# Patient Record
Sex: MALE | Race: WHITE | Employment: OTHER | URBAN - METROPOLITAN AREA
[De-identification: names, ages, dates, MRNs, and addresses within clinical notes are randomized per-mention and may not be internally consistent; named-entity substitution may affect disease eponyms.]

---

## 2017-01-04 ENCOUNTER — TELEPHONE (OUTPATIENT)
Dept: INTERNAL MEDICINE CLINIC | Age: 67
End: 2017-01-04

## 2017-01-15 DIAGNOSIS — J44.1 CHRONIC OBSTRUCTIVE PULMONARY DISEASE WITH ACUTE EXACERBATION (HCC): ICD-10-CM

## 2017-01-25 ENCOUNTER — TELEPHONE (OUTPATIENT)
Dept: INTERNAL MEDICINE CLINIC | Age: 67
End: 2017-01-25

## 2017-01-31 ENCOUNTER — TELEPHONE (OUTPATIENT)
Dept: INTERNAL MEDICINE CLINIC | Age: 67
End: 2017-01-31

## 2017-01-31 RX ORDER — LANCETS
EACH MISCELLANEOUS
Qty: 100 EACH | Refills: 5 | Status: SHIPPED | OUTPATIENT
Start: 2017-01-31 | End: 2017-03-27

## 2017-02-01 ENCOUNTER — TELEPHONE (OUTPATIENT)
Dept: PULMONOLOGY | Age: 67
End: 2017-02-01

## 2017-02-01 DIAGNOSIS — R91.1 PULMONARY NODULE: Primary | ICD-10-CM

## 2017-02-06 ENCOUNTER — TELEPHONE (OUTPATIENT)
Dept: INTERNAL MEDICINE CLINIC | Age: 67
End: 2017-02-06

## 2017-02-14 ENCOUNTER — TELEPHONE (OUTPATIENT)
Dept: INTERNAL MEDICINE CLINIC | Age: 67
End: 2017-02-14

## 2017-02-14 RX ORDER — GLIMEPIRIDE 4 MG/1
TABLET ORAL
Qty: 60 TABLET | Refills: 5 | Status: SHIPPED | OUTPATIENT
Start: 2017-02-14 | End: 2017-06-26 | Stop reason: SDUPTHER

## 2017-02-16 ENCOUNTER — HOSPITAL ENCOUNTER (OUTPATIENT)
Dept: CT IMAGING | Age: 67
Discharge: HOME | End: 2017-02-16
Attending: INTERNAL MEDICINE | Admitting: INTERNAL MEDICINE

## 2017-02-16 DIAGNOSIS — R91.1 SOLITARY PULMONARY NODULE: ICD-10-CM

## 2017-02-16 DIAGNOSIS — R91.1 PULMONARY NODULE: ICD-10-CM

## 2017-02-24 ENCOUNTER — TELEPHONE (OUTPATIENT)
Dept: PULMONOLOGY | Age: 67
End: 2017-02-24

## 2017-03-10 ENCOUNTER — OFFICE VISIT (OUTPATIENT)
Dept: INTERNAL MEDICINE CLINIC | Age: 67
End: 2017-03-10

## 2017-03-10 VITALS
OXYGEN SATURATION: 98 % | DIASTOLIC BLOOD PRESSURE: 86 MMHG | SYSTOLIC BLOOD PRESSURE: 144 MMHG | WEIGHT: 194 LBS | HEART RATE: 68 BPM | BODY MASS INDEX: 26.31 KG/M2

## 2017-03-10 DIAGNOSIS — E78.2 MIXED HYPERLIPIDEMIA: Chronic | ICD-10-CM

## 2017-03-10 DIAGNOSIS — Z79.4 TYPE 2 DIABETES MELLITUS WITHOUT COMPLICATION, WITH LONG-TERM CURRENT USE OF INSULIN (HCC): Primary | ICD-10-CM

## 2017-03-10 DIAGNOSIS — I10 ESSENTIAL HYPERTENSION: Chronic | ICD-10-CM

## 2017-03-10 DIAGNOSIS — E11.9 TYPE 2 DIABETES MELLITUS WITHOUT COMPLICATION, WITH LONG-TERM CURRENT USE OF INSULIN (HCC): Primary | ICD-10-CM

## 2017-03-10 DIAGNOSIS — N18.30 CHRONIC KIDNEY DISEASE, STAGE III (MODERATE) (HCC): ICD-10-CM

## 2017-03-10 PROCEDURE — 99214 OFFICE O/P EST MOD 30 MIN: CPT | Performed by: INTERNAL MEDICINE

## 2017-03-10 RX ORDER — SERTRALINE HYDROCHLORIDE 100 MG/1
150 TABLET, FILM COATED ORAL DAILY
Qty: 135 TABLET | Refills: 1 | Status: SHIPPED | OUTPATIENT
Start: 2017-03-10 | End: 2017-08-29

## 2017-03-10 RX ORDER — ATORVASTATIN CALCIUM 80 MG/1
TABLET, FILM COATED ORAL
Qty: 90 TABLET | Refills: 1 | Status: SHIPPED | OUTPATIENT
Start: 2017-03-10 | End: 2017-08-29

## 2017-03-10 RX ORDER — FENOFIBRATE 160 MG/1
TABLET ORAL
Qty: 90 TABLET | Refills: 1 | Status: SHIPPED | OUTPATIENT
Start: 2017-03-10 | End: 2017-08-29

## 2017-03-10 RX ORDER — AMLODIPINE BESYLATE 5 MG/1
TABLET ORAL
Qty: 30 TABLET | Refills: 5 | Status: SHIPPED | OUTPATIENT
Start: 2017-03-10 | End: 2017-06-16 | Stop reason: SDUPTHER

## 2017-03-13 ENCOUNTER — OFFICE VISIT (OUTPATIENT)
Dept: PULMONOLOGY | Age: 67
End: 2017-03-13

## 2017-03-13 VITALS
SYSTOLIC BLOOD PRESSURE: 152 MMHG | WEIGHT: 195 LBS | DIASTOLIC BLOOD PRESSURE: 90 MMHG | HEART RATE: 85 BPM | BODY MASS INDEX: 26.45 KG/M2

## 2017-03-13 DIAGNOSIS — I10 ESSENTIAL HYPERTENSION: Chronic | ICD-10-CM

## 2017-03-13 DIAGNOSIS — J43.2 CENTRILOBULAR EMPHYSEMA (HCC): ICD-10-CM

## 2017-03-13 DIAGNOSIS — R91.1 PULMONARY NODULE: Primary | ICD-10-CM

## 2017-03-13 DIAGNOSIS — J44.9 COPD, MODERATE (HCC): ICD-10-CM

## 2017-03-13 PROCEDURE — 99214 OFFICE O/P EST MOD 30 MIN: CPT | Performed by: INTERNAL MEDICINE

## 2017-03-27 RX ORDER — LANCETS
EACH MISCELLANEOUS
Qty: 150 EACH | Refills: 5 | Status: SHIPPED | OUTPATIENT
Start: 2017-03-27 | End: 2017-05-23 | Stop reason: SDUPTHER

## 2017-03-29 ENCOUNTER — TELEPHONE (OUTPATIENT)
Dept: INTERNAL MEDICINE CLINIC | Age: 67
End: 2017-03-29

## 2017-03-30 ENCOUNTER — OFFICE VISIT (OUTPATIENT)
Dept: INTERNAL MEDICINE CLINIC | Age: 67
End: 2017-03-30

## 2017-03-30 VITALS
HEART RATE: 76 BPM | RESPIRATION RATE: 95 BRPM | TEMPERATURE: 98.3 F | WEIGHT: 190 LBS | BODY MASS INDEX: 25.77 KG/M2 | DIASTOLIC BLOOD PRESSURE: 80 MMHG | SYSTOLIC BLOOD PRESSURE: 130 MMHG

## 2017-03-30 DIAGNOSIS — I10 ESSENTIAL HYPERTENSION: Chronic | ICD-10-CM

## 2017-03-30 DIAGNOSIS — J44.0 COPD WITH ACUTE BRONCHITIS (HCC): Primary | ICD-10-CM

## 2017-03-30 DIAGNOSIS — E78.2 MIXED HYPERLIPIDEMIA: Chronic | ICD-10-CM

## 2017-03-30 DIAGNOSIS — Z79.4 TYPE 2 DIABETES MELLITUS WITHOUT COMPLICATION, WITH LONG-TERM CURRENT USE OF INSULIN (HCC): ICD-10-CM

## 2017-03-30 DIAGNOSIS — E11.9 TYPE 2 DIABETES MELLITUS WITHOUT COMPLICATION, WITH LONG-TERM CURRENT USE OF INSULIN (HCC): ICD-10-CM

## 2017-03-30 DIAGNOSIS — J20.9 COPD WITH ACUTE BRONCHITIS (HCC): Primary | ICD-10-CM

## 2017-03-30 LAB
A/G RATIO: 1.4 (ref 1.1–2.2)
ALBUMIN SERPL-MCNC: 4.6 G/DL (ref 3.4–5)
ALP BLD-CCNC: 55 U/L (ref 40–129)
ALT SERPL-CCNC: 22 U/L (ref 10–40)
ANION GAP SERPL CALCULATED.3IONS-SCNC: 16 MMOL/L (ref 3–16)
AST SERPL-CCNC: 26 U/L (ref 15–37)
BILIRUB SERPL-MCNC: 0.4 MG/DL (ref 0–1)
BUN BLDV-MCNC: 25 MG/DL (ref 7–20)
CALCIUM SERPL-MCNC: 10.2 MG/DL (ref 8.3–10.6)
CHLORIDE BLD-SCNC: 100 MMOL/L (ref 99–110)
CHOLESTEROL, TOTAL: 164 MG/DL (ref 0–199)
CO2: 26 MMOL/L (ref 21–32)
CREAT SERPL-MCNC: 1.7 MG/DL (ref 0.8–1.3)
GFR AFRICAN AMERICAN: 49
GFR NON-AFRICAN AMERICAN: 41
GLOBULIN: 3.2 G/DL
GLUCOSE BLD-MCNC: 132 MG/DL (ref 70–99)
HDLC SERPL-MCNC: 30 MG/DL (ref 40–60)
LDL CHOLESTEROL CALCULATED: 81 MG/DL
POTASSIUM SERPL-SCNC: 4.4 MMOL/L (ref 3.5–5.1)
SODIUM BLD-SCNC: 142 MMOL/L (ref 136–145)
TOTAL PROTEIN: 7.8 G/DL (ref 6.4–8.2)
TRIGL SERPL-MCNC: 264 MG/DL (ref 0–150)
VLDLC SERPL CALC-MCNC: 53 MG/DL

## 2017-03-30 PROCEDURE — 99213 OFFICE O/P EST LOW 20 MIN: CPT | Performed by: INTERNAL MEDICINE

## 2017-03-30 RX ORDER — AZITHROMYCIN 250 MG/1
TABLET, FILM COATED ORAL
Qty: 1 PACKET | Refills: 0 | Status: SHIPPED | OUTPATIENT
Start: 2017-03-30 | End: 2017-04-09

## 2017-03-30 RX ORDER — GUAIFENESIN 1200 MG/1
1 TABLET, EXTENDED RELEASE ORAL 2 TIMES DAILY
Qty: 30 TABLET | Refills: 0 | COMMUNITY
Start: 2017-03-30 | End: 2018-07-06 | Stop reason: ALTCHOICE

## 2017-03-30 RX ORDER — ALBUTEROL SULFATE 90 UG/1
AEROSOL, METERED RESPIRATORY (INHALATION)
Qty: 1 INHALER | Refills: 3 | Status: SHIPPED | OUTPATIENT
Start: 2017-03-30 | End: 2018-04-06 | Stop reason: SDUPTHER

## 2017-03-31 LAB
ESTIMATED AVERAGE GLUCOSE: 139.9 MG/DL
HBA1C MFR BLD: 6.5 %

## 2017-05-23 ENCOUNTER — TELEPHONE (OUTPATIENT)
Dept: INTERNAL MEDICINE CLINIC | Age: 67
End: 2017-05-23

## 2017-05-23 RX ORDER — LANCETS
EACH MISCELLANEOUS
Qty: 150 EACH | Refills: 5 | Status: SHIPPED | OUTPATIENT
Start: 2017-05-23 | End: 2017-07-20 | Stop reason: SDUPTHER

## 2017-06-09 ENCOUNTER — OFFICE VISIT (OUTPATIENT)
Dept: INTERNAL MEDICINE CLINIC | Age: 67
End: 2017-06-09

## 2017-06-09 VITALS
DIASTOLIC BLOOD PRESSURE: 70 MMHG | WEIGHT: 191 LBS | OXYGEN SATURATION: 97 % | HEART RATE: 70 BPM | SYSTOLIC BLOOD PRESSURE: 140 MMHG | BODY MASS INDEX: 25.9 KG/M2

## 2017-06-09 DIAGNOSIS — F32.5 MAJOR DEPRESSIVE DISORDER WITH SINGLE EPISODE, IN FULL REMISSION (HCC): ICD-10-CM

## 2017-06-09 DIAGNOSIS — Z79.4 TYPE 2 DIABETES MELLITUS WITHOUT COMPLICATION, WITH LONG-TERM CURRENT USE OF INSULIN (HCC): Primary | ICD-10-CM

## 2017-06-09 DIAGNOSIS — E11.9 TYPE 2 DIABETES MELLITUS WITHOUT COMPLICATION, WITH LONG-TERM CURRENT USE OF INSULIN (HCC): Primary | ICD-10-CM

## 2017-06-09 DIAGNOSIS — N18.30 CHRONIC KIDNEY DISEASE, STAGE III (MODERATE) (HCC): ICD-10-CM

## 2017-06-09 DIAGNOSIS — E78.2 MIXED HYPERLIPIDEMIA: Chronic | ICD-10-CM

## 2017-06-09 DIAGNOSIS — I10 ESSENTIAL HYPERTENSION: Chronic | ICD-10-CM

## 2017-06-09 PROCEDURE — 90471 IMMUNIZATION ADMIN: CPT | Performed by: INTERNAL MEDICINE

## 2017-06-09 PROCEDURE — 99214 OFFICE O/P EST MOD 30 MIN: CPT | Performed by: INTERNAL MEDICINE

## 2017-06-09 PROCEDURE — 90670 PCV13 VACCINE IM: CPT | Performed by: INTERNAL MEDICINE

## 2017-06-09 ASSESSMENT — PATIENT HEALTH QUESTIONNAIRE - PHQ9
SUM OF ALL RESPONSES TO PHQ9 QUESTIONS 1 & 2: 0
2. FEELING DOWN, DEPRESSED OR HOPELESS: 0
1. LITTLE INTEREST OR PLEASURE IN DOING THINGS: 0
SUM OF ALL RESPONSES TO PHQ QUESTIONS 1-9: 0

## 2017-06-15 ENCOUNTER — TELEPHONE (OUTPATIENT)
Dept: INTERNAL MEDICINE CLINIC | Age: 67
End: 2017-06-15

## 2017-06-15 DIAGNOSIS — E11.9 TYPE 2 DIABETES MELLITUS WITHOUT COMPLICATION, WITH LONG-TERM CURRENT USE OF INSULIN (HCC): ICD-10-CM

## 2017-06-15 DIAGNOSIS — I10 ESSENTIAL HYPERTENSION: Chronic | ICD-10-CM

## 2017-06-15 DIAGNOSIS — Z79.4 TYPE 2 DIABETES MELLITUS WITHOUT COMPLICATION, WITH LONG-TERM CURRENT USE OF INSULIN (HCC): ICD-10-CM

## 2017-06-15 DIAGNOSIS — E78.2 MIXED HYPERLIPIDEMIA: Chronic | ICD-10-CM

## 2017-06-15 LAB
A/G RATIO: 1.4 (ref 1.1–2.2)
ALBUMIN SERPL-MCNC: 4.2 G/DL (ref 3.4–5)
ALP BLD-CCNC: 51 U/L (ref 40–129)
ALT SERPL-CCNC: 15 U/L (ref 10–40)
ANION GAP SERPL CALCULATED.3IONS-SCNC: 14 MMOL/L (ref 3–16)
AST SERPL-CCNC: 24 U/L (ref 15–37)
BILIRUB SERPL-MCNC: <0.2 MG/DL (ref 0–1)
BUN BLDV-MCNC: 26 MG/DL (ref 7–20)
CALCIUM SERPL-MCNC: 9.7 MG/DL (ref 8.3–10.6)
CHLORIDE BLD-SCNC: 102 MMOL/L (ref 99–110)
CHOLESTEROL, FASTING: 143 MG/DL (ref 0–199)
CO2: 28 MMOL/L (ref 21–32)
CREAT SERPL-MCNC: 1.5 MG/DL (ref 0.8–1.3)
GFR AFRICAN AMERICAN: 57
GFR NON-AFRICAN AMERICAN: 47
GLOBULIN: 3 G/DL
GLUCOSE FASTING: 100 MG/DL (ref 70–99)
HDLC SERPL-MCNC: 28 MG/DL (ref 40–60)
LDL CHOLESTEROL CALCULATED: 69 MG/DL
POTASSIUM SERPL-SCNC: 4.1 MMOL/L (ref 3.5–5.1)
SODIUM BLD-SCNC: 144 MMOL/L (ref 136–145)
TOTAL PROTEIN: 7.2 G/DL (ref 6.4–8.2)
TRIGLYCERIDE, FASTING: 229 MG/DL (ref 0–150)
VLDLC SERPL CALC-MCNC: 46 MG/DL

## 2017-06-16 ENCOUNTER — TELEPHONE (OUTPATIENT)
Dept: INTERNAL MEDICINE CLINIC | Age: 67
End: 2017-06-16

## 2017-06-16 LAB
ESTIMATED AVERAGE GLUCOSE: 125.5 MG/DL
HBA1C MFR BLD: 6 %

## 2017-06-16 RX ORDER — AMLODIPINE BESYLATE 5 MG/1
TABLET ORAL
Qty: 60 TABLET | Refills: 5 | Status: SHIPPED | OUTPATIENT
Start: 2017-06-16 | End: 2017-08-29

## 2017-06-26 ENCOUNTER — TELEPHONE (OUTPATIENT)
Dept: INTERNAL MEDICINE CLINIC | Age: 67
End: 2017-06-26

## 2017-06-26 ENCOUNTER — OFFICE VISIT (OUTPATIENT)
Dept: INTERNAL MEDICINE CLINIC | Age: 67
End: 2017-06-26

## 2017-06-26 VITALS
WEIGHT: 191.2 LBS | OXYGEN SATURATION: 95 % | HEART RATE: 75 BPM | DIASTOLIC BLOOD PRESSURE: 72 MMHG | BODY MASS INDEX: 25.93 KG/M2 | SYSTOLIC BLOOD PRESSURE: 152 MMHG

## 2017-06-26 DIAGNOSIS — I10 ESSENTIAL HYPERTENSION: Primary | Chronic | ICD-10-CM

## 2017-06-26 PROCEDURE — 99212 OFFICE O/P EST SF 10 MIN: CPT | Performed by: NURSE PRACTITIONER

## 2017-06-26 RX ORDER — GLIMEPIRIDE 4 MG/1
TABLET ORAL
Qty: 60 TABLET | Refills: 5 | Status: SHIPPED | OUTPATIENT
Start: 2017-06-26 | End: 2018-02-22

## 2017-06-26 RX ORDER — LISINOPRIL 30 MG/1
30 TABLET ORAL DAILY
Qty: 30 TABLET | Refills: 3 | Status: SHIPPED | OUTPATIENT
Start: 2017-06-26 | End: 2017-09-15 | Stop reason: SDUPTHER

## 2017-06-26 RX ORDER — HYDROCHLOROTHIAZIDE 12.5 MG/1
12.5 CAPSULE, GELATIN COATED ORAL DAILY
Qty: 30 CAPSULE | Refills: 3 | Status: SHIPPED | OUTPATIENT
Start: 2017-06-26 | End: 2017-10-25 | Stop reason: SDUPTHER

## 2017-06-28 DIAGNOSIS — J44.1 CHRONIC OBSTRUCTIVE PULMONARY DISEASE WITH ACUTE EXACERBATION (HCC): ICD-10-CM

## 2017-07-19 ENCOUNTER — TELEPHONE (OUTPATIENT)
Dept: INTERNAL MEDICINE CLINIC | Age: 67
End: 2017-07-19

## 2017-07-20 RX ORDER — LANCETS
EACH MISCELLANEOUS
Qty: 150 EACH | Refills: 5 | Status: SHIPPED | OUTPATIENT
Start: 2017-07-20 | End: 2017-09-15 | Stop reason: SDUPTHER

## 2017-07-24 ENCOUNTER — OFFICE VISIT (OUTPATIENT)
Dept: INTERNAL MEDICINE CLINIC | Age: 67
End: 2017-07-24

## 2017-07-24 VITALS — SYSTOLIC BLOOD PRESSURE: 152 MMHG | DIASTOLIC BLOOD PRESSURE: 80 MMHG

## 2017-07-24 DIAGNOSIS — I10 ESSENTIAL HYPERTENSION: Chronic | ICD-10-CM

## 2017-07-24 DIAGNOSIS — R42 DIZZINESS: Primary | ICD-10-CM

## 2017-07-24 LAB
ANION GAP SERPL CALCULATED.3IONS-SCNC: 12 MMOL/L (ref 3–16)
BUN BLDV-MCNC: 25 MG/DL (ref 7–20)
CALCIUM SERPL-MCNC: 10 MG/DL (ref 8.3–10.6)
CHLORIDE BLD-SCNC: 102 MMOL/L (ref 99–110)
CO2: 26 MMOL/L (ref 21–32)
CREAT SERPL-MCNC: 1.6 MG/DL (ref 0.8–1.3)
GFR AFRICAN AMERICAN: 53
GFR NON-AFRICAN AMERICAN: 44
GLUCOSE BLD-MCNC: 177 MG/DL (ref 70–99)
POTASSIUM SERPL-SCNC: 4.5 MMOL/L (ref 3.5–5.1)
SODIUM BLD-SCNC: 140 MMOL/L (ref 136–145)

## 2017-07-24 PROCEDURE — 99214 OFFICE O/P EST MOD 30 MIN: CPT | Performed by: NURSE PRACTITIONER

## 2017-07-24 RX ORDER — LISINOPRIL 10 MG/1
10 TABLET ORAL DAILY
Qty: 30 TABLET | Refills: 3 | Status: SHIPPED | OUTPATIENT
Start: 2017-07-24 | End: 2017-09-15

## 2017-07-28 ENCOUNTER — TELEPHONE (OUTPATIENT)
Dept: INTERNAL MEDICINE CLINIC | Age: 67
End: 2017-07-28

## 2017-07-28 ENCOUNTER — HOSPITAL ENCOUNTER (OUTPATIENT)
Dept: MRI IMAGING | Age: 67
Discharge: HOME | End: 2017-07-28
Attending: NURSE PRACTITIONER | Admitting: NURSE PRACTITIONER

## 2017-07-28 ENCOUNTER — OFFICE VISIT (OUTPATIENT)
Dept: CARDIOLOGY CLINIC | Age: 67
End: 2017-07-28

## 2017-07-28 VITALS
SYSTOLIC BLOOD PRESSURE: 132 MMHG | BODY MASS INDEX: 25.6 KG/M2 | HEART RATE: 62 BPM | HEIGHT: 72 IN | WEIGHT: 189 LBS | DIASTOLIC BLOOD PRESSURE: 76 MMHG

## 2017-07-28 DIAGNOSIS — R06.02 SHORTNESS OF BREATH: ICD-10-CM

## 2017-07-28 DIAGNOSIS — R42 DIZZINESS AND GIDDINESS: ICD-10-CM

## 2017-07-28 DIAGNOSIS — I25.10 CORONARY ARTERY DISEASE INVOLVING NATIVE CORONARY ARTERY OF NATIVE HEART WITHOUT ANGINA PECTORIS: Primary | ICD-10-CM

## 2017-07-28 DIAGNOSIS — H35.033 HYPERTENSIVE RETINOPATHY OF BOTH EYES: ICD-10-CM

## 2017-07-28 DIAGNOSIS — R42 DIZZINESS: ICD-10-CM

## 2017-07-28 DIAGNOSIS — I65.23 OCCLUSION AND STENOSIS OF BILATERAL CAROTID ARTERIES: ICD-10-CM

## 2017-07-28 DIAGNOSIS — E78.00 HYPERCHOLESTEREMIA: ICD-10-CM

## 2017-07-28 DIAGNOSIS — E78.2 MIXED HYPERLIPIDEMIA: Chronic | ICD-10-CM

## 2017-07-28 DIAGNOSIS — I10 ESSENTIAL HYPERTENSION: ICD-10-CM

## 2017-07-28 PROCEDURE — 93000 ELECTROCARDIOGRAM COMPLETE: CPT | Performed by: INTERNAL MEDICINE

## 2017-07-28 PROCEDURE — 99214 OFFICE O/P EST MOD 30 MIN: CPT | Performed by: INTERNAL MEDICINE

## 2017-08-03 ENCOUNTER — TELEPHONE (OUTPATIENT)
Dept: CARDIOLOGY CLINIC | Age: 67
End: 2017-08-03

## 2017-08-04 ENCOUNTER — HOSPITAL ENCOUNTER (OUTPATIENT)
Dept: NON INVASIVE DIAGNOSTICS | Age: 67
Discharge: HOME | End: 2017-08-07
Admitting: INTERNAL MEDICINE

## 2017-08-04 DIAGNOSIS — R42 DIZZINESS AND GIDDINESS: ICD-10-CM

## 2017-08-04 DIAGNOSIS — I65.23 OCCLUSION AND STENOSIS OF BILATERAL CAROTID ARTERIES: ICD-10-CM

## 2017-08-04 LAB
LV EF: 72 %
LVEF MODALITY: NORMAL

## 2017-08-29 RX ORDER — AMLODIPINE BESYLATE 5 MG/1
TABLET ORAL
Qty: 25 TABLET | Refills: 4 | Status: SHIPPED | OUTPATIENT
Start: 2017-08-29 | End: 2017-09-15 | Stop reason: SDUPTHER

## 2017-08-29 RX ORDER — AMLODIPINE BESYLATE 5 MG/1
TABLET ORAL
Qty: 30 TABLET | Refills: 5 | Status: SHIPPED | OUTPATIENT
Start: 2017-08-29 | End: 2017-08-29

## 2017-08-29 RX ORDER — FENOFIBRATE 160 MG/1
TABLET ORAL
Qty: 30 TABLET | Refills: 5 | Status: SHIPPED | OUTPATIENT
Start: 2017-08-29 | End: 2018-07-06 | Stop reason: SDUPTHER

## 2017-08-29 RX ORDER — SERTRALINE HYDROCHLORIDE 100 MG/1
TABLET, FILM COATED ORAL
Qty: 45 TABLET | Refills: 5 | Status: SHIPPED | OUTPATIENT
Start: 2017-08-29 | End: 2018-07-06 | Stop reason: SDUPTHER

## 2017-08-29 RX ORDER — ATORVASTATIN CALCIUM 80 MG/1
TABLET, FILM COATED ORAL
Qty: 30 TABLET | Refills: 5 | Status: SHIPPED | OUTPATIENT
Start: 2017-08-29 | End: 2018-07-06 | Stop reason: SDUPTHER

## 2017-09-15 ENCOUNTER — OFFICE VISIT (OUTPATIENT)
Dept: INTERNAL MEDICINE CLINIC | Age: 67
End: 2017-09-15

## 2017-09-15 VITALS
BODY MASS INDEX: 26.04 KG/M2 | WEIGHT: 192 LBS | DIASTOLIC BLOOD PRESSURE: 87 MMHG | SYSTOLIC BLOOD PRESSURE: 145 MMHG | HEART RATE: 75 BPM | OXYGEN SATURATION: 97 %

## 2017-09-15 DIAGNOSIS — E78.2 MIXED HYPERLIPIDEMIA: ICD-10-CM

## 2017-09-15 DIAGNOSIS — E11.9 TYPE 2 DIABETES MELLITUS WITHOUT COMPLICATION, WITH LONG-TERM CURRENT USE OF INSULIN (HCC): ICD-10-CM

## 2017-09-15 DIAGNOSIS — I10 ESSENTIAL HYPERTENSION: Chronic | ICD-10-CM

## 2017-09-15 DIAGNOSIS — Z79.4 TYPE 2 DIABETES MELLITUS WITHOUT COMPLICATION, WITH LONG-TERM CURRENT USE OF INSULIN (HCC): ICD-10-CM

## 2017-09-15 DIAGNOSIS — E11.9 TYPE 2 DIABETES MELLITUS WITHOUT COMPLICATION, WITH LONG-TERM CURRENT USE OF INSULIN (HCC): Primary | ICD-10-CM

## 2017-09-15 DIAGNOSIS — Z79.4 TYPE 2 DIABETES MELLITUS WITHOUT COMPLICATION, WITH LONG-TERM CURRENT USE OF INSULIN (HCC): Primary | ICD-10-CM

## 2017-09-15 DIAGNOSIS — N18.30 CHRONIC KIDNEY DISEASE, STAGE III (MODERATE) (HCC): ICD-10-CM

## 2017-09-15 LAB
A/G RATIO: 1.3 (ref 1.1–2.2)
ALBUMIN SERPL-MCNC: 4.5 G/DL (ref 3.4–5)
ALP BLD-CCNC: 62 U/L (ref 40–129)
ALT SERPL-CCNC: 16 U/L (ref 10–40)
ANION GAP SERPL CALCULATED.3IONS-SCNC: 15 MMOL/L (ref 3–16)
AST SERPL-CCNC: 18 U/L (ref 15–37)
BILIRUB SERPL-MCNC: <0.2 MG/DL (ref 0–1)
BUN BLDV-MCNC: 24 MG/DL (ref 7–20)
CALCIUM SERPL-MCNC: 10.9 MG/DL (ref 8.3–10.6)
CHLORIDE BLD-SCNC: 102 MMOL/L (ref 99–110)
CO2: 28 MMOL/L (ref 21–32)
CREAT SERPL-MCNC: 1.5 MG/DL (ref 0.8–1.3)
GFR AFRICAN AMERICAN: 57
GFR NON-AFRICAN AMERICAN: 47
GLOBULIN: 3.4 G/DL
GLUCOSE BLD-MCNC: 112 MG/DL (ref 70–99)
POTASSIUM SERPL-SCNC: 4.7 MMOL/L (ref 3.5–5.1)
SODIUM BLD-SCNC: 145 MMOL/L (ref 136–145)
TOTAL PROTEIN: 7.9 G/DL (ref 6.4–8.2)

## 2017-09-15 PROCEDURE — 99214 OFFICE O/P EST MOD 30 MIN: CPT | Performed by: INTERNAL MEDICINE

## 2017-09-15 RX ORDER — AMLODIPINE BESYLATE 5 MG/1
10 TABLET ORAL DAILY
COMMUNITY
Start: 2017-09-15 | End: 2017-10-25

## 2017-09-15 RX ORDER — LANCETS
EACH MISCELLANEOUS
Qty: 150 EACH | Refills: 5 | Status: SHIPPED | OUTPATIENT
Start: 2017-09-15 | End: 2018-07-06 | Stop reason: SDUPTHER

## 2017-09-15 RX ORDER — LISINOPRIL 40 MG/1
40 TABLET ORAL DAILY
COMMUNITY
Start: 2017-09-15 | End: 2017-10-25

## 2017-09-16 LAB
ESTIMATED AVERAGE GLUCOSE: 131.2 MG/DL
HBA1C MFR BLD: 6.2 %

## 2017-09-19 ENCOUNTER — TELEPHONE (OUTPATIENT)
Dept: INTERNAL MEDICINE CLINIC | Age: 67
End: 2017-09-19

## 2017-09-19 RX ORDER — AMLODIPINE BESYLATE 10 MG/1
10 TABLET ORAL DAILY
Qty: 30 TABLET | Refills: 5 | Status: SHIPPED | OUTPATIENT
Start: 2017-09-19 | End: 2018-02-22 | Stop reason: SDUPTHER

## 2017-10-18 ENCOUNTER — NURSE ONLY (OUTPATIENT)
Dept: INTERNAL MEDICINE CLINIC | Age: 67
End: 2017-10-18

## 2017-10-18 DIAGNOSIS — Z23 NEEDS FLU SHOT: Primary | ICD-10-CM

## 2017-10-18 PROCEDURE — 90471 IMMUNIZATION ADMIN: CPT | Performed by: INTERNAL MEDICINE

## 2017-10-18 PROCEDURE — 90662 IIV NO PRSV INCREASED AG IM: CPT | Performed by: INTERNAL MEDICINE

## 2017-12-29 ENCOUNTER — OFFICE VISIT (OUTPATIENT)
Dept: INTERNAL MEDICINE CLINIC | Age: 67
End: 2017-12-29

## 2017-12-29 VITALS
BODY MASS INDEX: 25.9 KG/M2 | WEIGHT: 191 LBS | HEART RATE: 80 BPM | SYSTOLIC BLOOD PRESSURE: 124 MMHG | RESPIRATION RATE: 16 BRPM | DIASTOLIC BLOOD PRESSURE: 58 MMHG

## 2017-12-29 DIAGNOSIS — Z79.4 TYPE 2 DIABETES MELLITUS WITHOUT COMPLICATION, WITH LONG-TERM CURRENT USE OF INSULIN (HCC): Primary | ICD-10-CM

## 2017-12-29 DIAGNOSIS — I10 ESSENTIAL HYPERTENSION: Chronic | ICD-10-CM

## 2017-12-29 DIAGNOSIS — E78.2 MIXED HYPERLIPIDEMIA: ICD-10-CM

## 2017-12-29 DIAGNOSIS — N18.30 CHRONIC KIDNEY DISEASE, STAGE III (MODERATE) (HCC): ICD-10-CM

## 2017-12-29 DIAGNOSIS — E11.9 TYPE 2 DIABETES MELLITUS WITHOUT COMPLICATION, WITH LONG-TERM CURRENT USE OF INSULIN (HCC): Primary | ICD-10-CM

## 2017-12-29 PROCEDURE — 99214 OFFICE O/P EST MOD 30 MIN: CPT | Performed by: INTERNAL MEDICINE

## 2017-12-29 NOTE — PROGRESS NOTES
DAILY Yes Maia Beltran MD   fenofibrate 160 MG tablet TAKE ONE TABLET BY MOUTH DAILY Yes Maia Beltran MD   sertraline (ZOLOFT) 100 MG tablet TAKE ONE AND ONE-HALF (1 & 1/2) TABLET BY MOUTH DAILY Yes Maia Beltran MD   glimepiride (AMARYL) 4 MG tablet take 2 tabs every morning Yes Maia Beltran MD   albuterol sulfate HFA (VENTOLIN HFA) 108 (90 BASE) MCG/ACT inhaler 2 Puff every 4-6 hours as needed for wheezing or shortness of breath Yes Jean Varner DO   glucose blood VI test strips ([employer] TEST STRIPS) strip Patient test twice daily Yes Maia Beltran MD   Blood Glucose Monitoring Suppl (ONE TOUCH ULTRA 2) W/DEVICE KIT 1 kit by Does not apply route daily as needed. Yes Maia Beltran MD   oxyCODONE-acetaminophen (PERCOCET) 7.5-325 MG per tablet Take 1 tablet by mouth every 6 hours as needed for Pain. Yes Historical Provider, MD   aspirin 325 MG tablet Take 325 mg by mouth daily. Yes Historical Provider, MD   fish oil-omega-3 fatty acids 1000 MG capsule Take 2 g by mouth daily. Yes Historical Provider, MD   guaiFENesin (MUCINEX MAXIMUM STRENGTH) 1200 MG TB12 Take 1 tablet by mouth 2 times daily  Jean Varner DO   fluticasone (ARNUITY ELLIPTA) 100 MCG/ACT AEPB Inhale 2 puffs into the lungs daily  Jean Varner DO        Vitals:    12/29/17 1259   BP: (!) 124/58   Site: Right Arm   Position: Sitting   Cuff Size: Large Adult   Pulse: 80   Resp: 16   Weight: 191 lb (86.6 kg)     Body mass index is 25.9 kg/m². Wt Readings from Last 3 Encounters:   12/29/17 191 lb (86.6 kg)   09/15/17 192 lb (87.1 kg)   07/28/17 189 lb (85.7 kg)     BP Readings from Last 3 Encounters:   12/29/17 (!) 124/58   09/15/17 (!) 145/87   07/28/17 132/76      CC:  Patient presents for re-evaluation of the following medical concerns     HPI  Diabetes Mellitus Type 2:  Current issues:  hyperglycemia.     Home blood sugar records:   in am, 101-288 in the evening on 8 mg dose of Amaryl, 10 units Lantus qpm  Any episodes of hypoglycemia? no  Eye exam current (within one year): yes  Tobacco history: He  reports that he quit smoking about 26 years ago. His smoking use included Cigarettes. He has a 20.00 pack-year smoking history. He has never used smokeless tobacco.   Daily Aspirin? Yes  Dietary indiscretion over the holidays- has gained about 2 pounds over the past 3 months. Hypertension/CKD:  Home blood pressure monitoring: No.  He is not adherent to a low sodium diet. Patient denies chest pain, shortness of breath, lightheadedness, headaches and palpitations. Antihypertensive medication side effects: no medication side effects noted. Use of agents associated with hypertension: none. Hyperlipidemia:  No new myalgias or GI upset on atorvastatin (Lipitor), fenofibrate (Tricor, Trilipix) and OTC Fish Oil. Lab Results   Component Value Date    LABA1C 6.2 09/15/2017    LABA1C 6.0 06/15/2017    LABA1C 6.5 03/30/2017     Lab Results   Component Value Date    LABMICR <1.20 02/26/2016    CREATININE 1.5 (H) 09/15/2017     Lab Results   Component Value Date    ALT 16 09/15/2017    AST 18 09/15/2017     Lab Results   Component Value Date    CHOL 164 03/30/2017    TRIG 264 (H) 03/30/2017    HDL 28 (L) 06/15/2017    LDLCALC 69 06/15/2017    LDLDIRECT 92 03/10/2016        Review of Systems  As documented in HPI    Physical Exam   Constitutional: He is oriented to person, place, and time. He appears well-developed and well-nourished. No distress. HENT:   Mouth/Throat: Oropharynx is clear and moist and mucous membranes are normal.   Eyes: Conjunctivae are normal.   Neck: No JVD present. Carotid bruit is not present. No thyroid mass and no thyromegaly present. Cardiovascular: Normal rate, regular rhythm, normal heart sounds and intact distal pulses. Exam reveals no gallop and no friction rub. No murmur heard. Pulmonary/Chest: Effort normal and breath sounds normal. No respiratory distress. He has no wheezes.  He has no rhonchi. He has no rales. Musculoskeletal: He exhibits no edema. Neurological: He is alert and oriented to person, place, and time. He has normal strength. Skin: Skin is warm and dry. No rash noted. No erythema. Psychiatric: He has a normal mood and affect.  His behavior is normal. Judgment and thought content normal.

## 2017-12-29 NOTE — PATIENT INSTRUCTIONS
good, quick way to make sure that you have a balanced meal. It also helps you spread carbs throughout the day. ¨ Divide your plate by types of foods. Put non-starchy vegetables on half the plate, meat or other protein food on one-quarter of the plate, and a grain or starchy vegetable in the final quarter of the plate. To this you can add a small piece of fruit and 1 cup of milk or yogurt, depending on how many carbs you are supposed to eat at a meal.  · Try to eat about the same amount of carbs at each meal. Do not \"save up\" your daily allowance of carbs to eat at one meal.  · Proteins have very little or no carbs per serving. Examples of proteins are beef, chicken, turkey, fish, eggs, tofu, cheese, cottage cheese, and peanut butter. A serving size of meat is 3 ounces, which is about the size of a deck of cards. Examples of meat substitute serving sizes (equal to 1 ounce of meat) are 1/4 cup of cottage cheese, 1 egg, 1 tablespoon of peanut butter, and ½ cup of tofu. How can you eat out and still eat healthy? · Learn to estimate the serving sizes of foods that have carbohydrate. If you measure food at home, it will be easier to estimate the amount in a serving of restaurant food. · If the meal you order has too much carbohydrate (such as potatoes, corn, or baked beans), ask to have a low-carbohydrate food instead. Ask for a salad or green vegetables. · If you use insulin, check your blood sugar before and after eating out to help you plan how much to eat in the future. · If you eat more carbohydrate at a meal than you had planned, take a walk or do other exercise. This will help lower your blood sugar. What else should you know? · Limit saturated fat, such as the fat from meat and dairy products. This is a healthy choice because people who have diabetes are at higher risk of heart disease. So choose lean cuts of meat and nonfat or low-fat dairy products.  Use olive or canola oil instead of butter or shortening

## 2018-02-22 RX ORDER — AMLODIPINE BESYLATE 10 MG/1
TABLET ORAL
Qty: 30 TABLET | Refills: 5 | Status: SHIPPED | OUTPATIENT
Start: 2018-02-22 | End: 2018-07-06 | Stop reason: SDUPTHER

## 2018-02-22 RX ORDER — GLIMEPIRIDE 4 MG/1
TABLET ORAL
Qty: 60 TABLET | Refills: 5 | Status: SHIPPED | OUTPATIENT
Start: 2018-02-22 | End: 2018-07-06 | Stop reason: SDUPTHER

## 2018-04-06 ENCOUNTER — OFFICE VISIT (OUTPATIENT)
Dept: CARDIOLOGY CLINIC | Age: 68
End: 2018-04-06

## 2018-04-06 ENCOUNTER — OFFICE VISIT (OUTPATIENT)
Dept: INTERNAL MEDICINE CLINIC | Age: 68
End: 2018-04-06

## 2018-04-06 VITALS
OXYGEN SATURATION: 95 % | BODY MASS INDEX: 25.9 KG/M2 | DIASTOLIC BLOOD PRESSURE: 60 MMHG | HEART RATE: 76 BPM | WEIGHT: 191 LBS | SYSTOLIC BLOOD PRESSURE: 134 MMHG

## 2018-04-06 VITALS
OXYGEN SATURATION: 96 % | BODY MASS INDEX: 26.01 KG/M2 | SYSTOLIC BLOOD PRESSURE: 148 MMHG | DIASTOLIC BLOOD PRESSURE: 82 MMHG | WEIGHT: 192 LBS | HEIGHT: 72 IN | HEART RATE: 67 BPM

## 2018-04-06 DIAGNOSIS — Z12.11 COLON CANCER SCREENING: ICD-10-CM

## 2018-04-06 DIAGNOSIS — Z79.4 TYPE 2 DIABETES MELLITUS WITHOUT COMPLICATION, WITH LONG-TERM CURRENT USE OF INSULIN (HCC): ICD-10-CM

## 2018-04-06 DIAGNOSIS — J44.9 COPD, MODERATE (HCC): ICD-10-CM

## 2018-04-06 DIAGNOSIS — E78.00 HYPERCHOLESTEREMIA: ICD-10-CM

## 2018-04-06 DIAGNOSIS — I10 ESSENTIAL HYPERTENSION: Chronic | ICD-10-CM

## 2018-04-06 DIAGNOSIS — Z12.5 PROSTATE CANCER SCREENING: ICD-10-CM

## 2018-04-06 DIAGNOSIS — I25.10 CORONARY ARTERY DISEASE INVOLVING NATIVE CORONARY ARTERY OF NATIVE HEART WITHOUT ANGINA PECTORIS: Primary | ICD-10-CM

## 2018-04-06 DIAGNOSIS — E11.9 TYPE 2 DIABETES MELLITUS WITHOUT COMPLICATION, WITH LONG-TERM CURRENT USE OF INSULIN (HCC): ICD-10-CM

## 2018-04-06 DIAGNOSIS — I10 ESSENTIAL HYPERTENSION: ICD-10-CM

## 2018-04-06 DIAGNOSIS — J40 BRONCHITIS: Primary | ICD-10-CM

## 2018-04-06 PROCEDURE — 99214 OFFICE O/P EST MOD 30 MIN: CPT | Performed by: INTERNAL MEDICINE

## 2018-04-06 RX ORDER — GUAIFENESIN AND CODEINE PHOSPHATE 100; 10 MG/5ML; MG/5ML
10 SOLUTION ORAL NIGHTLY PRN
Qty: 120 ML | Refills: 0 | Status: SHIPPED | OUTPATIENT
Start: 2018-04-06 | End: 2018-04-13

## 2018-04-06 RX ORDER — DOXYCYCLINE HYCLATE 100 MG/1
100 CAPSULE ORAL 2 TIMES DAILY
Qty: 20 CAPSULE | Refills: 0 | Status: SHIPPED | OUTPATIENT
Start: 2018-04-06 | End: 2018-04-16

## 2018-04-06 RX ORDER — ALBUTEROL SULFATE 90 UG/1
AEROSOL, METERED RESPIRATORY (INHALATION)
Qty: 1 INHALER | Refills: 3 | Status: SHIPPED | OUTPATIENT
Start: 2018-04-06 | End: 2019-02-07 | Stop reason: SDUPTHER

## 2018-04-06 RX ORDER — PREDNISONE 10 MG/1
40 TABLET ORAL DAILY
Qty: 20 TABLET | Refills: 0 | Status: SHIPPED | OUTPATIENT
Start: 2018-04-06 | End: 2018-04-11

## 2018-04-21 DIAGNOSIS — I10 ESSENTIAL HYPERTENSION: Chronic | ICD-10-CM

## 2018-04-22 RX ORDER — HYDROCHLOROTHIAZIDE 12.5 MG/1
CAPSULE, GELATIN COATED ORAL
Qty: 30 CAPSULE | Refills: 5 | Status: SHIPPED | OUTPATIENT
Start: 2018-04-22 | End: 2018-10-09 | Stop reason: SDUPTHER

## 2018-04-22 RX ORDER — LISINOPRIL 40 MG/1
TABLET ORAL
Qty: 30 TABLET | Refills: 5 | Status: SHIPPED | OUTPATIENT
Start: 2018-04-22 | End: 2018-10-09 | Stop reason: SDUPTHER

## 2018-04-23 ENCOUNTER — TELEPHONE (OUTPATIENT)
Dept: INTERNAL MEDICINE CLINIC | Age: 68
End: 2018-04-23

## 2018-04-23 RX ORDER — LEVOFLOXACIN 500 MG/1
500 TABLET, FILM COATED ORAL DAILY
Qty: 7 TABLET | Refills: 0 | Status: SHIPPED | OUTPATIENT
Start: 2018-04-23 | End: 2018-04-30

## 2018-06-25 DIAGNOSIS — J44.1 CHRONIC OBSTRUCTIVE PULMONARY DISEASE WITH ACUTE EXACERBATION (HCC): ICD-10-CM

## 2018-06-25 RX ORDER — INSULIN GLARGINE 100 [IU]/ML
INJECTION, SOLUTION SUBCUTANEOUS
Qty: 15 PEN | Refills: 4 | Status: SHIPPED | OUTPATIENT
Start: 2018-06-25 | End: 2018-10-09 | Stop reason: ALTCHOICE

## 2018-06-25 RX ORDER — PEN NEEDLE, DIABETIC 31 GX5/16"
NEEDLE, DISPOSABLE MISCELLANEOUS
Qty: 100 EACH | Refills: 11 | Status: SHIPPED | OUTPATIENT
Start: 2018-06-25 | End: 2019-06-19 | Stop reason: SDUPTHER

## 2018-06-27 RX ORDER — UMECLIDINIUM BROMIDE AND VILANTEROL TRIFENATATE 62.5; 25 UG/1; UG/1
POWDER RESPIRATORY (INHALATION)
Qty: 60 EACH | Refills: 0 | Status: SHIPPED | OUTPATIENT
Start: 2018-06-27 | End: 2018-07-06 | Stop reason: SDUPTHER

## 2018-07-06 ENCOUNTER — OFFICE VISIT (OUTPATIENT)
Dept: INTERNAL MEDICINE CLINIC | Age: 68
End: 2018-07-06

## 2018-07-06 VITALS
WEIGHT: 188 LBS | DIASTOLIC BLOOD PRESSURE: 60 MMHG | BODY MASS INDEX: 25.5 KG/M2 | HEART RATE: 80 BPM | SYSTOLIC BLOOD PRESSURE: 132 MMHG

## 2018-07-06 DIAGNOSIS — I10 ESSENTIAL HYPERTENSION: Chronic | ICD-10-CM

## 2018-07-06 DIAGNOSIS — Z79.4 TYPE 2 DIABETES MELLITUS WITHOUT COMPLICATION, WITH LONG-TERM CURRENT USE OF INSULIN (HCC): Primary | ICD-10-CM

## 2018-07-06 DIAGNOSIS — F32.5 MAJOR DEPRESSIVE DISORDER WITH SINGLE EPISODE, IN FULL REMISSION (HCC): ICD-10-CM

## 2018-07-06 DIAGNOSIS — E11.9 TYPE 2 DIABETES MELLITUS WITHOUT COMPLICATION, WITH LONG-TERM CURRENT USE OF INSULIN (HCC): Primary | ICD-10-CM

## 2018-07-06 DIAGNOSIS — E78.2 MIXED HYPERLIPIDEMIA: ICD-10-CM

## 2018-07-06 DIAGNOSIS — N18.30 CHRONIC KIDNEY DISEASE, STAGE III (MODERATE) (HCC): ICD-10-CM

## 2018-07-06 PROCEDURE — 99214 OFFICE O/P EST MOD 30 MIN: CPT | Performed by: INTERNAL MEDICINE

## 2018-07-06 RX ORDER — AMLODIPINE BESYLATE 10 MG/1
10 TABLET ORAL DAILY
Qty: 30 TABLET | Refills: 5 | Status: SHIPPED | OUTPATIENT
Start: 2018-07-06 | End: 2018-12-28 | Stop reason: SDUPTHER

## 2018-07-06 RX ORDER — LANCETS
EACH MISCELLANEOUS
Qty: 150 EACH | Refills: 5 | Status: SHIPPED | OUTPATIENT
Start: 2018-07-06 | End: 2019-06-19 | Stop reason: SDUPTHER

## 2018-07-06 RX ORDER — FENOFIBRATE 160 MG/1
TABLET ORAL
Qty: 30 TABLET | Refills: 5 | Status: SHIPPED | OUTPATIENT
Start: 2018-07-06 | End: 2018-12-28 | Stop reason: SDUPTHER

## 2018-07-06 RX ORDER — GLIMEPIRIDE 4 MG/1
TABLET ORAL
Qty: 60 TABLET | Refills: 5 | Status: SHIPPED | OUTPATIENT
Start: 2018-07-06 | End: 2018-12-28 | Stop reason: SDUPTHER

## 2018-07-06 RX ORDER — ATORVASTATIN CALCIUM 80 MG/1
TABLET, FILM COATED ORAL
Qty: 30 TABLET | Refills: 5 | Status: SHIPPED | OUTPATIENT
Start: 2018-07-06 | End: 2018-12-28 | Stop reason: SDUPTHER

## 2018-07-06 RX ORDER — SERTRALINE HYDROCHLORIDE 100 MG/1
100 TABLET, FILM COATED ORAL DAILY
Qty: 30 TABLET | Refills: 5 | Status: SHIPPED | OUTPATIENT
Start: 2018-07-06 | End: 2018-07-23 | Stop reason: SDUPTHER

## 2018-07-06 ASSESSMENT — PATIENT HEALTH QUESTIONNAIRE - PHQ9
2. FEELING DOWN, DEPRESSED OR HOPELESS: 0
SUM OF ALL RESPONSES TO PHQ9 QUESTIONS 1 & 2: 0
1. LITTLE INTEREST OR PLEASURE IN DOING THINGS: 0
SUM OF ALL RESPONSES TO PHQ QUESTIONS 1-9: 0

## 2018-07-06 NOTE — PROGRESS NOTES
sounds normal. No respiratory distress. He has no wheezes. He has no rhonchi. He has no rales. Musculoskeletal: He exhibits no edema. Neurological: He is alert and oriented to person, place, and time. He has normal strength. Skin: Skin is warm and dry. No rash noted. No erythema. Psychiatric: He has a normal mood and affect.  His behavior is normal. Judgment and thought content normal.

## 2018-07-23 ENCOUNTER — TELEPHONE (OUTPATIENT)
Dept: INTERNAL MEDICINE CLINIC | Age: 68
End: 2018-07-23

## 2018-07-23 RX ORDER — SERTRALINE HYDROCHLORIDE 100 MG/1
150 TABLET, FILM COATED ORAL DAILY
Qty: 45 TABLET | Refills: 5 | Status: SHIPPED | OUTPATIENT
Start: 2018-07-23 | End: 2018-12-28 | Stop reason: SDUPTHER

## 2018-07-23 NOTE — TELEPHONE ENCOUNTER
Pt is requesting a new rx for Sertraline . He stated that his meds was decreased to 100 mg.   Pt stated that he would like to have doseage increased to 150 mg #45  Please all new rx into [address] [place] [address] 00, [address] [facility] [phone] - F [phone] [

## 2018-07-24 ENCOUNTER — TELEPHONE (OUTPATIENT)
Dept: INTERNAL MEDICINE CLINIC | Age: 68
End: 2018-07-24

## 2018-07-30 ENCOUNTER — TELEPHONE (OUTPATIENT)
Dept: INTERNAL MEDICINE CLINIC | Age: 68
End: 2018-07-30

## 2018-07-30 NOTE — TELEPHONE ENCOUNTER
Pt stated that he requested a refill for Anoro Ellipta inhater #2  Pt stated that he called [address]. And they have not received any refills.   Please call med into [facility] [address], [address]

## 2018-08-07 ENCOUNTER — TELEPHONE (OUTPATIENT)
Dept: INTERNAL MEDICINE CLINIC | Age: 68
End: 2018-08-07

## 2018-08-07 NOTE — TELEPHONE ENCOUNTER
Pt stated that his blood sugars have been running 80-81 in the mornings. He has been taking 10 mg of insulin at night. Pt stated that blood sugars have not gone over 100 in the past 3 wks.

## 2018-08-23 ENCOUNTER — TELEPHONE (OUTPATIENT)
Dept: INTERNAL MEDICINE CLINIC | Age: 68
End: 2018-08-23

## 2018-09-18 ENCOUNTER — TELEPHONE (OUTPATIENT)
Dept: INTERNAL MEDICINE CLINIC | Age: 68
End: 2018-09-18

## 2018-09-18 NOTE — TELEPHONE ENCOUNTER
Pt is calling with the following blood sugar readings;   These readings were taken in the morning fasting,  9/1 117  9/3 135  9/4 165  9/5 124  9/6 132  9/7 152  9/10 163  9/11 138  9/13 123  9/14 171  9/16 139  9/18 147

## 2018-10-09 ENCOUNTER — OFFICE VISIT (OUTPATIENT)
Dept: INTERNAL MEDICINE CLINIC | Age: 68
End: 2018-10-09
Payer: COMMERCIAL

## 2018-10-09 VITALS
WEIGHT: 189 LBS | HEART RATE: 84 BPM | DIASTOLIC BLOOD PRESSURE: 70 MMHG | SYSTOLIC BLOOD PRESSURE: 128 MMHG | BODY MASS INDEX: 25.63 KG/M2

## 2018-10-09 DIAGNOSIS — Z79.4 TYPE 2 DIABETES MELLITUS WITHOUT COMPLICATION, WITH LONG-TERM CURRENT USE OF INSULIN (HCC): Primary | ICD-10-CM

## 2018-10-09 DIAGNOSIS — N18.30 CHRONIC KIDNEY DISEASE, STAGE III (MODERATE) (HCC): ICD-10-CM

## 2018-10-09 DIAGNOSIS — I10 ESSENTIAL HYPERTENSION: Chronic | ICD-10-CM

## 2018-10-09 DIAGNOSIS — E11.9 TYPE 2 DIABETES MELLITUS WITHOUT COMPLICATION, WITH LONG-TERM CURRENT USE OF INSULIN (HCC): Primary | ICD-10-CM

## 2018-10-09 DIAGNOSIS — F32.5 MAJOR DEPRESSIVE DISORDER WITH SINGLE EPISODE, IN FULL REMISSION (HCC): ICD-10-CM

## 2018-10-09 DIAGNOSIS — E78.2 MIXED HYPERLIPIDEMIA: ICD-10-CM

## 2018-10-09 PROCEDURE — 99214 OFFICE O/P EST MOD 30 MIN: CPT | Performed by: INTERNAL MEDICINE

## 2018-10-09 RX ORDER — LISINOPRIL 40 MG/1
TABLET ORAL
Qty: 30 TABLET | Refills: 5 | Status: SHIPPED | OUTPATIENT
Start: 2018-10-09 | End: 2018-12-28 | Stop reason: SDUPTHER

## 2018-10-09 RX ORDER — HYDROCHLOROTHIAZIDE 12.5 MG/1
CAPSULE, GELATIN COATED ORAL
Qty: 30 CAPSULE | Refills: 5 | Status: SHIPPED | OUTPATIENT
Start: 2018-10-09 | End: 2018-12-28 | Stop reason: SDUPTHER

## 2018-11-08 ENCOUNTER — NURSE ONLY (OUTPATIENT)
Dept: INTERNAL MEDICINE CLINIC | Age: 68
End: 2018-11-08
Payer: COMMERCIAL

## 2018-11-08 DIAGNOSIS — Z23 NEEDS FLU SHOT: Primary | ICD-10-CM

## 2018-11-08 PROCEDURE — 90471 IMMUNIZATION ADMIN: CPT | Performed by: INTERNAL MEDICINE

## 2018-11-08 PROCEDURE — 90662 IIV NO PRSV INCREASED AG IM: CPT | Performed by: INTERNAL MEDICINE

## 2018-11-20 ENCOUNTER — TELEPHONE (OUTPATIENT)
Dept: INTERNAL MEDICINE CLINIC | Age: 68
End: 2018-11-20

## 2018-11-20 NOTE — TELEPHONE ENCOUNTER
Blood sugars are 200's, bumped insulin up to 4 units lantus at night and sugar was 189 in am. Concerned that numbers are to high.

## 2018-11-20 NOTE — TELEPHONE ENCOUNTER
Patient requesting a call back from Yasmin Rolle regarding his A1C. Patient would not provide any other information. Patient can be reached at phone number provided.

## 2018-12-03 ENCOUNTER — TELEPHONE (OUTPATIENT)
Dept: INTERNAL MEDICINE CLINIC | Age: 68
End: 2018-12-03

## 2018-12-11 ENCOUNTER — TELEPHONE (OUTPATIENT)
Dept: INTERNAL MEDICINE CLINIC | Age: 68
End: 2018-12-11

## 2018-12-28 DIAGNOSIS — I10 ESSENTIAL HYPERTENSION: Chronic | ICD-10-CM

## 2018-12-28 RX ORDER — FENOFIBRATE 160 MG/1
TABLET ORAL
Qty: 90 TABLET | Refills: 1 | Status: SHIPPED | OUTPATIENT
Start: 2018-12-28 | End: 2019-06-22 | Stop reason: SDUPTHER

## 2018-12-28 RX ORDER — ATORVASTATIN CALCIUM 80 MG/1
TABLET, FILM COATED ORAL
Qty: 90 TABLET | Refills: 1 | Status: SHIPPED | OUTPATIENT
Start: 2018-12-28 | End: 2019-04-14

## 2018-12-28 RX ORDER — AMLODIPINE BESYLATE 10 MG/1
10 TABLET ORAL DAILY
Qty: 90 TABLET | Refills: 1 | Status: SHIPPED | OUTPATIENT
Start: 2018-12-28 | End: 2019-06-22 | Stop reason: SDUPTHER

## 2018-12-28 RX ORDER — GLIMEPIRIDE 4 MG/1
TABLET ORAL
Qty: 180 TABLET | Refills: 1 | Status: SHIPPED | OUTPATIENT
Start: 2018-12-28 | End: 2019-06-22 | Stop reason: SDUPTHER

## 2018-12-28 RX ORDER — LISINOPRIL 40 MG/1
TABLET ORAL
Qty: 90 TABLET | Refills: 1 | Status: SHIPPED | OUTPATIENT
Start: 2018-12-28 | End: 2019-06-21 | Stop reason: SDUPTHER

## 2018-12-28 RX ORDER — SERTRALINE HYDROCHLORIDE 100 MG/1
150 TABLET, FILM COATED ORAL DAILY
Qty: 135 TABLET | Refills: 1 | Status: SHIPPED | OUTPATIENT
Start: 2018-12-28 | End: 2019-01-11 | Stop reason: SDUPTHER

## 2018-12-28 RX ORDER — HYDROCHLOROTHIAZIDE 12.5 MG/1
CAPSULE, GELATIN COATED ORAL
Qty: 90 CAPSULE | Refills: 1 | Status: SHIPPED | OUTPATIENT
Start: 2018-12-28 | End: 2019-05-02 | Stop reason: ALTCHOICE

## 2019-01-11 ENCOUNTER — OFFICE VISIT (OUTPATIENT)
Dept: INTERNAL MEDICINE CLINIC | Age: 69
End: 2019-01-11
Payer: COMMERCIAL

## 2019-01-11 VITALS
DIASTOLIC BLOOD PRESSURE: 74 MMHG | OXYGEN SATURATION: 98 % | HEART RATE: 74 BPM | BODY MASS INDEX: 25.36 KG/M2 | SYSTOLIC BLOOD PRESSURE: 130 MMHG | WEIGHT: 187 LBS

## 2019-01-11 DIAGNOSIS — F51.01 PRIMARY INSOMNIA: ICD-10-CM

## 2019-01-11 DIAGNOSIS — N18.30 CHRONIC KIDNEY DISEASE, STAGE III (MODERATE) (HCC): ICD-10-CM

## 2019-01-11 DIAGNOSIS — Z79.4 TYPE 2 DIABETES MELLITUS WITHOUT COMPLICATION, WITH LONG-TERM CURRENT USE OF INSULIN (HCC): Primary | ICD-10-CM

## 2019-01-11 DIAGNOSIS — E78.2 MIXED HYPERLIPIDEMIA: ICD-10-CM

## 2019-01-11 DIAGNOSIS — F32.5 MAJOR DEPRESSIVE DISORDER WITH SINGLE EPISODE, IN FULL REMISSION (HCC): ICD-10-CM

## 2019-01-11 DIAGNOSIS — E11.9 TYPE 2 DIABETES MELLITUS WITHOUT COMPLICATION, WITH LONG-TERM CURRENT USE OF INSULIN (HCC): Primary | ICD-10-CM

## 2019-01-11 DIAGNOSIS — I10 ESSENTIAL HYPERTENSION: Chronic | ICD-10-CM

## 2019-01-11 PROCEDURE — 99214 OFFICE O/P EST MOD 30 MIN: CPT | Performed by: INTERNAL MEDICINE

## 2019-01-11 RX ORDER — TRAZODONE HYDROCHLORIDE 50 MG/1
50 TABLET ORAL NIGHTLY
Qty: 90 TABLET | Refills: 1 | Status: SHIPPED | OUTPATIENT
Start: 2019-01-11 | End: 2019-03-07 | Stop reason: DRUGHIGH

## 2019-01-11 RX ORDER — SERTRALINE HYDROCHLORIDE 100 MG/1
100 TABLET, FILM COATED ORAL DAILY
COMMUNITY
Start: 2019-01-11 | End: 2019-06-22

## 2019-01-14 ENCOUNTER — TELEPHONE (OUTPATIENT)
Dept: INTERNAL MEDICINE CLINIC | Age: 69
End: 2019-01-14

## 2019-01-15 ENCOUNTER — TELEPHONE (OUTPATIENT)
Dept: INTERNAL MEDICINE CLINIC | Age: 69
End: 2019-01-15

## 2019-01-22 ENCOUNTER — TELEPHONE (OUTPATIENT)
Dept: INTERNAL MEDICINE CLINIC | Age: 69
End: 2019-01-22

## 2019-01-23 ENCOUNTER — TELEPHONE (OUTPATIENT)
Dept: INTERNAL MEDICINE CLINIC | Age: 69
End: 2019-01-23

## 2019-01-24 ENCOUNTER — TELEPHONE (OUTPATIENT)
Dept: PULMONOLOGY | Age: 69
End: 2019-01-24

## 2019-01-25 ENCOUNTER — TELEPHONE (OUTPATIENT)
Dept: FAMILY MEDICINE CLINIC | Age: 69
End: 2019-01-25

## 2019-01-29 ENCOUNTER — TELEPHONE (OUTPATIENT)
Dept: INTERNAL MEDICINE CLINIC | Age: 69
End: 2019-01-29

## 2019-01-30 ENCOUNTER — TELEPHONE (OUTPATIENT)
Dept: INTERNAL MEDICINE CLINIC | Age: 69
End: 2019-01-30

## 2019-01-30 RX ORDER — BLOOD KETONE GLUCOSE MONITOR
KIT MISCELLANEOUS
Qty: 1 KIT | Refills: 0 | Status: SHIPPED | OUTPATIENT
Start: 2019-01-30

## 2019-01-30 RX ORDER — BLOOD KETONE GLUCOSE MONITOR
KIT MISCELLANEOUS
Qty: 1 KIT | Refills: 0 | Status: SHIPPED | OUTPATIENT
Start: 2019-01-30 | End: 2019-01-30 | Stop reason: SDUPTHER

## 2019-02-06 ENCOUNTER — TELEPHONE (OUTPATIENT)
Dept: INTERNAL MEDICINE CLINIC | Age: 69
End: 2019-02-06

## 2019-02-07 ENCOUNTER — OFFICE VISIT (OUTPATIENT)
Dept: INTERNAL MEDICINE CLINIC | Age: 69
End: 2019-02-07
Payer: COMMERCIAL

## 2019-02-07 VITALS
BODY MASS INDEX: 25.9 KG/M2 | WEIGHT: 191 LBS | OXYGEN SATURATION: 96 % | HEART RATE: 78 BPM | SYSTOLIC BLOOD PRESSURE: 134 MMHG | DIASTOLIC BLOOD PRESSURE: 62 MMHG | TEMPERATURE: 98.3 F

## 2019-02-07 DIAGNOSIS — J20.9 COPD WITH ACUTE BRONCHITIS (HCC): Primary | ICD-10-CM

## 2019-02-07 DIAGNOSIS — J44.0 COPD WITH ACUTE BRONCHITIS (HCC): Primary | ICD-10-CM

## 2019-02-07 PROBLEM — J43.8 OTHER EMPHYSEMA (HCC): Status: ACTIVE | Noted: 2017-03-13

## 2019-02-07 PROBLEM — J41.8 MIXED SIMPLE AND MUCOPURULENT CHRONIC BRONCHITIS (HCC): Status: ACTIVE | Noted: 2017-03-13

## 2019-02-07 PROCEDURE — 99214 OFFICE O/P EST MOD 30 MIN: CPT | Performed by: INTERNAL MEDICINE

## 2019-02-07 RX ORDER — DOXYCYCLINE HYCLATE 100 MG/1
100 CAPSULE ORAL 2 TIMES DAILY
Qty: 20 CAPSULE | Refills: 0 | Status: SHIPPED | OUTPATIENT
Start: 2019-02-07 | End: 2019-02-17

## 2019-02-07 RX ORDER — ALBUTEROL SULFATE 90 UG/1
AEROSOL, METERED RESPIRATORY (INHALATION)
Qty: 1 INHALER | Refills: 3 | Status: SHIPPED | OUTPATIENT
Start: 2019-02-07 | End: 2019-02-07 | Stop reason: SDUPTHER

## 2019-02-07 RX ORDER — DOXYCYCLINE HYCLATE 100 MG/1
100 CAPSULE ORAL 2 TIMES DAILY
Qty: 20 CAPSULE | Refills: 0 | Status: SHIPPED | OUTPATIENT
Start: 2019-02-07 | End: 2019-02-07 | Stop reason: SDUPTHER

## 2019-02-07 RX ORDER — ALBUTEROL SULFATE 90 UG/1
AEROSOL, METERED RESPIRATORY (INHALATION)
Qty: 1 INHALER | Refills: 3 | Status: SHIPPED | OUTPATIENT
Start: 2019-02-07

## 2019-03-07 ENCOUNTER — TELEPHONE (OUTPATIENT)
Dept: INTERNAL MEDICINE CLINIC | Age: 69
End: 2019-03-07

## 2019-03-07 RX ORDER — TRAZODONE HYDROCHLORIDE 100 MG/1
100 TABLET ORAL NIGHTLY
Qty: 30 TABLET | Refills: 5 | Status: SHIPPED | OUTPATIENT
Start: 2019-03-07 | End: 2019-07-08

## 2019-04-05 ENCOUNTER — OFFICE VISIT (OUTPATIENT)
Dept: INTERNAL MEDICINE CLINIC | Age: 69
End: 2019-04-05
Payer: COMMERCIAL

## 2019-04-05 VITALS
WEIGHT: 190 LBS | BODY MASS INDEX: 25.77 KG/M2 | HEART RATE: 75 BPM | SYSTOLIC BLOOD PRESSURE: 138 MMHG | RESPIRATION RATE: 14 BRPM | DIASTOLIC BLOOD PRESSURE: 70 MMHG | OXYGEN SATURATION: 96 %

## 2019-04-05 DIAGNOSIS — N18.30 CHRONIC KIDNEY DISEASE, STAGE III (MODERATE) (HCC): ICD-10-CM

## 2019-04-05 DIAGNOSIS — K59.03 DRUG-INDUCED CONSTIPATION: ICD-10-CM

## 2019-04-05 DIAGNOSIS — E78.2 MIXED HYPERLIPIDEMIA: ICD-10-CM

## 2019-04-05 DIAGNOSIS — F32.5 MAJOR DEPRESSIVE DISORDER WITH SINGLE EPISODE, IN FULL REMISSION (HCC): ICD-10-CM

## 2019-04-05 DIAGNOSIS — F51.01 PRIMARY INSOMNIA: ICD-10-CM

## 2019-04-05 DIAGNOSIS — I10 ESSENTIAL HYPERTENSION: Chronic | ICD-10-CM

## 2019-04-05 DIAGNOSIS — E11.9 TYPE 2 DIABETES MELLITUS WITHOUT COMPLICATION, WITH LONG-TERM CURRENT USE OF INSULIN (HCC): Primary | ICD-10-CM

## 2019-04-05 DIAGNOSIS — Z79.4 TYPE 2 DIABETES MELLITUS WITHOUT COMPLICATION, WITH LONG-TERM CURRENT USE OF INSULIN (HCC): Primary | ICD-10-CM

## 2019-04-05 PROCEDURE — 99214 OFFICE O/P EST MOD 30 MIN: CPT | Performed by: INTERNAL MEDICINE

## 2019-04-05 RX ORDER — POLYETHYLENE GLYCOL 3350 17 G/17G
POWDER, FOR SOLUTION ORAL
COMMUNITY
Start: 2019-04-05 | End: 2019-07-01

## 2019-04-05 NOTE — PROGRESS NOTES
Assessment/Plan      1. Type 2 diabetes mellitus without complication, with long-term current use of insulin ([facility])  Expect improvement based upon home BS readings, but will increase Victoza if HgbA1c remains > 8.0.  - Hemoglobin A1C; Future  -  DIABETES FOOT EXAM    2. Essential hypertension  Higher than baseline today, but had incident driving to the office. Will be rechecked at upcoming cardiology appointment. Continue current medications. - Comprehensive Metabolic Panel, Fasting; Future    3. Chronic kidney disease, stage III (moderate) ([facility])  Due to stone nephropathy. Stable. Will refer back to Dr. Larry Alberts if renal function worsens. He will continue to avoid NSAIDs and other nephrotoxic agents. 4. Mixed hyperlipidemia  Last LDL not at goal- if remains greater than 80, will switch to Crestor.  - Lipid, Fasting; Future    5. Major depressive disorder with single episode, in full remission ([facility] [address]  Well-controlled on lower dose of Zoloft- continue. 6. Primary insomnia  Improved on 100 mg dose of Trazodone- continue. 7. Drug-induced constipation  Worse with addition of Victoza and Trazodone. He will try Miralax 1 capful qd. Return in about 3 months (around 7/5/2019). Rhiannon Little   YOB: 1950    Date of Visit:  4/5/2019    Allergies   Allergen Reactions    Dilaudid [Hydromorphone Hcl] Other (See Comments)     Mental status changes    Nubain [Nalbuphine Hcl] Rash      Prior to Visit Medications    Medication Sig Taking? Authorizing Provider   traZODone (DESYREL) 100 MG tablet Take 1 tablet by mouth nightly Yes Urmila Dai MD   albuterol sulfate HFA (VENTOLIN HFA) 108 (90 Base) MCG/ACT inhaler 2 Puff every 4-6 hours as needed for wheezing or shortness of breath Yes Urmila Dai MD   blood glucose test strips (ASCENSIA AUTODISC VI;ONE TOUCH ULTRA TEST VI) strip 1 each by In Vitro route 2 times daily As needed.  Yes Urmila Dai MD   Blood Glucose Monitoring Suppl (PRECISION XTRA) w/Device KIT As directed Yes Baylee Lora MD   Tiotropium Bromide-Olodaterol (STIOLTO RESPIMAT) 2.5-2.5 MCG/ACT AERS Inhale 2 puffs once daily Yes Dora Davenport MD   umeclidinium-vilanterol (ANORO ELLIPTA) 62.5-25 MCG/INH AEPB inhaler Inhale 1 puff into the lungs daily Yes Baylee Lora MD   Liraglutide (VICTOZA) 18 MG/3ML SOPN SC injection Inject 0.6 mg into the skin daily Yes Baylee Lora MD   sertraline (ZOLOFT) 100 MG tablet Take 1 tablet by mouth daily Yes Baylee Lora MD   fenofibrate 160 MG tablet TAKE ONE TABLET BY MOUTH DAILY Yes Baylee Lora MD   hydrochlorothiazide (MICROZIDE) 12.5 MG capsule TAKE ONE CAPSULE BY MOUTH DAILY Yes Baylee Lora MD   glimepiride (AMARYL) 4 MG tablet TAKE TWO TABLETS BY MOUTH EVERY MORNING Yes Baylee Lora MD   amLODIPine (NORVASC) 10 MG tablet Take 1 tablet by mouth daily Yes Baylee Lora MD   atorvastatin (LIPITOR) 80 MG tablet TAKE ONE TABLET BY MOUTH DAILY Yes Baylee Lora MD   lisinopril (PRINIVIL;ZESTRIL) 40 MG tablet TAKE ONE TABLET BY MOUTH DAILY Yes Baylee Lora MD   insulin glargine (LANTUS SOLOSTAR) 100 UNIT/ML injection pen Inject 15 Units into the skin nightly  Yes Historical Provider, MD [phone] [facility] Patient test twice daily Yes Baylee Lora MD   B-D UF III MINI PEN NEEDLES 31G X 5 MM MISC PATIENT INJECTS ONCE DAILY Yes Baylee Lora MD   Blood Glucose Monitoring Suppl (ONE TOUCH ULTRA 2) W/DEVICE KIT 1 kit by Does not apply route daily as needed. Yes Baylee Lora MD   aspirin 325 MG tablet Take 325 mg by mouth daily. Yes Historical Provider, MD   fish oil-omega-3 fatty acids 1000 MG capsule Take 2 g by mouth daily. Yes Historical Provider, MD       Vitals:    04/05/19 1301 04/05/19 1335   BP: (!) 157/80 138/70   Pulse: 75    Resp: 14    SpO2: 96%    Weight: 190 lb (86.2 kg)       Body mass index is 25.77 kg/m².      Wt Readings from Last 3 Encounters: 228 (H) 2019    TRIGLYCFAST 376 (H) 2018    TRIG 264 (H) 2017    HDL 32 (L) 2019     Lab Results   Component Value Date    GLUF 146 (H) 2019    GLUCOSE 112 (H) 09/15/2017    LABA1C 8.3 2019    LABA1C 9.2 2018    LABA1C 6.6 2018     Lab Results   Component Value Date     (H) 2019    K 4.7 2019    BUN 26 (H) 2019    CREATININE 1.5 (H) 2019    LABGLOM 47 (A) 2019    GFRAA 57 (A) 2019    CALCIUM 10.3 2019    VITD25 41.6 2012     Lab Results   Component Value Date    ALT 17 2019    AST 17 2019     Lab Results   Component Value Date    HGB 16.8 2014     Lab Results   Component Value Date    TSH 1.56 2016        Review of Systems  As documented in HPI    Social History     Tobacco Use    Smoking status: Former Smoker     Packs/day: 1.00     Years: 20.00     Pack years: 20.00     Types: Cigarettes     Last attempt to quit: 1992     Years since quittin.2    Smokeless tobacco: Never Used   Substance Use Topics    Alcohol use: No        Physical Exam   Constitutional: He is oriented to person, place, and time. He appears well-developed and well-nourished. No distress. HENT:   Mouth/Throat: Oropharynx is clear and moist and mucous membranes are normal.   Eyes: Conjunctivae are normal.   Neck: No JVD present. Carotid bruit is not present. No thyroid mass and no thyromegaly present. Cardiovascular: Normal rate, regular rhythm, normal heart sounds and intact distal pulses. Exam reveals no gallop and no friction rub. No murmur heard. Pulmonary/Chest: Effort normal and breath sounds normal. No respiratory distress. He has no wheezes. He has no rhonchi. He has no rales. Musculoskeletal: He exhibits no edema. Neurological: He is alert and oriented to person, place, and time. He has normal strength. Skin: Skin is warm and dry. No rash noted. No erythema.    Psychiatric: He has a normal mood and affect.  His behavior is normal. Judgment and thought content normal.     Visual inspection:  Deformity/amputation: absent, dystrophic toenails  Skin lesions/pre-ulcerative calluses: absent  Edema: right- negative, left- negative    Sensory exam:  Monofilament sensation: normal  (minimum of 5 random plantar locations tested, avoiding callused areas - > 1 area with absence of sensation is + for neuropathy)    Plus at least one of the following:  Pulses: normal,   Pinprick: N/A  Proprioception: N/A  Vibration (128 Hz): N/A

## 2019-04-14 RX ORDER — ROSUVASTATIN CALCIUM 20 MG/1
20 TABLET, COATED ORAL DAILY
Qty: 30 TABLET | Refills: 2 | Status: SHIPPED | OUTPATIENT
Start: 2019-04-14 | End: 2019-06-10 | Stop reason: SDUPTHER

## 2019-04-16 ENCOUNTER — OFFICE VISIT (OUTPATIENT)
Dept: CARDIOLOGY CLINIC | Age: 69
End: 2019-04-16
Payer: COMMERCIAL

## 2019-04-16 VITALS
OXYGEN SATURATION: 94 % | HEART RATE: 70 BPM | HEIGHT: 72 IN | DIASTOLIC BLOOD PRESSURE: 80 MMHG | WEIGHT: 187 LBS | SYSTOLIC BLOOD PRESSURE: 140 MMHG | BODY MASS INDEX: 25.33 KG/M2

## 2019-04-16 DIAGNOSIS — I10 ESSENTIAL HYPERTENSION: Chronic | ICD-10-CM

## 2019-04-16 DIAGNOSIS — I25.10 CORONARY ARTERY DISEASE INVOLVING NATIVE CORONARY ARTERY OF NATIVE HEART WITHOUT ANGINA PECTORIS: Chronic | ICD-10-CM

## 2019-04-16 DIAGNOSIS — N18.30 CHRONIC KIDNEY DISEASE, STAGE III (MODERATE) (HCC): ICD-10-CM

## 2019-04-16 DIAGNOSIS — R06.02 SHORTNESS OF BREATH: Primary | ICD-10-CM

## 2019-04-16 DIAGNOSIS — E78.2 MIXED HYPERLIPIDEMIA: ICD-10-CM

## 2019-04-16 DIAGNOSIS — I65.23 BILATERAL CAROTID ARTERY STENOSIS: ICD-10-CM

## 2019-04-16 PROCEDURE — 93000 ELECTROCARDIOGRAM COMPLETE: CPT | Performed by: NURSE PRACTITIONER

## 2019-04-16 PROCEDURE — 99214 OFFICE O/P EST MOD 30 MIN: CPT | Performed by: NURSE PRACTITIONER

## 2019-04-16 RX ORDER — RANITIDINE 150 MG/1
150 TABLET ORAL 2 TIMES DAILY
COMMUNITY
End: 2019-07-01

## 2019-04-16 NOTE — PROGRESS NOTES
nightly      [address] Patient test twice daily 150 each 5    B-D UF III MINI PEN NEEDLES 31G X 5 MM MISC PATIENT INJECTS ONCE DAILY 100 each 11    Blood Glucose Monitoring Suppl (ONE TOUCH ULTRA 2) W/DEVICE KIT 1 kit by Does not apply route daily as needed. 1 kit 0    aspirin 325 MG tablet Take 325 mg by mouth daily.  fish oil-omega-3 fatty acids 1000 MG capsule Take 2 g by mouth daily.  polyethylene glycol (MIRALAX) powder Take 1 capful orally daily.  Tiotropium Bromide-Olodaterol (STIOLTO RESPIMAT) 2.5-2.5 MCG/ACT AERS Inhale 2 puffs once daily 1 Inhaler 5     No current facility-administered medications for this visit. Social History     Social History Narrative    Not on file       History reviewed. No pertinent family history. Review of Systems:  Cardiac: No chest pain and no palpitations. Respiratory: No new SOB, PND, orthopnea or cough. Neurological: No syncope or TIA. General: No major weight gain or loss, no fatigue, no weakness, no fever. Musculoskeletal: No new muscle or joint pain. GI: No change in bowel habits. Psychiatric: No anxiety, no panic, no insomnia, no depression. Skin: No rash. Lab Results   Component Value Date    CHOL 164 03/30/2017    TRIG 264 03/30/2017    HDL 30 04/12/2019    HDL 30 05/08/2012    LDLDIRECT 116 11/08/2018     No results for input(s): AST, ALT, ALB, BILIDIR, BILITOT, ALKPHOS in the last 72 hours. Vitals:    04/16/19 0955   BP: (!) 140/80   Pulse:    SpO2:        Physical Examination  Constitutional: He is oriented to person, place and time. He appears well developed and well nourished. Head: Normocephalic and atraumatic. Neck: Neck supple. No JVD present. Carotid bruit is not present. No mass and no thyromegaly present. Cardiovascular: Normal rate, regular rhythm, normal heart sounds and intact distal pulses. Exam reveals no gallop and no friction rub. No murmur heard.   Pulmonary/Chest: Effort normal

## 2019-04-16 NOTE — LETTER
[address] [place] [address] [address] [id number]  Phone: [phone]  Fax: [phone]    ANTHONY Maldonado CNP          April 16, 2019       Patient: Roshan Lamar   MR Number: [id number]   YOB: 1950   Date of Visit: 4/16/2019       Dear  ***:    I am requesting a consultation of my patient Gus Silva, to you for evaluation of ***. I appreciate your assistance in his care and look forward to your findings and recommendations.     Sincerely,                           Johnny Lopez APRN - CNP

## 2019-04-22 ENCOUNTER — TELEPHONE (OUTPATIENT)
Dept: INTERNAL MEDICINE CLINIC | Age: 69
End: 2019-04-22

## 2019-04-22 NOTE — TELEPHONE ENCOUNTER
Patient brought in bill for services on 10/9/18 & 2/7/2019. 10/9/19 services were not covered. E-mail sent to billing as it looks like there was a payment and then payment taken out.  Per billing, claim sent back to [facility] for reprocessing

## 2019-05-03 ENCOUNTER — HOSPITAL ENCOUNTER (OUTPATIENT)
Dept: ULTRASOUND IMAGING | Age: 69
Discharge: HOME | End: 2019-05-03
Payer: COMMERCIAL

## 2019-05-03 DIAGNOSIS — N18.30 CKD (CHRONIC KIDNEY DISEASE), STAGE III (HCC): ICD-10-CM

## 2019-05-03 PROCEDURE — 76770 US EXAM ABDO BACK WALL COMP: CPT

## 2019-05-30 ENCOUNTER — TELEPHONE (OUTPATIENT)
Dept: INTERNAL MEDICINE CLINIC | Age: 69
End: 2019-05-30

## 2019-05-31 NOTE — TELEPHONE ENCOUNTER
[facility] Transitions Initial Follow Up Call    Outreach made within 2 business days of discharge: Yes    Patient: Umair oRusseau Patient : 1950   MRN: [id number]  Reason for Admission: There are no discharge diagnoses documented for the most recent discharge. Discharge Date: 19       Spoke with: patient    Discharge department/facility: [facility] Patient Contact:  Was patient able to fill all prescriptions: Yes  Was patient instructed to bring all medications to the follow-up visit: No: patient refused  Is patient taking all medications as directed in the discharge summary?  Yes  Does patient understand their discharge instructions: Yes  Does patient have questions or concerns that need addressed prior to 7-14 day follow up office visit: no patient refused    Scheduled appointment with PCP within 7-14 days    Follow Up  Future Appointments   Date Time Provider Padmini Almaguer   2019 11:30 AM Saira Rivera MD [facility] MMA   2019  1:15 PM Janene Champion MD [facility] AFL Nephrolo   10/22/2019 10:00 AM [facility]   10/22/2019 11:15 AM ANTHONY Vega - CNP FF Cardio [facility]       Nicholas Mata MA

## 2019-06-04 ENCOUNTER — TELEPHONE (OUTPATIENT)
Dept: INTERNAL MEDICINE CLINIC | Age: 69
End: 2019-06-04

## 2019-06-04 NOTE — TELEPHONE ENCOUNTER
Patient wants to know if Dr. Reed Smith needs to see him for a HFU. Patient states he was discharged on 5.30. He is requesting an early morning Friday appointment if her has to come in. Please advise. Patient can be reached at phone number provided.

## 2019-06-04 NOTE — TELEPHONE ENCOUNTER
See neuro or pulmonary. Should come in for follow up but if he doesn't want to it is his decision. He does not want to come in. He is feeling fine. Will follow up with neuro and pulmonary. He will call if he needs anything  Per Dr Veena Alvarez.

## 2019-06-10 RX ORDER — ROSUVASTATIN CALCIUM 20 MG/1
20 TABLET, COATED ORAL DAILY
Qty: 90 TABLET | Refills: 1 | Status: SHIPPED | OUTPATIENT
Start: 2019-06-10 | End: 2019-07-01

## 2019-06-19 ENCOUNTER — TELEPHONE (OUTPATIENT)
Dept: INTERNAL MEDICINE CLINIC | Age: 69
End: 2019-06-19

## 2019-06-19 RX ORDER — LANCETS
EACH MISCELLANEOUS
Qty: 150 EACH | Refills: 5 | Status: SHIPPED | OUTPATIENT
Start: 2019-06-19

## 2019-06-19 NOTE — TELEPHONE ENCOUNTER
Patient is leaving for Vacation this coming Friday and is requesting the following please be called in before he leaves:    1.    blood glucose test strips (ASCENSIA AUTODISC VI;ONE TOUCH ULTRA TEST VI) strip 1 each by In Vitro route 2 times daily As needed. 2. [place] Patient test twice daily     3. B-D UF III MINI PEN NEEDLES 31G X 5 MM MISC PATIENT INJECTS ONCE DAILY         [facility] [address] [place] [address], [address] [place] [address] [phone]    Aware doctor  is out of the office today  Patient made aware to allow 24 to 48 business hours for prescription refills.

## 2019-06-21 ENCOUNTER — TELEPHONE (OUTPATIENT)
Dept: INTERNAL MEDICINE CLINIC | Age: 69
End: 2019-06-21

## 2019-06-21 DIAGNOSIS — I10 ESSENTIAL HYPERTENSION: Chronic | ICD-10-CM

## 2019-06-21 RX ORDER — LISINOPRIL 40 MG/1
TABLET ORAL
Qty: 90 TABLET | Refills: 1 | Status: SHIPPED | OUTPATIENT
Start: 2019-06-21 | End: 2019-11-15 | Stop reason: SINTOL

## 2019-06-21 NOTE — TELEPHONE ENCOUNTER
Patient forgot to request the following prescription refill prior to leaving on vacation tomorrow:    lisinopril (PRINIVIL;ZESTRIL) 40 MG tablet TAKE ONE TABLET BY MOUTH DAILY     #90    [facility] [address] [place] [address], [address] [place] [address] [phone] Krupa Kaiser [phone]     Patient made aware to allow 24 to 48 business hours for prescription refills. Since patient is leaving tomorrow for Vacation, he is requesting this please be done today. Patient can be reached @ phone # provided should there be any questions.

## 2019-06-22 DIAGNOSIS — I10 ESSENTIAL HYPERTENSION: Chronic | ICD-10-CM

## 2019-06-22 RX ORDER — HYDROCHLOROTHIAZIDE 12.5 MG/1
CAPSULE, GELATIN COATED ORAL
Qty: 90 CAPSULE | Refills: 1 | Status: SHIPPED | OUTPATIENT
Start: 2019-06-22 | End: 2019-07-01

## 2019-06-22 RX ORDER — AMLODIPINE BESYLATE 10 MG/1
10 TABLET ORAL DAILY
Qty: 90 TABLET | Refills: 1 | Status: SHIPPED | OUTPATIENT
Start: 2019-06-22 | End: 2019-12-24

## 2019-06-22 RX ORDER — SERTRALINE HYDROCHLORIDE 100 MG/1
TABLET, FILM COATED ORAL
Qty: 135 TABLET | Refills: 1 | Status: SHIPPED | OUTPATIENT
Start: 2019-06-22 | End: 2019-12-24

## 2019-06-22 RX ORDER — FENOFIBRATE 160 MG/1
TABLET ORAL
Qty: 90 TABLET | Refills: 1 | Status: SHIPPED | OUTPATIENT
Start: 2019-06-22 | End: 2019-12-24

## 2019-06-22 RX ORDER — ATORVASTATIN CALCIUM 80 MG/1
TABLET, FILM COATED ORAL
Qty: 90 TABLET | Refills: 1 | Status: SHIPPED | OUTPATIENT
Start: 2019-06-22 | End: 2019-08-09 | Stop reason: ALTCHOICE

## 2019-06-22 RX ORDER — GLIMEPIRIDE 4 MG/1
TABLET ORAL
Qty: 180 TABLET | Refills: 1 | Status: SHIPPED | OUTPATIENT
Start: 2019-06-22 | End: 2019-12-26

## 2019-07-01 ENCOUNTER — OFFICE VISIT (OUTPATIENT)
Dept: INTERNAL MEDICINE CLINIC | Age: 69
End: 2019-07-01
Payer: COMMERCIAL

## 2019-07-01 ENCOUNTER — HOSPITAL ENCOUNTER (OUTPATIENT)
Age: 69
End: 2019-07-01
Payer: COMMERCIAL

## 2019-07-01 ENCOUNTER — HOSPITAL ENCOUNTER (OUTPATIENT)
Dept: GENERAL RADIOLOGY | Age: 69
Discharge: HOME | End: 2019-07-01
Payer: COMMERCIAL

## 2019-07-01 VITALS
TEMPERATURE: 98.6 F | WEIGHT: 187 LBS | DIASTOLIC BLOOD PRESSURE: 64 MMHG | RESPIRATION RATE: 16 BRPM | BODY MASS INDEX: 25.36 KG/M2 | HEART RATE: 74 BPM | SYSTOLIC BLOOD PRESSURE: 122 MMHG | OXYGEN SATURATION: 95 %

## 2019-07-01 DIAGNOSIS — Z13.29 SCREENING FOR THYROID DISORDER: ICD-10-CM

## 2019-07-01 DIAGNOSIS — R06.09 EXERTIONAL DYSPNEA: ICD-10-CM

## 2019-07-01 DIAGNOSIS — J18.9 LINGULAR PNEUMONIA: ICD-10-CM

## 2019-07-01 DIAGNOSIS — R53.83 OTHER FATIGUE: Primary | ICD-10-CM

## 2019-07-01 DIAGNOSIS — R53.83 OTHER FATIGUE: ICD-10-CM

## 2019-07-01 PROCEDURE — 71046 X-RAY EXAM CHEST 2 VIEWS: CPT

## 2019-07-01 PROCEDURE — 99214 OFFICE O/P EST MOD 30 MIN: CPT | Performed by: INTERNAL MEDICINE

## 2019-07-01 NOTE — PROGRESS NOTES
Progress Note:    Alfredo Roque    7/8/2019    Chief Complaint   Patient presents with    Fatigue     Recent hospitalization for pneumonia, in [place] last week and now fatigued, slight cough with green phlegm. No fever or chills  Shortness of breath with exertion. (s)Juan Roque has had 3 days of fatigue  Progression: same not resolving  Quality: fatigue, no cough, no fever  Radiation: general  Severity: moderate, mildly limiting  Timing: gradual osnet  Moderation: was recently placed in hospital for 2 days for lingular consolidation treated as PNA, he just feels fatigued after coming back from vacation, \"I over did it when I was there\", beach outings, riding his bike, hauling his bike. He traveled to [facility] by air flight. Did not bring his bottles in, no list of meds, was uncertain of few meds when asked. He has two statin listed on his med rec. Reports sugars \"it's been good\", last A1c 7.4%, denies, lowest 67, he denies any symptoms with low sugars, usually between 's range, just had nephrology checkup his Cr. Was stable ~1.4 range. Patient currently denies chest pain, SOB>baseline, NVD, FC, rash, malaise, rigor, dizziness/lightheadness, other pertinent ROS was also reviewed. (-) edema/leg pain. /64 (Site: Right Upper Arm, Position: Sitting, Cuff Size: Medium Adult)   Pulse 74 Comment: Regular  Temp 98.6 °F (37 °C) (Oral)   Resp 16   Wt 187 lb (84.8 kg)   SpO2 95%   BMI 25.36 kg/m²   Body mass index is 25.36 kg/m².     Gen: Patient appears well groomed, well appearing  HEAD: Atraumatic, normocephalic,   Eyes: PERRLA, EOMI   Neck: supple, no thyroid nodule appreciated, no JVD  Chest: Clear to auscultation BLADE, unlabored breathing, normal expansion  Heart: Regular rate, regular rhythm, no murmur, no rub  Abdomen: Non-tender, non-distended, bowel sounds present x3  Extremities: no edema, distal pulses intact  Patient was alert and oriented to person, place and time    Assessment and Plan:    Victorino Damon was seen today for fatigue. Diagnoses and all orders for this visit:    Lingular pneumonia  Other fatigue  Exertional dyspnea  Low suspicion for PE but will check d-dimer, likely it will be midly elevated consider recent PNA episode but should not be very high, had recent CT chest that was neg, recheck xray for ligular consolidation  -     XR CHEST STANDARD (2 VW); Future  -     COMPREHENSIVE METABOLIC PANEL; Future  -     CBC WITH AUTO DIFFERENTIAL; Future  -     D-DIMER, QUANTITATIVE; Future    Screening for thyroid disorder  -     TSH with Reflex; Future    Current Outpatient Medications on File Prior to Visit   Medication Sig Dispense Refill    glimepiride (AMARYL) 4 MG tablet TAKE 2 TABLETS BY MOUTH EVERY MORNING 180 tablet 1    amLODIPine (NORVASC) 10 MG tablet TAKE 1 TABLET BY MOUTH DAILY 90 tablet 1    sertraline (ZOLOFT) 100 MG tablet TAKE 1 AND 1/2 TABLETS BY MOUTH DAILY 135 tablet 1    atorvastatin (LIPITOR) 80 MG tablet TAKE 1 TABLET BY MOUTH DAILY 90 tablet 1    fenofibrate 160 MG tablet TAKE 1 TABLET BY MOUTH DAILY 90 tablet 1    lisinopril (PRINIVIL;ZESTRIL) 40 MG tablet TAKE ONE TABLET BY MOUTH DAILY 90 tablet 1    Insulin Pen Needle (B-D UF III MINI PEN NEEDLES) 31G X 5 MM MISC PATIENT INJECTS ONCE DAILY 100 each 11    KROGER LANCETS THIN [facility] Patient test twice daily 150 each 5    umeclidinium-vilanterol (ANORO ELLIPTA) 62.5-25 MCG/INH AEPB inhaler Inhale 1 puff into the lungs daily 60 each 5    Liraglutide (VICTOZA) 18 MG/3ML SOPN SC injection Inject 0.6 mg into the skin daily 9 mL 3    insulin glargine (LANTUS SOLOSTAR) 100 UNIT/ML injection pen Inject 15 Units into the skin nightly       aspirin 325 MG tablet Take 325 mg by mouth daily.  fish oil-omega-3 fatty acids 1000 MG capsule Take 2 g by mouth daily.       blood glucose test strips (ASCENSIA AUTODISC VI;ONE TOUCH ULTRA TEST VI) strip 1 each by In Vitro route 2 times daily 100 each 5    albuterol 20.00     Pack years: 20.00     Types: Cigarettes     Last attempt to quit: 1992     Years since quittin.5    Smokeless tobacco: Never Used   Substance Use Topics    Alcohol use: No       History reviewed. No pertinent family history.

## 2019-07-02 ENCOUNTER — TELEPHONE (OUTPATIENT)
Dept: INTERNAL MEDICINE CLINIC | Age: 69
End: 2019-07-02

## 2019-07-02 DIAGNOSIS — J18.9 LINGULAR PNEUMONIA: Primary | ICD-10-CM

## 2019-07-02 RX ORDER — DOXYCYCLINE HYCLATE 100 MG
100 TABLET ORAL 2 TIMES DAILY
Qty: 14 TABLET | Refills: 0 | Status: SHIPPED | OUTPATIENT
Start: 2019-07-02 | End: 2019-07-09

## 2019-07-02 NOTE — TELEPHONE ENCOUNTER
Lab values looks normal overall, low suspicion for blood clot, the R middle lobe lung still seems to have a solid area concerning for unresolved pneumonia. Kidney function is elevated but around his baseline. Start antibiotics again  and monitor for fevers. Cannot rule out fungal etiologies, follow up with pulmonologist.  Avoid excessive UV (sun) while on doxycycline.

## 2019-07-23 ENCOUNTER — OFFICE VISIT (OUTPATIENT)
Dept: PULMONOLOGY | Age: 69
End: 2019-07-23
Payer: COMMERCIAL

## 2019-07-23 ENCOUNTER — TELEPHONE (OUTPATIENT)
Dept: INTERNAL MEDICINE CLINIC | Age: 69
End: 2019-07-23

## 2019-07-23 VITALS
OXYGEN SATURATION: 93 % | SYSTOLIC BLOOD PRESSURE: 145 MMHG | RESPIRATION RATE: 18 BRPM | DIASTOLIC BLOOD PRESSURE: 73 MMHG | WEIGHT: 186 LBS | HEART RATE: 64 BPM | BODY MASS INDEX: 25.23 KG/M2

## 2019-07-23 DIAGNOSIS — R91.8 LUNG INFILTRATE: ICD-10-CM

## 2019-07-23 DIAGNOSIS — R06.02 SOB (SHORTNESS OF BREATH): ICD-10-CM

## 2019-07-23 DIAGNOSIS — J43.2 CENTRILOBULAR EMPHYSEMA (HCC): Primary | ICD-10-CM

## 2019-07-23 DIAGNOSIS — J44.9 COPD, MODERATE (HCC): ICD-10-CM

## 2019-07-23 PROCEDURE — 99214 OFFICE O/P EST MOD 30 MIN: CPT | Performed by: INTERNAL MEDICINE

## 2019-07-23 NOTE — TELEPHONE ENCOUNTER
Patient is requesting the following prescription(s):      blood glucose test strips (ASCENSIA AUTODISC VI;ONE TOUCH ULTRA TEST VI) strip 1 each by In Vitro route 2 times daily       Please call in to:    [address], [address] [place] [address] [phone] [facility] [phone]    Patient made aware to allow 24 to 48 business hours for prescription refills. Patient can be reached @ phone # provided should there be any questions.

## 2019-07-23 NOTE — PROGRESS NOTES
[facility]  Progress Note  Su Pedersen MD       Kiah Maldonado   YOB: 1950    Date of Visit:  7/23/2019    Assessment/Plan:  1. SOB  Seems worse  AGARWAL  Had PNA in lingular  F/U CXR looks like some R sided infiltrate  Repeat CT chest  Also repeat PFT (Last was 2015)s    2. COPD, moderate ([address]  Reviewed pulmonary function testing from 2015. Repeat PFT  Anoro ==> Trelegy. Give him samples  Have him use the albuterol in advance of activity    3. Centrilobular emphysema (HCC)  CT imaging does reveal evidence of mild centrilobular emphysema    4. Essential hypertension  Blood pressure is elevated today  Follow-up with Dr. Glenis Barr: one month      Chief Complaint   Patient presents with    Shortness of Breath     more frequent     Pneumonia     recently diagnosed with pneumonia in past 6 weeks       HPI  The patient presents with a chief complaint of shortness of breath and cough. He had PNA at the end of May this year diagnosed by CT. He was at [facility] for a couple days in treatment of this. However, he is still having trouble with exertional dyspnea. He is on Anoro daily. He has albuterol but does not use that much. He had f/u CXR 7/1. No Chest pain, Nausea or vomiting reported      Review of Systems  As documented in HPI     Physical Exam:  Well developed, well nourished  Alert and oriented  Sclera is clear  No cervical adenopathy  No JVD. Chest examination is clear. Cardiac examination reveals regular rate and rhythm without murmur, gallop or rub. The abdomen is soft, nontender and nondistended. There is no clubbing, cyanosis or edema of the extremities. There is no obvious skin rash. No focal neuro deficicts  Normal mood and affect    Allergies   Allergen Reactions    Dilaudid [Hydromorphone Hcl] Other (See Comments)     Mental status changes    Nubain [Nalbuphine Hcl] Rash     Prior to Visit Medications    Medication Sig Taking?

## 2019-08-05 ENCOUNTER — OFFICE VISIT (OUTPATIENT)
Dept: INTERNAL MEDICINE CLINIC | Age: 69
End: 2019-08-05
Payer: COMMERCIAL

## 2019-08-05 VITALS
HEART RATE: 74 BPM | BODY MASS INDEX: 25.23 KG/M2 | OXYGEN SATURATION: 95 % | DIASTOLIC BLOOD PRESSURE: 60 MMHG | TEMPERATURE: 98.4 F | WEIGHT: 186 LBS | SYSTOLIC BLOOD PRESSURE: 134 MMHG

## 2019-08-05 DIAGNOSIS — J40 BRONCHITIS: Primary | ICD-10-CM

## 2019-08-05 PROCEDURE — 99213 OFFICE O/P EST LOW 20 MIN: CPT | Performed by: INTERNAL MEDICINE

## 2019-08-05 RX ORDER — AZITHROMYCIN 250 MG/1
TABLET, FILM COATED ORAL
Qty: 6 TABLET | Refills: 0 | Status: SHIPPED | OUTPATIENT
Start: 2019-08-05 | End: 2019-08-15

## 2019-08-05 NOTE — PROGRESS NOTES
[facility]  Internal Medicine Progress Note  Marlene Alvarez MD       Assessment/Plan:  Bronchitis  COPD  zpak  Continue trelegy  Off work this week. Instructed to call if worsening. Has routine f/u already scheduled for Friday. Other orders  -     azithromycin (ZITHROMAX Z-LURDES) 250 MG tablet; Take 2 tablets daily on day 1, then 1 tablet daily on days 2-5. Patient advised to call or return to clinic if these symptoms worsen or fail to improve as anticipated. DOS: 8/5/2019    Leopold Perfect  UGJ:[id number]    Chief Complaint   Patient presents with    Cough       HPI:   81 y.o. male here today for 4 day(s) of URI symptoms. Symptoms include cough with yellow sputum, dyspnea. He denies fever, nasal congestion and sore throat. Treatment to date: trelegy daily and albuterol approx every 5 hours. Relevant PMH: COPD. Also treated for pneumonia in June. He works outdoors with [employer]. He  reports that he quit smoking about 27 years ago. His smoking use included cigarettes. He has a 20.00 pack-year smoking history.  He has never used smokeless tobacco.    Allergies   Allergen Reactions    Dilaudid [Hydromorphone Hcl] Other (See Comments)     Mental status changes    Nubain [Nalbuphine Hcl] Rash     Outpatient Medications Marked as Taking for the 8/5/19 encounter (Office Visit) with Marlene Alvarez MD   Medication Sig Dispense Refill    Fluticasone-Umeclidin-Vilant (TRELEGY ELLIPTA IN) Inhale 1 puff into the lungs daily      azithromycin (ZITHROMAX Z-LURDES) 250 MG tablet Take 2 tablets daily on day 1, then 1 tablet daily on days 2-5. 6 tablet 0    traZODone (DESYREL) 100 MG tablet Take 1 tablet by mouth nightly 90 tablet 1    doxazosin (CARDURA) 2 MG tablet Take 1 tablet by mouth daily 30 tablet 3    glimepiride (AMARYL) 4 MG tablet TAKE 2 TABLETS BY MOUTH EVERY MORNING 180 tablet 1    amLODIPine (NORVASC) 10 MG tablet TAKE 1 TABLET BY MOUTH DAILY 90 tablet 1    sertraline (ZOLOFT) 100 MG tablet TAKE 1 AND 1/2 TABLETS BY MOUTH DAILY 135 tablet 1    atorvastatin (LIPITOR) 80 MG tablet TAKE 1 TABLET BY MOUTH DAILY 90 tablet 1    fenofibrate 160 MG tablet TAKE 1 TABLET BY MOUTH DAILY 90 tablet 1    lisinopril (PRINIVIL;ZESTRIL) 40 MG tablet TAKE ONE TABLET BY MOUTH DAILY 90 tablet 1    blood glucose test strips (ASCENSIA AUTODISC VI;ONE TOUCH ULTRA TEST VI) strip 1 each by In Vitro route 2 times daily 100 each 5    Insulin Pen Needle (B-D UF III MINI PEN NEEDLES) 31G X 5 MM MISC PATIENT INJECTS ONCE DAILY 100 each [address] [facility] Patient test twice daily 150 each 5    albuterol sulfate HFA (VENTOLIN HFA) 108 (90 Base) MCG/ACT inhaler 2 Puff every 4-6 hours as needed for wheezing or shortness of breath 1 Inhaler 3    Blood Glucose Monitoring Suppl (PRECISION XTRA) w/Device KIT As directed 1 kit 0    Liraglutide (VICTOZA) 18 MG/3ML SOPN SC injection Inject 0.6 mg into the skin daily 9 mL 3    insulin glargine (LANTUS SOLOSTAR) 100 UNIT/ML injection pen Inject 15 Units into the skin nightly       Blood Glucose Monitoring Suppl (ONE TOUCH ULTRA 2) W/DEVICE KIT 1 kit by Does not apply route daily as needed. 1 kit 0    aspirin 325 MG tablet Take 325 mg by mouth daily.  fish oil-omega-3 fatty acids 1000 MG capsule Take 2 g by mouth daily. Review of Systems    As per HPI. OBJECTIVE:  Vitals:    08/05/19 1000   BP: 134/60   Pulse: 74   Temp: 98.4 °F (36.9 °C)   TempSrc: Oral   SpO2: 95%   Weight: 186 lb (84.4 kg)     Physical Exam   Constitutional: No distress. Cardiovascular: Normal rate and regular rhythm. Pulmonary/Chest: Effort normal and breath sounds normal. No respiratory distress. He has no wheezes. He has no rales.

## 2019-08-09 ENCOUNTER — TELEPHONE (OUTPATIENT)
Dept: PULMONOLOGY | Age: 69
End: 2019-08-09

## 2019-08-09 ENCOUNTER — OFFICE VISIT (OUTPATIENT)
Dept: INTERNAL MEDICINE CLINIC | Age: 69
End: 2019-08-09
Payer: COMMERCIAL

## 2019-08-09 VITALS
SYSTOLIC BLOOD PRESSURE: 158 MMHG | OXYGEN SATURATION: 93 % | BODY MASS INDEX: 25.36 KG/M2 | DIASTOLIC BLOOD PRESSURE: 70 MMHG | HEART RATE: 81 BPM | WEIGHT: 187 LBS | TEMPERATURE: 98.4 F

## 2019-08-09 DIAGNOSIS — E78.2 MIXED HYPERLIPIDEMIA: ICD-10-CM

## 2019-08-09 DIAGNOSIS — E11.9 TYPE 2 DIABETES MELLITUS WITHOUT COMPLICATION, WITH LONG-TERM CURRENT USE OF INSULIN (HCC): ICD-10-CM

## 2019-08-09 DIAGNOSIS — Z79.4 TYPE 2 DIABETES MELLITUS WITHOUT COMPLICATION, WITH LONG-TERM CURRENT USE OF INSULIN (HCC): ICD-10-CM

## 2019-08-09 DIAGNOSIS — J20.9 COPD WITH ACUTE BRONCHITIS (HCC): Primary | ICD-10-CM

## 2019-08-09 DIAGNOSIS — I10 ESSENTIAL HYPERTENSION: Chronic | ICD-10-CM

## 2019-08-09 DIAGNOSIS — N18.30 CHRONIC KIDNEY DISEASE, STAGE III (MODERATE) (HCC): ICD-10-CM

## 2019-08-09 DIAGNOSIS — J44.0 COPD WITH ACUTE BRONCHITIS (HCC): Primary | ICD-10-CM

## 2019-08-09 PROBLEM — R91.8 LUNG INFILTRATE: Status: RESOLVED | Noted: 2019-07-23 | Resolved: 2019-08-09

## 2019-08-09 PROBLEM — G93.0 CEREBRAL CYSTS: Status: ACTIVE | Noted: 2019-08-09

## 2019-08-09 PROBLEM — G93.0 CEREBRAL CYSTS: Status: RESOLVED | Noted: 2019-08-09 | Resolved: 2019-08-09

## 2019-08-09 PROCEDURE — 99214 OFFICE O/P EST MOD 30 MIN: CPT | Performed by: INTERNAL MEDICINE

## 2019-08-09 RX ORDER — DOXAZOSIN 2 MG/1
4 TABLET ORAL NIGHTLY
COMMUNITY
Start: 2019-08-09 | End: 2019-10-22

## 2019-08-09 RX ORDER — ROSUVASTATIN CALCIUM 20 MG/1
20 TABLET, COATED ORAL DAILY
COMMUNITY
End: 2019-12-04

## 2019-08-09 RX ORDER — PREDNISONE 10 MG/1
40 TABLET ORAL DAILY
Qty: 20 TABLET | Refills: 0 | Status: SHIPPED | OUTPATIENT
Start: 2019-08-09 | End: 2019-08-14

## 2019-08-09 RX ORDER — DOXYCYCLINE HYCLATE 100 MG/1
100 CAPSULE ORAL 2 TIMES DAILY
Qty: 20 CAPSULE | Refills: 0 | Status: SHIPPED | OUTPATIENT
Start: 2019-08-09 | End: 2019-08-19

## 2019-08-09 NOTE — PROGRESS NOTES
Assessment/Plan       1. COPD with acute bronchitis ([facility]  No significant improvement with Z-lurdes. Doxycycline 100 mg bid and prednisone 40 mg qd x5 days prescribed. Continue Trelegy and albuterol MDI prn. He has follow up appointment with pulmonary 8/21, but he will call here if no improvement within 2-3 days or sooner if condition worsens. 2. Type 2 diabetes mellitus without complication, with long-term current use of insulin ([facility])  Benefits of GLP-1 agonist discussed. Since Victoza is not preferred with his insurance plan, will switch to Trulicity 5.49 mg qweek. He will continue his glimepiride and insulin at current doses, but may be able to wean down insulin dose once steroids completed. He will call if am BS < 100.  - Hemoglobin A1C; Future    3. Essential hypertension  Inadequately controlled off diuretic. Increase Cardura to 4 mg qd. - Comprehensive Metabolic Panel, Fasting; Future    4. Chronic kidney disease, stage III (moderate) ([facility])  Due to stone nephropathy. Improved with discontinuation of diuretic. He will continue regular follow up with Dr. Anyi Martins. 5. Mixed hyperlipidemia  Tolerating switch from Lipitor to Crestor. Will adjust dose until LDL < 70.  - Lipid, Fasting; Future           Return in about 3 months (around 11/9/2019). Raffaele Patel   YOB: 1950    Date of Visit:  8/9/2019    Allergies   Allergen Reactions    Dilaudid [Hydromorphone Hcl] Other (See Comments)     Mental status changes    Nubain [Nalbuphine Hcl] Rash      Prior to Visit Medications    Medication Sig Taking? Authorizing Provider   rosuvastatin (CRESTOR) 20 MG tablet Take 20 mg by mouth daily Yes Historical Provider, MD   Fluticasone-Umeclidin-Vilant (TRELEGY ELLIPTA IN) Inhale 1 puff into the lungs daily Yes Historical Provider, MD   azithromycin (ZITHROMAX Z-LURDES) 250 MG tablet Take 2 tablets daily on day 1, then 1 tablet daily on days 2-5.  Yes Jomar Mendoza MD   traZODone ([facility])

## 2019-08-12 ENCOUNTER — HOSPITAL ENCOUNTER (OUTPATIENT)
Dept: CT IMAGING | Age: 69
Discharge: HOME | End: 2019-08-12
Payer: COMMERCIAL

## 2019-08-12 ENCOUNTER — HOSPITAL ENCOUNTER (OUTPATIENT)
Dept: PULMONOLOGY | Age: 69
End: 2019-08-12
Payer: COMMERCIAL

## 2019-08-12 DIAGNOSIS — J44.9 COPD, MODERATE (HCC): ICD-10-CM

## 2019-08-12 DIAGNOSIS — R91.8 LUNG INFILTRATE: ICD-10-CM

## 2019-08-12 PROCEDURE — 71250 CT THORAX DX C-: CPT

## 2019-08-13 ENCOUNTER — TELEPHONE (OUTPATIENT)
Dept: PULMONOLOGY | Age: 69
End: 2019-08-13

## 2019-08-13 DIAGNOSIS — T17.908A ASPIRATION INTO AIRWAY, INITIAL ENCOUNTER: Primary | ICD-10-CM

## 2019-08-22 ENCOUNTER — HOSPITAL ENCOUNTER (OUTPATIENT)
Dept: GENERAL RADIOLOGY | Age: 69
Discharge: HOME | End: 2019-08-22
Payer: COMMERCIAL

## 2019-08-22 ENCOUNTER — HOSPITAL ENCOUNTER (OUTPATIENT)
Dept: SPEECH THERAPY | Age: 69
Discharge: HOME | End: 2019-08-22
Payer: COMMERCIAL

## 2019-08-22 ENCOUNTER — HOSPITAL ENCOUNTER (OUTPATIENT)
Dept: PULMONOLOGY | Age: 69
Discharge: HOME | End: 2019-08-22
Payer: COMMERCIAL

## 2019-08-22 VITALS — OXYGEN SATURATION: 97 % | HEART RATE: 68 BPM | RESPIRATION RATE: 18 BRPM

## 2019-08-22 DIAGNOSIS — T17.908A ASPIRATION INTO AIRWAY, INITIAL ENCOUNTER: ICD-10-CM

## 2019-08-22 DIAGNOSIS — J44.9 COPD, MODERATE (HCC): ICD-10-CM

## 2019-08-22 PROCEDURE — 94060 EVALUATION OF WHEEZING: CPT

## 2019-08-22 PROCEDURE — 94200 LUNG FUNCTION TEST (MBC/MVV): CPT

## 2019-08-22 PROCEDURE — 94729 DIFFUSING CAPACITY: CPT

## 2019-08-22 PROCEDURE — 92611 MOTION FLUOROSCOPY/SWALLOW: CPT

## 2019-08-22 PROCEDURE — 92526 ORAL FUNCTION THERAPY: CPT

## 2019-08-22 PROCEDURE — 94760 N-INVAS EAR/PLS OXIMETRY 1: CPT

## 2019-08-22 PROCEDURE — 94726 PLETHYSMOGRAPHY LUNG VOLUMES: CPT

## 2019-08-22 PROCEDURE — 6370000000 HC RX 637 (ALT 250 FOR IP): Performed by: INTERNAL MEDICINE

## 2019-08-22 RX ORDER — ALBUTEROL SULFATE 90 UG/1
4 AEROSOL, METERED RESPIRATORY (INHALATION) ONCE
Status: COMPLETED | OUTPATIENT
Start: 2019-08-22 | End: 2019-08-22

## 2019-08-22 RX ADMIN — Medication 4 PUFF: at 08:28

## 2019-08-23 ENCOUNTER — OFFICE VISIT (OUTPATIENT)
Dept: PULMONOLOGY | Age: 69
End: 2019-08-23
Payer: COMMERCIAL

## 2019-08-23 VITALS
RESPIRATION RATE: 18 BRPM | HEIGHT: 72 IN | WEIGHT: 184 LBS | OXYGEN SATURATION: 95 % | DIASTOLIC BLOOD PRESSURE: 71 MMHG | SYSTOLIC BLOOD PRESSURE: 146 MMHG | HEART RATE: 70 BPM | BODY MASS INDEX: 24.92 KG/M2

## 2019-08-23 DIAGNOSIS — J44.9 COPD, MODERATE (HCC): ICD-10-CM

## 2019-08-23 DIAGNOSIS — R06.02 SOB (SHORTNESS OF BREATH): Primary | ICD-10-CM

## 2019-08-23 DIAGNOSIS — J43.2 CENTRILOBULAR EMPHYSEMA (HCC): ICD-10-CM

## 2019-08-23 DIAGNOSIS — I10 ESSENTIAL HYPERTENSION: Chronic | ICD-10-CM

## 2019-08-23 LAB
DLCO %PRED: 64 %
DLCO PRED: NORMAL ML/MIN/MMHG
DLCO/VA %PRED: NORMAL %
DLCO/VA PRED: NORMAL ML/MIN/MMHG
DLCO/VA: NORMAL ML/MIN/MMHG
DLCO: NORMAL ML/MIN/MMHG
EXPIRATORY TIME-POST: NORMAL SEC
EXPIRATORY TIME: NORMAL SEC
FEF 25-75% %CHNG: NORMAL
FEF 25-75% %PRED-POST: NORMAL %
FEF 25-75% %PRED-PRE: NORMAL L/SEC
FEF 25-75% PRED: NORMAL L/SEC
FEF 25-75%-POST: NORMAL L/SEC
FEF 25-75%-PRE: NORMAL L/SEC
FEV1 %PRED-POST: 80 %
FEV1 %PRED-PRE: 77 %
FEV1 PRED: NORMAL L
FEV1-POST: NORMAL L
FEV1-PRE: NORMAL L
FEV1/FVC %PRED-POST: NORMAL %
FEV1/FVC %PRED-PRE: NORMAL %
FEV1/FVC PRED: NORMAL %
FEV1/FVC-POST: 77 %
FEV1/FVC-PRE: 75 %
FVC %PRED-POST: NORMAL L
FVC %PRED-PRE: NORMAL %
FVC PRED: NORMAL L
FVC-POST: NORMAL L
FVC-PRE: NORMAL L
GAW %PRED: NORMAL %
GAW PRED: NORMAL L/S/CMH2O
GAW: NORMAL L/S/CMH2O
IC %PRED: NORMAL %
IC PRED: NORMAL L
IC: NORMAL L
MEP: NORMAL
MIP: NORMAL
MVV %PRED-PRE: NORMAL %
MVV PRED: NORMAL L/MIN
MVV-PRE: NORMAL L/MIN
PEF %PRED-POST: NORMAL %
PEF %PRED-PRE: NORMAL L/SEC
PEF PRED: NORMAL L/SEC
PEF%CHNG: NORMAL
PEF-POST: NORMAL L/SEC
PEF-PRE: NORMAL L/SEC
RAW %PRED: NORMAL %
RAW PRED: NORMAL CMH2O/L/S
RAW: NORMAL CMH2O/L/S
RV %PRED: NORMAL %
RV PRED: NORMAL L
RV: NORMAL L
SVC %PRED: NORMAL %
SVC PRED: NORMAL L
SVC: NORMAL L
TLC %PRED: 83 %
TLC PRED: NORMAL L
TLC: NORMAL L
VA %PRED: NORMAL %
VA PRED: NORMAL L
VA: NORMAL L
VTG %PRED: NORMAL %
VTG PRED: NORMAL L
VTG: NORMAL L

## 2019-08-23 PROCEDURE — 99214 OFFICE O/P EST MOD 30 MIN: CPT | Performed by: INTERNAL MEDICINE

## 2019-08-23 ASSESSMENT — PULMONARY FUNCTION TESTS
FEV1_PERCENT_PREDICTED_POST: 80
FEV1/FVC_POST: 77
FEV1_PERCENT_PREDICTED_PRE: 77
FEV1/FVC_PRE: 75

## 2019-08-26 ENCOUNTER — TELEPHONE (OUTPATIENT)
Dept: INTERNAL MEDICINE CLINIC | Age: 69
End: 2019-08-26

## 2019-08-26 ENCOUNTER — TELEPHONE (OUTPATIENT)
Dept: PULMONOLOGY | Age: 69
End: 2019-08-26

## 2019-08-26 NOTE — TELEPHONE ENCOUNTER
Last appointment: 8/9/2019  Next appointment: No future appointment scheduled  Last refill:     Chart documentation states medication discontinued 08/09/2019, no reason documented but was re added to med list as historical.

## 2019-08-26 NOTE — TELEPHONE ENCOUNTER
Incoming call from patient who stated that Deborah didn't receive the prescription for his Trelegy. He stated they didn't call him so they don't have it. Tried to call pharmacy, but they are closed. Informed patient that we will make sure they get it.

## 2019-09-30 ENCOUNTER — TELEPHONE (OUTPATIENT)
Dept: INTERNAL MEDICINE CLINIC | Age: 69
End: 2019-09-30

## 2019-09-30 NOTE — TELEPHONE ENCOUNTER
Spoke with patient regarding medication issues. Patient was under the impression that he was switched from Atorvastatin to Crestor. Advised that is correct, but pharmacy continues to fill the Atorvastatin, advised patient he needs to advise his pharmacy of switch and to stop with automatic refill. He understood and will contact them to have them stop all automatic refill as they are screwing up his medications.

## 2019-10-22 ENCOUNTER — OFFICE VISIT (OUTPATIENT)
Dept: CARDIOLOGY CLINIC | Age: 69
End: 2019-10-22
Payer: COMMERCIAL

## 2019-10-22 ENCOUNTER — HOSPITAL ENCOUNTER (OUTPATIENT)
Dept: VASCULAR LAB | Age: 69
Discharge: HOME | End: 2019-10-22
Payer: COMMERCIAL

## 2019-10-22 VITALS
DIASTOLIC BLOOD PRESSURE: 80 MMHG | HEART RATE: 68 BPM | WEIGHT: 187 LBS | OXYGEN SATURATION: 92 % | SYSTOLIC BLOOD PRESSURE: 130 MMHG | BODY MASS INDEX: 25.33 KG/M2 | HEIGHT: 72 IN

## 2019-10-22 DIAGNOSIS — I65.23 BILATERAL CAROTID ARTERY STENOSIS: Primary | ICD-10-CM

## 2019-10-22 DIAGNOSIS — I10 ESSENTIAL HYPERTENSION: Chronic | ICD-10-CM

## 2019-10-22 DIAGNOSIS — I25.10 CORONARY ARTERY DISEASE INVOLVING NATIVE CORONARY ARTERY OF NATIVE HEART WITHOUT ANGINA PECTORIS: Chronic | ICD-10-CM

## 2019-10-22 DIAGNOSIS — E78.2 MIXED HYPERLIPIDEMIA: ICD-10-CM

## 2019-10-22 DIAGNOSIS — R06.02 SHORTNESS OF BREATH: Primary | ICD-10-CM

## 2019-10-22 PROCEDURE — 99214 OFFICE O/P EST MOD 30 MIN: CPT | Performed by: NURSE PRACTITIONER

## 2019-10-22 PROCEDURE — 93880 EXTRACRANIAL BILAT STUDY: CPT

## 2019-10-22 PROCEDURE — 93000 ELECTROCARDIOGRAM COMPLETE: CPT | Performed by: NURSE PRACTITIONER

## 2019-11-12 ENCOUNTER — TELEPHONE (OUTPATIENT)
Dept: CARDIOLOGY CLINIC | Age: 69
End: 2019-11-12

## 2019-11-12 ENCOUNTER — HOSPITAL ENCOUNTER (OUTPATIENT)
Dept: NON INVASIVE DIAGNOSTICS | Age: 69
Discharge: HOME | End: 2019-11-12
Payer: COMMERCIAL

## 2019-11-12 DIAGNOSIS — R06.02 SHORTNESS OF BREATH: ICD-10-CM

## 2019-11-12 LAB
LV EF: 60 %
LVEF MODALITY: NORMAL

## 2019-11-12 PROCEDURE — 93306 TTE W/DOPPLER COMPLETE: CPT

## 2019-11-14 ENCOUNTER — TELEPHONE (OUTPATIENT)
Dept: INTERNAL MEDICINE CLINIC | Age: 69
End: 2019-11-14

## 2019-11-15 ENCOUNTER — OFFICE VISIT (OUTPATIENT)
Dept: INTERNAL MEDICINE CLINIC | Age: 69
End: 2019-11-15
Payer: COMMERCIAL

## 2019-11-15 VITALS
TEMPERATURE: 97.8 F | DIASTOLIC BLOOD PRESSURE: 64 MMHG | WEIGHT: 188 LBS | BODY MASS INDEX: 25.5 KG/M2 | OXYGEN SATURATION: 96 % | SYSTOLIC BLOOD PRESSURE: 148 MMHG | HEART RATE: 92 BPM | RESPIRATION RATE: 16 BRPM

## 2019-11-15 DIAGNOSIS — J20.9 ACUTE BRONCHITIS, UNSPECIFIED ORGANISM: Primary | ICD-10-CM

## 2019-11-15 PROCEDURE — 99213 OFFICE O/P EST LOW 20 MIN: CPT | Performed by: INTERNAL MEDICINE

## 2019-11-15 RX ORDER — GUAIFENESIN 600 MG/1
1200 TABLET, EXTENDED RELEASE ORAL 2 TIMES DAILY PRN
Qty: 120 TABLET | Refills: 0 | Status: SHIPPED | OUTPATIENT
Start: 2019-11-15 | End: 2019-12-15

## 2019-11-15 RX ORDER — DOXYCYCLINE HYCLATE 100 MG/1
100 CAPSULE ORAL 2 TIMES DAILY
Qty: 20 CAPSULE | Refills: 0 | Status: SHIPPED | OUTPATIENT
Start: 2019-11-15 | End: 2019-11-25

## 2019-11-15 RX ORDER — LOSARTAN POTASSIUM 100 MG/1
100 TABLET ORAL DAILY
Qty: 30 TABLET | Refills: 5 | Status: SHIPPED | OUTPATIENT
Start: 2019-11-15 | End: 2020-04-06

## 2019-11-22 ENCOUNTER — OFFICE VISIT (OUTPATIENT)
Dept: INTERNAL MEDICINE CLINIC | Age: 69
End: 2019-11-22
Payer: COMMERCIAL

## 2019-11-22 VITALS
BODY MASS INDEX: 24.55 KG/M2 | OXYGEN SATURATION: 97 % | SYSTOLIC BLOOD PRESSURE: 132 MMHG | WEIGHT: 181 LBS | HEART RATE: 84 BPM | DIASTOLIC BLOOD PRESSURE: 60 MMHG

## 2019-11-22 DIAGNOSIS — N18.30 CHRONIC KIDNEY DISEASE, STAGE III (MODERATE) (HCC): ICD-10-CM

## 2019-11-22 DIAGNOSIS — J20.9 COPD WITH ACUTE BRONCHITIS (HCC): Primary | ICD-10-CM

## 2019-11-22 DIAGNOSIS — E11.9 TYPE 2 DIABETES MELLITUS WITHOUT COMPLICATION, WITH LONG-TERM CURRENT USE OF INSULIN (HCC): ICD-10-CM

## 2019-11-22 DIAGNOSIS — E78.2 MIXED HYPERLIPIDEMIA: ICD-10-CM

## 2019-11-22 DIAGNOSIS — J44.0 COPD WITH ACUTE BRONCHITIS (HCC): Primary | ICD-10-CM

## 2019-11-22 DIAGNOSIS — I10 ESSENTIAL HYPERTENSION: Chronic | ICD-10-CM

## 2019-11-22 DIAGNOSIS — Z12.5 SCREENING FOR PROSTATE CANCER: ICD-10-CM

## 2019-11-22 DIAGNOSIS — Z79.4 TYPE 2 DIABETES MELLITUS WITHOUT COMPLICATION, WITH LONG-TERM CURRENT USE OF INSULIN (HCC): ICD-10-CM

## 2019-11-22 PROCEDURE — 99214 OFFICE O/P EST MOD 30 MIN: CPT | Performed by: INTERNAL MEDICINE

## 2019-12-02 ENCOUNTER — TELEPHONE (OUTPATIENT)
Dept: INTERNAL MEDICINE CLINIC | Age: 69
End: 2019-12-02

## 2019-12-03 ENCOUNTER — OFFICE VISIT (OUTPATIENT)
Dept: INTERNAL MEDICINE CLINIC | Age: 69
End: 2019-12-03
Payer: COMMERCIAL

## 2019-12-03 VITALS
OXYGEN SATURATION: 98 % | DIASTOLIC BLOOD PRESSURE: 80 MMHG | HEART RATE: 72 BPM | SYSTOLIC BLOOD PRESSURE: 138 MMHG | WEIGHT: 181 LBS | TEMPERATURE: 98.1 F | BODY MASS INDEX: 24.55 KG/M2

## 2019-12-03 DIAGNOSIS — F51.01 PRIMARY INSOMNIA: ICD-10-CM

## 2019-12-03 DIAGNOSIS — R06.83 SNORING: ICD-10-CM

## 2019-12-03 DIAGNOSIS — R53.83 OTHER FATIGUE: ICD-10-CM

## 2019-12-03 DIAGNOSIS — J40 BRONCHITIS: Primary | ICD-10-CM

## 2019-12-03 DIAGNOSIS — J44.9 COPD, MODERATE (HCC): ICD-10-CM

## 2019-12-03 PROCEDURE — 99214 OFFICE O/P EST MOD 30 MIN: CPT | Performed by: INTERNAL MEDICINE

## 2019-12-03 RX ORDER — AMOXICILLIN AND CLAVULANATE POTASSIUM 875; 125 MG/1; MG/1
1 TABLET, FILM COATED ORAL 2 TIMES DAILY
Qty: 20 TABLET | Refills: 0 | Status: SHIPPED | OUTPATIENT
Start: 2019-12-03 | End: 2019-12-13

## 2019-12-04 RX ORDER — ROSUVASTATIN CALCIUM 20 MG/1
20 TABLET, COATED ORAL DAILY
Qty: 90 TABLET | Refills: 1 | Status: SHIPPED | OUTPATIENT
Start: 2019-12-04 | End: 2020-06-01

## 2019-12-24 RX ORDER — SERTRALINE HYDROCHLORIDE 100 MG/1
TABLET, FILM COATED ORAL
Qty: 135 TABLET | Refills: 1 | Status: SHIPPED | OUTPATIENT
Start: 2019-12-24 | End: 2020-03-20

## 2019-12-24 RX ORDER — FENOFIBRATE 160 MG/1
TABLET ORAL
Qty: 90 TABLET | Refills: 1 | Status: SHIPPED | OUTPATIENT
Start: 2019-12-24 | End: 2020-06-22

## 2019-12-24 RX ORDER — AMLODIPINE BESYLATE 10 MG/1
10 TABLET ORAL DAILY
Qty: 90 TABLET | Refills: 1 | Status: SHIPPED | OUTPATIENT
Start: 2019-12-24 | End: 2020-06-22

## 2019-12-26 ENCOUNTER — TELEPHONE (OUTPATIENT)
Dept: INTERNAL MEDICINE CLINIC | Age: 69
End: 2019-12-26

## 2019-12-26 RX ORDER — GLIMEPIRIDE 4 MG/1
TABLET ORAL
Qty: 180 TABLET | Refills: 1 | Status: SHIPPED | OUTPATIENT
Start: 2019-12-26 | End: 2020-06-22

## 2020-01-02 ENCOUNTER — TELEPHONE (OUTPATIENT)
Dept: INTERNAL MEDICINE CLINIC | Age: 70
End: 2020-01-02

## 2020-01-02 RX ORDER — TRAZODONE HYDROCHLORIDE 100 MG/1
TABLET ORAL
Qty: 90 TABLET | Refills: 1 | Status: SHIPPED | OUTPATIENT
Start: 2020-01-02 | End: 2020-04-30 | Stop reason: SDUPTHER

## 2020-01-02 RX ORDER — LEVOFLOXACIN 500 MG/1
500 TABLET, FILM COATED ORAL DAILY
Qty: 7 TABLET | Refills: 0 | Status: SHIPPED | OUTPATIENT
Start: 2020-01-02 | End: 2021-11-29 | Stop reason: SDUPTHER

## 2020-01-02 NOTE — TELEPHONE ENCOUNTER
URI TRIAGE    When did the symptoms start? 7 day(s) ago  Blowing out or coughing up any colored mucus? yes - Green & Yellow and Brown  Fevers? none  Sore throat? no  SOB/wheezing/chest tightness? yes - SOB due to COPD, No Wheezing or Chest Tightness  GI symptoms? no    Other symptoms? No   Treatment tried so far? Mucinex and  Antibiotic. Patient finished, and he got better for a couple days, but then the above symptoms returned. He states Dr. Caroline Rodas had informed him there is a stronger antibiotic he ould try which he now wishes to try. He states Dr. Caroline Rodas diagnosed him with Bronchitis.     [facility] [address] [place] [address], [address] [place] [address] -  [phone]

## 2020-01-27 ENCOUNTER — TELEPHONE (OUTPATIENT)
Dept: PULMONOLOGY | Age: 70
End: 2020-01-27

## 2020-03-16 ENCOUNTER — TELEPHONE (OUTPATIENT)
Dept: INTERNAL MEDICINE CLINIC | Age: 70
End: 2020-03-16

## 2020-03-16 NOTE — TELEPHONE ENCOUNTER
Patient does not have access to his MyChart.  He stated that he will wait until his appt that's scheduled for 3/20/20

## 2020-03-16 NOTE — TELEPHONE ENCOUNTER
He should not go to work if he is sick. If he thinks that he needs an antibiotic and is not short of breath, he can complete an Evisit. If he becomes short of breath, should go to ED.

## 2020-03-20 ENCOUNTER — OFFICE VISIT (OUTPATIENT)
Dept: INTERNAL MEDICINE CLINIC | Age: 70
End: 2020-03-20
Payer: COMMERCIAL

## 2020-03-20 VITALS
DIASTOLIC BLOOD PRESSURE: 60 MMHG | OXYGEN SATURATION: 97 % | HEART RATE: 81 BPM | SYSTOLIC BLOOD PRESSURE: 128 MMHG | BODY MASS INDEX: 25.09 KG/M2 | WEIGHT: 185 LBS

## 2020-03-20 PROCEDURE — 99214 OFFICE O/P EST MOD 30 MIN: CPT | Performed by: INTERNAL MEDICINE

## 2020-03-20 RX ORDER — DOXYCYCLINE HYCLATE 100 MG/1
100 CAPSULE ORAL 2 TIMES DAILY
Qty: 20 CAPSULE | Refills: 0 | Status: SHIPPED | OUTPATIENT
Start: 2020-03-20 | End: 2021-11-12 | Stop reason: SDUPTHER

## 2020-03-20 RX ORDER — SERTRALINE HYDROCHLORIDE 100 MG/1
50 TABLET, FILM COATED ORAL DAILY
COMMUNITY
Start: 2020-03-20 | End: 2020-06-22

## 2020-03-20 NOTE — PROGRESS NOTES
Willa Brady MD   amLODIPine (NORVASC) 10 MG tablet TAKE 1 TABLET BY MOUTH DAILY Yes Willa Brady MD   rosuvastatin (CRESTOR) 20 MG tablet TAKE 1 TABLET BY MOUTH DAILY Yes Willa Brady MD   Insulin Pen Needle (B-D UF III MINI PEN NEEDLES) 31G X 5 MM MISC PATIENT INJECTS ONCE DAILY Yes Willa Brady MD   doxazosin (CARDURA) 4 MG tablet Take 1 tablet by mouth nightly Yes Wallace Inman MD   losartan (COZAAR) 100 MG tablet Take 1 tablet by mouth daily Yes Jane Anderson MD   blood glucose test strips (ASCENSIA AUTODISC VI;ONE TOUCH ULTRA TEST VI) strip 1 each by In Vitro route 2 times daily Yes Willa Brady MD   Fluticasone-Umeclidin-Vilant (TRELEGY ELLIPTA) 126-18.8-75 MCG/INH AEPB Inhale 2 puffs into the lungs daily Yes Huber Toussaint MD   [facility] Patient test twice daily Yes Willa Brady MD   albuterol sulfate HFA (VENTOLIN HFA) 108 (90 Base) MCG/ACT inhaler 2 Puff every 4-6 hours as needed for wheezing or shortness of breath Yes Willa Brady MD   Blood Glucose Monitoring Suppl (PRECISION XTRA) w/Device KIT As directed Yes Willa Brady MD   insulin glargine (LANTUS SOLOSTAR) 100 UNIT/ML injection pen Inject 15 Units into the skin nightly  Yes Historical Provider, MD   Blood Glucose Monitoring Suppl (ONE TOUCH ULTRA 2) W/DEVICE KIT 1 kit by Does not apply route daily as needed. Yes Willa Brady MD   aspirin 325 MG tablet Take 325 mg by mouth daily. Yes Historical Provider, MD   fish oil-omega-3 fatty acids 1000 MG capsule Take 2 g by mouth daily. Yes Historical Provider, MD   sertraline (ZOLOFT) 100 MG tablet TAKE 1 AND 1/2 TABLETS BY MOUTH DAILY  Willa Brady MD       Vitals:    03/20/20 1400   BP: 128/60   Pulse: 81   SpO2: 97%   Weight: 185 lb (83.9 kg)      Body mass index is 25.09 kg/m².      Wt Readings from Last 3 Encounters:   03/20/20 185 lb (83.9 kg)   01/16/20 189 lb 6.4 oz (85.9 kg)   12/03/19 181 lb (82.1 kg)     BP Readings from Last 3 Encounters:   03/20/20 128/60   01/16/20 131/82   12/03/19 138/80       CC:  Patient presents for re-evaluation of the following medical concerns     HPI    Acute bronchitis/COPD:  10 day history of cough productive of light yellow sputum. No fever, SOB, rhinorrhea, nasal congestion, ST.  Last treated with antibiotics in early January, with complete resolution of symptoms. Taking Trelegy consistently, but only needed rescue inhaler 2-3x/week. No change with time. Taking Mucinex. Diabetes Mellitus Type 2: Current symptoms/problems include none. Not taking trulicity consistently, but tolerating. Using 12-15 units of Lantus qpm.    Home blood sugar records: fasting range: not checking, 76- 206 before bedtime  Any episodes of hypoglycemia? no  Daily Aspirin? Yes    Hypertension/CKD:  Home blood pressure monitoring: No.  He is adherent to a low sodium diet. Patient denies chest pain, headache, lightheadedness and palpitations. Antihypertensive medication side effects: no medication side effects noted. Use of agents associated with hypertension: none. Diuretic still on hold per Dr. Chantal Saenz. Hyperlipidemia:  No new myalgias or GI upset on Crestor and fenofibrate (Tricor, Trilipix). Mood Disorder:  Patient presents for follow-up of depression. Current complaints include: none. He denies anhedonia, depressed mood, tearfulness, feelings of hopelessness, insomnia, irritability, excessive worry, restlessness and suicidal thoughts or behavior. Symptoms/signs of marol: none. External stressors: nothing new. Current treatment includes: Zoloft- 100 mg qd. Medication side effects: none. Interested in weaning off Zoloft.     Lab Results   Component Value Date    LDLCALC 66 12/20/2019    LDLCALC 73 08/30/2019    LDLDIRECT 116 (H) 11/08/2018    LDLDIRECT 92 03/10/2016    TRIGLYCFAST 184 (H) 12/20/2019    TRIGLYCFAST 170 (H) 08/30/2019    TRIG 264 (H) 03/30/2017    HDL 34 (L) 12/20/2019     Lab Results Component Value Date    GLUF 144 (H) 2019    GLUCOSE 251 (H) 2019    LABA1C 6.4 2019    LABA1C 7.4 2019    LABA1C 7.4 2019     Lab Results   Component Value Date     2019    K 4.2 2019    BUN 21 (H) 2019    CREATININE 1.5 (H) 2019    LABGLOM 47 (A) 2019    GFRAA 56 (A) 2019    CALCIUM 10.0 2019    VITD25 41.6 2012     Lab Results   Component Value Date    ALT 13 2019    AST 21 2019     Lab Results   Component Value Date    HGB 14.0 2019     Lab Results   Component Value Date    TSHREFLEX 1.16 2019    TSH 1.56 2016        Review of Systems  As documented in HPI    Social History     Tobacco Use    Smoking status: Former Smoker     Packs/day: 1.00     Years: 20.00     Pack years: 20.00     Types: Cigarettes     Last attempt to quit: 3/3/1992     Years since quittin.0    Smokeless tobacco: Never Used   Substance Use Topics    Alcohol use: No        Physical Exam   Constitutional: He is oriented to person, place, and time. He appears well-developed and well-nourished. No distress. HENT:   Right Ear: Tympanic membrane, external ear and ear canal normal.   Left Ear: Tympanic membrane, external ear and ear canal normal.   Eyes: Conjunctivae are normal.   Neck: Neck supple. Cardiovascular: Normal rate and regular rhythm. No murmur heard. Pulmonary/Chest: Effort normal. No respiratory distress. He has wheezes (occasional). He has no rhonchi. He has no rales. Lymphadenopathy:     He has cervical adenopathy. Neurological: He is alert and oriented to person, place, and time. Skin: Skin is warm and dry. No rash noted. No erythema. Psychiatric: He has a normal mood and affect.  His speech is normal and behavior is normal. Cognition and memory are normal.

## 2020-04-06 RX ORDER — LOSARTAN POTASSIUM 100 MG/1
100 TABLET ORAL DAILY
Qty: 30 TABLET | Refills: 5 | Status: SHIPPED | OUTPATIENT
Start: 2020-04-06 | End: 2020-09-30

## 2020-04-30 RX ORDER — TRAZODONE HYDROCHLORIDE 100 MG/1
TABLET ORAL
Qty: 90 TABLET | Refills: 1 | Status: SHIPPED | OUTPATIENT
Start: 2020-04-30 | End: 2020-05-01 | Stop reason: SDUPTHER

## 2020-04-30 NOTE — TELEPHONE ENCOUNTER
Patient requesting 90 day refill of traZODone (DESYREL) 100 MG tablet be sent to [facility] on [address] [facility].      Last appointment: 3/20/2020  Next appointment: 6/26/2020  Last refill: 1/2/2020

## 2020-05-01 ENCOUNTER — TELEPHONE (OUTPATIENT)
Dept: INTERNAL MEDICINE CLINIC | Age: 70
End: 2020-05-01

## 2020-05-01 RX ORDER — TRAZODONE HYDROCHLORIDE 100 MG/1
150 TABLET ORAL NIGHTLY
Qty: 135 TABLET | Refills: 1 | Status: SHIPPED | OUTPATIENT
Start: 2020-05-01 | End: 2020-11-09

## 2020-05-01 NOTE — TELEPHONE ENCOUNTER
Spring Cunningham with [facility] is requesting clarification on the sig for traZODone (DESYREL) 100 MG tablet    Please contact Spring Cunningham at the number provided to clarify

## 2020-05-01 NOTE — TELEPHONE ENCOUNTER
Patient states he couldn't get his prescription for Trazodone 100 mg because it's too soon. He has been taking 1 & 1/2 tablets at nights. He is requesting a different prescription strength please be called in so he can get it filled.     Please call in to:    Iris Valle [address] [place] [address] [phone] - F [phone]

## 2020-06-01 RX ORDER — ROSUVASTATIN CALCIUM 20 MG/1
20 TABLET, COATED ORAL DAILY
Qty: 90 TABLET | Refills: 1 | Status: SHIPPED | OUTPATIENT
Start: 2020-06-01 | End: 2020-11-25 | Stop reason: SDUPTHER

## 2020-06-09 ENCOUNTER — TELEPHONE (OUTPATIENT)
Dept: PULMONOLOGY | Age: 70
End: 2020-06-09

## 2020-06-09 RX ORDER — DULAGLUTIDE 0.75 MG/.5ML
INJECTION, SOLUTION SUBCUTANEOUS
Qty: 6 ML | Refills: 5 | Status: SHIPPED | OUTPATIENT
Start: 2020-06-09 | End: 2021-04-29 | Stop reason: SINTOL

## 2020-06-09 RX ORDER — FLUTICASONE FUROATE, UMECLIDINIUM BROMIDE AND VILANTEROL TRIFENATATE 100; 62.5; 25 UG/1; UG/1; UG/1
1 POWDER RESPIRATORY (INHALATION) DAILY
Qty: 3 EACH | Refills: 3 | Status: SHIPPED | OUTPATIENT
Start: 2020-06-09 | End: 2021-07-09

## 2020-06-22 RX ORDER — AMLODIPINE BESYLATE 10 MG/1
10 TABLET ORAL DAILY
Qty: 90 TABLET | Refills: 1 | Status: SHIPPED | OUTPATIENT
Start: 2020-06-22 | End: 2020-12-18

## 2020-06-22 RX ORDER — GLIMEPIRIDE 4 MG/1
TABLET ORAL
Qty: 180 TABLET | Refills: 1 | Status: SHIPPED | OUTPATIENT
Start: 2020-06-22 | End: 2020-12-18

## 2020-06-22 RX ORDER — FENOFIBRATE 160 MG/1
TABLET ORAL
Qty: 90 TABLET | Refills: 1 | Status: SHIPPED | OUTPATIENT
Start: 2020-06-22 | End: 2020-12-18

## 2020-06-22 RX ORDER — SERTRALINE HYDROCHLORIDE 100 MG/1
TABLET, FILM COATED ORAL
Qty: 135 TABLET | Refills: 1 | Status: SHIPPED | OUTPATIENT
Start: 2020-06-22 | End: 2020-07-31

## 2020-07-10 RX ORDER — PEN NEEDLE, DIABETIC 31 GX5/16"
NEEDLE, DISPOSABLE MISCELLANEOUS
Qty: 100 EACH | Refills: 1 | Status: SHIPPED | OUTPATIENT
Start: 2020-07-10 | End: 2022-03-03 | Stop reason: SDUPTHER

## 2020-07-30 PROBLEM — J44.0 COPD WITH ACUTE BRONCHITIS (HCC): Status: RESOLVED | Noted: 2017-03-13 | Resolved: 2020-07-30

## 2020-07-30 PROBLEM — J20.9 COPD WITH ACUTE BRONCHITIS (HCC): Status: RESOLVED | Noted: 2017-03-13 | Resolved: 2020-07-30

## 2020-07-30 NOTE — PROGRESS NOTES
Assessment/Plan     1. Type 2 diabetes mellitus without complication, with long-term current use of insulin (HCC)  Volatile depending upon dietary factors. He will adjust his Lantus dose based upon home BS readings. Continue current doses of Amaryl and Trulicity. Labs ordered. -  DIABETES FOOT EXAM    2. Essential hypertension  Well-controlled on recheck. Continue current medications. 3. Chronic kidney disease, stage III (moderate) (HCC)  Stable. He does not wish to follow-up with nephrology, but will make new referral if GFR drops significantly. 4. Mixed hyperlipidemia  Tolerating current dose(s) of Crestor and Tricor- continue. -  ABDOMINAL AORTA LIMITED; Future    5. Primary insomnia  Doing well on 100-150 mg dose of trazodone- continue. 6. Major depressive disorder with single episode, in full remission [facility])  Patient feels ready to wean Zoloft- will decrease dose to 50 mg qd. He will call if symptoms recur. Return in about 3 months (around 10/31/2020). Marcell Scott   YOB: 1950    Date of Visit:  7/31/2020    Allergies   Allergen Reactions    Dilaudid [Hydromorphone Hcl] Other (See Comments)     Mental status changes    Nubain [Nalbuphine Hcl] Rash      Prior to Visit Medications    Medication Sig Taking?  Authorizing Provider   Insulin Pen Needle (B-D UF III MINI PEN NEEDLES) 31G X 5 MM MISC USE AS DIRECTED TO INJECT ONCE DAILY Yes Deisy Wylie MD   amLODIPine (NORVASC) 10 MG tablet TAKE 1 TABLET BY MOUTH DAILY Yes Deisy Wylie MD   glimepiride (AMARYL) 4 MG tablet TAKE 2 TABLETS BY MOUTH EVERY MORNING Yes Deisy Wylie MD   sertraline (ZOLOFT) 100 MG tablet TAKE 1 AND 1/2 TABLETS BY MOUTH DAILY  Patient taking differently: Take 100 mg by mouth daily  Yes Deisy Wylie MD   fenofibrate (TRIGLIDE) 160 MG tablet TAKE 1 TABLET BY MOUTH DAILY Yes MD CONNOR Yee 2.42 SO/8.2IM SOPN INJECT 0.75 MG INTO THE SKIN ONCE A WEEK Yes Annalise Paez MD   fluticasone-umeclidin-vilant (TRELEGY ELLIPTA) 100-62.5-25 MCG/INH AEPB Inhale 1 puff into the lungs daily Yes Tonia Canseco MD   Nicolas Schilder 100-62.5-25 MCG/INH AEPB INHALE 2 PUFFS INTO THE LUNGS DAILY Yes Tonia Canseco MD   rosuvastatin (CRESTOR) 20 MG tablet TAKE 1 TABLET BY MOUTH DAILY Yes Annalise Paez MD   doxazosin (CARDURA) 4 MG tablet TAKE 1 TABLET BY MOUTH EVERY NIGHT Yes Chetan West MD   traZODone (DESYREL) 100 MG tablet Take 1.5 tablets by mouth nightly TAKE 1 TABLET BY MOUTH EVERY NIGHT Yes Annalise Paez MD   losartan (COZAAR) 100 MG tablet TAKE 1 TABLET BY MOUTH DAILY Yes Annalise Paze MD   blood glucose test strips (ASCENSIA AUTODISC VI;ONE TOUCH ULTRA TEST VI) strip 1 each by In Vitro route 2 times daily Yes Annalise Paez MD   [facility] Patient test twice daily Yes Annalise Paez MD   albuterol sulfate HFA (VENTOLIN HFA) 108 (90 Base) MCG/ACT inhaler 2 Puff every 4-6 hours as needed for wheezing or shortness of breath Yes Annalise Paez MD   Blood Glucose Monitoring Suppl (PRECISION XTRA) w/Device KIT As directed Yes Annalise Paez MD   insulin glargine (LANTUS SOLOSTAR) 100 UNIT/ML injection pen Inject 15 Units into the skin nightly  Yes Historical Provider, MD   Blood Glucose Monitoring Suppl (ONE TOUCH ULTRA 2) W/DEVICE KIT 1 kit by Does not apply route daily as needed. Yes Annalise Paez MD   aspirin 325 MG tablet Take 325 mg by mouth daily. Yes Historical Provider, MD   fish oil-omega-3 fatty acids 1000 MG capsule Take 2 g by mouth daily. Yes Historical Provider, MD       Vitals:    07/31/20 0959 07/31/20 1002 07/31/20 1029   BP: (!) 146/60 (!) 148/62 135/65   Pulse: 76     Temp: 98.4 °F (36.9 °C)     TempSrc: Oral     SpO2: 97%     Weight: 186 lb (84.4 kg)        Body mass index is 25.23 kg/m².      Wt Readings from Last 3 Encounters:   07/31/20 186 lb (84.4 kg)   03/20/20 185 lb (83.9 kg)   01/16/20 189 lb 6.4 oz (85.9 kg)     BP Readings from Last 3 Encounters:   07/31/20 135/65   03/20/20 128/60   01/16/20 131/82       CC:  Patient presents for re-evaluation of the following medical concerns     HPI    Diabetes Mellitus Type 2: Current symptoms/problems include none. Not taking trulicity consistently, but tolerating. Using 2-15 units of Lantus qpm.    Home blood sugar records: fasting range: , 150-160 before bedtime  Any episodes of hypoglycemia? no  Daily Aspirin? Yes    Hypertension/CKD:  Home blood pressure monitoring: No.  He is adherent to a low sodium diet. Patient denies chest pain, headache, lightheadedness and palpitations. Antihypertensive medication side effects: no medication side effects noted. Use of agents associated with hypertension: none. Hyperlipidemia:  No new myalgias or GI upset on Crestor and fenofibrate (Tricor, Trilipix). Mood Disorder:  Patient presents for follow-up of depression and insomnia. Current complaints include: none. He denies anhedonia, depressed mood, tearfulness, feelings of hopelessness, insomnia, irritability, excessive worry, restlessness and suicidal thoughts or behavior. Symptoms/signs of marlo: none. External stressors: nothing new. Current treatment includes: Zoloft- 100 mg qd, trazodone 150 mg qd. Medication side effects: none.       Lab Results   Component Value Date    LDLCALC 66 12/20/2019    LDLCALC 73 08/30/2019    LDLDIRECT 116 (H) 11/08/2018    LDLDIRECT 92 03/10/2016    TRIGLYCFAST 184 (H) 12/20/2019    TRIGLYCFAST 170 (H) 08/30/2019    TRIG 264 (H) 03/30/2017    HDL 34 (L) 12/20/2019     Lab Results   Component Value Date    GLUF 144 (H) 12/20/2019    GLUCOSE 251 (H) 07/01/2019    LABA1C 6.4 12/20/2019    LABA1C 7.4 08/30/2019    LABA1C 7.4 04/12/2019     Lab Results   Component Value Date     12/20/2019    K 4.2 12/20/2019    BUN 21 (H) 12/20/2019    CREATININE 1.5 (H) 12/20/2019    LABGLOM 47 (A) 12/20/2019    GFRAA 56 (A) 12/20/2019 CALCIUM 10.0 2019    VITD25 41.6 2012     Lab Results   Component Value Date    ALT 13 2019    AST 21 2019     Lab Results   Component Value Date    HGB 14.0 2019     Lab Results   Component Value Date    TSHREFLEX 1.16 2019    TSH 1.56 2016        Review of Systems  As documented in HPI    Social History     Tobacco Use    Smoking status: Former Smoker     Packs/day: 1.00     Years: 20.00     Pack years: 20.00     Types: Cigarettes     Last attempt to quit: 3/3/1992     Years since quittin.4    Smokeless tobacco: Never Used   Substance Use Topics    Alcohol use: No        Physical Exam   Constitutional: He is oriented to person, place, and time. He appears well-developed and well-nourished. No distress. HENT:   Mouth/Throat: Oropharynx is clear and moist and mucous membranes are normal.   Eyes: Conjunctivae are normal.   Neck: No JVD present. Carotid bruit is not present. No thyroid mass and no thyromegaly present. Cardiovascular: Normal rate, regular rhythm, normal heart sounds and intact distal pulses. Exam reveals no gallop and no friction rub. No murmur heard. Pulmonary/Chest: Effort normal and breath sounds normal. No respiratory distress. He has no wheezes. He has no rhonchi. He has no rales. Musculoskeletal:         General: No edema. Neurological: He is alert and oriented to person, place, and time. He has normal strength. Skin: Skin is warm and dry. No rash noted. No erythema. Psychiatric: He has a normal mood and affect.  His behavior is normal. Judgment and thought content normal.     Visual inspection:  Deformity/amputation: absent  Skin lesions/pre-ulcerative calluses: absent  Edema: right- negative, left- negative    Sensory exam:  Monofilament sensation: normal  (minimum of 5 random plantar locations tested, avoiding callused areas - > 1 area with absence of sensation is + for neuropathy)    Plus at least one of the following:  Pulses: normal,   Pinprick: N/A  Proprioception: N/A  Vibration (128 Hz): N/A

## 2020-07-31 ENCOUNTER — OFFICE VISIT (OUTPATIENT)
Dept: INTERNAL MEDICINE CLINIC | Age: 70
End: 2020-07-31
Payer: COMMERCIAL

## 2020-07-31 VITALS
HEART RATE: 76 BPM | OXYGEN SATURATION: 97 % | DIASTOLIC BLOOD PRESSURE: 65 MMHG | BODY MASS INDEX: 25.23 KG/M2 | SYSTOLIC BLOOD PRESSURE: 135 MMHG | TEMPERATURE: 98.4 F | WEIGHT: 186 LBS

## 2020-07-31 PROCEDURE — 99214 OFFICE O/P EST MOD 30 MIN: CPT | Performed by: INTERNAL MEDICINE

## 2020-09-08 DIAGNOSIS — Z79.4 TYPE 2 DIABETES MELLITUS WITHOUT COMPLICATION, WITH LONG-TERM CURRENT USE OF INSULIN (HCC): ICD-10-CM

## 2020-09-08 DIAGNOSIS — I10 ESSENTIAL HYPERTENSION: Chronic | ICD-10-CM

## 2020-09-08 DIAGNOSIS — E78.2 MIXED HYPERLIPIDEMIA: ICD-10-CM

## 2020-09-08 DIAGNOSIS — E11.9 TYPE 2 DIABETES MELLITUS WITHOUT COMPLICATION, WITH LONG-TERM CURRENT USE OF INSULIN (HCC): ICD-10-CM

## 2020-09-08 DIAGNOSIS — N18.30 CKD (CHRONIC KIDNEY DISEASE), STAGE III (HCC): ICD-10-CM

## 2020-09-08 LAB
A/G RATIO: 1.5 (ref 1.1–2.2)
ALBUMIN SERPL-MCNC: 4.2 G/DL (ref 3.4–5)
ALP BLD-CCNC: 53 U/L (ref 40–129)
ALT SERPL-CCNC: 13 U/L (ref 10–40)
ANION GAP SERPL CALCULATED.3IONS-SCNC: 15 MMOL/L (ref 3–16)
AST SERPL-CCNC: 22 U/L (ref 15–37)
BILIRUB SERPL-MCNC: 0.3 MG/DL (ref 0–1)
BUN BLDV-MCNC: 16 MG/DL (ref 7–20)
CALCIUM SERPL-MCNC: 9.6 MG/DL (ref 8.3–10.6)
CHLORIDE BLD-SCNC: 103 MMOL/L (ref 99–110)
CHOLESTEROL, FASTING: 119 MG/DL (ref 0–199)
CO2: 24 MMOL/L (ref 21–32)
CREAT SERPL-MCNC: 1.7 MG/DL (ref 0.8–1.3)
GFR AFRICAN AMERICAN: 49
GFR NON-AFRICAN AMERICAN: 40
GLOBULIN: 2.8 G/DL
GLUCOSE BLD-MCNC: 126 MG/DL (ref 70–99)
GLUCOSE FASTING: 126 MG/DL (ref 70–99)
HDLC SERPL-MCNC: 32 MG/DL (ref 40–60)
LDL CHOLESTEROL CALCULATED: 56 MG/DL
PHOSPHORUS: 2.6 MG/DL (ref 2.5–4.9)
POTASSIUM SERPL-SCNC: 3.8 MMOL/L (ref 3.5–5.1)
SODIUM BLD-SCNC: 142 MMOL/L (ref 136–145)
TOTAL PROTEIN: 7 G/DL (ref 6.4–8.2)
TRIGLYCERIDE, FASTING: 157 MG/DL (ref 0–150)
VLDLC SERPL CALC-MCNC: 31 MG/DL

## 2020-09-09 LAB
ESTIMATED AVERAGE GLUCOSE: 151.3 MG/DL
HBA1C MFR BLD: 6.9 %

## 2020-09-30 RX ORDER — LOSARTAN POTASSIUM 100 MG/1
100 TABLET ORAL DAILY
Qty: 30 TABLET | Refills: 5 | Status: SHIPPED | OUTPATIENT
Start: 2020-09-30 | End: 2021-03-02

## 2020-09-30 NOTE — TELEPHONE ENCOUNTER
Last appointment: 7/31/2020  Next appointment: 11/6/2020  Last refill: 04/06/2020 # 30 with 5 refills

## 2020-10-05 ENCOUNTER — OFFICE VISIT (OUTPATIENT)
Dept: PULMONOLOGY | Age: 70
End: 2020-10-05
Payer: COMMERCIAL

## 2020-10-05 VITALS — DIASTOLIC BLOOD PRESSURE: 79 MMHG | OXYGEN SATURATION: 97 % | HEART RATE: 82 BPM | SYSTOLIC BLOOD PRESSURE: 139 MMHG

## 2020-10-05 PROCEDURE — 90694 VACC AIIV4 NO PRSRV 0.5ML IM: CPT | Performed by: INTERNAL MEDICINE

## 2020-10-05 PROCEDURE — 90471 IMMUNIZATION ADMIN: CPT | Performed by: INTERNAL MEDICINE

## 2020-10-05 PROCEDURE — 99214 OFFICE O/P EST MOD 30 MIN: CPT | Performed by: INTERNAL MEDICINE

## 2020-10-05 NOTE — PROGRESS NOTES
[facility]  Progress Note  Seferino Thompson MD       Nina Branch   YOB: 1950    Date of Visit:  10/5/2020    Assessment/Plan:  1. SOB  Stable  Actually better since he was here last    2. COPD, moderate ([address]  Stable  PFT 8/19:  Spirometry:  Flow volume loops were normal. The FEV-1/FVC ratio was normal.   The FEV-1 was 2.79 liters (77% of predicted), which was   moderately decreased. The FVC was 3.72 liters (76% of predicted),   which was decreased. Response to inhaled bronchodilators   (albuterol) was not significant. Lung volumes:  Lung volumes were tested by plethysmography. The total lung   capacity was 5.82 liters (83% of predicted), which was normal.   The residual volume was 1.86 liters (71% of predicted), which was   decreased. The ratio of residual volume to total lung capacity   (RV/TLC) was 32, which was normal.     Diffusion capacity was found to be mildly decreased.       Interpretation:  Overall normal PFT with only mild decrease in DLCO. Clinical   correlation is recommended. Pulmonary function testing actually is improved. Modified barium swallow was also pretty good. Discussed techniques to protect against future aspiration    Medication Management:  Trelegy  Albuterol    3. Centrilobular emphysema (HCC)/PN  Stable  CT imaging does reveal evidence of mild centrilobular emphysema. Last CT showed \"inflammatory nodules\"  Repeat CT chest    4. Essential hypertension  Stable  BP acceptable. FOLLOW UP: 6 month      Chief Complaint   Patient presents with    Shortness of Breath     1 year       HPI  The patient presents with a chief complaint of moderate shortness of breath related to Moderate COPD of many years duration. He has mild associated cough. Exertion is a modifying factor. Cold weather is a factor. No Chest pain, Nausea or vomiting reported.       Review of Systems  As documented in HPI      Physical Exam:  Well developed, well nourished  Alert and oriented  Sclera is clear  No cervical adenopathy  No JVD. Chest examination is clear. Cardiac examination reveals regular rate and rhythm without murmur, gallop or rub. The abdomen is soft, nontender and nondistended. There is no clubbing, cyanosis or edema of the extremities. There is no obvious skin rash. No focal neuro deficicts  Normal mood and affect    Allergies   Allergen Reactions    Dilaudid [Hydromorphone Hcl] Other (See Comments)     Mental status changes    Nubain [Nalbuphine Hcl] Rash     Prior to Visit Medications    Medication Sig Taking?  Authorizing Provider   losartan (COZAAR) 100 MG tablet TAKE 1 TABLET BY MOUTH DAILY  Gladis Gonzalez MD   sertraline (ZOLOFT) 50 MG tablet Take 1 tablet by mouth daily  Gladis Gonzalez MD   Insulin Pen Needle (B-D UF III MINI PEN NEEDLES) 31G X 5 MM MISC USE AS DIRECTED TO INJECT ONCE DAILY  Gladis Gonzalez MD   amLODIPine (NORVASC) 10 MG tablet TAKE 1 TABLET BY MOUTH DAILY  Gladis Gonzalez MD   glimepiride (AMARYL) 4 MG tablet TAKE 2 TABLETS BY MOUTH EVERY MORNING  Gladis Gonzalez MD   fenofibrate (TRIGLIDE) 160 MG tablet TAKE 1 TABLET BY MOUTH DAILY  Gladis Gonzalez MD   TRULICITY 0.73 AF/3.7WM SOPN INJECT 0.75 MG INTO THE SKIN ONCE A WEEK  Gladis Gonzalez MD   fluticasone-umeclidin-vilant (TRELEGY ELLIPTA) 100-62.5-25 MCG/INH AEPB Inhale 1 puff into the lungs daily  Feliz Jennings MD   Fatuma Coker 100-62.5-25 MCG/INH AEPB INHALE 2 PUFFS INTO THE LUNGS DAILY  Feliz Jennings MD   rosuvastatin (CRESTOR) 20 MG tablet TAKE 1 TABLET BY MOUTH DAILY  Gladis Gonzalez MD   doxazosin (CARDURA) 4 MG tablet TAKE 1 TABLET BY MOUTH EVERY NIGHT  Aaron Wolf MD   traZODone (DESYREL) 100 MG tablet Take 1.5 tablets by mouth nightly TAKE 1 TABLET BY MOUTH EVERY NIGHT  Gladis Gonzalez MD   blood glucose test strips (ASCENSIA AUTODISC VI;ONE TOUCH ULTRA TEST VI) strip 1 each by In Vitro route 2 times daily  Gladis Gonzalez MD [facility] Patient test twice daily  Yvonne Mendez MD   albuterol sulfate HFA (VENTOLIN HFA) 108 (90 Base) MCG/ACT inhaler 2 Puff every 4-6 hours as needed for wheezing or shortness of breath  Yvonne Mendez MD   Blood Glucose Monitoring Suppl (PRECISION XTRA) w/Device KIT As directed  Yvonne Mendez MD   insulin glargine (LANTUS SOLOSTAR) 100 UNIT/ML injection pen Inject 15 Units into the skin nightly   Historical Provider, MD   Blood Glucose Monitoring Suppl (ONE TOUCH ULTRA 2) W/DEVICE KIT 1 kit by Does not apply route daily as needed. Yvonne Mendez MD   aspirin 325 MG tablet Take 325 mg by mouth daily. Historical Provider, MD   fish oil-omega-3 fatty acids 1000 MG capsule Take 2 g by mouth daily. Historical Provider, MD       Vitals:    10/05/20 0903   BP: 139/79   Pulse: 82   SpO2: 97%     There is no height or weight on file to calculate BMI.      Wt Readings from Last 3 Encounters:   20 186 lb (84.4 kg)   20 185 lb (83.9 kg)   20 189 lb 6.4 oz (85.9 kg)     BP Readings from Last 3 Encounters:   10/05/20 139/79   20 135/65   20 128/60        Social History     Tobacco Use   Smoking Status Former Smoker    Packs/day: 1.00    Years: 20.00    Pack years: 20.00    Types: Cigarettes    Last attempt to quit: 3/3/1992    Years since quittin.6   Smokeless Tobacco Never Used

## 2020-10-16 ENCOUNTER — HOSPITAL ENCOUNTER (OUTPATIENT)
Dept: CT IMAGING | Age: 70
Discharge: HOME | End: 2020-10-16
Payer: COMMERCIAL

## 2020-10-16 PROCEDURE — 71250 CT THORAX DX C-: CPT

## 2020-10-29 ENCOUNTER — OFFICE VISIT (OUTPATIENT)
Dept: INTERNAL MEDICINE CLINIC | Age: 70
End: 2020-10-29
Payer: COMMERCIAL

## 2020-10-29 VITALS
TEMPERATURE: 97.1 F | WEIGHT: 186 LBS | SYSTOLIC BLOOD PRESSURE: 138 MMHG | BODY MASS INDEX: 25.23 KG/M2 | HEART RATE: 64 BPM | DIASTOLIC BLOOD PRESSURE: 60 MMHG

## 2020-10-29 PROCEDURE — 99214 OFFICE O/P EST MOD 30 MIN: CPT | Performed by: INTERNAL MEDICINE

## 2020-10-29 NOTE — PROGRESS NOTES
Assessment/Plan     1. Type 2 diabetes mellitus without complication, with long-term current use of insulin (HCC)  Evening BS readings volatile due to inconsistent diet and frequent episodes of am hypoglycemia. Will move insulin to am- sliding scale provided. Recommended more consistent diet. 2. Essential hypertension  Well-controlled. Continue current medications. 3. Stage 3a chronic kidney disease  Stable. He will continue to avoid NSAIDs and follow up as directed with nephrology. 4. Mixed hyperlipidemia  Tolerating current dose(s) of Crestor and fenofibrate- continue. 5. Major depressive disorder with single episode, in full remission ([address]  Well-controlled on current dose of Zoloft- continue. 6. Primary insomnia  Doing well on current dose of trazodone- continue. Return in about 3 months (around 1/29/2021) for 30 MIN F/U- Video. Mart Washington   YOB: 1950    Date of Visit:  10/29/2020    Allergies   Allergen Reactions    Dilaudid [Hydromorphone Hcl] Other (See Comments)     Mental status changes    Nubain [Nalbuphine Hcl] Rash      Prior to Visit Medications    Medication Sig Taking?  Authorizing Provider   losartan (COZAAR) 100 MG tablet TAKE 1 TABLET BY MOUTH DAILY Yes Daniel Nunez MD   amLODIPine (NORVASC) 10 MG tablet TAKE 1 TABLET BY MOUTH DAILY Yes Daniel Nunez MD   glimepiride (AMARYL) 4 MG tablet TAKE 2 TABLETS BY MOUTH EVERY MORNING Yes Daniel Nunez MD   fenofibrate (TRIGLIDE) 160 MG tablet TAKE 1 TABLET BY MOUTH DAILY Yes Daniel Nunez MD   TRULICITY 1.01 IX/4.3PJ SOPN INJECT 0.75 MG INTO THE SKIN ONCE A WEEK Yes Daniel Nunez MD   fluticasone-umeclidin-vilant (TRELEGY ELLIPTA) 100-62.5-25 MCG/INH AEPB Inhale 1 puff into the lungs daily Yes MD Diego Johnsontiss Latin 100-62.5-25 MCG/INH AEPB INHALE 2 PUFFS INTO THE LUNGS DAILY Yes Sylvie Rodriguez MD   rosuvastatin (CRESTOR) 20 MG tablet TAKE 1 TABLET BY MOUTH DAILY Yes Loreta Ramirez MD   doxazosin (CARDURA) 4 MG tablet TAKE 1 TABLET BY MOUTH EVERY NIGHT Yes Jose Schafer MD   traZODone (DESYREL) 100 MG tablet Take 1.5 tablets by mouth nightly TAKE 1 TABLET BY MOUTH EVERY NIGHT Yes Loreta Ramirez MD   insulin glargine (LANTUS SOLOSTAR) 100 UNIT/ML injection pen Inject 15 Units into the skin nightly  Yes Historical Provider, MD   aspirin 325 MG tablet Take 325 mg by mouth daily. Yes Historical Provider, MD   fish oil-omega-3 fatty acids 1000 MG capsule Take 2 g by mouth daily. Yes Historical Provider, MD   Insulin Pen Needle (B-D UF III MINI PEN NEEDLES) 31G X 5 MM MISC USE AS DIRECTED TO INJECT ONCE DAILY  Loreta Ramirez MD   blood glucose test strips (ASCENSIA AUTODISC VI;ONE TOUCH ULTRA TEST VI) strip 1 each by In Vitro route 2 times daily  MD Kimmie Puga Patient test twice daily  Loreta Ramirez MD   albuterol sulfate HFA (VENTOLIN HFA) 108 (90 Base) MCG/ACT inhaler 2 Puff every 4-6 hours as needed for wheezing or shortness of breath  Patient not taking: Reported on 10/29/2020  Loreta Ramirez MD   Blood Glucose Monitoring Suppl (PRECISION XTRA) w/Device KIT As directed  Loreta Ramirez MD   Blood Glucose Monitoring Suppl (ONE TOUCH ULTRA 2) W/DEVICE KIT 1 kit by Does not apply route daily as needed. Loreta Ramirez MD       Vitals:    10/29/20 0934   BP: 138/60   Site: Right Upper Arm   Position: Sitting   Cuff Size: Large Adult   Pulse: 64   Temp: 97.1 °F (36.2 °C)   Weight: 186 lb (84.4 kg)      Body mass index is 25.23 kg/m². Wt Readings from Last 3 Encounters:   10/29/20 186 lb (84.4 kg)   07/31/20 186 lb (84.4 kg)   03/20/20 185 lb (83.9 kg)     BP Readings from Last 3 Encounters:   10/29/20 138/60   10/05/20 139/79   07/31/20 135/65       CC:  Patient presents for re-evaluation of the following medical concerns     HPI    Diabetes Mellitus Type 2: Current symptoms/problems include none.   Not taking trulicity consistently, but tolerating. Using 2-11 units of Lantus qpm.    Home blood sugar records: fasting range: , 120-160 before bedtime  Any episodes of hypoglycemia? no  Daily Aspirin? Yes    Hypertension/CKD:  Home blood pressure monitoring: No.  He is adherent to a low sodium diet. Patient denies chest pain, SOB, headache, lightheadedness and palpitations. Antihypertensive medication side effects: no medication side effects noted. Use of agents associated with hypertension: none. Hyperlipidemia:  No new myalgias or GI upset on Crestor and fenofibrate (Tricor, Trilipix). Mood Disorder:  Patient presents for follow-up of depression and insomnia. Current complaints include: none. He denies anhedonia, depressed mood, tearfulness, feelings of hopelessness, insomnia, irritability, excessive worry, restlessness and suicidal thoughts or behavior. Symptoms/signs of marlo: none. External stressors: nothing new. Current treatment includes: Zoloft- 50 mg qd, trazodone 150 mg qd. Medication side effects: none.       Lab Results   Component Value Date    LDLCALC 56 09/08/2020    LDLCALC 66 12/20/2019    LDLDIRECT 116 (H) 11/08/2018    LDLDIRECT 92 03/10/2016    TRIGLYCFAST 157 (H) 09/08/2020    TRIGLYCFAST 184 (H) 12/20/2019    TRIG 264 (H) 03/30/2017    HDL 32 (L) 09/08/2020     Lab Results   Component Value Date    GLUF 126 (H) 09/08/2020    GLUCOSE 126 (H) 09/08/2020    LABA1C 6.9 09/08/2020    LABA1C 6.4 12/20/2019    LABA1C 7.4 08/30/2019     Lab Results   Component Value Date     09/08/2020    K 3.8 09/08/2020    BUN 16 09/08/2020    CREATININE 1.7 (H) 09/08/2020    LABGLOM 40 (A) 09/08/2020    GFRAA 49 (A) 09/08/2020    CALCIUM 9.6 09/08/2020    VITD25 41.6 05/08/2012     Lab Results   Component Value Date    ALT 13 09/08/2020    AST 22 09/08/2020     Lab Results   Component Value Date    HGB 14.0 12/20/2019     Lab Results   Component Value Date    TSHREFLEX 1.16 07/01/2019    TSH 1.56 2016        Review of Systems  As documented in HPI    Social History     Tobacco Use    Smoking status: Former Smoker     Packs/day: 1.00     Years: 20.00     Pack years: 20.00     Types: Cigarettes     Last attempt to quit: 3/3/1992     Years since quittin.6    Smokeless tobacco: Never Used   Substance Use Topics    Alcohol use: No        Physical Exam   Constitutional: He is oriented to person, place, and time. He appears well-developed and well-nourished. No distress. HENT:   Mouth/Throat: Oropharynx is clear and moist and mucous membranes are normal.   Eyes: Conjunctivae are normal.   Neck: No JVD present. Carotid bruit is not present. No thyroid mass and no thyromegaly present. Cardiovascular: Normal rate, regular rhythm, normal heart sounds and intact distal pulses. Exam reveals no gallop and no friction rub. No murmur heard. Pulmonary/Chest: Effort normal and breath sounds normal. No respiratory distress. He has no wheezes. He has no rhonchi. He has no rales. Musculoskeletal:         General: No edema. Neurological: He is alert and oriented to person, place, and time. He has normal strength. Skin: Skin is warm and dry. No rash noted. No erythema. Psychiatric: He has a normal mood and affect.  His behavior is normal. Judgment and thought content normal.

## 2020-11-09 RX ORDER — TRAZODONE HYDROCHLORIDE 100 MG/1
TABLET ORAL
Qty: 135 TABLET | Refills: 1 | Status: SHIPPED | OUTPATIENT
Start: 2020-11-09 | End: 2021-03-24

## 2020-11-13 ENCOUNTER — TELEPHONE (OUTPATIENT)
Dept: INTERNAL MEDICINE CLINIC | Age: 70
End: 2020-11-13

## 2020-11-13 RX ORDER — DOXAZOSIN MESYLATE 4 MG/1
TABLET ORAL
Qty: 90 TABLET | Refills: 1 | Status: SHIPPED | OUTPATIENT
Start: 2020-11-13 | End: 2021-05-10

## 2020-11-13 NOTE — TELEPHONE ENCOUNTER
Patient states he does not want to go back to Dr. Rudolph Hughes so they will not refill his doxazosin (CARDURA) 4 MG tablet.  Wondering if Dr. José Miguel Childs would take over filling it    FYI -  Since changing DM checks to the am readings have not exceeded 100

## 2020-11-13 NOTE — TELEPHONE ENCOUNTER
I will take care of his Cardura as long as his renal function remains stable. Refill sent to his local pharmacy.

## 2020-11-25 RX ORDER — ROSUVASTATIN CALCIUM 20 MG/1
20 TABLET, COATED ORAL DAILY
Qty: 90 TABLET | Refills: 1 | Status: SHIPPED | OUTPATIENT
Start: 2020-11-25 | End: 2021-02-16 | Stop reason: SDUPTHER

## 2020-12-17 RX ORDER — SERTRALINE HYDROCHLORIDE 100 MG/1
TABLET, FILM COATED ORAL
Qty: 135 TABLET | Refills: 1 | OUTPATIENT
Start: 2020-12-17

## 2020-12-18 RX ORDER — AMLODIPINE BESYLATE 10 MG/1
10 TABLET ORAL DAILY
Qty: 90 TABLET | Refills: 1 | Status: SHIPPED | OUTPATIENT
Start: 2020-12-18 | End: 2021-07-09

## 2020-12-18 RX ORDER — GLIMEPIRIDE 4 MG/1
TABLET ORAL
Qty: 180 TABLET | Refills: 1 | Status: SHIPPED | OUTPATIENT
Start: 2020-12-18 | End: 2021-07-05

## 2020-12-18 RX ORDER — FENOFIBRATE 160 MG/1
TABLET ORAL
Qty: 90 TABLET | Refills: 1 | Status: SHIPPED | OUTPATIENT
Start: 2020-12-18 | End: 2021-07-09

## 2021-02-16 RX ORDER — ROSUVASTATIN CALCIUM 20 MG/1
20 TABLET, COATED ORAL DAILY
Qty: 90 TABLET | Refills: 1 | Status: SHIPPED | OUTPATIENT
Start: 2021-02-16 | End: 2021-08-15

## 2021-02-16 NOTE — TELEPHONE ENCOUNTER
Received refill request from pharmacy for Rosuvastatin.   LOV 10/29/20  Next appt 4/29/20  Last filled  11/25/2020  Medication pended

## 2021-03-02 ENCOUNTER — TELEPHONE (OUTPATIENT)
Dept: INTERNAL MEDICINE CLINIC | Age: 71
End: 2021-03-02

## 2021-03-02 RX ORDER — LOSARTAN POTASSIUM 100 MG/1
100 TABLET ORAL DAILY
Qty: 90 TABLET | Refills: 1 | Status: SHIPPED | OUTPATIENT
Start: 2021-03-02 | End: 2021-08-29

## 2021-03-02 NOTE — TELEPHONE ENCOUNTER
Patient called the Pharmacy requesting that his prescription for:    losartan (COZAAR) 100 MG tablet TAKE 1 TABLET BY MOUTH DAILY     Please be changed to refill for a quantity of 90 instead of 30    Please call in to:    [address] [phone] - F [phone]    If approved by Dr. Duane Hoyle

## 2021-03-24 RX ORDER — TRAZODONE HYDROCHLORIDE 100 MG/1
TABLET ORAL
Qty: 135 TABLET | Refills: 1 | Status: SHIPPED | OUTPATIENT
Start: 2021-03-24 | End: 2021-03-25 | Stop reason: SDUPTHER

## 2021-03-25 ENCOUNTER — TELEPHONE (OUTPATIENT)
Dept: INTERNAL MEDICINE CLINIC | Age: 71
End: 2021-03-25

## 2021-03-25 RX ORDER — TRAZODONE HYDROCHLORIDE 100 MG/1
200 TABLET ORAL NIGHTLY
Qty: 180 TABLET | Refills: 1 | Status: SHIPPED | OUTPATIENT
Start: 2021-03-25 | End: 2021-09-03

## 2021-03-25 NOTE — TELEPHONE ENCOUNTER
Patient is requesting the following prescription(s):    Patient is requesting an increase in the dosage of     traZODone (DESYREL) 100 MG tablet TAKE 1& 1/2 TABLETS BY MOUTH EVERY NIGHT     Stating that sometimes he needs more than prescribed, so he takes more, which causes him to require a refill too early. Please call in to:  [address] [place] [phone] [facility] [phone]     Patient made aware to allow 24 to 48 business hours for prescription refills. Patient can be reached @ phone # provided should there be any questions.

## 2021-03-25 NOTE — TELEPHONE ENCOUNTER
Insurance will not pay as it is too soon for script. Patient takes a max of 2 tablets nightly but not every night. Can we send a new script to pharmacy for 2 QHS so he can get this filled?

## 2021-04-07 NOTE — PROGRESS NOTES
Date of Visit:  2021    CC: Imelda Fisher (: 1950) is a 78 y.o. male, established patient, here for evaluation/re-evaluation of the following medical concerns:    ASSESSMENT/PLAN:  Generalized abdominal pain  Top of differential includes side effect of Trulicity, pancreatic dysfunction and gallbladder disease. He will try stopping the Trulicity for 2 weeks- if no improvement and RUQ US negative, will refer to GI.  -     US GALLBLADDER RUQ; Future  -     Lipase; Future  -     Amylase; Future  Diarrhea, unspecified type  Suspect related to above. -     US GALLBLADDER RUQ; Future  -     TSH with Reflex; Future  -     Celiac Screen with Reflex; Future  Type 2 diabetes mellitus without complication, with long-term current use of insulin (HCC)  Adequately controlled per current home BS readings, but may need to restart insulin with discontinuation of Trulicity- he will call if FBG or bedtime readings consistently > 150. Continue current dose of Amaryl.  -     Lipid, Fasting; Future  -     Comprehensive Metabolic Panel, Fasting; Future  -     Hemoglobin A1C; Future  Screening for prostate cancer  -     PSA screening; Future     Follow up 21, as scheduled    Vitals:    21 0959   BP: 130/68   Pulse: 77   Weight: 184 lb (83.5 kg)   Height: 6' (1.829 m)      Estimated body mass index is 24.95 kg/m² as calculated from the following:    Height as of this encounter: 6' (1.829 m). Weight as of this encounter: 184 lb (83.5 kg). Wt Readings from Last 3 Encounters:   21 184 lb (83.5 kg)   10/29/20 186 lb (84.4 kg)   20 186 lb (84.4 kg)     BP Readings from Last 3 Encounters:   21 130/68   10/29/20 138/60   10/05/20 139/79     HPI  Acute GI Symptoms:  Patient complains of a 2 1/2 month history of diarrhea- semi-solid, small volume, occuring 3 times/day. Symptoms have been unchanged with time.   Associated symptoms include early satiety, abdominal pain- generalized, achy and hematochezia- occasional after BM. Patient denies heartburn, nausea, vomiting, constipation, melena, unintentional weight loss and fevers. Symptoms are exacerbated by any oral intake. Prior history of similar symptoms: no.  Relevant PMH: diabetes. New exposures: none. Therapy to date: anti-diarrheal- Immodium- temporary relief. Prior diagnostic testing: colonoscopy 7/18- negative. BS running between . Has not taken insulin for the past 7 months. ROS  As documented above    Physical Exam  Constitutional:       General: He is not in acute distress. Appearance: He is well-developed. Eyes:      General: No scleral icterus. Cardiovascular:      Rate and Rhythm: Normal rate and regular rhythm. Heart sounds: Normal heart sounds. No murmur. No friction rub. No gallop. Pulmonary:      Effort: Pulmonary effort is normal. No respiratory distress. Breath sounds: Normal breath sounds. No decreased breath sounds, wheezing, rhonchi or rales. Abdominal:      General: Bowel sounds are normal. There is no distension. Palpations: Abdomen is soft. There is no mass. Tenderness: There is abdominal tenderness (mid-abdominal, mild). There is no guarding or rebound. Lymphadenopathy:      Cervical: No cervical adenopathy. Skin:     General: Skin is warm and dry. Findings: No erythema or rash. Comments: No jaundice   Neurological:      Mental Status: He is alert and oriented to person, place, and time. Psychiatric:         Behavior: Behavior normal.           --Arnulfo Martines MD on 4/8/2021 at 1:44 PM    An electronic signature was used to authenticate this note.

## 2021-04-08 ENCOUNTER — OFFICE VISIT (OUTPATIENT)
Dept: INTERNAL MEDICINE CLINIC | Age: 71
End: 2021-04-08
Payer: COMMERCIAL

## 2021-04-08 VITALS
SYSTOLIC BLOOD PRESSURE: 130 MMHG | HEART RATE: 77 BPM | DIASTOLIC BLOOD PRESSURE: 68 MMHG | BODY MASS INDEX: 24.92 KG/M2 | HEIGHT: 72 IN | WEIGHT: 184 LBS

## 2021-04-08 DIAGNOSIS — E11.9 TYPE 2 DIABETES MELLITUS WITHOUT COMPLICATION, WITH LONG-TERM CURRENT USE OF INSULIN (HCC): ICD-10-CM

## 2021-04-08 DIAGNOSIS — Z12.5 SCREENING FOR PROSTATE CANCER: ICD-10-CM

## 2021-04-08 DIAGNOSIS — R19.7 DIARRHEA, UNSPECIFIED TYPE: ICD-10-CM

## 2021-04-08 DIAGNOSIS — Z79.4 TYPE 2 DIABETES MELLITUS WITHOUT COMPLICATION, WITH LONG-TERM CURRENT USE OF INSULIN (HCC): ICD-10-CM

## 2021-04-08 DIAGNOSIS — R10.84 GENERALIZED ABDOMINAL PAIN: Primary | ICD-10-CM

## 2021-04-08 PROBLEM — K59.03 DRUG-INDUCED CONSTIPATION: Status: RESOLVED | Noted: 2019-04-05 | Resolved: 2021-04-08

## 2021-04-08 PROCEDURE — 99214 OFFICE O/P EST MOD 30 MIN: CPT | Performed by: INTERNAL MEDICINE

## 2021-04-08 RX ORDER — CALCIUM CITRATE/VITAMIN D3 200MG-6.25
TABLET ORAL
Qty: 100 STRIP | Refills: 11 | Status: SHIPPED | OUTPATIENT
Start: 2021-04-08 | End: 2022-03-18 | Stop reason: SDUPTHER

## 2021-04-13 ENCOUNTER — HOSPITAL ENCOUNTER (OUTPATIENT)
Dept: ULTRASOUND IMAGING | Age: 71
Discharge: HOME | End: 2021-04-13
Payer: COMMERCIAL

## 2021-04-13 DIAGNOSIS — R10.84 GENERALIZED ABDOMINAL PAIN: ICD-10-CM

## 2021-04-13 DIAGNOSIS — R19.7 DIARRHEA, UNSPECIFIED TYPE: ICD-10-CM

## 2021-04-13 PROCEDURE — 76705 ECHO EXAM OF ABDOMEN: CPT

## 2021-04-27 PROBLEM — R06.02 SOB (SHORTNESS OF BREATH): Status: RESOLVED | Noted: 2019-07-23 | Resolved: 2021-04-27

## 2021-04-27 NOTE — PROGRESS NOTES
Date of Visit:  2021    CC: Page Sahu (: 1950) is a 78 y.o. male, established patient, here for evaluation/re-evaluation of the following medical concerns:    ASSESSMENT/PLAN:  Type 2 diabetes mellitus without complication, with long-term current use of insulin ([facility].)  Assessment & Plan:  Recent HgbA1c at goal of < 8.0. Home BS readings higher since discontinuation of Trulicity, which caused abdominal pain. He will restart Lantus consistently at 5 units qpm if fasting BS readings rise consistently above 150. If this fails to control his HgbA1c, consider SGLT2 inhibitor- Invokana is the only drug in this class indicated for his GFR. He is very reluctant to start mealtime insulin. Continue high dose Amaryl qam.  He was urged to improve his erratic diet- declines further education. Orders:  -     Hemoglobin A1C; Future  Essential hypertension  Assessment & Plan:  Well-controlled. Continue current antihypertensive regimen. Orders:  -     Comprehensive Metabolic Panel, Fasting; Future  Stage 3a chronic kidney disease  Assessment & Plan:  Stable per recent labs- will continue to monitor. He will continue to avoid NSAIDs. Mixed hyperlipidemia  Assessment & Plan:  LDL at goal of < 80, but triglycerides remain slightly elevated and HDL is low. Continue current doses of Crestor and fenofibrate. Orders:  -     Lipid, Fasting; Future  Major depressive disorder with single episode, in full remission ([facility].)  Assessment & Plan:  Well-controlled on moderate dose Zoloft- continue. Primary insomnia  Assessment & Plan:  Doing well on 100 mg dose of trazodone- continue. Return in about 3 months (around 2021). Vitals:    21 1024   BP: 130/78   Pulse: 74   Resp: 16   Weight: 182 lb (82.6 kg)   Height: 6' (1.829 m)      Estimated body mass index is 24.68 kg/m² as calculated from the following:    Height as of this encounter: 6' (1.829 m). Weight as of this encounter: 182 lb (82.6 kg). Wt Readings from Last 3 Encounters:   04/29/21 182 lb (82.6 kg)   04/08/21 184 lb (83.5 kg)   10/29/20 186 lb (84.4 kg)     BP Readings from Last 3 Encounters:   04/29/21 130/78   04/08/21 130/68   10/29/20 138/60     HPI  Diabetes Mellitus Type 2: Current symptoms/problems include none. Trulicity held since 4/8 due to abdominal pain and diarrhea- improved after a few days off medication. Using 2-5 units of Lantus qpm- takes very infrequently. Home blood sugar records: fasting range: , 150-220 before bedtime  Any episodes of hypoglycemia? no  Daily Aspirin? Yes    Hypertension/CKD:  Home blood pressure monitoring: No.  He is adherent to a low sodium diet. Patient denies chest pain, SOB, headache, lightheadedness and palpitations. Antihypertensive medication side effects: no medication side effects noted. Use of agents associated with hypertension: none. Hyperlipidemia:  No new myalgias or GI upset on Crestor and fenofibrate (Tricor, Trilipix). Mood Disorder:  Patient presents for follow-up of depression and insomnia. Current complaints include: none. He denies anhedonia, depressed mood, tearfulness, feelings of hopelessness, insomnia, irritability, excessive worry, restlessness and suicidal thoughts or behavior. Symptoms/signs of marlo: none. External stressors: nothing new. Current treatment includes: Zoloft- 100 mg qd, trazodone 100 mg qd. Medication side effects: none. ROS  As documented above    Physical Exam  Constitutional:       General: He is not in acute distress. Appearance: He is well-developed. Eyes:      Conjunctiva/sclera: Conjunctivae normal.   Neck:      Thyroid: No thyroid mass or thyromegaly. Cardiovascular:      Rate and Rhythm: Normal rate and regular rhythm. Heart sounds: Normal heart sounds. No murmur. No friction rub. No gallop. Pulmonary:      Effort: Pulmonary effort is normal. No respiratory distress. Breath sounds: Normal breath sounds. No wheezing, rhonchi or rales. Musculoskeletal:      Right lower leg: No edema. Left lower leg: No edema. Lymphadenopathy:      Cervical: No cervical adenopathy. Skin:     General: Skin is warm and dry. Findings: No erythema or rash. Neurological:      Mental Status: He is alert and oriented to person, place, and time. Psychiatric:         Speech: Speech normal.         Behavior: Behavior normal.         Thought Content: Thought content normal.         Judgment: Judgment normal.           --Justus Oropeza MD on 4/29/2021 at 3:32 PM    An electronic signature was used to authenticate this note.

## 2021-04-29 ENCOUNTER — OFFICE VISIT (OUTPATIENT)
Dept: INTERNAL MEDICINE CLINIC | Age: 71
End: 2021-04-29
Payer: COMMERCIAL

## 2021-04-29 VITALS
HEART RATE: 74 BPM | SYSTOLIC BLOOD PRESSURE: 130 MMHG | DIASTOLIC BLOOD PRESSURE: 78 MMHG | HEIGHT: 72 IN | RESPIRATION RATE: 16 BRPM | BODY MASS INDEX: 24.65 KG/M2 | WEIGHT: 182 LBS

## 2021-04-29 DIAGNOSIS — Z79.4 TYPE 2 DIABETES MELLITUS WITHOUT COMPLICATION, WITH LONG-TERM CURRENT USE OF INSULIN (HCC): Primary | ICD-10-CM

## 2021-04-29 DIAGNOSIS — N18.31 STAGE 3A CHRONIC KIDNEY DISEASE (HCC): ICD-10-CM

## 2021-04-29 DIAGNOSIS — F32.5 MAJOR DEPRESSIVE DISORDER WITH SINGLE EPISODE, IN FULL REMISSION (HCC): ICD-10-CM

## 2021-04-29 DIAGNOSIS — F51.01 PRIMARY INSOMNIA: ICD-10-CM

## 2021-04-29 DIAGNOSIS — I10 ESSENTIAL HYPERTENSION: Chronic | ICD-10-CM

## 2021-04-29 DIAGNOSIS — E11.9 TYPE 2 DIABETES MELLITUS WITHOUT COMPLICATION, WITH LONG-TERM CURRENT USE OF INSULIN (HCC): Primary | ICD-10-CM

## 2021-04-29 DIAGNOSIS — E78.2 MIXED HYPERLIPIDEMIA: ICD-10-CM

## 2021-04-29 PROCEDURE — 99214 OFFICE O/P EST MOD 30 MIN: CPT | Performed by: INTERNAL MEDICINE

## 2021-04-29 PROCEDURE — 3051F HG A1C>EQUAL 7.0%<8.0%: CPT | Performed by: INTERNAL MEDICINE

## 2021-04-29 RX ORDER — SERTRALINE HYDROCHLORIDE 100 MG/1
50 TABLET, FILM COATED ORAL DAILY
COMMUNITY
End: 2022-03-01 | Stop reason: SDUPTHER

## 2021-04-29 NOTE — ASSESSMENT & PLAN NOTE
LDL at goal of < 80, but triglycerides remain slightly elevated and HDL is low. Continue current doses of Crestor and fenofibrate.

## 2021-05-04 ENCOUNTER — TELEPHONE (OUTPATIENT)
Dept: INTERNAL MEDICINE CLINIC | Age: 71
End: 2021-05-04

## 2021-05-04 NOTE — TELEPHONE ENCOUNTER
Patient is requesting a refill of TRUE METRIX BLOOD GLUCOSE TEST strip. Patient states he only received a box of 50 strip last refill. RX is for 100 with 11 refills.
Spoke with pharmacy who states patient was given 2 boxes of 50. Patient states he only received 1 box when he usually receives 2 in a box. He will take back to pharmacy to show he only received half the script. He will call me back if there is any issues.
normal...

## 2021-05-10 DIAGNOSIS — N18.30 CKD (CHRONIC KIDNEY DISEASE), STAGE III (HCC): ICD-10-CM

## 2021-05-10 RX ORDER — DOXAZOSIN MESYLATE 4 MG/1
TABLET ORAL
Qty: 90 TABLET | Refills: 0 | Status: SHIPPED | OUTPATIENT
Start: 2021-05-10 | End: 2021-08-05

## 2021-06-18 ENCOUNTER — TELEPHONE (OUTPATIENT)
Dept: INTERNAL MEDICINE CLINIC | Age: 71
End: 2021-06-18

## 2021-06-18 ENCOUNTER — HOSPITAL ENCOUNTER (OUTPATIENT)
Dept: CT IMAGING | Age: 71
Discharge: HOME | End: 2021-06-18
Payer: COMMERCIAL

## 2021-06-18 DIAGNOSIS — R10.84 GENERALIZED ABDOMINAL PAIN: ICD-10-CM

## 2021-06-18 DIAGNOSIS — R10.84 GENERALIZED ABDOMINAL PAIN: Primary | ICD-10-CM

## 2021-06-18 PROCEDURE — 74176 CT ABD & PELVIS W/O CONTRAST: CPT

## 2021-06-18 NOTE — TELEPHONE ENCOUNTER
Patients results for CT Of Abdominal Show no acute abdominal abnormality identified chronic non obstructing calculi or nephrocalcinosis inferiorly in the right kidney unchanged two very small non obstructing calculi left kidney mild diverticulosis of the entire colon without finding acute diverticulitis.

## 2021-06-18 NOTE — TELEPHONE ENCOUNTER
Patient states his stomach pain has returned and has been going on for the past 3 days, and is now worse than before. He states it's in the middle of his stomach, and 80 % of the time it's a constant pain, and 20% of the time, it isn't there. Patient states on a scale of 1-10, he rates his pain as a 7. He is requesting Dr. Doreene Harada recommendation as to what his next step should be since he's already had testing for this. Please contact patient @ phone # provided.

## 2021-06-23 ENCOUNTER — TELEPHONE (OUTPATIENT)
Dept: INTERNAL MEDICINE CLINIC | Age: 71
End: 2021-06-23

## 2021-06-23 DIAGNOSIS — R10.84 GENERALIZED ABDOMINAL PAIN: ICD-10-CM

## 2021-06-23 NOTE — TELEPHONE ENCOUNTER
Spoke with patient who states he is still having diarrhea 2-3 x a day. Not as watery and the stomach pain is better. He has been taking Imodium OTC for the diarrhea which does help. He would like to know what the next steps should be?

## 2021-06-23 NOTE — TELEPHONE ENCOUNTER
If he is getting better overall, can continue with supportive care- hydration and BRAT diet, Immodium prn. However, if does not resolve within the next week, will need to get stool cultures and probably refer to GI.

## 2021-07-01 ENCOUNTER — TELEPHONE (OUTPATIENT)
Dept: INTERNAL MEDICINE CLINIC | Age: 71
End: 2021-07-01

## 2021-07-01 PROBLEM — R10.84 GENERALIZED ABDOMINAL PAIN: Status: ACTIVE | Noted: 2021-07-01

## 2021-07-01 RX ORDER — OMEPRAZOLE 20 MG/1
20 CAPSULE, DELAYED RELEASE ORAL
Qty: 30 CAPSULE | Refills: 2 | Status: SHIPPED | OUTPATIENT
Start: 2021-07-01 | End: 2021-09-23 | Stop reason: SDUPTHER

## 2021-07-01 NOTE — TELEPHONE ENCOUNTER
Patient is calling for Samantha Starr to give an update on his condition. Patient advised that Samantha Starr is not in the office and that I will take a message and have another MA get back to him. Patient states the Diarrhea is 90% controlled but he is still experiencing considerable stomach discomfort. Please contact patient to advise.

## 2021-07-01 NOTE — TELEPHONE ENCOUNTER
Patient called to inform Dr Eduar Jean that he will stop taking the following medication because he feel like its not making him any  Better. He will call Dr Kenna Kilpatrick to make an appointment. Luiz Vera     omeprazole (PRILOSEC) 20 MG delayed release capsule Take 1 capsule by mouth every morning (before breakfast)

## 2021-07-01 NOTE — TELEPHONE ENCOUNTER
Pain starts around 4:00 every afternoon. Feels it a little when he wakes up but then eat breakfast and it gets better. Describes pain as it burns. Symptoms for 5-6 weeks. Pain located up around the sternum. Takes Pepcid in the morning. Drinks Pepto straight out of the bottle, which soothes it a little.

## 2021-07-05 RX ORDER — GLIMEPIRIDE 4 MG/1
TABLET ORAL
Qty: 180 TABLET | Refills: 1 | Status: SHIPPED | OUTPATIENT
Start: 2021-07-05 | End: 2021-10-29

## 2021-07-09 RX ORDER — FENOFIBRATE 160 MG/1
TABLET ORAL
Qty: 90 TABLET | Refills: 1 | Status: SHIPPED | OUTPATIENT
Start: 2021-07-09 | End: 2022-01-03

## 2021-07-09 RX ORDER — AMLODIPINE BESYLATE 10 MG/1
10 TABLET ORAL DAILY
Qty: 90 TABLET | Refills: 1 | Status: SHIPPED | OUTPATIENT
Start: 2021-07-09 | End: 2022-01-03

## 2021-08-05 DIAGNOSIS — N18.30 CKD (CHRONIC KIDNEY DISEASE), STAGE III (HCC): ICD-10-CM

## 2021-08-05 RX ORDER — DOXAZOSIN MESYLATE 4 MG/1
TABLET ORAL
Qty: 90 TABLET | Refills: 1 | Status: SHIPPED | OUTPATIENT
Start: 2021-08-05 | End: 2022-02-01

## 2021-08-15 RX ORDER — ROSUVASTATIN CALCIUM 20 MG/1
20 TABLET, COATED ORAL DAILY
Qty: 90 TABLET | Refills: 1 | Status: SHIPPED | OUTPATIENT
Start: 2021-08-15 | End: 2022-02-07

## 2021-08-29 RX ORDER — LOSARTAN POTASSIUM 100 MG/1
100 TABLET ORAL DAILY
Qty: 90 TABLET | Refills: 1 | Status: SHIPPED | OUTPATIENT
Start: 2021-08-29 | End: 2022-02-24

## 2021-09-03 RX ORDER — TRAZODONE HYDROCHLORIDE 100 MG/1
TABLET ORAL
Qty: 180 TABLET | Refills: 1 | Status: SHIPPED | OUTPATIENT
Start: 2021-09-03 | End: 2021-11-08 | Stop reason: SDUPTHER

## 2021-09-23 RX ORDER — OMEPRAZOLE 20 MG/1
20 CAPSULE, DELAYED RELEASE ORAL
Qty: 30 CAPSULE | Refills: 2 | Status: SHIPPED | OUTPATIENT
Start: 2021-09-23 | End: 2021-12-21

## 2021-10-05 ENCOUNTER — OFFICE VISIT (OUTPATIENT)
Dept: PULMONOLOGY | Age: 71
End: 2021-10-05
Payer: COMMERCIAL

## 2021-10-05 VITALS — OXYGEN SATURATION: 99 % | HEART RATE: 78 BPM

## 2021-10-05 DIAGNOSIS — R06.02 SOB (SHORTNESS OF BREATH): Primary | ICD-10-CM

## 2021-10-05 DIAGNOSIS — J43.2 CENTRILOBULAR EMPHYSEMA (HCC): ICD-10-CM

## 2021-10-05 DIAGNOSIS — J44.9 COPD, MODERATE (HCC): ICD-10-CM

## 2021-10-05 DIAGNOSIS — R91.1 PULMONARY NODULE: ICD-10-CM

## 2021-10-05 PROCEDURE — 90694 VACC AIIV4 NO PRSRV 0.5ML IM: CPT | Performed by: INTERNAL MEDICINE

## 2021-10-05 PROCEDURE — 90471 IMMUNIZATION ADMIN: CPT | Performed by: INTERNAL MEDICINE

## 2021-10-05 PROCEDURE — 99214 OFFICE O/P EST MOD 30 MIN: CPT | Performed by: INTERNAL MEDICINE

## 2021-10-05 NOTE — PROGRESS NOTES
[facility]  Progress Note  MD Nidhi Onofre   YOB: 1950    Date of Visit:  10/5/2021    Assessment/Plan:  1. SOB  Stable/Worse  Seems mostly stable  Exertion related    2. COPD, moderate ([address]  Stable  PFT 8/19:  Spirometry:  Flow volume loops were normal. The FEV-1/FVC ratio was normal.   The FEV-1 was 2.79 liters (77% of predicted), which was   moderately decreased. The FVC was 3.72 liters (76% of predicted),   which was decreased. Response to inhaled bronchodilators   (albuterol) was not significant. Lung volumes:  Lung volumes were tested by plethysmography. The total lung   capacity was 5.82 liters (83% of predicted), which was normal.   The residual volume was 1.86 liters (71% of predicted), which was   decreased. The ratio of residual volume to total lung capacity   (RV/TLC) was 32, which was normal.     Diffusion capacity was found to be mildly decreased.       Interpretation:  Overall normal PFT with only mild decrease in DLCO. Clinical   correlation is recommended. Pulmonary function testing actually is improved. Modified barium swallow was also pretty good. Discussed techniques to protect against future aspiration    Medication Management:  Trelegy  Albuterol    3. Centrilobular emphysema (HCC)/PN  Stable  CT imaging does reveal evidence of mild centrilobular emphysema. Last CT showed \"inflammatory nodules\"  Repeat CT chest    4. Essential hypertension  Stable  BP acceptable. FOLLOW UP: 12 month      Chief Complaint   Patient presents with    Shortness of Breath     1 year       HPI  The patient presents with a chief complaint of moderate shortness of breath related to Moderate COPD of many years duration. He has mild associated cough. Exertion is a modifying factor. Cold weather is a factor. No Chest pain, Nausea or vomiting reported.       Review of Systems  As documented in HPI      Physical Exam:  Well developed, well nourished  Alert and oriented  Sclera is clear  No cervical adenopathy  No JVD. Chest examination is clear. Cardiac examination reveals regular rate and rhythm without murmur, gallop or rub. The abdomen is soft, nontender and nondistended. There is no clubbing, cyanosis or edema of the extremities. There is no obvious skin rash. No focal neuro deficicts  Normal mood and affect    Allergies   Allergen Reactions    Dilaudid [Hydromorphone Hcl] Other (See Comments)     Mental status changes    Nubain [Nalbuphine Hcl] Rash     Prior to Visit Medications    Medication Sig Taking?  Authorizing Provider   omeprazole (PRILOSEC) 20 MG delayed release capsule Take 1 capsule by mouth every morning (before breakfast)  Mae Basurto MD   traZODone (DESYREL) 100 MG tablet TAKE 2 TABLETS BY MOUTH EVERY NIGHT  Mae Basurto MD   losartan (COZAAR) 100 MG tablet TAKE 1 TABLET BY MOUTH DAILY  Mae Basurto MD   rosuvastatin (CRESTOR) 20 MG tablet TAKE 1 TABLET BY MOUTH DAILY  Mae Basurto MD   doxazosin (CARDURA) 4 MG tablet TAKE 1 TABLET BY MOUTH EVERY NIGHT  Mae Basurto MD   Dorena Sabal 100-62.5-25 MCG/INH AEPB INHALE 1 PUFF INTO THE LUNGS DAILY  Vee Saavedra MD   amLODIPine (NORVASC) 10 MG tablet TAKE 1 TABLET BY MOUTH DAILY  Mae Basurto MD   fenofibrate (TRIGLIDE) 160 MG tablet TAKE 1 TABLET BY MOUTH DAILY  Mae Basurto MD   glimepiride (AMARYL) 4 MG tablet TAKE 2 TABLETS BY MOUTH EVERY MORNING  Mae Basurto MD   sertraline (ZOLOFT) 100 MG tablet Take 100 mg by mouth daily  Historical Provider, MD   TRUE METRIX BLOOD GLUCOSE TEST strip USE 1 STRIP TO TEST BLOOD SUGAR TWICE DAILY  Mae Basurto MD   Insulin Pen Needle (B-D UF III MINI PEN NEEDLES) 31G X 5 MM MISC USE AS DIRECTED TO INJECT ONCE DAILY  Mae Basurto MD   [id number] [facility] Patient test twice daily  Mae Basurto MD   albuterol sulfate HFA (VENTOLIN HFA) 108 (90 Base) MCG/ACT inhaler 2 Puff every 4-6 hours as needed for wheezing or shortness of breath  Diane Juárez MD   Blood Glucose Monitoring Suppl (PRECISION XTRA) w/Device KIT As directed  Diane Juárez MD   insulin glargine (LANTUS SOLOSTAR) 100 UNIT/ML injection pen Inject 15 Units into the skin nightly   Historical Provider, MD   Blood Glucose Monitoring Suppl (ONE TOUCH ULTRA 2) W/DEVICE KIT 1 kit by Does not apply route daily as needed. Diane Juárez MD   aspirin 325 MG tablet Take 325 mg by mouth daily. Historical Provider, MD   fish oil-omega-3 fatty acids 1000 MG capsule Take 2 g by mouth daily. Historical Provider, MD       Vitals:    10/05/21 1007   Pulse: 78   SpO2: 99%     There is no height or weight on file to calculate BMI.      Wt Readings from Last 3 Encounters:   21 182 lb (82.6 kg)   21 184 lb (83.5 kg)   10/29/20 186 lb (84.4 kg)     BP Readings from Last 3 Encounters:   21 130/78   21 130/68   10/29/20 138/60        Social History     Tobacco Use   Smoking Status Former Smoker    Packs/day: 1.00    Years: 20.00    Pack years: 20.00    Types: Cigarettes    Quit date: 3/3/1992    Years since quittin.6   Smokeless Tobacco Never Used

## 2021-10-05 NOTE — PROGRESS NOTES
Vaccine Information Sheet, \"Influenza - Inactivated\"  given to Jessika Holden, or parent/legal guardian of  Jessika Holden and verbalized understanding. Patient responses:    Have you ever had a reaction to a flu vaccine? No  Do you have any current illness? No  Have you ever had Guillian [place] Syndrome? No  Do you have a serious allergy to any of the follow: Neomycin, Polymyxin, Thimerosal, eggs or egg products? No    Flu vaccine given per order. Please see immunization tab. Risks and benefits explained. Current VIS given.       Immunizations Administered     Name Date Dose Route    Influenza, Quadv, adjuvanted, 65 yrs +, IM, PF (Fluad) 10/5/2021 0.5 mL Intramuscular    Site: Deltoid- Left    Lot: [id number]    NDC: 60195-411-03

## 2021-10-11 RX ORDER — FLUTICASONE FUROATE, UMECLIDINIUM BROMIDE AND VILANTEROL TRIFENATATE 100; 62.5; 25 UG/1; UG/1; UG/1
1 POWDER RESPIRATORY (INHALATION) DAILY
Qty: 180 EACH | Refills: 3 | Status: SHIPPED | OUTPATIENT
Start: 2021-10-11

## 2021-10-25 PROBLEM — R06.02 SOB (SHORTNESS OF BREATH): Status: RESOLVED | Noted: 2019-07-23 | Resolved: 2021-10-25

## 2021-10-25 PROBLEM — J43.2 CENTRILOBULAR EMPHYSEMA (HCC): Status: ACTIVE | Noted: 2019-07-23

## 2021-10-25 PROBLEM — J43.9 EMPHYSEMA OF LUNG (HCC): Status: ACTIVE | Noted: 2019-07-23

## 2021-10-25 PROBLEM — J43.2 CENTRILOBULAR EMPHYSEMA (HCC): Status: RESOLVED | Noted: 2021-10-05 | Resolved: 2021-10-25

## 2021-10-25 NOTE — PROGRESS NOTES
Date of Visit:  10/29/2021    CC: Flaco Erickson (: 1950) is a 73 y.o. male, established patient, here for evaluation/re-evaluation of the following medical concerns:    ASSESSMENT/PLAN:  Type 2 diabetes mellitus without complication, with long-term current use of insulin ([facility]  Assessment & Plan:  Home BS readings too low due to decreased dietary intake related to recent GI upset. No longer requiring Lantus and will cut Amaryl dose to 4 mg qd. He will call if fasting BS still running < 100. Orders:  -     Hemoglobin A1C; Future  Essential hypertension  Assessment & Plan:  Sub-optimal in the office today, likely due to use of high dose Aleve, which he will discontinue. Continue current BP medications. Orders:  -     Comprehensive Metabolic Panel, Fasting; Future  Stage 3a chronic kidney disease ([facility]  Mixed hyperlipidemia  Assessment & Plan:  At goal on current doses of Crestor and fenofibrate except for low HDL, which responds best to exercise. Major depressive disorder with single episode, in full remission ([facility]  Assessment & Plan:  Well-controlled on moderate dose Zoloft- continue. Primary insomnia  Assessment & Plan:  Doing well on 100 mg dose of trazodone- continue. 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 Suspect ongoing symptoms due to switch to Aleve, which he also agrees to discontinue. Continue daily omeprazole. He will follow up as directed with GI. Return in about 3 months (around 2022). Vitals:    10/29/21 0836 10/29/21 0913   BP: (!) 148/78 138/78   Pulse: 63    Resp: 16    SpO2: 98%    Weight: 178 lb (80.7 kg)    Height: 6' (1.829 m)       Estimated body mass index is 24.14 kg/m² as calculated from the following:    Height as of this encounter: 6' (1.829 m). Weight as of this encounter: 178 lb (80.7 kg).      Wt Readings from Last 3 Encounters:   10/29/21 178 lb (80.7 kg)   04/29/21 182 lb (82.6 kg)   04/08/21 184 lb (83.5 kg)     BP Readings from Last 3 Encounters:   10/29/21 138/78   04/29/21 130/78   04/08/21 130/68     HPI  Diabetes Mellitus Type 2: Current symptoms/problems include none. Trulicity held since 4/8 due to abdominal pain and diarrhea. Stopped Lantus 6 months ago due to hypoglycemia. Taking 8 mg Amaryl consistently. Home blood sugar records: fasting range: 48-low 100s, low 100s before bedtime  Any episodes of hypoglycemia? Yes- if not eating normally  Daily Aspirin? Yes    Hypertension/CKD:  Home blood pressure monitoring: No.  He is adherent to a low sodium diet. Patient denies chest pain, SOB, headache, lightheadedness and palpitations. Antihypertensive medication side effects: no medication side effects noted. Use of agents associated with hypertension: none. Hyperlipidemia:  No new myalgias or GI upset on Crestor and fenofibrate (Tricor, Trilipix). LDL 60, triglycerides 148, HDL 33- 7/22/21. Mood Disorder:  Patient presents for follow-up of depression and insomnia. Current complaints include: none. He denies anhedonia, depressed mood, tearfulness, feelings of hopelessness, insomnia, irritability, excessive worry, restlessness and suicidal thoughts or behavior. Symptoms/signs of marlo: none. External stressors: nothing new. Current treatment includes: Zoloft- 100 mg qd, trazodone 100 mg qd. Medication side effects: none. Abdominal pain:  EGD 8/3/21- duodenal stricture dilated, healing gastric ulcer visualized. Biopsies negative for celiac disease and H. Pylori. Excedrin discontinued- had been taking daily, omeprazole continued. Has been taking Aleve 4/day since stopped Excedrin. Abdominal pain slowly improving, but appetite still poor. ROS  As documented above    Physical Exam  Constitutional:       General: He is not in acute distress. Appearance: He is well-developed.    Eyes: Conjunctiva/sclera: Conjunctivae normal.   Neck:      Thyroid: No thyroid mass or thyromegaly. Cardiovascular:      Rate and Rhythm: Normal rate and regular rhythm. Heart sounds: Normal heart sounds. No murmur heard. No friction rub. No gallop. Pulmonary:      Effort: Pulmonary effort is normal. No respiratory distress. Breath sounds: Normal breath sounds. No wheezing, rhonchi or rales. Musculoskeletal:      Right lower leg: No edema. Left lower leg: No edema. Lymphadenopathy:      Cervical: No cervical adenopathy. Skin:     General: Skin is warm and dry. Findings: No erythema or rash. Neurological:      Mental Status: He is alert and oriented to person, place, and time. Psychiatric:         Speech: Speech normal.         Behavior: Behavior normal.         Thought Content: Thought content normal.         Judgment: Judgment normal.     Diabetic foot exam:     Left Foot:   Visual Exam: normal except for dystrophic toenails   Pulse DP: 2+ (normal)   Filament test: normal sensation       Right Foot:   Visual Exam: normal except for dystrophic toenails   Pulse DP: 2+ (normal)   Filament test: normal sensation     On this date 10/29/2021 I have spent 35 minutes reviewing previous notes, test results and face to face with the patient discussing the diagnosis and importance of compliance with the treatment plan as well as documenting on the day of the visit. --Alda Lopez MD on 1/24/2022 at 11:34 AM    An electronic signature was used to authenticate this note.

## 2021-10-29 ENCOUNTER — OFFICE VISIT (OUTPATIENT)
Dept: INTERNAL MEDICINE CLINIC | Age: 71
End: 2021-10-29
Payer: COMMERCIAL

## 2021-10-29 VITALS
HEIGHT: 72 IN | OXYGEN SATURATION: 98 % | HEART RATE: 63 BPM | BODY MASS INDEX: 24.11 KG/M2 | DIASTOLIC BLOOD PRESSURE: 78 MMHG | SYSTOLIC BLOOD PRESSURE: 138 MMHG | WEIGHT: 178 LBS | RESPIRATION RATE: 16 BRPM

## 2021-10-29 DIAGNOSIS — F32.5 MAJOR DEPRESSIVE DISORDER WITH SINGLE EPISODE, IN FULL REMISSION (HCC): ICD-10-CM

## 2021-10-29 DIAGNOSIS — F51.01 PRIMARY INSOMNIA: ICD-10-CM

## 2021-10-29 DIAGNOSIS — N18.31 STAGE 3A CHRONIC KIDNEY DISEASE (HCC): ICD-10-CM

## 2021-10-29 DIAGNOSIS — R10.84 GENERALIZED ABDOMINAL PAIN: ICD-10-CM

## 2021-10-29 DIAGNOSIS — I10 ESSENTIAL HYPERTENSION: Chronic | ICD-10-CM

## 2021-10-29 DIAGNOSIS — E11.9 TYPE 2 DIABETES MELLITUS WITHOUT COMPLICATION, WITH LONG-TERM CURRENT USE OF INSULIN (HCC): Primary | ICD-10-CM

## 2021-10-29 DIAGNOSIS — Z79.4 TYPE 2 DIABETES MELLITUS WITHOUT COMPLICATION, WITH LONG-TERM CURRENT USE OF INSULIN (HCC): Primary | ICD-10-CM

## 2021-10-29 DIAGNOSIS — E78.2 MIXED HYPERLIPIDEMIA: ICD-10-CM

## 2021-10-29 PROCEDURE — 90732 PPSV23 VACC 2 YRS+ SUBQ/IM: CPT | Performed by: INTERNAL MEDICINE

## 2021-10-29 PROCEDURE — 3051F HG A1C>EQUAL 7.0%<8.0%: CPT | Performed by: INTERNAL MEDICINE

## 2021-10-29 PROCEDURE — 99214 OFFICE O/P EST MOD 30 MIN: CPT | Performed by: INTERNAL MEDICINE

## 2021-10-29 PROCEDURE — 90471 IMMUNIZATION ADMIN: CPT | Performed by: INTERNAL MEDICINE

## 2021-10-29 RX ORDER — GLIMEPIRIDE 4 MG/1
TABLET ORAL
COMMUNITY
Start: 2021-10-29 | End: 2022-01-03

## 2021-10-29 SDOH — ECONOMIC STABILITY: HOUSING INSECURITY
IN THE LAST 12 MONTHS, WAS THERE A TIME WHEN YOU DID NOT HAVE A STEADY PLACE TO SLEEP OR SLEPT IN A SHELTER (INCLUDING NOW)?: NO

## 2021-10-29 SDOH — ECONOMIC STABILITY: TRANSPORTATION INSECURITY
IN THE PAST 12 MONTHS, HAS LACK OF TRANSPORTATION KEPT YOU FROM MEETINGS, WORK, OR FROM GETTING THINGS NEEDED FOR DAILY LIVING?: NO

## 2021-10-29 SDOH — ECONOMIC STABILITY: INCOME INSECURITY: IN THE LAST 12 MONTHS, WAS THERE A TIME WHEN YOU WERE NOT ABLE TO PAY THE MORTGAGE OR RENT ON TIME?: NO

## 2021-10-29 SDOH — ECONOMIC STABILITY: FOOD INSECURITY: WITHIN THE PAST 12 MONTHS, YOU WORRIED THAT YOUR FOOD WOULD RUN OUT BEFORE YOU GOT MONEY TO BUY MORE.: NEVER TRUE

## 2021-10-29 SDOH — ECONOMIC STABILITY: TRANSPORTATION INSECURITY
IN THE PAST 12 MONTHS, HAS THE LACK OF TRANSPORTATION KEPT YOU FROM MEDICAL APPOINTMENTS OR FROM GETTING MEDICATIONS?: NO

## 2021-10-29 SDOH — ECONOMIC STABILITY: FOOD INSECURITY: WITHIN THE PAST 12 MONTHS, THE FOOD YOU BOUGHT JUST DIDN'T LAST AND YOU DIDN'T HAVE MONEY TO GET MORE.: NEVER TRUE

## 2021-10-29 ASSESSMENT — SOCIAL DETERMINANTS OF HEALTH (SDOH): HOW HARD IS IT FOR YOU TO PAY FOR THE VERY BASICS LIKE FOOD, HOUSING, MEDICAL CARE, AND HEATING?: NOT HARD AT ALL

## 2021-10-29 NOTE — ASSESSMENT & PLAN NOTE
Sub-optimal in the office today, likely due to use of high dose Aleve, which he will discontinue. Continue current BP medications.

## 2021-10-29 NOTE — PATIENT INSTRUCTIONS
DO NOT TAKE EXCEDRIN, ADVIL, IBUPROFEN, ALEVE, NAPROXEN OR ANY OTHER DRUGS CALLED NSAIDS (EXCEPT FOR DAILY BABY ASPIRIN)

## 2021-10-29 NOTE — ASSESSMENT & PLAN NOTE
Home BS readings too low due to decreased dietary intake related to recent GI upset. No longer requiring Lantus and will cut Amaryl dose to 4 mg qd. He will call if fasting BS still running < 100.

## 2021-10-29 NOTE — ASSESSMENT & PLAN NOTE
At goal on current doses of Crestor and fenofibrate except for low HDL, which responds best to exercise.

## 2021-10-29 NOTE — ASSESSMENT & PLAN NOTE
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 Suspect ongoing symptoms due to switch to Aleve, which he also agrees to discontinue. Continue daily omeprazole.   He will follow up as directed with GI.

## 2021-11-05 ENCOUNTER — HOSPITAL ENCOUNTER (OUTPATIENT)
Dept: CT IMAGING | Age: 71
Discharge: HOME | End: 2021-11-05
Payer: COMMERCIAL

## 2021-11-05 DIAGNOSIS — R91.1 PULMONARY NODULE: ICD-10-CM

## 2021-11-05 PROCEDURE — 71250 CT THORAX DX C-: CPT

## 2021-11-08 ENCOUNTER — TELEPHONE (OUTPATIENT)
Dept: INTERNAL MEDICINE CLINIC | Age: 71
End: 2021-11-08

## 2021-11-08 RX ORDER — TRAZODONE HYDROCHLORIDE 100 MG/1
250 TABLET ORAL NIGHTLY
Qty: 225 TABLET | Refills: 1 | Status: SHIPPED | OUTPATIENT
Start: 2021-11-08 | End: 2022-01-24

## 2021-11-08 NOTE — TELEPHONE ENCOUNTER
We really can't go any higher on this medication as long as he is taking the Zoloft due to risk of serotonin syndrome. We could try switching him to a different medication, Mirtazapine, which usually works for insomnia at lower doses.

## 2021-11-08 NOTE — TELEPHONE ENCOUNTER
Patient states his Trazodone is not working like it used to and would like the mgs increased. States he has issues going on. Patient would not provide any additional information, stating he was just in & Dr. Emilee Hager is aware of what is going on. He forgot to ask her while he was here. Patient currently taking 100 mgs , 2 nightly.

## 2021-11-11 ENCOUNTER — TELEPHONE (OUTPATIENT)
Dept: INTERNAL MEDICINE CLINIC | Age: 71
End: 2021-11-11

## 2021-11-11 NOTE — TELEPHONE ENCOUNTER
Patient advised. Patint not happy. He will not schedule virtual, telephone or go to urgent care.  He may go to the [facility] if he can't treat it with OTC

## 2021-11-11 NOTE — TELEPHONE ENCOUNTER
He needs some type of evaluation before antibiotics and/or steroids can be prescribed. We cannot see him in person in our office if he has acute respiratory symptoms due to COVID protocols, so virtual is the only option at this point. If he does not want to do this, will need to be seen at urgent care.

## 2021-11-11 NOTE — TELEPHONE ENCOUNTER
Patient states he is not doing an E visit nor is he doing a VV he wants a Script called in to his pharmacy because he knows that he has Bronchitis and he wants something for it. He states he will go elsewhere to be treated.  After being told that the doctor would normally like to visit with him to get the correct diagnosis in order to prescribe the correct medication he did not want to be scheduled for any visits at this time

## 2021-11-11 NOTE — TELEPHONE ENCOUNTER
----- Message from Deya Matthews sent at 11/11/2021  2:05 PM EST -----  Subject: Message to Provider    QUESTIONS  Information for Provider? PT believes he has bronchitis and was hoping   that Dr. Lydia Talley could right him a RX to treat it if possible.   ---------------------------------------------------------------------------  --------------  [address]  What is the best way for the office to contact you? OK to leave message on   voicemail  Preferred Call Back Phone Number? [phone]  ---------------------------------------------------------------------------  --------------  SCRIPT ANSWERS  Relationship to Patient?  Self

## 2021-11-12 ENCOUNTER — TELEMEDICINE (OUTPATIENT)
Dept: INTERNAL MEDICINE CLINIC | Age: 71
End: 2021-11-12
Payer: COMMERCIAL

## 2021-11-12 DIAGNOSIS — J01.90 ACUTE NON-RECURRENT SINUSITIS, UNSPECIFIED LOCATION: Primary | ICD-10-CM

## 2021-11-12 DIAGNOSIS — J44.1 COPD EXACERBATION (HCC): ICD-10-CM

## 2021-11-12 PROCEDURE — 99443 PR PHYS/QHP TELEPHONE EVALUATION 21-30 MIN: CPT | Performed by: INTERNAL MEDICINE

## 2021-11-12 RX ORDER — DOXYCYCLINE HYCLATE 100 MG/1
100 CAPSULE ORAL 2 TIMES DAILY
Qty: 20 CAPSULE | Refills: 0 | Status: SHIPPED | OUTPATIENT
Start: 2021-11-12 | End: 2021-11-22

## 2021-11-12 RX ORDER — PREDNISONE 10 MG/1
40 TABLET ORAL DAILY
Qty: 20 TABLET | Refills: 0 | Status: SHIPPED | OUTPATIENT
Start: 2021-11-12 | End: 2021-11-17

## 2021-11-12 NOTE — TELEPHONE ENCOUNTER
Patient is calling regarding his conversation with Pete Zhou yesterday. He would like to speak to Pete Zhou again. He would not elaborate further, just to say he wanted to speak to Pete Zhou. Please contact patient at the number provided.

## 2021-11-12 NOTE — PROGRESS NOTES
Mikayla Mendoza is a 73 y.o. male evaluated via telephone on 11/12/2021. Consent:  He and/or health care decision maker is aware that that he may receive a bill for this telephone service, depending on his insurance coverage, and has provided verbal consent to proceed: Yes    Documentation:  I communicated with the patient and/or health care decision maker about the following issues:  Respiratory Symptoms:  Patient complains of 7 day(s) history of purulent nasal discharge, nasal congestion, shortness of breath and productive cough: Sputum is yellow and green. Symptoms have been unchanged with time. He denies any other symptoms . Relevant PMH: DM and COPD. Smoking history:  He  reports that he quit smoking about 29 years ago. His smoking use included cigarettes. He has a 20.00 pack-year smoking history. He has never used smokeless tobacco. He has had no known ill contacts. Treatment to date: albuterol inhaler- once daily, Trelegy qd. Details of this discussion including any medical advice provided:  Acute non-recurrent sinusitis, unspecified location  Likely post-viral.  Patient declines COVID testing, so was advised to isolate for at least 10 days after onset of symptoms.  -     doxycycline hyclate (VIBRAMYCIN) 100 MG capsule; Take 1 capsule by mouth 2 times daily for 10 days, Disp-20 capsule, R-0Normal  -      Patient will call if no improvement within 3-4 days or sooner if symptoms worsen. COPD exacerbation ([facility] [address]  Secondary to above. Continue Trelegy and albuterol prn. Patient will call if symptoms change or worsen. -     predniSONE (DELTASONE) 10 MG tablet; Take 4 tablets by mouth daily for 5 days, Disp-20 tablet, R-0Normal    No follow-ups on file.      Patient-Reported Vitals 11/12/2021   Patient-Reported Weight 175lb   Patient-Reported Height 6'0\"        Wt Readings from Last 3 Encounters:   10/29/21 178 lb (80.7 kg)   04/29/21 182 lb (82.6 kg)   04/08/21 184 lb (83.5 kg)     BP Readings from Last 3 Encounters:   10/29/21 138/78   04/29/21 130/78   04/08/21 130/68     Estimated body mass index is 24.14 kg/m² as calculated from the following:    Height as of 10/29/21: 6' (1.829 m). Weight as of 10/29/21: 178 lb (80.7 kg). A caregiver was present when appropriate. Due to this being a TeleHealth encounter (During [facility]-73 public health emergency), evaluation of the following organ systems was limited: Vitals/Constitutional/EENT/Resp/CV/GI//MS/Neuro/Skin/Heme-Lymph-Imm. Pursuant to the emergency declaration under the [address] [place] [address] authority and the [employer] and [employer] Act, this Virtual Visit was conducted with patient's (and/or legal guardian's) consent, to reduce the patient's risk of exposure to COVID-19 and provide necessary medical care. The patient (and/or legal guardian) has also been advised to contact this office for worsening conditions or problems, and seek emergency medical treatment and/or call 911 if deemed necessary. I affirm this is a Patient Initiated Episode with a Patient who has not had a related appointment within my department in the past 7 days or scheduled within the next 24 hours. Patient identification was verified at the start of the visit: Yes    Total Time: minutes: 21-30 minutes    Note: not billable if this call serves to triage the patient into an appointment for the relevant concern    --Enma Hernandez MD on 11/12/2021 at 5:11 PM    An  electronic signature was used to authenticate this note.

## 2021-11-29 ENCOUNTER — TELEPHONE (OUTPATIENT)
Dept: INTERNAL MEDICINE CLINIC | Age: 71
End: 2021-11-29

## 2021-11-29 RX ORDER — LEVOFLOXACIN 500 MG/1
500 TABLET, FILM COATED ORAL DAILY
Qty: 7 TABLET | Refills: 0 | Status: SHIPPED | OUTPATIENT
Start: 2021-11-29 | End: 2021-12-06

## 2021-11-29 NOTE — TELEPHONE ENCOUNTER
Patient states he had a phone visit with Dr. Bryant Araujo, and was diagnosed with Bronchitis and prescribed medication which he doesn't remember the name of. He finished the medication 1 week ago, and states he didn't have any improvement at all. He still has a productive cough with green, and yellow sputum. He states he is coughing day and night, and he's coughed so much that his rib cage hurts. He still has the following symptoms he discussed with Dr. Bryant Araujo: \"Respiratory Symptoms:  Patient complains of 7 day(s) history of purulent nasal discharge, nasal congestion, shortness of breath and productive cough: Sputum is yellow and green. Symptoms have been unchanged with time. He denies any other symptoms. \"  He is requesting another round of something please be prescribed. Appointment offered, and he will schedule if deemed necessary, but since this is an issue he was seen for, prefers something just please be prescribed and called in to:    [facility] [phone] Aarti Lombardiine [phone]     Aware doctor  is out of the office today    Please contact patient at phone # provided if or when something is prescribed.

## 2021-11-29 NOTE — TELEPHONE ENCOUNTER
Script for Levaquin sent to pharmacy- he should watch his blood sugar closely while taking this medication, as it can cause hypoglycemia in combination with glimepiride. Please call patient to notify.

## 2021-12-20 NOTE — PROGRESS NOTES
Date of Visit:  2021    CC: Pati Adkins (: 1950) is a 73 y.o. male, established patient, here for evaluation/re-evaluation of the following medical concerns:    ASSESSMENT/PLAN:  Bronchitis  Recurrent after 2 antibiotics and 1 course of prednisone. Since have transient response to the Levaquin, will re-treat for 14 days instead of 7. If symptoms do not resolve completely or recur within 6 weeks, he will follow up with his pulmonologist for further evaluation. -     XR CHEST STANDARD (2 VW); Future  Centrilobular emphysema ([address]  Worse due to above. Will re-treat with 5 days of prednisone to address inflammation. Continue Trelegy and prn albuterol. He was advised to obtain home pulse oximeter and call if consistently < 92%. Return if symptoms worsen or fail to improve. Patient-Reported Vitals 2021   Patient-Reported Weight 178lb   Patient-Reported Height 6'0\"        Wt Readings from Last 3 Encounters:   10/29/21 178 lb (80.7 kg)   21 182 lb (82.6 kg)   21 184 lb (83.5 kg)     BP Readings from Last 3 Encounters:   10/29/21 138/78   21 130/78   21 130/68     Estimated body mass index is 24.14 kg/m² as calculated from the following:    Height as of 10/29/21: 6' (1.829 m). Weight as of 10/29/21: 178 lb (80.7 kg). HPI  Current symptoms include fatigue, SOB, cough productive of greenish sputum, decreased appetite. No fevers, sinus symptoms, ST.  Using rescue inhaler 2-3 times per day. Treated with 10 day course of doxycycline and 5 day course of prednisone , then 7 days of Levaquin - felt better for a few days at the end of the course, but symptoms rapidly recurred. Stable chest CT 21 per pulmonologist.    ROS  As documented above    Physical Exam  Constitutional:       General: He is not in acute distress. Appearance: He is well-developed. HENT:      Head: Normocephalic and atraumatic. Mouth/Throat:      Lips: No lesions.    Neck: Comments: No visible mass  Pulmonary:      Effort: Pulmonary effort is normal. No respiratory distress. Skin:     Comments: No rash, erythema or other discoloration on facial skin and exposed areas of neck and upper extremites    Neurological:      Mental Status: He is alert. Comments: No facial asymmetry (cranial nerve 7 motor function) or gaze palsy   Psychiatric:         Attention and Perception: Attention normal.         Mood and Affect: Mood normal.         Speech: Speech normal.         Behavior: Behavior normal.         Thought Content: Thought content normal.         Cognition and Memory: Cognition normal.           Kat Dumont is a 73 y.o. male being evaluated by a Virtual Visit (video visit) encounter to address concerns as mentioned above. A caregiver was present when appropriate. Due to this being a TeleHealth encounter (During [facility]-64 public health emergency), evaluation of the following organ systems was limited: Vitals/Constitutional/EENT/Resp/CV/GI//MS/Neuro/Skin/Heme-Lymph-Imm. Pursuant to the emergency declaration under the [address] [place] [address] and the [employer] and [employer] Act, this Virtual Visit was conducted with patient's (and/or legal guardian's) consent, to reduce the patient's risk of exposure to COVID-19 and provide necessary medical care. The patient (and/or legal guardian) has also been advised to contact this office for worsening conditions or problems, and seek emergency medical treatment and/or call 911 if deemed necessary. Patient identification was verified at the start of the visit: Yes    Services were provided through a video synchronous discussion virtually to substitute for in-person clinic visit. Patient and provider were located at their individual homes.     --Ange Soriano MD on 12/21/2021 at 9:52 AM    An electronic signature was used to authenticate this note.

## 2021-12-21 ENCOUNTER — TELEMEDICINE (OUTPATIENT)
Dept: INTERNAL MEDICINE CLINIC | Age: 71
End: 2021-12-21
Payer: COMMERCIAL

## 2021-12-21 DIAGNOSIS — J43.2 CENTRILOBULAR EMPHYSEMA (HCC): ICD-10-CM

## 2021-12-21 DIAGNOSIS — J40 BRONCHITIS: Primary | ICD-10-CM

## 2021-12-21 PROCEDURE — 99214 OFFICE O/P EST MOD 30 MIN: CPT | Performed by: INTERNAL MEDICINE

## 2021-12-21 RX ORDER — LEVOFLOXACIN 500 MG/1
500 TABLET, FILM COATED ORAL DAILY
Qty: 14 TABLET | Refills: 0 | Status: SHIPPED | OUTPATIENT
Start: 2021-12-21 | End: 2022-01-04

## 2021-12-21 RX ORDER — PREDNISONE 10 MG/1
40 TABLET ORAL DAILY
Qty: 20 TABLET | Refills: 0 | Status: SHIPPED | OUTPATIENT
Start: 2021-12-21 | End: 2021-12-26

## 2021-12-21 RX ORDER — OMEPRAZOLE 20 MG/1
CAPSULE, DELAYED RELEASE ORAL
Qty: 30 CAPSULE | Refills: 5 | Status: SHIPPED | OUTPATIENT
Start: 2021-12-21 | End: 2022-03-01

## 2021-12-22 ENCOUNTER — HOSPITAL ENCOUNTER (OUTPATIENT)
Age: 71
Discharge: HOME | End: 2021-12-22
Payer: COMMERCIAL

## 2021-12-22 ENCOUNTER — HOSPITAL ENCOUNTER (OUTPATIENT)
Dept: GENERAL RADIOLOGY | Age: 71
Discharge: HOME | End: 2021-12-22
Payer: COMMERCIAL

## 2021-12-22 DIAGNOSIS — J40 BRONCHITIS: ICD-10-CM

## 2021-12-22 PROCEDURE — 71046 X-RAY EXAM CHEST 2 VIEWS: CPT

## 2022-01-03 RX ORDER — FENOFIBRATE 160 MG/1
TABLET ORAL
Qty: 90 TABLET | Refills: 1 | Status: SHIPPED | OUTPATIENT
Start: 2022-01-03 | End: 2022-07-06 | Stop reason: SDUPTHER

## 2022-01-03 RX ORDER — GLIMEPIRIDE 4 MG/1
TABLET ORAL
Qty: 180 TABLET | Refills: 1 | Status: SHIPPED | OUTPATIENT
Start: 2022-01-03 | End: 2022-07-06 | Stop reason: SDUPTHER

## 2022-01-03 RX ORDER — AMLODIPINE BESYLATE 10 MG/1
10 TABLET ORAL DAILY
Qty: 90 TABLET | Refills: 1 | Status: SHIPPED | OUTPATIENT
Start: 2022-01-03 | End: 2022-04-20

## 2022-01-24 ENCOUNTER — TELEPHONE (OUTPATIENT)
Dept: INTERNAL MEDICINE CLINIC | Age: 72
End: 2022-01-24

## 2022-01-24 RX ORDER — PANTOPRAZOLE SODIUM 40 MG/1
40 TABLET, DELAYED RELEASE ORAL
Qty: 90 TABLET | Refills: 1 | Status: SHIPPED | OUTPATIENT
Start: 2022-01-24 | End: 2022-04-20

## 2022-01-24 RX ORDER — MIRTAZAPINE 15 MG/1
15 TABLET, FILM COATED ORAL NIGHTLY
Qty: 90 TABLET | Refills: 1 | Status: SHIPPED | OUTPATIENT
Start: 2022-01-24 | End: 2022-04-04

## 2022-01-24 NOTE — TELEPHONE ENCOUNTER
He is taking the Zoloft for his depression and to help him cope with stress. I am concerned that dropping the dose to 50 mg may be making this worse. However, since it interacts with the trazodone, I had to cut the dose of Zoloft to increase the trazodone. Remeron can also interact with Zoloft, but is usually effective for sleep at much lower doses than the trazodone, so he would be able to take a higher dose of Zoloft with it.

## 2022-01-24 NOTE — TELEPHONE ENCOUNTER
Last appointment: 12/21/2021  Next appointment: 2/4/2022  Last refill: 30 with 5 12/21/2021      Confirmed refills with pharmacy.   Note completed

## 2022-01-24 NOTE — TELEPHONE ENCOUNTER
----- Message from Ishmael Samuel sent at 1/24/2022 11:05 AM EST -----  Subject: Refill Request    QUESTIONS  Name of Medication? omeprazole (PRILOSEC) 20 MG delayed release capsule  Patient-reported dosage and instructions? 20 mg once daily  How many days do you have left? 7  Preferred Pharmacy? [facility] #[id number]  Pharmacy phone number (if available)? [phone]  ---------------------------------------------------------------------------  --------------,  Name of Medication? Other - pantaprozole  Patient-reported dosage and instructions? 40mg once daily for stomach  How many days do you have left? 0  Preferred Pharmacy? [facility] #[id number]  Pharmacy phone number (if available)? [phone]  ---------------------------------------------------------------------------  --------------  Vilma CADE  What is the best way for the office to contact you? OK to leave message on   voicemail  Preferred Call Back Phone Number?  [phone]

## 2022-01-24 NOTE — TELEPHONE ENCOUNTER
If 250 mg of trazodone is not working, we really need to try something else, such as Remeron. How much Zoloft is he currently taking?

## 2022-01-24 NOTE — TELEPHONE ENCOUNTER
Patient says he doesn't want to the zoloft at all. He says he doesn't need it. He says he is retired now and doesn't want to take it. He is willing to try Remeron. Please call into local pharmacy.   He will continue the 1/2 tablet until he sees you on the 4th of Feb.

## 2022-01-24 NOTE — TELEPHONE ENCOUNTER
----- Message from Phoebe Matson sent at 1/24/2022 11:00 AM EST -----  Subject: Medication Problem    QUESTIONS  Name of Medication? traZODone (DESYREL) 100 MG tablet  Patient-reported dosage and instructions? 100 mg 2 1/2  What question or problem do you have with the medication? pt states that   100 mg is not working. Needs to increase the mg. Pt says that he usually   gets a 90-day script. Preferred Pharmacy? [facility] [address] [place] [address] [phone]  Pharmacy phone number (if available)? [phone]  Additional Information for Provider?   ---------------------------------------------------------------------------  --------------  CALL BACK INFO  What is the best way for the office to contact you? OK to leave message on   voicemail  Preferred Call Back Phone Number? [phone]  ---------------------------------------------------------------------------  --------------  SCRIPT ANSWERS  Relationship to Patient?  Self

## 2022-02-01 DIAGNOSIS — N18.30 CKD (CHRONIC KIDNEY DISEASE), STAGE III (HCC): ICD-10-CM

## 2022-02-01 RX ORDER — DOXAZOSIN MESYLATE 4 MG/1
TABLET ORAL
Qty: 90 TABLET | Refills: 1 | Status: SHIPPED | OUTPATIENT
Start: 2022-02-01 | End: 2022-07-06

## 2022-02-07 RX ORDER — ROSUVASTATIN CALCIUM 20 MG/1
20 TABLET, COATED ORAL DAILY
Qty: 90 TABLET | Refills: 1 | Status: SHIPPED | OUTPATIENT
Start: 2022-02-07 | End: 2022-06-22 | Stop reason: SDUPTHER

## 2022-02-24 RX ORDER — LOSARTAN POTASSIUM 100 MG/1
100 TABLET ORAL DAILY
Qty: 90 TABLET | Refills: 1 | Status: SHIPPED | OUTPATIENT
Start: 2022-02-24 | End: 2022-07-19 | Stop reason: SDUPTHER

## 2022-02-28 NOTE — PROGRESS NOTES
Date of Visit:  3/1/2022    CC: Yasir Ramos (: 1950) is a 73 y.o. male, established patient, here for evaluation/re-evaluation of the following medical concerns:    ASSESSMENT/PLAN:  Type 2 diabetes mellitus without complication, with long-term current use of insulin ([facility] [address]  Assessment & Plan:  Expect higher HgbA1c based upon home BS readings and recent dietary indiscretion. Patient counseled on lifestyle issues. Will titrate Lantus if HgbA1c > 8.0. Orders:  -     Hemoglobin A1C; Future  Essential hypertension  Assessment & Plan:  Well-controlled. Continue current antihypertensive regimen. Orders:  -     Comprehensive Metabolic Panel, Fasting; Future  Stage 3a chronic kidney disease ([facility] [address]  Assessment & Plan:  Stable per recent labs- will continue to monitor. He will continue to avoid NSAIDs. Mixed hyperlipidemia  Assessment & Plan:  At goal on current doses of Crestor and fenofibrate except for low HDL, which responds best to exercise. Orders:  -     Lipid, Fasting; Future  Major depressive disorder with single episode, in full remission [facility])  Assessment & Plan:  More irritability at lower dose of Zoloft- he will increase back to 100 mg qd. Primary insomnia  Assessment & Plan:  Improved with switch from trazodone to Remeron. However, if am somnolence persists, he will try cutting back to 7.5 mg qhs. Generalized abdominal pain  Assessment & Plan:  Improved with discontinuation of Excedrin and Aleve and consistent use of pantoprazole- continue. Patient will call if symptoms change or worsen. Orders:  -     CBC with Auto Differential; Future  -     Ferritin; Future  -     Iron and TIBC; Future     Return in about 3 months (around 2022). Vitals:    22 0836   BP: 122/66   Pulse: 68   Resp: 18   SpO2: 97%   Weight: 177 lb 3.2 oz (80.4 kg)      Estimated body mass index is 24.03 kg/m² as calculated from the following:    Height as of 10/29/21: 6' (1.829 m).     Weight as of this encounter: 177 lb 3.2 oz (80.4 kg). Wt Readings from Last 3 Encounters:   03/01/22 177 lb 3.2 oz (80.4 kg)   10/29/21 178 lb (80.7 kg)   04/29/21 182 lb (82.6 kg)     BP Readings from Last 3 Encounters:   03/01/22 122/66   10/29/21 138/78   04/29/21 130/78     HPI  Diabetes Mellitus Type 2: Current symptoms/problems include none. Using Lantus occasionally- 6-7 units. Taking 4 mg Amaryl consistently. Last HgbA1c 6.6- 10/29/21    Home blood sugar records: fasting range: 160s in the morning, 175 before bedtime. Has been drinking regular soda at work. Any episodes of hypoglycemia? No  Daily Aspirin? Yes    Hypertension/CKD:  Home blood pressure monitoring: No.  He is adherent to a low sodium diet. Patient denies chest pain, worsening AGARWAL, headache, lightheadedness and palpitations. Antihypertensive medication side effects: no medication side effects noted. Use of agents associated with hypertension: none. GFR 50- 10/29/21. Hyperlipidemia:  No new myalgias or GI upset on Crestor and fenofibrate (Tricor, Trilipix). LDL 60, triglycerides 148, HDL 33- 7/22/21. Mood Disorder:  Patient presents for follow-up of depression and insomnia. Current complaints include: irritability. He denies anhedonia, depressed mood, tearfulness, feelings of hopelessness, irritability, excessive worry, restlessness and suicidal thoughts or behavior. Symptoms/signs of marlo: none. External stressors: work issues. Current treatment includes: Zoloft- 50 mg qd, Remeron 15 mg qhs, trazodone discontinued 1/24 due to lack of efficacy. Medication side effects: am somnolence, but doesn't last very long. Abdominal pain:  EGD 8/3/21- duodenal stricture dilated, healing gastric ulcer visualized. Biopsies negative for celiac disease and H. Pylori. Excedrin discontinued- had been taking daily. Has been taking Aleve 4/day since stopped Excedrin- discontinued. No GI symptoms currently- taking pantoprazole consistently.     ROS  As documented above    Physical Exam  Constitutional:       General: He is not in acute distress. Appearance: He is well-developed. Eyes:      Conjunctiva/sclera: Conjunctivae normal.   Neck:      Thyroid: No thyroid mass or thyromegaly. Cardiovascular:      Rate and Rhythm: Normal rate and regular rhythm. Heart sounds: Normal heart sounds. No murmur heard. No friction rub. No gallop. Pulmonary:      Effort: Pulmonary effort is normal. No respiratory distress. Breath sounds: Normal breath sounds. No wheezing, rhonchi or rales. Musculoskeletal:      Right lower leg: No edema. Left lower leg: No edema. Lymphadenopathy:      Cervical: No cervical adenopathy. Skin:     General: Skin is warm and dry. Findings: No erythema or rash. Neurological:      Mental Status: He is alert and oriented to person, place, and time. Psychiatric:         Speech: Speech normal.         Behavior: Behavior normal.         Thought Content: Thought content normal.         Judgment: Judgment normal.             --Saadia Melton MD on 3/1/2022 at 6:33 PM    An electronic signature was used to authenticate this note.

## 2022-03-01 ENCOUNTER — OFFICE VISIT (OUTPATIENT)
Dept: INTERNAL MEDICINE CLINIC | Age: 72
End: 2022-03-01
Payer: COMMERCIAL

## 2022-03-01 VITALS
OXYGEN SATURATION: 97 % | BODY MASS INDEX: 24.03 KG/M2 | DIASTOLIC BLOOD PRESSURE: 66 MMHG | WEIGHT: 177.2 LBS | SYSTOLIC BLOOD PRESSURE: 122 MMHG | RESPIRATION RATE: 18 BRPM | HEART RATE: 68 BPM

## 2022-03-01 DIAGNOSIS — F32.5 MAJOR DEPRESSIVE DISORDER WITH SINGLE EPISODE, IN FULL REMISSION (HCC): ICD-10-CM

## 2022-03-01 DIAGNOSIS — I10 ESSENTIAL HYPERTENSION: Chronic | ICD-10-CM

## 2022-03-01 DIAGNOSIS — N18.31 STAGE 3A CHRONIC KIDNEY DISEASE (HCC): ICD-10-CM

## 2022-03-01 DIAGNOSIS — E78.2 MIXED HYPERLIPIDEMIA: ICD-10-CM

## 2022-03-01 DIAGNOSIS — R10.84 GENERALIZED ABDOMINAL PAIN: ICD-10-CM

## 2022-03-01 DIAGNOSIS — F51.01 PRIMARY INSOMNIA: ICD-10-CM

## 2022-03-01 DIAGNOSIS — Z79.4 TYPE 2 DIABETES MELLITUS WITHOUT COMPLICATION, WITH LONG-TERM CURRENT USE OF INSULIN (HCC): Primary | ICD-10-CM

## 2022-03-01 DIAGNOSIS — E11.9 TYPE 2 DIABETES MELLITUS WITHOUT COMPLICATION, WITH LONG-TERM CURRENT USE OF INSULIN (HCC): Primary | ICD-10-CM

## 2022-03-01 PROCEDURE — 99214 OFFICE O/P EST MOD 30 MIN: CPT | Performed by: INTERNAL MEDICINE

## 2022-03-01 PROCEDURE — 3051F HG A1C>EQUAL 7.0%<8.0%: CPT | Performed by: INTERNAL MEDICINE

## 2022-03-01 RX ORDER — SERTRALINE HYDROCHLORIDE 100 MG/1
100 TABLET, FILM COATED ORAL DAILY
Qty: 90 TABLET | Refills: 1 | Status: SHIPPED | OUTPATIENT
Start: 2022-03-01 | End: 2022-07-19 | Stop reason: SDUPTHER

## 2022-03-01 RX ORDER — INSULIN GLARGINE 100 [IU]/ML
15 INJECTION, SOLUTION SUBCUTANEOUS NIGHTLY
Qty: 5 PEN | Refills: 2 | Status: SHIPPED | OUTPATIENT
Start: 2022-03-01 | End: 2022-07-19 | Stop reason: SDUPTHER

## 2022-03-01 ASSESSMENT — PATIENT HEALTH QUESTIONNAIRE - PHQ9
4. FEELING TIRED OR HAVING LITTLE ENERGY: 3
9. THOUGHTS THAT YOU WOULD BE BETTER OFF DEAD, OR OF HURTING YOURSELF: 0
3. TROUBLE FALLING OR STAYING ASLEEP: 3
SUM OF ALL RESPONSES TO PHQ QUESTIONS 1-9: 8
8. MOVING OR SPEAKING SO SLOWLY THAT OTHER PEOPLE COULD HAVE NOTICED. OR THE OPPOSITE, BEING SO FIGETY OR RESTLESS THAT YOU HAVE BEEN MOVING AROUND A LOT MORE THAN USUAL: 0
SUM OF ALL RESPONSES TO PHQ9 QUESTIONS 1 & 2: 0
7. TROUBLE CONCENTRATING ON THINGS, SUCH AS READING THE NEWSPAPER OR WATCHING TELEVISION: 1
2. FEELING DOWN, DEPRESSED OR HOPELESS: 0
10. IF YOU CHECKED OFF ANY PROBLEMS, HOW DIFFICULT HAVE THESE PROBLEMS MADE IT FOR YOU TO DO YOUR WORK, TAKE CARE OF THINGS AT HOME, OR GET ALONG WITH OTHER PEOPLE: 0
SUM OF ALL RESPONSES TO PHQ QUESTIONS 1-9: 8
5. POOR APPETITE OR OVEREATING: 1
6. FEELING BAD ABOUT YOURSELF - OR THAT YOU ARE A FAILURE OR HAVE LET YOURSELF OR YOUR FAMILY DOWN: 0
SUM OF ALL RESPONSES TO PHQ QUESTIONS 1-9: 8
SUM OF ALL RESPONSES TO PHQ QUESTIONS 1-9: 8
1. LITTLE INTEREST OR PLEASURE IN DOING THINGS: 0

## 2022-03-01 NOTE — ASSESSMENT & PLAN NOTE
Expect higher HgbA1c based upon home BS readings and recent dietary indiscretion. Patient counseled on lifestyle issues. Will titrate Lantus if HgbA1c > 8.0.

## 2022-03-01 NOTE — ASSESSMENT & PLAN NOTE
Improved with switch from trazodone to Remeron.   However, if am somnolence persists, he will try cutting back to 7.5 mg qhs.

## 2022-03-01 NOTE — ASSESSMENT & PLAN NOTE
Improved with discontinuation of Excedrin and Aleve and consistent use of pantoprazole- continue. Patient will call if symptoms change or worsen.

## 2022-03-02 ENCOUNTER — TELEPHONE (OUTPATIENT)
Dept: INTERNAL MEDICINE CLINIC | Age: 72
End: 2022-03-02

## 2022-03-02 PROBLEM — D50.0 IRON DEFICIENCY ANEMIA DUE TO CHRONIC BLOOD LOSS: Status: ACTIVE | Noted: 2022-03-02

## 2022-03-03 RX ORDER — PEN NEEDLE, DIABETIC 31 GX5/16"
NEEDLE, DISPOSABLE MISCELLANEOUS
Qty: 100 EACH | Refills: 11 | Status: SHIPPED | OUTPATIENT
Start: 2022-03-03

## 2022-03-03 NOTE — TELEPHONE ENCOUNTER
Patient needs to have the following medictaion refilled:      Insulin Pen Needle (B-D UF III MINI PEN NEEDLES) 31G X 5 MM MISC USE AS DIRECTED TO INJECT ONCE DAILY           [facility] [address] [place] [address] [phone] [facility] [phone]         Last appointment: 3/1/2022  Next appointment: 6/7/2022  Last refill:

## 2022-03-04 NOTE — TELEPHONE ENCOUNTER
Pharmacy would like you to change the Lantus to [facility] which is covered by insurance

## 2022-03-18 RX ORDER — CALCIUM CITRATE/VITAMIN D3 200MG-6.25
TABLET ORAL
Qty: 100 STRIP | Refills: 11 | Status: SHIPPED | OUTPATIENT
Start: 2022-03-18 | End: 2022-06-13 | Stop reason: SDUPTHER

## 2022-03-18 NOTE — TELEPHONE ENCOUNTER
Patient needs to have the following medication refilled:    TRUE METRIX BLOOD GLUCOSE TEST strip USE 1 STRIP TO TEST BLOOD SUGAR TWICE DAILY             [facility] [address] [place] [address], [address] [place] [address] [phone] - F [phone]

## 2022-04-04 RX ORDER — MIRTAZAPINE 15 MG/1
TABLET, FILM COATED ORAL
Qty: 90 TABLET | Refills: 1 | Status: SHIPPED | OUTPATIENT
Start: 2022-04-04 | End: 2022-07-19 | Stop reason: SDUPTHER

## 2022-04-20 RX ORDER — PANTOPRAZOLE SODIUM 40 MG/1
TABLET, DELAYED RELEASE ORAL
Qty: 90 TABLET | Refills: 1 | Status: SHIPPED | OUTPATIENT
Start: 2022-04-20 | End: 2022-07-19 | Stop reason: SDUPTHER

## 2022-04-20 RX ORDER — AMLODIPINE BESYLATE 10 MG/1
10 TABLET ORAL DAILY
Qty: 90 TABLET | Refills: 1 | Status: SHIPPED | OUTPATIENT
Start: 2022-04-20 | End: 2022-07-19 | Stop reason: SDUPTHER

## 2022-05-13 RX ORDER — TRAZODONE HYDROCHLORIDE 100 MG/1
TABLET ORAL
Qty: 225 TABLET | Refills: 0 | Status: SHIPPED | OUTPATIENT
Start: 2022-05-13 | End: 2022-07-19 | Stop reason: SDUPTHER

## 2022-05-13 NOTE — TELEPHONE ENCOUNTER
Per Dr. Missy Michele last note, patient is no longer on trazodone. Please call and check with him to see if he truly needs this filled.

## 2022-06-13 ENCOUNTER — TELEPHONE (OUTPATIENT)
Dept: INTERNAL MEDICINE CLINIC | Age: 72
End: 2022-06-13

## 2022-06-13 RX ORDER — CALCIUM CITRATE/VITAMIN D3 200MG-6.25
TABLET ORAL
Qty: 100 STRIP | Refills: 11 | Status: SHIPPED | OUTPATIENT
Start: 2022-06-13 | End: 2022-09-14 | Stop reason: SDUPTHER

## 2022-06-13 NOTE — TELEPHONE ENCOUNTER
Patient is requesting the following prescription(s):  Last appointment: 3/1/2022  Next appointment: 7/19/2022  Last refill: 03-    blood glucose test strips (TRUE METRIX BLOOD GLUCOSE TEST) strip USE 1 STRIP TO TEST BLOOD SUGAR TWICE DAILY       [facility] [address] [place] [address] - P [phone] Brigitte Tavarez [phone]    Patient made aware to allow 24 to 48 business hours for prescription refills. Patient can be reached @ phone # provided should there be any questions.

## 2022-06-22 RX ORDER — ROSUVASTATIN CALCIUM 20 MG/1
20 TABLET, COATED ORAL DAILY
Qty: 30 TABLET | Refills: 0 | Status: SHIPPED | OUTPATIENT
Start: 2022-06-22 | End: 2022-06-22

## 2022-06-22 RX ORDER — ROSUVASTATIN CALCIUM 20 MG/1
20 TABLET, COATED ORAL DAILY
Qty: 90 TABLET | Refills: 0 | Status: SHIPPED | OUTPATIENT
Start: 2022-06-22 | End: 2022-09-20

## 2022-06-22 NOTE — TELEPHONE ENCOUNTER
Patient needs to have the following medictaion refilled:        rosuvastatin (CRESTOR) 20 MG tablet TAKE 1 TABLET BY MOUTH DAILY         [facility] [address] [place] [address], [address] [place] [address] RD - P [phone] Roger Cheek [phone]         Last appointment: 3/1/2022  Next appointment: 7/19/2022  Last refill: 2/7/2022

## 2022-07-06 DIAGNOSIS — N18.30 CKD (CHRONIC KIDNEY DISEASE), STAGE III (HCC): ICD-10-CM

## 2022-07-06 RX ORDER — DOXAZOSIN MESYLATE 4 MG/1
TABLET ORAL
Qty: 90 TABLET | Refills: 1 | Status: SHIPPED | OUTPATIENT
Start: 2022-07-06

## 2022-07-06 RX ORDER — FENOFIBRATE 160 MG/1
TABLET ORAL
Qty: 90 TABLET | Refills: 1 | Status: SHIPPED | OUTPATIENT
Start: 2022-07-06

## 2022-07-06 RX ORDER — GLIMEPIRIDE 4 MG/1
TABLET ORAL
Qty: 180 TABLET | Refills: 1 | Status: SHIPPED | OUTPATIENT
Start: 2022-07-06 | End: 2022-09-30

## 2022-07-06 NOTE — TELEPHONE ENCOUNTER
Patient is requesting  \"90 day\" supplies on Fenofibrate and Glimepiride sent to [facility] at [place] [facility] [place]. Last re-fill:  1/3/2022. Last visit:  3/1/2022. Next visit:  7/19/2022. Please call patient at number provided with any questions/concerns/updates.

## 2022-07-14 NOTE — PROGRESS NOTES
Date of Visit:  2022    CC: Yaneth Nolasco (: 1950) is a 81 y.o. male, established patient, here for evaluation/re-evaluation of the following medical concerns:    ASSESSMENT/PLAN:  Type 2 diabetes mellitus without complication, with long-term current use of insulin ([facility]  Assessment & Plan:  Expect higher HgbA1c based upon home BS readings and recent dietary indiscretion. Patient counseled on lifestyle issues. Will titrate Lantus if HgbA1c > 8.0, but may require mealtime insulin to avoid hypoglycemia. Orders:  -     Hemoglobin A1C; Future  Essential hypertension  Assessment & Plan:  Well-controlled. Continue current antihypertensive regimen. Orders:  -     Comprehensive Metabolic Panel, Fasting; Future  Stage 3a chronic kidney disease ([facility]  Assessment & Plan:  Stable per recent labs- will continue to monitor. He will continue to avoid NSAIDs. Mixed hyperlipidemia  Assessment & Plan: Tolerating current dose(s) of Crestor and fenofibrate- continue. Orders:  -     Lipid, Fasting; Future  Iron deficiency anemia due to chronic blood loss  Assessment & Plan:  Abdominal pain resolved with discontinuation of Excedrin and Aleve and consistent use of PPI- continue. However, if there is persistent evidence of iron deficiency, will refer back to GI. Orders:  -     CBC with Auto Differential; Future  -     Ferritin; Future  -     Iron and TIBC; Future  -     Soluble transferrin receptor; Future  Major depressive disorder with single episode, in full remission [facility])  Assessment & Plan:  Well-controlled on current dose of Zoloft- continue. Primary insomnia  Assessment & Plan:  Doing well on current dose of Remeron- continue. Screening for prostate cancer  -     PSA Screening; Future     Return in about 4 months (around 2022) for 30 MIN F/U.      Vitals:    22 1526   BP: 138/80   Site: Right Upper Arm   Position: Sitting   Cuff Size: Large Adult   Pulse: 76   SpO2: 99%   Weight: 182 lb 9.6 oz (82.8 kg)   Height: 6' 1\" (1.854 m)      Estimated body mass index is 24.09 kg/m² as calculated from the following:    Height as of this encounter: 6' 1\" (1.854 m). Weight as of this encounter: 182 lb 9.6 oz (82.8 kg). Wt Readings from Last 3 Encounters:   07/19/22 182 lb 9.6 oz (82.8 kg)   03/01/22 177 lb 3.2 oz (80.4 kg)   10/29/21 178 lb (80.7 kg)     BP Readings from Last 3 Encounters:   07/19/22 138/80   03/01/22 122/66   10/29/21 138/78     HPI  Diabetes Mellitus Type 2: Current symptoms/problems include none. Using Lantus daily- 5-10 units. Taking 8 mg Amaryl consistently. Last HgbA1c 6.6- 10/29/21    Home blood sugar records: fasting range:  in the morning, 122-257 before bedtime. Stopping drinking regular soda at work., but still eating cookies and candy regularly. Weight up 5 pounds since last visit. Any episodes of hypoglycemia? No  Daily Aspirin? Yes    Lab Results   Component Value Date/Time    GLUF 165 03/01/2022 09:27 AM    GLUF 182 10/29/2021 09:19 AM    LABA1C 7.8 03/01/2022 09:27 AM    LABA1C 6.6 10/29/2021 09:19 AM         Hypertension/CKD:  Home blood pressure monitoring: No.  He is adherent to a low sodium diet. Patient denies chest pain, worsening AGARWAL, headache, lightheadedness and palpitations. Antihypertensive medication side effects: no medication side effects noted. Use of agents associated with hypertension: none. GFR 46- 3/1/22. Hyperlipidemia:  No new myalgias or GI upset on Crestor and fenofibrate (Tricor, Trilipix).       Lab Results   Component Value Date/Time    LDLCALC 62 03/01/2022 09:27 AM    LDLCALC 60 07/22/2021 10:39 AM    LDLDIRECT 116 11/08/2018 11:24 AM    LDLDIRECT 92 03/10/2016 09:02 AM    HDL 30 03/01/2022 09:27 AM    HDL 33 07/22/2021 10:39 AM    HDL 30 05/08/2012 01:59 PM    HDL 35 01/31/2012 01:50 AM    TRIGLYCFAST 169 03/01/2022 09:27 AM    TRIGLYCFAST 148 07/22/2021 10:39 AM    ALT 9 03/01/2022 09:27 AM    ALT 9 10/29/2021 09:19 AM AST 14 03/01/2022 09:27 AM    AST 14 10/29/2021 09:19 AM         Mood Disorder:  Patient presents for follow-up of depression and insomnia. Current complaints include: irritability. He denies anhedonia, depressed mood, tearfulness, feelings of hopelessness, irritability, excessive worry, restlessness and suicidal thoughts or behavior. Symptoms/signs of marlo: none. External stressors: work issues. Current treatment includes: Zoloft- 100 mg qd, Remeron 22.5 mg qhs, trazodone 50 mg qhs. Medication side effects: am somnolence, but doesn't last very long. GI/Anemia:  EGD 8/3/21- duodenal stricture dilated, healing gastric ulcer visualized. Biopsies negative for celiac disease and H. Pylori. Excedrin discontinued- had been taking daily. Has been taking Aleve 4/day since stopped Excedrin- discontinued. No GI symptoms currently- taking pantoprazole consistently. ROS  As documented above    Physical Exam  Constitutional:       General: He is not in acute distress. Appearance: He is well-developed. Eyes:      Conjunctiva/sclera: Conjunctivae normal.   Neck:      Thyroid: No thyroid mass or thyromegaly. Cardiovascular:      Rate and Rhythm: Normal rate and regular rhythm. Heart sounds: Normal heart sounds. No murmur heard. No friction rub. No gallop. Pulmonary:      Effort: Pulmonary effort is normal. No respiratory distress. Breath sounds: Normal breath sounds. No wheezing, rhonchi or rales. Musculoskeletal:      Right lower leg: No edema. Left lower leg: No edema. Lymphadenopathy:      Cervical: No cervical adenopathy. Skin:     General: Skin is warm and dry. Findings: No erythema or rash. Neurological:      Mental Status: He is alert and oriented to person, place, and time. Psychiatric:         Speech: Speech normal.         Behavior: Behavior normal.         Thought Content:  Thought content normal.         Judgment: Judgment normal.            --Raad Zuluaga MD on 7/19/2022 at 7:23 PM    An electronic signature was used to authenticate this note.

## 2022-07-19 ENCOUNTER — OFFICE VISIT (OUTPATIENT)
Dept: INTERNAL MEDICINE CLINIC | Age: 72
End: 2022-07-19
Payer: COMMERCIAL

## 2022-07-19 VITALS
HEIGHT: 73 IN | HEART RATE: 76 BPM | WEIGHT: 182.6 LBS | DIASTOLIC BLOOD PRESSURE: 80 MMHG | OXYGEN SATURATION: 99 % | SYSTOLIC BLOOD PRESSURE: 138 MMHG | BODY MASS INDEX: 24.2 KG/M2

## 2022-07-19 DIAGNOSIS — E78.2 MIXED HYPERLIPIDEMIA: ICD-10-CM

## 2022-07-19 DIAGNOSIS — Z12.5 SCREENING FOR PROSTATE CANCER: ICD-10-CM

## 2022-07-19 DIAGNOSIS — E11.9 TYPE 2 DIABETES MELLITUS WITHOUT COMPLICATION, WITH LONG-TERM CURRENT USE OF INSULIN (HCC): Primary | ICD-10-CM

## 2022-07-19 DIAGNOSIS — F51.01 PRIMARY INSOMNIA: ICD-10-CM

## 2022-07-19 DIAGNOSIS — N18.31 STAGE 3A CHRONIC KIDNEY DISEASE (HCC): ICD-10-CM

## 2022-07-19 DIAGNOSIS — F32.5 MAJOR DEPRESSIVE DISORDER WITH SINGLE EPISODE, IN FULL REMISSION (HCC): ICD-10-CM

## 2022-07-19 DIAGNOSIS — I10 ESSENTIAL HYPERTENSION: Chronic | ICD-10-CM

## 2022-07-19 DIAGNOSIS — Z79.4 TYPE 2 DIABETES MELLITUS WITHOUT COMPLICATION, WITH LONG-TERM CURRENT USE OF INSULIN (HCC): Primary | ICD-10-CM

## 2022-07-19 DIAGNOSIS — D50.0 IRON DEFICIENCY ANEMIA DUE TO CHRONIC BLOOD LOSS: ICD-10-CM

## 2022-07-19 PROBLEM — R10.84 GENERALIZED ABDOMINAL PAIN: Status: RESOLVED | Noted: 2021-07-01 | Resolved: 2022-07-19

## 2022-07-19 PROCEDURE — 1123F ACP DISCUSS/DSCN MKR DOCD: CPT | Performed by: INTERNAL MEDICINE

## 2022-07-19 PROCEDURE — 99214 OFFICE O/P EST MOD 30 MIN: CPT | Performed by: INTERNAL MEDICINE

## 2022-07-19 PROCEDURE — 3051F HG A1C>EQUAL 7.0%<8.0%: CPT | Performed by: INTERNAL MEDICINE

## 2022-07-19 RX ORDER — AMLODIPINE BESYLATE 10 MG/1
10 TABLET ORAL DAILY
Qty: 90 TABLET | Refills: 1 | Status: ON HOLD | OUTPATIENT
Start: 2022-07-19 | End: 2022-10-28 | Stop reason: HOSPADM

## 2022-07-19 RX ORDER — TRAZODONE HYDROCHLORIDE 50 MG/1
50 TABLET ORAL NIGHTLY
Qty: 90 TABLET | Refills: 1 | Status: SHIPPED | OUTPATIENT
Start: 2022-07-19

## 2022-07-19 RX ORDER — PANTOPRAZOLE SODIUM 40 MG/1
TABLET, DELAYED RELEASE ORAL
Qty: 90 TABLET | Refills: 1 | Status: SHIPPED | OUTPATIENT
Start: 2022-07-19

## 2022-07-19 RX ORDER — MIRTAZAPINE 15 MG/1
22.5 TABLET, FILM COATED ORAL NIGHTLY
Qty: 135 TABLET | Refills: 1 | Status: SHIPPED | OUTPATIENT
Start: 2022-07-19

## 2022-07-19 RX ORDER — INSULIN GLARGINE 100 [IU]/ML
15 INJECTION, SOLUTION SUBCUTANEOUS NIGHTLY
Qty: 5 PEN | Refills: 5 | Status: SHIPPED | OUTPATIENT
Start: 2022-07-19

## 2022-07-19 RX ORDER — LOSARTAN POTASSIUM 100 MG/1
100 TABLET ORAL DAILY
Qty: 90 TABLET | Refills: 1 | Status: ON HOLD | OUTPATIENT
Start: 2022-07-19 | End: 2022-10-28 | Stop reason: HOSPADM

## 2022-07-19 RX ORDER — SERTRALINE HYDROCHLORIDE 100 MG/1
100 TABLET, FILM COATED ORAL DAILY
Qty: 90 TABLET | Refills: 1 | Status: SHIPPED | OUTPATIENT
Start: 2022-07-19

## 2022-07-19 NOTE — ASSESSMENT & PLAN NOTE
Abdominal pain resolved with discontinuation of Excedrin and Aleve and consistent use of PPI- continue. However, if there is persistent evidence of iron deficiency, will refer back to GI.

## 2022-07-19 NOTE — ASSESSMENT & PLAN NOTE
Expect higher HgbA1c based upon home BS readings and recent dietary indiscretion. Patient counseled on lifestyle issues. Will titrate Lantus if HgbA1c > 8.0, but may require mealtime insulin to avoid hypoglycemia.

## 2022-07-21 NOTE — TELEPHONE ENCOUNTER
Left voicemail for patient to call back [place] [facility] Lactation Text: This medication is not safe during pregnancy. It is unknown if this medication is excreted in breast milk.

## 2022-08-29 ENCOUNTER — TELEPHONE (OUTPATIENT)
Dept: INTERNAL MEDICINE CLINIC | Age: 72
End: 2022-08-29

## 2022-08-29 NOTE — TELEPHONE ENCOUNTER
Spoke with pharmacy they had a refill on file and will get this filled immediately.    Patient advised and had no further questions

## 2022-09-09 ENCOUNTER — TELEPHONE (OUTPATIENT)
Dept: CARDIOLOGY CLINIC | Age: 72
End: 2022-09-09

## 2022-09-09 NOTE — TELEPHONE ENCOUNTER
Called pt about the message below and he stated that he don't have any other symptoms and that this has been going on for a while.

## 2022-09-09 NOTE — TELEPHONE ENCOUNTER
Hx of PCI of LCx with IWMI,  of RCA treated medically. Last office visit with Dr. Kim Rebollar - 4/6/18  Last office visit with NP Mark Chaudhry) 10/22/19      He noticed shortness of breath for past 4-5 months, exertional but notices less exertion will provoke shortness of breath (climbed ladder with chain saw in arms to cut down branches from tree, but had to rest before he started to cut branches. His level of activity is usually higher than most people his age. He attributed this to his hx of COPD. His shortness of breath is often associated with chest burning (mid-sternal). Both shortness of breath and chest burning are resolved with 5 minutes of rest.  If he walks longer distances on level ground he may get these same symptoms about 50% of the time. He tried NTG years ago, but it caused a severe headache and has not used nitroglycerin since. Unfortunately he can't remember his past anginal symptoms. Night sweats are not associated with shortness of breath or chest discomfort  Twinges are located between left axilla and nipple on left side of chest, no associated shortness of breath, usually occurs at rest (watching TV), subsides in 3-4 minutes.

## 2022-09-09 NOTE — TELEPHONE ENCOUNTER
Pt states he has been having night sweats, twinges and sob for the past 4-5 weeks. Please call pt to advise.

## 2022-09-09 NOTE — TELEPHONE ENCOUNTER
Scheduled with Francisca Link NP on 9/14/22 at 1:30 pm.  Instructed to go to the ER if symptoms worsen. Patient voiced understanding of all information discussed.

## 2022-09-14 ENCOUNTER — OFFICE VISIT (OUTPATIENT)
Dept: CARDIOLOGY CLINIC | Age: 72
End: 2022-09-14
Payer: COMMERCIAL

## 2022-09-14 VITALS
BODY MASS INDEX: 23.57 KG/M2 | WEIGHT: 174 LBS | HEIGHT: 72 IN | OXYGEN SATURATION: 97 % | SYSTOLIC BLOOD PRESSURE: 124 MMHG | HEART RATE: 74 BPM | DIASTOLIC BLOOD PRESSURE: 64 MMHG

## 2022-09-14 DIAGNOSIS — I10 ESSENTIAL HYPERTENSION: ICD-10-CM

## 2022-09-14 DIAGNOSIS — E78.2 MIXED HYPERLIPIDEMIA: ICD-10-CM

## 2022-09-14 DIAGNOSIS — I25.119 CORONARY ARTERY DISEASE INVOLVING NATIVE CORONARY ARTERY OF NATIVE HEART WITH ANGINA PECTORIS (HCC): Primary | ICD-10-CM

## 2022-09-14 DIAGNOSIS — R06.02 SHORTNESS OF BREATH: ICD-10-CM

## 2022-09-14 PROCEDURE — 1123F ACP DISCUSS/DSCN MKR DOCD: CPT | Performed by: NURSE PRACTITIONER

## 2022-09-14 PROCEDURE — 99214 OFFICE O/P EST MOD 30 MIN: CPT | Performed by: NURSE PRACTITIONER

## 2022-09-14 PROCEDURE — 93000 ELECTROCARDIOGRAM COMPLETE: CPT | Performed by: NURSE PRACTITIONER

## 2022-09-14 RX ORDER — MELOXICAM 15 MG/1
TABLET ORAL
COMMUNITY
Start: 2022-08-18

## 2022-09-14 RX ORDER — CALCIUM CITRATE/VITAMIN D3 200MG-6.25
TABLET ORAL
Qty: 100 STRIP | Refills: 11 | Status: SHIPPED | OUTPATIENT
Start: 2022-09-14

## 2022-09-14 NOTE — PROGRESS NOTES
[facility]     Outpatient Follow Up Note    CHIEF COMPLAINT / HPI:  Follow Up secondary to chest pain     Subjective:   Jessika Holden is 81 y.o. male who presents today with a history of CAD, CKD, HTN, HLD, COPD (follows with Dr. Manny Canseco). Today, Mr Laureen Cosme says he has been short of breath for the past 4-5 months. He has also had twinges in his chest and increased fatigue. Twinges do not worsen with activity and last about 3 minutes. Today, he has been experiencing left sided chest pain. Denies palpitations. Mr Laureen Cosme stays active doing yard work and tolerates the activity. Says he cannot remember his anginal equivalent. With regard to medication therapy the patient has been compliant with prescribed regimen. They have tolerated therapy to date. Past Medical History:   Diagnosis Date    Chronic renal insufficiency     Due to chronic nephrolithiasis    Colloid cyst of third ventricle (HCC)     Coronary artery disease     DDD (degenerative disc disease), lumbar 2006    Disc bulge and facet arthropathy at L3-L4, L5-S1    Diabetes mellitus, type 2 (HCC)     Diverticulitis of colon with perforation     Emergency colostomy    ED (erectile dysfunction)     Hyperlipidemia     Hypertension     Hypertensive retinopathy of both eyes 2013    Nephrolithiasis      Social History:    Social History     Tobacco Use   Smoking Status Former    Packs/day: 1.00    Years: 20.00    Pack years: 20.00    Types: Cigarettes    Quit date: 3/3/1992    Years since quittin.5   Smokeless Tobacco Never     Current Medications:  Current Outpatient Medications   Medication Sig Dispense Refill    mirtazapine (REMERON) 15 MG tablet Take 1.5 tablets by mouth nightly 135 tablet 1    sertraline (ZOLOFT) 100 MG tablet Take 1 tablet by mouth in the morning. 90 tablet 1    traZODone (DESYREL) 50 MG tablet Take 1 tablet by mouth nightly 90 tablet 1    losartan (COZAAR) 100 MG tablet Take 1 tablet by mouth in the morning.  80 tablet 1    amLODIPine (NORVASC) 10 MG tablet Take 1 tablet by mouth in the morning. 90 tablet 1    pantoprazole (PROTONIX) 40 MG tablet TAKE 1 TABLET BY MOUTH EVERY MORNING BEFORE BREAKFAST 90 tablet 1    insulin glargine (LANTUS SOLOSTAR) 100 UNIT/ML injection pen Inject 15 Units into the skin nightly 5 pen 5    doxazosin (CARDURA) 4 MG tablet TAKE 1 TABLET BY MOUTH EVERY NIGHT 90 tablet 1    fenofibrate (TRIGLIDE) 160 MG tablet Take 1 tablet by mouth daily 90 tablet 1    glimepiride (AMARYL) 4 MG tablet TAKE 2 TABLETS BY MOUTH EVERY MORNING 180 tablet 1    rosuvastatin (CRESTOR) 20 MG tablet TAKE 1 TABLET BY MOUTH DAILY 90 tablet 0    TRELEGY ELLIPTA 100-62.5-25 MCG/INH AEPB INHALE 1 PUFF INTO THE LUNGS DAILY 180 each 3    albuterol sulfate HFA (VENTOLIN HFA) 108 (90 Base) MCG/ACT inhaler 2 Puff every 4-6 hours as needed for wheezing or shortness of breath 1 Inhaler 3    aspirin 325 MG tablet Take 325 mg by mouth daily. fish oil-omega-3 fatty acids 1000 MG capsule Take 2 g by mouth daily. meloxicam (MOBIC) 15 MG tablet TAKE 1 TABLET BY MOUTH EVERY DAY      blood glucose test strips (TRUE METRIX BLOOD GLUCOSE TEST) strip USE 1 STRIP TO TEST BLOOD SUGAR TWICE DAILY 100 strip 11    Insulin Pen Needle (B-D UF III MINI PEN NEEDLES) 31G X 5 MM MISC USE AS DIRECTED TO INJECT ONCE DAILY 100 each 111 [facility] Patient test twice daily 150 each 5    Blood Glucose Monitoring Suppl (PRECISION XTRA) w/Device KIT As directed 1 kit 0    Blood Glucose Monitoring Suppl (ONE TOUCH ULTRA 2) W/DEVICE KIT 1 kit by Does not apply route daily as needed. 1 kit 0     No current facility-administered medications for this visit. REVIEW OF SYSTEMS:    CONSTITUTIONAL: No major weight gain or loss, weakness, night sweats or fever. + fatigue   HEENT: No new vision difficulties or ringing in the ears.   RESPIRATORY: No new PND, orthopnea or cough. + SOB  CARDIOVASCULAR: See HPI  GI: No nausea, vomiting, diarrhea, constipation, abdominal pain or changes in bowel habits. : No urinary frequency, urgency, incontinence hematuria or dysuria. SKIN: No cyanosis or skin lesions. MUSCULOSKELETAL: No new muscle or joint pain. NEUROLOGICAL: No syncope or TIA-like symptoms. PSYCHIATRIC: No anxiety, pain, insomnia or depression    Objective:   PHYSICAL EXAM:      Wt Readings from Last 3 Encounters:   09/14/22 174 lb (78.9 kg)   07/19/22 182 lb 9.6 oz (82.8 kg)   03/01/22 177 lb 3.2 oz (80.4 kg)          VITALS:  /64 (Site: Left Upper Arm, Position: Sitting, Cuff Size: Medium Adult)   Pulse 74   Ht 6' (1.829 m)   Wt 174 lb (78.9 kg)   SpO2 97%   BMI 23.60 kg/m²   CONSTITUTIONAL: Cooperative, no apparent distress, and appears well nourished / developed  NEUROLOGIC:  Awake and orientated to person, place and time. PSYCH: Calm affect. SKIN: Warm and dry. HEENT: Sclera non-icteric, normocephalic, neck supple, no elevation of JVP, normal carotid pulses with no bruits and thyroid normal size. LUNGS:  No increased work of breathing and clear to auscultation, no crackles or wheezing  CARDIOVASCULAR:  Regular rate and rhythm with no murmurs, gallops, rubs, or abnormal heart sounds, normal PMI. The apical impulses not displaced  Heart tones are crisp and normal  Cervical veins are not engorged  The carotid upstroke is normal in amplitude and contour without delay or bruit  JVP is not elevated  ABDOMEN:  Normal bowel sounds, non-distended and non-tender to palpation  EXT: No edema, no calf tenderness. Pulses are present bilaterally.     DATA:    Lab Results   Component Value Date    ALT 10 07/28/2022    AST 16 07/28/2022    ALKPHOS 62 07/28/2022    BILITOT 0.5 07/28/2022     Lab Results   Component Value Date    CREATININE 1.4 (H) 07/28/2022    BUN 17 07/28/2022     07/28/2022    K 4.1 07/28/2022     07/28/2022    CO2 22 07/28/2022     Lab Results   Component Value Date    TSH 1.56 09/02/2016     Lab Results Component Value Date    WBC 9.8 2022    HGB 12.8 (L) 2022    HCT 41.4 2022    MCV 99.6 2022     2022     No components found for: CHLPL  Lab Results   Component Value Date    TRIG 264 (H) 2017    TRIG 244 (H) 2016    TRIG 293 (H) 2016     Lab Results   Component Value Date    HDL 28 (L) 2022    HDL 30 (L) 2022    HDL 33 (L) 2021     Lab Results   Component Value Date    LDLCALC 60 2022    LDLCALC 62 2022    LDLCALC 60 2021     Lab Results   Component Value Date    LABVLDL 40 2022    LABVLDL 34 2022    LABVLDL 30 2021          No results found for: BNP  Radiology Review:  Pertinent images / reports were reviewed as a part of this visit and reveals the following:    Last Echo: 19   Summary   Normal left ventricle size, wall thickness, and systolic function with an   estimated ejection fraction of 60%. No regional wall motion abnormalities are seen. Normal diastolic function. Thickened mitral valve without evidence of stenosis. There is moderate   mitral regurgitation noted. There is mild tricuspid regurgitation with a PASP estimation of 45 mmHg. Mild pulmonary hypertension. Last Stress Test: 2019 ([place])     No significant abnormality     Last Angiogram: 2015  EF 60%, PCI cx with taxus. INferior wall MI with  RCA medical treatment    Carotid Duplex 10/22/2019   Summary        -The right internal carotid artery appears to have a <50% diameter reducing    stenosis based on velocity criteria. -The left internal carotid artery appears to have a <50% diameter reducing    stenosis based on velocity criteria. Last EC22  Sinus  Rhythm   -  Nonspecific T-abnormality. Assessment:      Diagnosis Orders   1.  Coronary artery disease involving native coronary artery of native heart with angina pectoris (HCC) /   Chest pain   ~ c/o twinges in chest and worsening SOB  ~ GXT 5/30/19 no significant abnormality   ~ GXT 8/4/2017 fixed defect s/w diaphragmatic artifact. No ischemia   ~ [facility] ([facility]) 4/2015 PCI of Cx  ~ asa / statin  EKG 12 lead  NM MYOCARDIAL SPECT REST EXERCISE OR RX      2. Shortness of breath   ~ progressively worsening over 4-5 months  ~ hx of COPD  ~ lungs clear, no weight gain   Echo 2D w doppler w color complete      3. Essential hypertension   ~ controlled; 124/64 in office today        4. Mixed hyperlipidemia   ~ rosuvastatin 20   ~ lipids 7/8/22  HDL 28 LDL 60 Trig 200       5. COPD       ~ follows with Dr Steve Bob     6. DM       ~ managed by PCP    7. Carotid stenosis       ~ Duplex 10/2019 <50% BICA    8. CKD       ~ following with Dr Iqra Wilkins     I had the opportunity to review the clinical symptoms and presentation of Noah Arnold. Plan:     Check echo and stress test due to symptoms  Schedule follow up with Dr Francesca Aguilar in 6 months (last seen in 2018), or sooner test dependent   Consider repeating carotids at follow up (pt does not wish to schedule/order now)    Overall the patient is stable from CV standpoint    I have addresed the patient's cardiac risk factors and adjusted pharmacologic treatment as needed. In addition, I have reinforced the need for patient directed risk factor modification. Further evaluation will be based upon the patient's clinical course and testing results. All questions and concerns were addressed to the patient    The patient is not currently smoking. The risks related to smoking were reviewed with the patient. Recommend maintaining a smoke-free lifestyle. Patient is not on a beta-blocker  Patient is on an ace-i/ARB  Patient is on a statin    Antiplatelet therapy has been recommended / prescribed for this patient. Education conducted on adverse reactions including bleeding was discussed. The patient verbalizes understanding not to stop medications without discussing with us.     Discussed exercise: 30-60 minutes 7 days/week  Discussed Low saturated fat/KANE diet. Thank you for allowing to us to participate [facility] [place].     Electronically signed by ANTHONY Akins CNP on 9/14/2022 at 1:59 PM     Documentation of today's visit sent to PCP

## 2022-09-14 NOTE — TELEPHONE ENCOUNTER
Patient is requesting a refill on the following prescriptions:   blood glucose test strips (TRUE METRIX BLOOD GLUCOSE TEST) strip  Last appointment: 7/19/2022  Next appointment: 11/22/2022  Last refill: 006/13/22      Please call in to:  [address] [phone] [place] [phone]   [facility], [address] [place] [id number]   Phone:  [phone]  Fax:  [phone]    Please contact patient with any questions.

## 2022-09-20 RX ORDER — ROSUVASTATIN CALCIUM 20 MG/1
20 TABLET, COATED ORAL DAILY
Qty: 90 TABLET | Refills: 1 | Status: ON HOLD | OUTPATIENT
Start: 2022-09-20 | End: 2022-10-28 | Stop reason: SDUPTHER

## 2022-09-23 ENCOUNTER — HOSPITAL ENCOUNTER (OUTPATIENT)
Dept: NON INVASIVE DIAGNOSTICS | Age: 72
Discharge: HOME | End: 2022-09-23
Payer: COMMERCIAL

## 2022-09-23 ENCOUNTER — TELEPHONE (OUTPATIENT)
Dept: CARDIOLOGY CLINIC | Age: 72
End: 2022-09-23

## 2022-09-23 DIAGNOSIS — R94.39 ABNORMAL CARDIOVASCULAR STRESS TEST: ICD-10-CM

## 2022-09-23 DIAGNOSIS — R07.9 CHEST PAIN, UNSPECIFIED TYPE: ICD-10-CM

## 2022-09-23 DIAGNOSIS — I25.10 CORONARY ARTERY DISEASE INVOLVING NATIVE CORONARY ARTERY OF NATIVE HEART WITHOUT ANGINA PECTORIS: Primary | ICD-10-CM

## 2022-09-23 DIAGNOSIS — R06.02 SHORTNESS OF BREATH: ICD-10-CM

## 2022-09-23 LAB
LV EF: 61 %
LVEF MODALITY: NORMAL

## 2022-09-23 PROCEDURE — 3430000000 HC RX DIAGNOSTIC RADIOPHARMACEUTICAL: Performed by: NURSE PRACTITIONER

## 2022-09-23 PROCEDURE — A9502 TC99M TETROFOSMIN: HCPCS | Performed by: NURSE PRACTITIONER

## 2022-09-23 PROCEDURE — 93017 CV STRESS TEST TRACING ONLY: CPT | Performed by: INTERNAL MEDICINE

## 2022-09-23 PROCEDURE — 6360000002 HC RX W HCPCS: Performed by: NURSE PRACTITIONER

## 2022-09-23 PROCEDURE — 78452 HT MUSCLE IMAGE SPECT MULT: CPT

## 2022-09-23 RX ADMIN — TETROFOSMIN 30 MILLICURIE: 1.38 INJECTION, POWDER, LYOPHILIZED, FOR SOLUTION INTRAVENOUS at 09:17

## 2022-09-23 RX ADMIN — TETROFOSMIN 10 MILLICURIE: 1.38 INJECTION, POWDER, LYOPHILIZED, FOR SOLUTION INTRAVENOUS at 07:42

## 2022-09-23 RX ADMIN — REGADENOSON 0.4 MG: 0.08 INJECTION, SOLUTION INTRAVENOUS at 09:16

## 2022-09-23 NOTE — PROGRESS NOTES
Instructed on walking Lexiscan Stress Test Procedure including possible side effects/ adverse reactions. Patient verbalizes  understanding and denies having any questions. See [address] [place] [address] Rd Cardiology.   Brynn Hurt RN

## 2022-09-23 NOTE — TELEPHONE ENCOUNTER
Per DCE set up for [facility] for abnormal stress test and chest pain    Brionna Rob is aware of recommendations and in agreement

## 2022-09-26 NOTE — TELEPHONE ENCOUNTER
Spoke with Renee Ziegler, he actually needs to move back a week due to wife having a vacation trip that has been planned for over a year. Rescheduled to 10-19-22/7:30-9:00 am w/DCE.

## 2022-09-30 RX ORDER — GLIMEPIRIDE 4 MG/1
TABLET ORAL
Qty: 180 TABLET | Refills: 1 | Status: SHIPPED | OUTPATIENT
Start: 2022-09-30

## 2022-10-03 ENCOUNTER — TELEPHONE (OUTPATIENT)
Dept: CARDIOLOGY CLINIC | Age: 72
End: 2022-10-03

## 2022-10-03 NOTE — TELEPHONE ENCOUNTER
Pt called asking if he should still have his echo on 10/5 with having his cath beng done on 10/19  Please advise   Thank you

## 2022-10-04 NOTE — TELEPHONE ENCOUNTER
Called and spoke to patient. As patient had abnormal GXT and LHC is planned, we will cancel echo at this time. If SOB persists after LHC, will consider repeating echo at that time.

## 2022-10-10 ENCOUNTER — OFFICE VISIT (OUTPATIENT)
Dept: PULMONOLOGY | Age: 72
End: 2022-10-10
Payer: MEDICARE

## 2022-10-10 VITALS — HEART RATE: 85 BPM | OXYGEN SATURATION: 95 %

## 2022-10-10 DIAGNOSIS — R91.1 PULMONARY NODULE: ICD-10-CM

## 2022-10-10 DIAGNOSIS — I25.10 CORONARY ARTERY DISEASE INVOLVING NATIVE CORONARY ARTERY OF NATIVE HEART WITHOUT ANGINA PECTORIS: Chronic | ICD-10-CM

## 2022-10-10 DIAGNOSIS — J44.9 COPD, MODERATE (HCC): ICD-10-CM

## 2022-10-10 DIAGNOSIS — J43.2 CENTRILOBULAR EMPHYSEMA (HCC): Primary | ICD-10-CM

## 2022-10-10 PROCEDURE — G8420 CALC BMI NORM PARAMETERS: HCPCS | Performed by: INTERNAL MEDICINE

## 2022-10-10 PROCEDURE — 99214 OFFICE O/P EST MOD 30 MIN: CPT | Performed by: INTERNAL MEDICINE

## 2022-10-10 PROCEDURE — G8484 FLU IMMUNIZE NO ADMIN: HCPCS | Performed by: INTERNAL MEDICINE

## 2022-10-10 PROCEDURE — G8427 DOCREV CUR MEDS BY ELIG CLIN: HCPCS | Performed by: INTERNAL MEDICINE

## 2022-10-10 PROCEDURE — 1123F ACP DISCUSS/DSCN MKR DOCD: CPT | Performed by: INTERNAL MEDICINE

## 2022-10-10 PROCEDURE — 1036F TOBACCO NON-USER: CPT | Performed by: INTERNAL MEDICINE

## 2022-10-10 PROCEDURE — 3023F SPIROM DOC REV: CPT | Performed by: INTERNAL MEDICINE

## 2022-10-10 PROCEDURE — 3017F COLORECTAL CA SCREEN DOC REV: CPT | Performed by: INTERNAL MEDICINE

## 2022-10-10 NOTE — PROGRESS NOTES
[facility] [id number]  Progress Note  Cheli Pappas MD       Steven Samson   YOB: 1950    Date of Visit:  10/10/2022    Assessment/Plan:  1. SOB  Worse  May be CAD related    2. COPD, moderate ([address]  Stable  PFT 8/19:  Spirometry:  Flow volume loops were normal. The FEV-1/FVC ratio was normal.   The FEV-1 was 2.79 liters (77% of predicted), which was   moderately decreased. The FVC was 3.72 liters (76% of predicted),   which was decreased. Response to inhaled bronchodilators   (albuterol) was not significant. Lung volumes:  Lung volumes were tested by plethysmography. The total lung   capacity was 5.82 liters (83% of predicted), which was normal.   The residual volume was 1.86 liters (71% of predicted), which was   decreased. The ratio of residual volume to total lung capacity   (RV/TLC) was 32, which was normal.     Diffusion capacity was found to be mildly decreased. Interpretation:  Overall normal PFT with only mild decrease in DLCO. Clinical   correlation is recommended. Pulmonary function testing actually is improved. Modified barium swallow was also pretty good. Discussed techniques to protect against future aspiration    Medication Management:  Trelegy  Albuterol    3. Centrilobular emphysema (HCC)/PN  Stable  CT imaging does reveal evidence of mild centrilobular emphysema. Last CT showed \"inflammatory nodules\"  Repeat CT chest    4. CAD  Unstable  Failed GXT  Having Cath and probably will have stenting. Scheduled for next week. FOLLOW UP: 12 month      Chief Complaint   Patient presents with    Shortness of Breath     1 year f.u. Getting Cardiac Cath done next week       HPI  The patient presents with a chief complaint of moderate shortness of breath related to Moderate COPD of many years duration. He has mild associated cough. Exertion is a modifying factor. Cold weather is a factor. He has \"blocked artery\" and is going for a cath.  He has a stent already. His breathing has been worse. He failed his GXT. Review of Systems  As documented in HPI      Physical Exam:  Well developed, well nourished  Alert and oriented  Sclera is clear  No cervical adenopathy  No JVD. Chest examination is clear. Cardiac examination reveals regular rate and rhythm without murmur, gallop or rub. The abdomen is soft, nontender and nondistended. There is no clubbing, cyanosis or edema of the extremities. There is no obvious skin rash. No focal neuro deficicts  Normal mood and affect    Allergies   Allergen Reactions    Dilaudid [Hydromorphone Hcl] Other (See Comments)     Mental status changes    Nubain [Nalbuphine Hcl] Rash     Prior to Visit Medications    Medication Sig Taking? Authorizing Provider   glimepiride (AMARYL) 4 MG tablet TAKE 2 TABLETS BY MOUTH EVERY MORNING  Yareli Carcamo MD   rosuvastatin (CRESTOR) 20 MG tablet TAKE 1 TABLET BY MOUTH DAILY  Yareli Carcamo MD   meloxicam (MOBIC) 15 MG tablet TAKE 1 TABLET BY MOUTH EVERY DAY  Historical Provider, MD   blood glucose test strips (TRUE METRIX BLOOD GLUCOSE TEST) strip USE 1 STRIP TO TEST BLOOD SUGAR TWICE DAILY  Yareli Carcamo MD   mirtazapine (REMERON) 15 MG tablet Take 1.5 tablets by mouth nightly  Yareli Carcamo MD   sertraline (ZOLOFT) 100 MG tablet Take 1 tablet by mouth in the morning. Yareli Carcamo MD   traZODone (DESYREL) 50 MG tablet Take 1 tablet by mouth nightly  Yareli Carcamo MD   losartan (COZAAR) 100 MG tablet Take 1 tablet by mouth in the morning. Yareli Carcamo MD   amLODIPine (NORVASC) 10 MG tablet Take 1 tablet by mouth in the morning.   Yareli Carcamo MD   pantoprazole (PROTONIX) 40 MG tablet TAKE 1 TABLET BY MOUTH EVERY MORNING BEFORE BREAKFAST  Yareli Carcamo MD   insulin glargine (LANTUS SOLOSTAR) 100 UNIT/ML injection pen Inject 15 Units into the skin nightly  Yareli Carcamo MD   doxazosin (CARDURA) 4 MG tablet TAKE 1 TABLET BY MOUTH EVERY NIGHT Leon Mcae MD   fenofibrate (TRIGLIDE) 160 MG tablet Take 1 tablet by mouth daily  Leon Mace MD   Insulin Pen Needle (B-D UF III MINI PEN NEEDLES) 31G X 5 MM MISC USE AS DIRECTED TO INJECT ONCE DAILY  Leon Mace MD   Sabina Panning 100-62.5-25 MCG/INH AEPB INHALE 1 PUFF INTO THE LUNGS DAILY  Purvi Bryant MD   [place] [id number] [facility] Patient test twice daily  Leon Mace MD   albuterol sulfate HFA (VENTOLIN HFA) 108 (90 Base) MCG/ACT inhaler 2 Puff every 4-6 hours as needed for wheezing or shortness of breath  Leon Mace MD   Blood Glucose Monitoring Suppl (PRECISION XTRA) w/Device KIT As directed  Leon Mace MD   Blood Glucose Monitoring Suppl (ONE TOUCH ULTRA 2) W/DEVICE KIT 1 kit by Does not apply route daily as needed. Leon Mace MD   aspirin 325 MG tablet Take 325 mg by mouth daily. Historical Provider, MD   fish oil-omega-3 fatty acids 1000 MG capsule Take 2 g by mouth daily. Historical Provider, MD       Vitals:    10/10/22 1024   Pulse: 85   SpO2: 95%     There is no height or weight on file to calculate BMI.      Wt Readings from Last 3 Encounters:   22 174 lb (78.9 kg)   22 182 lb 9.6 oz (82.8 kg)   22 177 lb 3.2 oz (80.4 kg)     BP Readings from Last 3 Encounters:   22 124/64   22 138/80   22 122/66        Social History     Tobacco Use   Smoking Status Former    Packs/day: 1.00    Years: 20.00    Pack years: 20.00    Types: Cigarettes    Quit date: 3/3/1992    Years since quittin.6   Smokeless Tobacco Never

## 2022-10-12 RX ORDER — FLUTICASONE FUROATE, UMECLIDINIUM BROMIDE AND VILANTEROL TRIFENATATE 100; 62.5; 25 UG/1; UG/1; UG/1
1 POWDER RESPIRATORY (INHALATION) DAILY
Qty: 180 EACH | Refills: 3 | OUTPATIENT
Start: 2022-10-12

## 2022-10-19 ENCOUNTER — APPOINTMENT (OUTPATIENT)
Dept: GENERAL RADIOLOGY | Age: 72
DRG: 233 | End: 2022-10-19
Attending: INTERNAL MEDICINE
Payer: MEDICARE

## 2022-10-19 ENCOUNTER — HOSPITAL ENCOUNTER (INPATIENT)
Dept: CARDIAC CATH/INVASIVE PROCEDURES | Age: 72
LOS: 13 days | Discharge: HOME HEALTH | DRG: 233 | End: 2022-11-01
Attending: INTERNAL MEDICINE | Admitting: INTERNAL MEDICINE
Payer: MEDICARE

## 2022-10-19 DIAGNOSIS — Z95.1 S/P CABG X 3: Primary | ICD-10-CM

## 2022-10-19 PROBLEM — I25.110 CORONARY ARTERY DISEASE INVOLVING NATIVE CORONARY ARTERY OF NATIVE HEART WITH UNSTABLE ANGINA PECTORIS (HCC): Status: ACTIVE | Noted: 2022-10-19

## 2022-10-19 PROBLEM — I20.0 UNSTABLE ANGINA (HCC): Status: ACTIVE | Noted: 2022-10-19

## 2022-10-19 LAB
ABO/RH: NORMAL
ALBUMIN SERPL-MCNC: 3.4 G/DL (ref 3.4–5)
ALP BLD-CCNC: 48 U/L (ref 40–129)
ALT SERPL-CCNC: 8 U/L (ref 10–40)
ANION GAP SERPL CALCULATED.3IONS-SCNC: 7 MMOL/L (ref 3–16)
ANTIBODY SCREEN: NORMAL
APTT: 67.7 SEC (ref 23–34.3)
AST SERPL-CCNC: 13 U/L (ref 15–37)
BASE EXCESS ARTERIAL: -1.3 MMOL/L (ref -3–3)
BILIRUB SERPL-MCNC: 0.3 MG/DL (ref 0–1)
BILIRUBIN DIRECT: <0.2 MG/DL (ref 0–0.3)
BILIRUBIN URINE: NEGATIVE
BILIRUBIN, INDIRECT: ABNORMAL MG/DL (ref 0–1)
BLOOD, URINE: NEGATIVE
BUN BLDV-MCNC: 15 MG/DL (ref 7–20)
CALCIUM SERPL-MCNC: 9.9 MG/DL (ref 8.3–10.6)
CARBOXYHEMOGLOBIN ARTERIAL: 1.6 % (ref 0–1.5)
CHLORIDE BLD-SCNC: 107 MMOL/L (ref 99–110)
CLARITY: CLEAR
CO2: 27 MMOL/L (ref 21–32)
COLOR: YELLOW
CREAT SERPL-MCNC: 1.4 MG/DL (ref 0.8–1.3)
EKG ATRIAL RATE: 76 BPM
EKG DIAGNOSIS: NORMAL
EKG P AXIS: 59 DEGREES
EKG P-R INTERVAL: 150 MS
EKG Q-T INTERVAL: 420 MS
EKG QRS DURATION: 86 MS
EKG QTC CALCULATION (BAZETT): 472 MS
EKG R AXIS: 14 DEGREES
EKG T AXIS: 45 DEGREES
EKG VENTRICULAR RATE: 76 BPM
GFR SERPL CREATININE-BSD FRML MDRD: 54 ML/MIN/{1.73_M2}
GLUCOSE BLD-MCNC: 124 MG/DL (ref 70–99)
GLUCOSE BLD-MCNC: 151 MG/DL (ref 70–99)
GLUCOSE URINE: NEGATIVE MG/DL
HCO3 ARTERIAL: 24.7 MMOL/L (ref 21–29)
HCT VFR BLD CALC: 38.7 % (ref 40.5–52.5)
HEMOGLOBIN, ART, EXTENDED: 10.4 G/DL (ref 13.5–17.5)
HEMOGLOBIN: 12 G/DL (ref 13.5–17.5)
INR BLD: 1.42 (ref 0.87–1.14)
KETONES, URINE: NEGATIVE MG/DL
LEFT VENTRICULAR EJECTION FRACTION MODE: NORMAL
LEUKOCYTE ESTERASE, URINE: NEGATIVE
LV EF: 55 %
MCH RBC QN AUTO: 29.3 PG (ref 26–34)
MCHC RBC AUTO-ENTMCNC: 30.9 G/DL (ref 31–36)
MCV RBC AUTO: 94.7 FL (ref 80–100)
METHEMOGLOBIN ARTERIAL: 0.5 %
MICROSCOPIC EXAMINATION: NORMAL
NITRITE, URINE: NEGATIVE
O2 SAT, ARTERIAL: 89.8 %
O2 THERAPY: ABNORMAL
PCO2 ARTERIAL: 46.1 MMHG (ref 35–45)
PDW BLD-RTO: 19.2 % (ref 12.4–15.4)
PERFORMED ON: ABNORMAL
PH ARTERIAL: 7.34 (ref 7.35–7.45)
PH UA: 7.5 (ref 5–8)
PLATELET # BLD: 233 K/UL (ref 135–450)
PMV BLD AUTO: 6.9 FL (ref 5–10.5)
PO2 ARTERIAL: 62.2 MMHG (ref 75–108)
POTASSIUM SERPL-SCNC: 3.5 MMOL/L (ref 3.5–5.1)
PROTEIN UA: NEGATIVE MG/DL
PROTHROMBIN TIME: 17.3 SEC (ref 11.7–14.5)
RBC # BLD: 4.09 M/UL (ref 4.2–5.9)
SODIUM BLD-SCNC: 141 MMOL/L (ref 136–145)
SPECIFIC GRAVITY UA: 1.01 (ref 1–1.03)
TCO2 ARTERIAL: 58.5 MMOL/L
TOTAL PROTEIN: 6.5 G/DL (ref 6.4–8.2)
URINE REFLEX TO CULTURE: NORMAL
URINE TYPE: NORMAL
UROBILINOGEN, URINE: 0.2 E.U./DL
WBC # BLD: 6.7 K/UL (ref 4–11)

## 2022-10-19 PROCEDURE — B2111ZZ FLUOROSCOPY OF MULTIPLE CORONARY ARTERIES USING LOW OSMOLAR CONTRAST: ICD-10-PCS | Performed by: INTERNAL MEDICINE

## 2022-10-19 PROCEDURE — 2709999900 HC NON-CHARGEABLE SUPPLY

## 2022-10-19 PROCEDURE — 82803 BLOOD GASES ANY COMBINATION: CPT

## 2022-10-19 PROCEDURE — 71045 X-RAY EXAM CHEST 1 VIEW: CPT

## 2022-10-19 PROCEDURE — 99223 1ST HOSP IP/OBS HIGH 75: CPT | Performed by: THORACIC SURGERY (CARDIOTHORACIC VASCULAR SURGERY)

## 2022-10-19 PROCEDURE — 94375 RESPIRATORY FLOW VOLUME LOOP: CPT

## 2022-10-19 PROCEDURE — 86850 RBC ANTIBODY SCREEN: CPT

## 2022-10-19 PROCEDURE — 93458 L HRT ARTERY/VENTRICLE ANGIO: CPT

## 2022-10-19 PROCEDURE — 93005 ELECTROCARDIOGRAM TRACING: CPT | Performed by: INTERNAL MEDICINE

## 2022-10-19 PROCEDURE — C1769 GUIDE WIRE: HCPCS

## 2022-10-19 PROCEDURE — 2580000003 HC RX 258: Performed by: INTERNAL MEDICINE

## 2022-10-19 PROCEDURE — 86923 COMPATIBILITY TEST ELECTRIC: CPT

## 2022-10-19 PROCEDURE — 6360000004 HC RX CONTRAST MEDICATION: Performed by: INTERNAL MEDICINE

## 2022-10-19 PROCEDURE — 80076 HEPATIC FUNCTION PANEL: CPT

## 2022-10-19 PROCEDURE — 81003 URINALYSIS AUTO W/O SCOPE: CPT

## 2022-10-19 PROCEDURE — 99152 MOD SED SAME PHYS/QHP 5/>YRS: CPT

## 2022-10-19 PROCEDURE — 85027 COMPLETE CBC AUTOMATED: CPT

## 2022-10-19 PROCEDURE — 99153 MOD SED SAME PHYS/QHP EA: CPT

## 2022-10-19 PROCEDURE — 36415 COLL VENOUS BLD VENIPUNCTURE: CPT

## 2022-10-19 PROCEDURE — 86901 BLOOD TYPING SEROLOGIC RH(D): CPT

## 2022-10-19 PROCEDURE — 85730 THROMBOPLASTIN TIME PARTIAL: CPT

## 2022-10-19 PROCEDURE — 2500000003 HC RX 250 WO HCPCS

## 2022-10-19 PROCEDURE — 99152 MOD SED SAME PHYS/QHP 5/>YRS: CPT | Performed by: INTERNAL MEDICINE

## 2022-10-19 PROCEDURE — 80048 BASIC METABOLIC PNL TOTAL CA: CPT

## 2022-10-19 PROCEDURE — 4A023N7 MEASUREMENT OF CARDIAC SAMPLING AND PRESSURE, LEFT HEART, PERCUTANEOUS APPROACH: ICD-10-PCS | Performed by: INTERNAL MEDICINE

## 2022-10-19 PROCEDURE — 2500000003 HC RX 250 WO HCPCS: Performed by: INTERNAL MEDICINE

## 2022-10-19 PROCEDURE — C1894 INTRO/SHEATH, NON-LASER: HCPCS

## 2022-10-19 PROCEDURE — 94640 AIRWAY INHALATION TREATMENT: CPT

## 2022-10-19 PROCEDURE — 93010 ELECTROCARDIOGRAM REPORT: CPT | Performed by: INTERNAL MEDICINE

## 2022-10-19 PROCEDURE — 6370000000 HC RX 637 (ALT 250 FOR IP): Performed by: NURSE PRACTITIONER

## 2022-10-19 PROCEDURE — 6360000002 HC RX W HCPCS

## 2022-10-19 PROCEDURE — 86900 BLOOD TYPING SEROLOGIC ABO: CPT

## 2022-10-19 PROCEDURE — 2000000000 HC ICU R&B

## 2022-10-19 PROCEDURE — 6370000000 HC RX 637 (ALT 250 FOR IP): Performed by: INTERNAL MEDICINE

## 2022-10-19 PROCEDURE — 85610 PROTHROMBIN TIME: CPT

## 2022-10-19 PROCEDURE — B2151ZZ FLUOROSCOPY OF LEFT HEART USING LOW OSMOLAR CONTRAST: ICD-10-PCS | Performed by: INTERNAL MEDICINE

## 2022-10-19 PROCEDURE — 93458 L HRT ARTERY/VENTRICLE ANGIO: CPT | Performed by: INTERNAL MEDICINE

## 2022-10-19 RX ORDER — ASPIRIN 325 MG
325 TABLET ORAL DAILY
Status: DISCONTINUED | OUTPATIENT
Start: 2022-10-20 | End: 2022-10-21 | Stop reason: DRUGHIGH

## 2022-10-19 RX ORDER — SODIUM CHLORIDE 9 MG/ML
INJECTION, SOLUTION INTRAVENOUS PRN
Status: DISCONTINUED | OUTPATIENT
Start: 2022-10-19 | End: 2022-10-21 | Stop reason: SDUPTHER

## 2022-10-19 RX ORDER — TRAZODONE HYDROCHLORIDE 50 MG/1
50 TABLET ORAL NIGHTLY
Status: DISCONTINUED | OUTPATIENT
Start: 2022-10-19 | End: 2022-10-20

## 2022-10-19 RX ORDER — ROSUVASTATIN CALCIUM 20 MG/1
20 TABLET, COATED ORAL DAILY
Status: DISCONTINUED | OUTPATIENT
Start: 2022-10-20 | End: 2022-11-01 | Stop reason: HOSPADM

## 2022-10-19 RX ORDER — LOSARTAN POTASSIUM 100 MG/1
100 TABLET ORAL DAILY
Status: DISCONTINUED | OUTPATIENT
Start: 2022-10-20 | End: 2022-10-23

## 2022-10-19 RX ORDER — NITROGLYCERIN 20 MG/100ML
5-200 INJECTION INTRAVENOUS CONTINUOUS
Status: DISCONTINUED | OUTPATIENT
Start: 2022-10-19 | End: 2022-10-22

## 2022-10-19 RX ORDER — DOXAZOSIN MESYLATE 1 MG/1
1 TABLET ORAL DAILY
Status: DISCONTINUED | OUTPATIENT
Start: 2022-10-19 | End: 2022-10-22

## 2022-10-19 RX ORDER — ACETAMINOPHEN 325 MG/1
650 TABLET ORAL EVERY 4 HOURS PRN
Status: DISCONTINUED | OUTPATIENT
Start: 2022-10-19 | End: 2022-10-22

## 2022-10-19 RX ORDER — AMLODIPINE BESYLATE 5 MG/1
10 TABLET ORAL DAILY
Status: DISCONTINUED | OUTPATIENT
Start: 2022-10-20 | End: 2022-10-24

## 2022-10-19 RX ORDER — MIRTAZAPINE 15 MG/1
15 TABLET, FILM COATED ORAL NIGHTLY
Status: DISCONTINUED | OUTPATIENT
Start: 2022-10-19 | End: 2022-11-01 | Stop reason: HOSPADM

## 2022-10-19 RX ORDER — SODIUM CHLORIDE 0.9 % (FLUSH) 0.9 %
5-40 SYRINGE (ML) INJECTION PRN
Status: DISCONTINUED | OUTPATIENT
Start: 2022-10-19 | End: 2022-11-01 | Stop reason: HOSPADM

## 2022-10-19 RX ORDER — ONDANSETRON 2 MG/ML
4 INJECTION INTRAMUSCULAR; INTRAVENOUS EVERY 6 HOURS PRN
Status: DISCONTINUED | OUTPATIENT
Start: 2022-10-19 | End: 2022-10-21 | Stop reason: SDUPTHER

## 2022-10-19 RX ORDER — SODIUM CHLORIDE 0.9 % (FLUSH) 0.9 %
5-40 SYRINGE (ML) INJECTION EVERY 12 HOURS SCHEDULED
Status: DISCONTINUED | OUTPATIENT
Start: 2022-10-19 | End: 2022-10-21 | Stop reason: SDUPTHER

## 2022-10-19 RX ADMIN — MIRTAZAPINE 15 MG: 15 TABLET, FILM COATED ORAL at 22:01

## 2022-10-19 RX ADMIN — IOPAMIDOL 74 ML: 755 INJECTION, SOLUTION INTRAVENOUS at 09:21

## 2022-10-19 RX ADMIN — NITROGLYCERIN 5 MCG/MIN: 20 INJECTION INTRAVENOUS at 18:16

## 2022-10-19 RX ADMIN — Medication 2 PUFF: at 20:22

## 2022-10-19 RX ADMIN — DOXAZOSIN 1 MG: 1 TABLET ORAL at 22:01

## 2022-10-19 RX ADMIN — TRAZODONE HYDROCHLORIDE 50 MG: 50 TABLET ORAL at 22:01

## 2022-10-19 RX ADMIN — SODIUM CHLORIDE, PRESERVATIVE FREE 10 ML: 5 INJECTION INTRAVENOUS at 22:01

## 2022-10-19 ASSESSMENT — PAIN SCALES - GENERAL
PAINLEVEL_OUTOF10: 0
PAINLEVEL_OUTOF10: 0

## 2022-10-19 NOTE — CONSULTS
Cardiothoracic Surgery Consultation           PCP: Shanon Liriano MD    Referring Physician: Lydia Caldwell    DIAGNOSIS:  MVD including LM    CHIEF COMPLAINT:  SOB, Fatigue    History Obtained From:  patient, spouse, family member - Daughter, electronic medical record    HISTORY OF PRESENT ILLNESS:      The patient is a 81 y.o. male with significant past medical history of  of CAD with a stent in the past [place], CKD3, DM2, HLD, HTN, past smoker, depression, COPD,  who presents to the cath lab as an OP  with several months of SOB, fatigue, twitches left upper chest.  Left heart angiogram found  multivessel coronary artery disease with significant Left Main disease. CVTS was consulted for surgical evaluation for coronary artery bypass grafting. Patient is currently hemodynamically stable and denying any complaints of chest pain or shortness of breath. Patient is a previous smoker. Patient has  been vaccinated for covid.     Past Medical History:        Diagnosis Date    Chronic renal insufficiency     Due to chronic nephrolithiasis    Colloid cyst of third ventricle (HCC)     Coronary artery disease     DDD (degenerative disc disease), lumbar 2006    Disc bulge and facet arthropathy at L3-L4, L5-S1    Diabetes mellitus, type 2 ([address]     Diverticulitis of colon with perforation 11/07    Emergency colostomy    ED (erectile dysfunction)     Hyperlipidemia     Hypertension     Hypertensive retinopathy of both eyes 1/24/2013    Nephrolithiasis        Past Surgical History:        Procedure Laterality Date    BRAIN SURGERY  12/13/2007    Resection of colloid cyst of 3rd ventricle    CARDIAC CATHETERIZATION  5/02, 12/03,  3/08    COLOSTOMY  11/07    Emergent- due to diverticulitis with perforation    CORONARY ANGIOPLASTY  1995    RCA- unsuccessful    CORONARY ANGIOPLASTY WITH STENT PLACEMENT  10/05    Obtuse marginal branch of circumflex:  drug-eluting stent    KNEE ARTHROSCOPY  2000, 2005    Right    LITHOTRIPSY  1998 Bilateral    LITHOTRIPSY  1994    Bilateral with right stent placement    PARTIAL NEPHRECTOMY  1980    Right    REVISION COLOSTOMY  3/08    Colostomy takedown with sigmoid colectomy and coloproctostomy anastomosis    TOTAL KNEE ARTHROPLASTY Left 3/56/55    UMBILICAL HERNIA REPAIR         Medications:   Home Meds:   Prior to Admission medications    Medication Sig Start Date End Date Taking? Authorizing Provider   glimepiride (AMARYL) 4 MG tablet TAKE 2 TABLETS BY MOUTH EVERY MORNING 9/30/22   Moe Luciano MD   rosuvastatin (CRESTOR) 20 MG tablet TAKE 1 TABLET BY MOUTH DAILY 9/20/22   Moe Luciano MD   meloxicam (MOBIC) 15 MG tablet TAKE 1 TABLET BY MOUTH EVERY DAY 8/18/22   Historical Provider, MD   blood glucose test strips (TRUE METRIX BLOOD GLUCOSE TEST) strip USE 1 STRIP TO TEST BLOOD SUGAR TWICE DAILY 9/14/22   Moe Luciano MD   mirtazapine (REMERON) 15 MG tablet Take 1.5 tablets by mouth nightly 7/19/22   Moe Luciano MD   sertraline (ZOLOFT) 100 MG tablet Take 1 tablet by mouth in the morning. 7/19/22   Moe Luciano MD   traZODone (DESYREL) 50 MG tablet Take 1 tablet by mouth nightly 7/19/22   Moe Luciano MD   losartan (COZAAR) 100 MG tablet Take 1 tablet by mouth in the morning. 7/19/22   Moe Luciano MD   amLODIPine (NORVASC) 10 MG tablet Take 1 tablet by mouth in the morning.  7/19/22   Moe Luciano MD   pantoprazole (PROTONIX) 40 MG tablet TAKE 1 TABLET BY MOUTH EVERY MORNING BEFORE BREAKFAST 7/19/22   Moe Luciano MD   insulin glargine (LANTUS SOLOSTAR) 100 UNIT/ML injection pen Inject 15 Units into the skin nightly 7/19/22   Moe Luciano MD   doxazosin (CARDURA) 4 MG tablet TAKE 1 TABLET BY MOUTH EVERY NIGHT 7/6/22   Moe Luciano MD   fenofibrate (TRIGLIDE) 160 MG tablet Take 1 tablet by mouth daily 7/6/22   Moe Luciano MD   Insulin Pen Needle (B-D UF III MINI PEN NEEDLES) 31G X 5 MM MISC USE AS DIRECTED TO INJECT ONCE DAILY 3/3/22   Moe Luciano MD Miriam Giraldo 100-62.5-25 MCG/INH AEPB INHALE 1 PUFF INTO THE LUNGS DAILY 10/11/21   MD Kimmie Kwok Patient test twice daily 6/19/19   Jennifer Salas MD   albuterol sulfate HFA (VENTOLIN HFA) 108 (90 Base) MCG/ACT inhaler 2 Puff every 4-6 hours as needed for wheezing or shortness of breath 2/7/19   Jennifer Salas MD   Blood Glucose Monitoring Suppl (PRECISION XTRA) w/Device KIT As directed 1/30/19   Jennifer Salas MD   Blood Glucose Monitoring Suppl (ONE TOUCH ULTRA 2) W/DEVICE KIT 1 kit by Does not apply route daily as needed. 12/19/14   Jennifer Salas MD   aspirin 325 MG tablet Take 325 mg by mouth daily. Historical Provider, MD   fish oil-omega-3 fatty acids 1000 MG capsule Take 2 g by mouth daily.     Historical Provider, MD      Scheduled Meds:    [START ON 10/20/2022] amLODIPine  10 mg Oral Daily    [START ON 10/20/2022] aspirin  325 mg Oral Daily    doxazosin  1 mg Oral Daily    [START ON 10/20/2022] losartan  100 mg Oral Daily    [START ON 10/20/2022] rosuvastatin  20 mg Oral Daily    [START ON 10/20/2022] sertraline  100 mg Oral Daily    sodium chloride flush  5-40 mL IntraVENous 2 times per day    [START ON 10/20/2022] mometasone-formoterol  2 puff Inhalation BID    [START ON 10/20/2022] tiotropium  2 puff Inhalation Daily     Continuous Infusions:    sodium chloride         Current Facility-Administered Medications   Medication Dose Route Frequency Provider Last Rate Last Admin    sodium chloride flush 0.9 % injection 5-40 mL  5-40 mL IntraVENous PRN Manuel Hogan MD        [START ON 10/20/2022] amLODIPine (NORVASC) tablet 10 mg  10 mg Oral Daily Manuel Hogan MD        [START ON 10/20/2022] aspirin tablet 325 mg  325 mg Oral Daily Manuel Hogan MD        doxazosin (CARDURA) tablet 1 mg  1 mg Oral Daily Manuel Hogan MD        [START ON 10/20/2022] losartan (COZAAR) tablet 100 mg  100 mg Oral Daily Manuel Hogan MD [START ON 10/20/2022] rosuvastatin (CRESTOR) tablet 20 mg  20 mg Oral Daily Cristian Cross MD        [START ON 10/20/2022] sertraline (ZOLOFT) tablet 100 mg  100 mg Oral Daily Cristian Cross MD        sodium chloride flush 0.9 % injection 5-40 mL  5-40 mL IntraVENous 2 times per day Cristian Cross MD        0.9 % sodium chloride infusion   IntraVENous PRN Cristian Cross MD        acetaminophen (TYLENOL) tablet 650 mg  650 mg Oral Q4H PRN Cristian Cross MD        ondansetron [facility]) injection 4 mg  4 mg IntraVENous Q6H PRN Cristian Cross MD        [START ON 10/20/2022] mometasone-formoterol (DULERA) 200-5 MCG/ACT inhaler 2 puff  2 puff Inhalation BID Cristian Cross MD        [START ON 10/20/2022] tiotropium (SPIRIVA RESPIMAT) 2.5 MCG/ACT inhaler 2 puff  2 puff Inhalation Daily Crisitan Cross MD           Allergies:  Dilaudid [hydromorphone hcl] and Nubain [nalbuphine hcl]    Social History:    TOBACCO:   reports that he quit smoking about 30 years ago. His smoking use included cigarettes. He has a 20.00 pack-year smoking history. He has never used smokeless tobacco.  ETOH:   reports no history of alcohol use. DRUGS:   reports no history of drug use. LIFESTYLE: Active    MARITAL STATUS:   OCCUPATION:  Retired; continues to work part-time    Family History:    No family history on file.     REVIEW OF SYSTEMS:    CONSTITUTIONAL:  Well developed, well nourished, No acute distress  DERMATOLOGICAL: negative  NEUROLOGICAL:  negative  EYES:  positive for  Reading glasses  HEENT:  positive for  hearing loss; Bilateral Hearing aides  RESPIRATORY:  SOB  CARDIOVASCULAR:  fatigue, twitches in left upper chest  GASTROINTESTINAL:  negative  GENITO-URINARY: no dysuria, trouble voiding, or hematuria, CKD 3  ENDOCRINE: Positive DM3 HgA1C 7.4  MUSCULOSKELETAL:  positive R  TKR difficult with standing  HEMATOLOGICAL AND LYMPHATIC: negative  IMMUNOLOGICAL: negative  PSYCHOLOGICAL: positive for - depression    PHYSICAL EXAM:    VITALS:  BP (!) 161/88   Pulse 76   Temp 97.4 °F (36.3 °C) (Temporal)   Resp 16   Ht 6' (1.829 m)   Wt 174 lb (78.9 kg)   SpO2 98%   BMI 23.60 kg/m²   Hand Dominance:     Eyes:  lids and lashes normal, pupils equal and round, extra ocular muscles intact, sclera clear, conjunctiva normal    Head/ENT:  Normocephalic, atraumatic, all natural teeth, good residual dentition, routine dental exams, normal gums, & palate, oropharynx without erythema or exudates    Neck:  supple, symmetrical, trachea midline, no lymphadenopathy, no jugular venous distension, no carotid bruits and MASSES:  no masses    Lungs:  no increased work of breathing, good air exchange, no retractions and clear to auscultation, no palpable / percussible abnormalities    Cardiovascular:  regular rate and rhythm, S1, S2 normal, no murmur, click, rub or gallop    Pulses:     carotid brachial radial femoral popliteal DP PT   RIGHT 2+ 2+ 2+ 2+ 2+ 2+ 2+   LEFT 2+ 2+ 2+ 2+ 2+ 2+ 2+     Abdomen:  Soft, normal bowel sounds, non-tender, no hepatosplenomegaly, aorta likely normal and bruits absent    Musculoskeletal:  Back is straight and non-tender,  No CVAT, full ROM of upper and lower extremities.     Extremities:   No clubbing, or cyanosis, or edema     Skin: warm and normal turgor, no ulcers, infections, or rashes, no rashes, no ecchymoses    Neurological: awake, alert and oriented x 3, motor 5/5 bilateral upper and lower extremities, sensation grossly intact    Psychiatric: Mood and affect appear appropriate    LABS:  BMP:   Lab Results   Component Value Date/Time     10/19/2022 07:40 AM    K 3.5 10/19/2022 07:40 AM     10/19/2022 07:40 AM    CO2 27 10/19/2022 07:40 AM    PHOS 2.6 09/08/2020 08:45 AM    BUN 15 10/19/2022 07:40 AM    CREATININE 1.4 10/19/2022 07:40 AM        Lab Results   Component Value Date/Time    MG 2.0 03/07/2011 01:17 PM      CBC:   Lab Results Component Value Date/Time    WBC 6.7 10/19/2022 07:40 AM    HGB 12.0 10/19/2022 07:40 AM    HCT 38.7 10/19/2022 07:40 AM    MCV 94.7 10/19/2022 07:40 AM     10/19/2022 07:40 AM      Coagulation:   Lab Results   Component Value Date/Time    INR 1.42 10/19/2022 09:15 AM    APTT 67.7 10/19/2022 09:15 AM       HgbA1c:  Lab Results   Component Value Date/Time    LABA1C 7.4 07/28/2022 11:03 AM      Hepatic Profile:  No results found for: ALB    Lab Results   Component Value Date/Time    BILITOT 0.3 10/19/2022 09:15 AM    BILIDIR <0.2 10/19/2022 09:15 AM    AST 13 10/19/2022 09:15 AM    ALT 8 10/19/2022 09:15 AM    ALKPHOS 48 10/19/2022 09:15 AM      Cholesterol:  Lab Results   Component Value Date/Time    CHOL 164 03/30/2017 10:42 AM    HDL 28 07/28/2022 11:03 AM    HDL 30 05/08/2012 01:59 PM    LDLCALC 60 07/28/2022 11:03 AM    TRIG 264 03/30/2017 10:42 AM      UA:  Lab Results   Component Value Date/Time    NITRITE neg 02/26/2016 01:46 PM    COLORU yellow 02/26/2016 01:46 PM    PHUR 6 02/26/2016 01:46 PM    WBCUA 0 04/25/2019 01:47 PM    RBCUA 4 04/25/2019 01:47 PM    CLARITYU neg 02/26/2016 01:46 PM    SPECGRAV 1.015 02/26/2016 01:46 PM    LEUKOCYTESUR neg 02/26/2016 01:46 PM    BILIRUBINUR neg 02/26/2016 01:46 PM    BLOODU neg 02/26/2016 01:46 PM    GLUCOSEU neg 02/26/2016 01:46 PM       TESTING/IMAGING  EKG:  Sinus bradycardiaPossible Inferior infarct (cited on or before 19-OCT-2022)Abnormal ECGWhen compared with ECG of 19-OCT-2022     ANGIOGRAM:   LM 70% distal, 20% ostial   LAD 20% mid   Cx 70% mid, patent mid stent   RI N/A   % prox with R-R and L-R collaterals   LVEDP 6   LVG 55%     ECHO/TIMUR: 11/12/19  Summary   Normal left ventricle size, wall thickness, and systolic function with an   estimated ejection fraction of 60%. No regional wall motion abnormalities are seen. Normal diastolic function. Thickened mitral valve without evidence of stenosis.  There is moderate   mitral regurgitation noted.   There is mild tricuspid regurgitation with a PASP estimation of 45 mmHg. Mild pulmonary hypertension. Signature      ------------------------------------------------------------------   Electronically signed by Jose G Núñez, [facility]   (Interpreting physician) on 11/12/2019 at 09:47 AM   ------------------------------------------------------------------      Findings      Left Ventricle   Normal left ventricle size, wall thickness, and systolic function with an   estimated ejection fraction of 60%. No regional wall motion abnormalities   are seen. Normal diastolic function. Avg. E/e'=10.5      Mitral Valve   Thickened mitral valve without evidence of stenosis. There is moderate mitral regurgitation noted. Left Atrium   The left atrium is normal in size. Aortic Valve   The aortic valve is structurally normal. There is no significant aortic   valve regurgitation or stenosis. Aorta   The aortic root is normal in size. Right Ventricle   The right ventricle is normal in size and function. TAPSE is estimated at   2.22 cm. Tricuspid Valve   Tricuspid valve is structurally normal.   There is mild tricuspid regurgitation with a PASP estimation of 45 mmHg. Mild pulmonary hypertension. Right Atrium   The right atrial size is normal.      Pulmonic Valve   The pulmonic valve is structurally normal.   No evidence of pulmonic valve stenosis. Trivial pulmonic regurgitation present. Pericardial Effusion   No pericardial effusion noted. Pleural Effusion   No pleural effusion. Miscellaneous   The inferior vena cava appears normal in size with normal respiratory   variation.      M-Mode/2D Measurements (cm)      LV Diastolic Dimension: 4.9 cm LV Systolic Dimension: 2.16 cm   LV Septum Diastolic: 3.12 cm   LV PW Diastolic: 0.8 cm        AO Root Dimension: 2.8 cm                                  LA Dimension: 3.7 cm                                  LA Area: 15.4 cm2                                  LA volume/Index: 41.1 ml /20 ml/m2     Doppler Measurements      AV Peak Velocity: 177 cm/s   MV Peak E-Wave: 89.2 cm/s   AV Peak Gradient: 12.53 mmHg MV Peak A-Wave: 102 cm/s   AV Mean Gradient: 6 mmHg     MV E/A Ratio: 0.87   LVOT Peak Velocity: 119 cm/s                                MV Max P mmHg   TR Velocity:290 cm/s         MV Vmax:635 cm/s   TR Gradient:33.64 mmHg   Estimated RAP:10 mmHg   Estimated RVSP: 43 mmHg   E' Septal Velocity: 7.4 cm/s   E' Lateral Velocity: 10 cm/s      Aortic Valve      Peak Velocity: 177 cm/s   Mean Velocity: 112 cm/s   Peak Gradient: 12.53 mmHg Mean Gradient: 6 mmHg   AV VTI: 40 cm     Aorta      Aortic Root: 2.8 cm    CT CHEST W/O CONTRAST: 21  1. No acute intrapulmonary findings. 2. Stable chronic emphysema. 3. Stable mild-to-moderate interstitial pulmonary fibrosis, unchanged from 10/16/2020, though progressed from more remote studies of . 4. Multiple stable scattered pulmonary nodules throughout both lungs are unchanged dating back to the study of 2019, and likely reflect benign granulomatous disease. In the absence of a known personal history of cancer,  these are consistent with benign granulomatous disease and require no further follow-up. However, if there is a history of known cancer, continued chest  CT surveillance is suggested as per oncologic protocol. 5. Stable trace bilateral pleural effusions.       [id number] Score for Atrial Fibrillation Stroke Risk   Risk   Factors  Component Value   C CHF No 0   H HTN Yes 1   A2 Age >= 76 No,  (71 y.o.) 0   D DM Yes 1   S2 Prior Stroke/TIA No 0   V Vascular Disease No 0   A Age 74-69 Yes,  (71 y.o.) 1   Sc Sex male 0    [id number]  Score  3   Score last updated 10/19/22 8:34 PM EDT    Click here for a link to the UpToDate guideline \"Atrial Fibrillation: Anticoagulation therapy to prevent embolization    Disclaimer: Risk Score calculation is dependent on accuracy of patient problem list and past encounter diagnosis. CTS Adult Cardiac Risk: CABG  Mortality Risk: 1.366%  Renal Failure: 1.702%  Permanent Stroke: 1.231%  Prolonged Ventilation: 4.844%  DSW Infection: 0.162%  Reoperation: 2.447%  Morbidity and Mortality:8.403 %  Short Length of Stay: 43.589%  Long Length of Stay: 3.838%      ASSESSMENT AND PLAN:      Dr. Onelia Garber has discussed the option for coronary artery bypass grafting/left atrial appendage ligation with the patient. Will obtain additional testing (carotid duplex, PFTs, vein mapping) to further evaluate if patient is an appropriate candidate for CABG/KELLY ligation. If decision is made to proceed with surgery, tentative plan for OR on Friday. Continue with medical optimization in the interim including pre-op testing. Thank you Dr. Toy Gracia for the consultation. Electronically signed by ANTHONY Garcia CNP on 10/19/2022 at 3:04 PM   Note reviewed, events of last 24 hours reviewed along with vital signs and I/Os and patient examined. Assessment and plans discussed and are as outlined above. Surgery has been recommended and discussed with the patient and his family who is present at the bedside. I have discussed the risks, benefits and alternatives with patient, including risks of infection, bleeding, MI, stroke, arrhythmias and an operative mortality risk of 1-2 % as outlined in the STS Cardiac Surgery Risk profile above. I also discussed the the alternative of not doing surgery and the consequences of that action. Questions have been answered and the patient is willing to proceed. I also had a discussion regarding secondary risk modification with him, addressing the need for immediate tobacco cessation, weight loss through dietary modification and exercise, and will continue to have those conversations with him post op.    Surgery to be scheduled for Friday am.      Gerson Billings MD, FACS, [facility] [place] [place]

## 2022-10-19 NOTE — PROGRESS NOTES
Notified Dr. Heena Kim pt's BP running [id number] systolic. N.O. start nitro gtt to keep pt's SBP <160.

## 2022-10-19 NOTE — H&P
[facility]  H+P  Consult  OP Visit  FU Visit   CC HPI   Abnl stress Presents for [facility] for abnormal stress. HISTORY/ALLERGY/ROS   MEDHx  has a past medical history of Chronic renal insufficiency, Colloid cyst of third ventricle (HCC), Coronary artery disease, DDD (degenerative disc disease), lumbar, Diabetes mellitus, type 2 ([address] Diverticulitis of colon with perforation, ED (erectile dysfunction), Hyperlipidemia, Hypertension, Hypertensive retinopathy of both eyes, and Nephrolithiasis. SURGHx  has a past surgical history that includes Cardiac catheterization (5/02, 12/03,  3/08); Revision Colostomy (3/08); Lithotripsy (1998); Lithotripsy (1994); partial nephrectomy (1980); Coronary angioplasty with stent (10/05); Coronary angioplasty (1995); Umbilical hernia repair; Knee arthroscopy (2000, 2005); colostomy (11/07); Total knee arthroplasty (Left, 9/29/14); and brain surgery (12/13/2007). SOCHx  reports that he quit smoking about 30 years ago. His smoking use included cigarettes. He has a 20.00 pack-year smoking history. He has never used smokeless tobacco. He reports that he does not drink alcohol and does not use drugs. FAMHx family history is not on file.    ALLERG Dilaudid [hydromorphone hcl] and Nubain [nalbuphine hcl]   ROS Full ROS obtained and negative except as mentioned in HPI   MEDICATIONS   Current Facility-Administered Medications   Medication Dose Route Frequency Provider Last Rate Last Admin    sodium chloride flush 0.9 % injection 5-40 mL  5-40 mL IntraVENous PRN Manuel Hogan MD          PHYSICAL EXAM   Vitals Vitals:    10/19/22 0745   BP: (!) 162/84   Pulse: 76   Resp: 16   Temp: 98 °F (36.7 °C)   SpO2: 95%      Gen Alert, coop, no distress Heart  Rrr, no mrg   Head NC, AT, no abnorm Abd  Soft, NT, +BS, no mass, no OM   Eyes PER, conj/corn clear Ext  Ext nl, AT, no C/C/E   Nose Nares nl, no drain, NT Pulse 2+ and symmetric   Throat Lips, mucosa, tongue nl Skin Col/text/turg nl, no vis rash/les   Neck S/S, TM, NT, no bruit/JVD Psych Nl mood and affect   Lung CTA-B, unlabored, no DTP Lymph   No cervical or axillary LA   Ch wall NT, no deform Neuro  Nl gross M/S exam      ASSESSMENT AND PLAN   *Abnormal stress  MPI Inferolateral ST depression, apical inferolateral mixed I/S  Plan LHC, R/B/O discussed

## 2022-10-19 NOTE — OP NOTE
[address]   [facility] Operative Note     Procedure Summary  Procedure [facility]   Indication UA   Consent Obtained   Access RRA   US US guidance used to determine artery patency, size (>2mm), anatomic variations and ideal puncture location. Real-time US utilized concurrent with vascular needle entry into artery. Image(s) permanently recorded and reported in chart. Bleed Risk Low   Sedation Minimal conscious sedation for patient comfort. Independent trained observer pushed medications at my direction. We monitored the patient's level of consciousness and vital signs/physiologic status throughout the procedure duration (see start and stop times above, as well as medication dosages).    Start Time 1638   Stop Time 0911   Versed 4mg   Fentanyl 100mcg   Contrast 74cc   Flouro 2.9   EBL <26NT   Complicat None   Specimens None     Findings  Artery Findings/Result   LM 70% distal, 20% ostial   LAD 20% mid   Cx 70% mid, patent mid stent   RI N/A   % prox with R-R and L-R collaterals   LVEDP 6   LVG 55%     Intervention(s)  None    Post Cath Dx:   MVD including LM, CABG referral  Consider CABG LAD, Cx, RCA, KELLY clip

## 2022-10-19 NOTE — PROGRESS NOTES
Pt arrived to 2908 from cath lab. TR band had been removed from right radial site and transparent dressing applied prior to arrival to CVU. Right radial site noted with no swelling or redness.

## 2022-10-19 NOTE — DISCHARGE INSTRUCTIONS
Radial Angiogram      Care of your puncture site:  Remove clear bandage 24 hours after the procedure. May shower in 24 hours  Inspect the site daily and gently clean using soap and water. Dry thoroughly and apply a Band-Aid. Normal Observations:  Soreness or tenderness which may last one week. Mild oozing from the incision site. Possible bruising that could last 2 weeks. Activity:  You may resume driving 24 hours following the procedure. You may resume normal activity in 3 days or after the wound heals. Avoid lifting more than 10 pounds for 3 days with affected arm. Nutrition:  Regular diet  Drink at least 8 to 10 glasses of decaffeinated, non-alcoholic fluid for the next 24 hours to flush the x-ray dye used for your angiogram out of your body. Call your doctor immediately if your condition worsens, for any other concerns, for a follow-up appointment or if you experience any of the following:  Significant bleeding that does not stop after 10 minutes of applying firm pressure on the puncture site. Increased swelling of the wrist.  Unusual pain, numbness, or tingling of the wrist/arm. Any signs of infection such as: redness, yellow drainage at the site, swelling or pain. Discharge Instructions Following Open Heart Surgery      Do you have the help you need at home? Supplies you will need at home: Accurate Scale  Digital Thermometer  Antibacterial Soap  Clean Wash Cloths  Someone to be with you for one week      Activity Instructions:  Do not lift, push, or pull anything over 5 pounds for 8 weeks from the day of surgery. This could prevent your breastbone from healing properly. (A gallon of milk is more than 5 pounds so you should buy ½ gallons). When you are given permission from your surgeon to begin lifting again, do so gradually. You will need time to build your muscle strength. Housework - Do not cut the grass, vacuum, sweep or do any heavy housework.     Work - Returning to work may take 4 weeks if you have a desk job. But if you have a more physical job you may need to wait up to 12 weeks. Consult your doctor. Driving - Do not drive a car or any vehicle for 4 weeks from the day of surgery. You cannot drive a truck, tractor or  for 8 weeks from the day of surgery. After 4 weeks, you can drive vehicles with power steering only. Do not drive while on pain medication. Riding - You may ride in a car for local trips, up to 45 minutes. If you have to travel further then 2 hours stop every 45 minutes. Wear your lap and shoulder seatbelts in the car. Place your heart pillow between your chest and the shoulder seatbelt. Walking & Stair Climbing - Use your activity diary in your binder for your walking plan. Plan to walk indoors on days the temperature is below 40º or over 80º or during smog alerts. At first limit your stair climbing to once or twice a day. You may use the handrail for balance only. Do not pull yourself up with it. It is not unusual to feel tired for the first few weeks, but walking builds up your strength. Sleeping - If you have trouble sleeping during the night, take your pain medication at bedtime. Some people prefer to sleep in a recliner. Sexual Activity - When you can climb 2 flights of stairs without chest pain, shortness of breath or fatigue, it is generally OK to resume sexual activity. If you suspect your medication is affecting your sexual desire or abilities talk with your physician. Often medication can be adjusted or changed to correct sexual difficulties. Incision Care:  Wash your incisions daily with a clean washcloth and antibacterial soap. Do not wash your incisions with the same washcloth you have cleansed other parts of your body with. You may shower but keep your back to the spray. No tub baths until incisions are healed. Dirty tub water can cause a serious infection on unhealed incisions. Avoid hot water, comfortably warm is OK.     Do not apply anything other than antibacterial soap and water to your incisions. Do not apply lotions, creams, ointments, powders or hydrogen peroxide. Keep your incisions clean and dry. No one should touch your incisions without washing their hands first. Do not let pets touch your incisions (have incisions covered with clothing when around pets). Keep your heart pillow clean and off the floor. If your surgeon has recommended a dressing for your incision, it should be changed daily. Remove dressing, clean the incision and apply a new dressing until drainage stops. When there is no drainage, your incision may be left open to air. Expect some swelling in the leg with the incision. Keep your legs elevated above the level of your heart when lying or sitting. Wear loose clothing to prevent your clothing from rubbing your incisions. Women should wear a bra to support the breastbone. Diet/Nutritional Intake:  Eat a low fat, low salt diet, balanced diet. Eat a high fiber diet to avoid constipation and straining during bowel movements (including whole grains, raw veggies and fruits). Diabetics should check their blood sugars at least twice a day before meals. Contact your primary care physician if your blood sugar is consistently above 130. Good tissue healing occurs when blood sugars are less than 130. Weight/Temperature: Take your temperature and weigh yourself every morning. You will be given a log to record your temperature and weight. You should take the log with you to your doctor appointments. Call the surgeon if you gain 3 pounds or more overnight or 5 pounds in a week. This may be a sign of fluid retention. Also, call the surgeon if your temperature is greater than 101º. Depression: It is not unusual to have feelings of anxiety, fear or depression after surgery. If you need help with these feelings, call your primary care physician.  There are medications to help and healing usually occurs sooner if you are not depressed. Cardiac Rehab Information provided: [phone]  Your physician has referred you to Cardiac Rehab. This is an important part of your recovery. You have been given a brief explanation of Cardiac Rehab and instructions when to call Cardiac Rehab. The Cardiac Rehab team works with your cardiologist to start your Rehab at the appropriate time in your recovery. Remember to discuss Cardiac Rehab with your physician at your first follow-up visit. Medication:  Take pain medication as ordered to be comfortable and able to increase activity level. Pain medications can cause drowsiness, constipation, confusion, nausea and itching. Your need for pain medication should decrease over the next two weeks. For mild pain you may take Tylenol (acetaminophen), but do not exceed 4000mg of Tylenol in one day. You may have been prescribed Colace (docusate), which is a stool softener to help prevent straining with bowel movements. If your bowels have not moved by your second day at home, take a laxative of choice. If no bowel movement by the third day, call your surgeon. If you find you have the opposite problem and your stools are too soft, stop taking the stool softener. Take your medications exactly how prescribed. Dont increase decrease, or stop your medication without your doctors approval.    Tell your doctor if you get any symptoms or side effects such as an upset stomach, vomiting, diarrhea, or skin rash. Do not take any over the counter medication or herbal supplements without consulting your physician. Keep a list of all your medication names, dosages, and instructions. SHE Hose (White Stockings)  These are used to prevent swelling and usually are worn for 2-4 weeks. They should be worn during the day and taken off at bedtime. Someone other than the patient will need to put on and take off the hose. It is too much pulling and tugging for the patient. Expect them to be difficult to put on and they should feel snug. Wash them by hand to keep their elasticity. Incentive Spirometer:  Continue to use your lung exerciser at least 3 times per day for the next 4 weeks. Support your chest and cough each time you complete the 10 exercises. This will help keep your lungs clear and prevent pneumonia. Awareness of Heart Beating: You may feel your heart is beating stronger or that your heart is beating in your neck and ears. Dont worry this is common especially at bedtime. Smoking:  NEVER SMOKE AGAIN! Absolutely no tobacco products. Do not allow others to smoke around you. Second hand smoke can be just as bad. Smoking negatively affects the healing process, circulation, and increases your risk for blood clots. The [facility] has a free program to help people quit smoking. Call 1-175.263.9875. Heart Valve Replacement:  If you had your heart valve replaced, you will receive a card in the mail to keep in your wallet. Show ALL your doctors and dentist the card before any treatments. You will need to take antibiotics before and after surgery, teeth cleaning, etc. for the rest of your life. If you get a sore throat or fever over 101º that lasts a day or two call your doctor. If you are exposed to someone with strep throat, then get a sore throat yourself, call your doctor IMMEDIATELY. Any doubts about taking antibiotics before or after a procedure call your surgeons office. If taking Coumadin monitor dietary restrictions as provided by the dietician. What problems do you need to look out for after you leave the hospital?   Call your surgeon if you have any of these symptoms. [phone]  If you have sudden, severe shortness of breath or shortness of breath while resting. Pain, tenderness, warmth or redness in your calf. For redness, warmth, tenderness, drainage or swelling of any incision. For ANY chest incision drainage.   Temperature (taken daily) greater than 101º. If your heart rate is below 50 or over 110 beats per minute, or symptoms of fluttering in your chest or irregular pulse. Weight gain of 3 pounds in one day or 5 pounds in one week. If bowels have not moved by third day at home. Persistent diarrhea, nausea or vomiting. If you are unable to eat, or unable to drink 5 glasses of fluid a day for more than one day. If you have pain not relieved by pain medication. If you experience the same pain or other symptoms that brought you to the hospital or doctor before surgery. Diabetics: Call Primary Care Physician for blood sugars consistently above 130.   Depression: Call Primary Care Physician if feelings of depression persist.    If you think something is seriously wrong go to the nearest emergency room OR Call 911 for any EMERGENCY and go to the nearest hospital!

## 2022-10-19 NOTE — PROGRESS NOTES
RN called Cardiology NP Sera Coronel to convey patient's request to restart home meds trazodone and remeron. NP said he would look into patient's chart in order to make a decision. 2030: NP gave order to restart 50 mg trazodone and 15 mg Remeron PO.

## 2022-10-19 NOTE — PRE SEDATION
[facility] Cardiology  Brief Pre-Op Note/Sedation Assessment    Iram Sandra  1950  [id number]  7:48 AM    Planned Procedure: Cardiac Catheterization Procedure  Post Procedure Plan: Return to same level of care  Consent:   I have discussed with the patient and/or the patient representative the indication, alternatives, and the possible risks and/or complications of the planned procedure and the anesthesia methods. The patient and/or patient representative appear to understand and agree to proceed. Chief Complaint:   Anginal Equivalent  Dyspnea on Exertion  Dyspnea  Fatigue     Indications for Procedure:  Presentation New Onset Angina <= 2 months and Worsening Angina   Anginal Class (2 wks) CCS III - Symptoms with everyday living activities, i.e., moderate limitation   Anginal Sx Typical CP   CHF Class (2wks) No symptoms   CV Instability Yes     Prior Ischemic Workup/Eval:  Pre-Proc Meds Yes: ACE/ARB/ARNI, Aspirin, Ca Channel Blockers, and STATIN   Stress Test? Yes:  Stress or Imaging Studies Performed (within ANY time period):   Type:  Stress Nuclear  Results:  Positive:  Ischemia on ECG and Myocardial Perfusion Defects (Nuclear) Extent of Ischemia:  Intermediate     Does Patient need surgery? Cardiac Valve Surgery No     Pre-Procedure Medical History:  Vital Signs BP (!) 162/84   Pulse 76   Temp 98 °F (36.7 °C)   Resp 16   Ht 6' (1.829 m)   Wt 174 lb (78.9 kg)   SpO2 95%   BMI 23.60 kg/m²    Allergies Reviewed in EMR   Medications Reviewed in EMR   Past Med Hx Reviewed in EMR   Surg Hx Reviewed in EMR     Pre-Sedation:  Pre-Sedation Exam I have personally completed a history, physical exam & review of systems for this patient (see notes). Hx Anesthesia Comps None   Mod Mallampati II   ASA Class Class 2 - A normal healthy patient with mild systemic disease     Tory Scale:   Activity 2 - Able to move 4 extrem on command   Respiration 2 - Able to breath deeply and cough freely   Circulation 2 - BP +/- 20mmHg of normal   Consciousness 2 - Fully awake   Oxygen Saturation 2 - Able to maintain oxygen sat >92% on room air     Sedation/Anesthesia Plan:  Guard patient safety and welfare. Minimize physical discomfort and pain. Minimize negative psychological responses to treatment by providing sedation and analgesia and maximize the potential amnesia. Patient to meet pre-procedure discharge plan.      Medication Planned:  Midazolam intravenously and fentanyl intravenously     Patient is an appropriate candidate for plan of sedation:   Yes    Electronically signed by Edna Schafer MD on 10/19/2022 at 7:48 AM

## 2022-10-20 ENCOUNTER — TELEPHONE (OUTPATIENT)
Dept: INTERNAL MEDICINE CLINIC | Age: 72
End: 2022-10-20

## 2022-10-20 ENCOUNTER — ANESTHESIA EVENT (OUTPATIENT)
Dept: OPERATING ROOM | Age: 72
End: 2022-10-20
Payer: MEDICARE

## 2022-10-20 LAB
ANION GAP SERPL CALCULATED.3IONS-SCNC: 9 MMOL/L (ref 3–16)
BUN BLDV-MCNC: 13 MG/DL (ref 7–20)
CALCIUM SERPL-MCNC: 9.8 MG/DL (ref 8.3–10.6)
CHLORIDE BLD-SCNC: 106 MMOL/L (ref 99–110)
CHOLESTEROL, TOTAL: 121 MG/DL (ref 0–199)
CO2: 24 MMOL/L (ref 21–32)
CREAT SERPL-MCNC: 1.2 MG/DL (ref 0.8–1.3)
EKG ATRIAL RATE: 58 BPM
EKG DIAGNOSIS: NORMAL
EKG P AXIS: 61 DEGREES
EKG P-R INTERVAL: 164 MS
EKG Q-T INTERVAL: 462 MS
EKG QRS DURATION: 96 MS
EKG QTC CALCULATION (BAZETT): 453 MS
EKG R AXIS: 11 DEGREES
EKG T AXIS: 23 DEGREES
EKG VENTRICULAR RATE: 58 BPM
GFR SERPL CREATININE-BSD FRML MDRD: >60 ML/MIN/{1.73_M2}
GLUCOSE BLD-MCNC: 73 MG/DL (ref 70–99)
HCT VFR BLD CALC: 37.3 % (ref 40.5–52.5)
HDLC SERPL-MCNC: 25 MG/DL (ref 40–60)
HEMOGLOBIN: 11.8 G/DL (ref 13.5–17.5)
LDL CHOLESTEROL CALCULATED: 50 MG/DL
MAGNESIUM: 1.9 MG/DL (ref 1.8–2.4)
MCH RBC QN AUTO: 29.6 PG (ref 26–34)
MCHC RBC AUTO-ENTMCNC: 31.6 G/DL (ref 31–36)
MCV RBC AUTO: 93.8 FL (ref 80–100)
PDW BLD-RTO: 19.1 % (ref 12.4–15.4)
PLATELET # BLD: 191 K/UL (ref 135–450)
PMV BLD AUTO: 7.2 FL (ref 5–10.5)
POTASSIUM SERPL-SCNC: 3.9 MMOL/L (ref 3.5–5.1)
RBC # BLD: 3.98 M/UL (ref 4.2–5.9)
SODIUM BLD-SCNC: 139 MMOL/L (ref 136–145)
TRIGL SERPL-MCNC: 231 MG/DL (ref 0–150)
VLDLC SERPL CALC-MCNC: 46 MG/DL
WBC # BLD: 8.2 K/UL (ref 4–11)

## 2022-10-20 PROCEDURE — 83735 ASSAY OF MAGNESIUM: CPT

## 2022-10-20 PROCEDURE — 99223 1ST HOSP IP/OBS HIGH 75: CPT | Performed by: INTERNAL MEDICINE

## 2022-10-20 PROCEDURE — 99233 SBSQ HOSP IP/OBS HIGH 50: CPT | Performed by: NURSE PRACTITIONER

## 2022-10-20 PROCEDURE — 94761 N-INVAS EAR/PLS OXIMETRY MLT: CPT

## 2022-10-20 PROCEDURE — 85027 COMPLETE CBC AUTOMATED: CPT

## 2022-10-20 PROCEDURE — 80061 LIPID PANEL: CPT

## 2022-10-20 PROCEDURE — 2000000000 HC ICU R&B

## 2022-10-20 PROCEDURE — 83036 HEMOGLOBIN GLYCOSYLATED A1C: CPT

## 2022-10-20 PROCEDURE — 6370000000 HC RX 637 (ALT 250 FOR IP): Performed by: NURSE PRACTITIONER

## 2022-10-20 PROCEDURE — 93010 ELECTROCARDIOGRAM REPORT: CPT | Performed by: INTERNAL MEDICINE

## 2022-10-20 PROCEDURE — 6370000000 HC RX 637 (ALT 250 FOR IP): Performed by: INTERNAL MEDICINE

## 2022-10-20 PROCEDURE — 94640 AIRWAY INHALATION TREATMENT: CPT

## 2022-10-20 PROCEDURE — 2580000003 HC RX 258: Performed by: INTERNAL MEDICINE

## 2022-10-20 PROCEDURE — 93971 EXTREMITY STUDY: CPT

## 2022-10-20 PROCEDURE — 93880 EXTRACRANIAL BILAT STUDY: CPT

## 2022-10-20 PROCEDURE — 80048 BASIC METABOLIC PNL TOTAL CA: CPT

## 2022-10-20 RX ORDER — GABAPENTIN 300 MG/1
600 CAPSULE ORAL
Status: COMPLETED | OUTPATIENT
Start: 2022-10-21 | End: 2022-10-21

## 2022-10-20 RX ORDER — PANTOPRAZOLE SODIUM 40 MG/10ML
40 INJECTION, POWDER, LYOPHILIZED, FOR SOLUTION INTRAVENOUS
Status: COMPLETED | OUTPATIENT
Start: 2022-10-21 | End: 2022-10-21

## 2022-10-20 RX ORDER — ALPRAZOLAM 0.25 MG/1
0.25 TABLET ORAL ONCE
Status: DISCONTINUED | OUTPATIENT
Start: 2022-10-20 | End: 2022-10-20

## 2022-10-20 RX ORDER — TRAZODONE HYDROCHLORIDE 50 MG/1
100 TABLET ORAL NIGHTLY
Status: DISCONTINUED | OUTPATIENT
Start: 2022-10-20 | End: 2022-10-22

## 2022-10-20 RX ORDER — SODIUM CHLORIDE, SODIUM LACTATE, POTASSIUM CHLORIDE, CALCIUM CHLORIDE 600; 310; 30; 20 MG/100ML; MG/100ML; MG/100ML; MG/100ML
INJECTION, SOLUTION INTRAVENOUS CONTINUOUS
Status: DISCONTINUED | OUTPATIENT
Start: 2022-10-21 | End: 2022-10-21 | Stop reason: DRUGHIGH

## 2022-10-20 RX ORDER — CHLORHEXIDINE GLUCONATE 4 G/100ML
SOLUTION TOPICAL SEE ADMIN INSTRUCTIONS
Status: COMPLETED | OUTPATIENT
Start: 2022-10-20 | End: 2022-10-20

## 2022-10-20 RX ADMIN — ROSUVASTATIN CALCIUM 20 MG: 20 TABLET, FILM COATED ORAL at 08:46

## 2022-10-20 RX ADMIN — ASPIRIN 325 MG: 325 TABLET ORAL at 08:47

## 2022-10-20 RX ADMIN — ACETAMINOPHEN 650 MG: 325 TABLET ORAL at 06:32

## 2022-10-20 RX ADMIN — Medication 10 ML: at 22:00

## 2022-10-20 RX ADMIN — MIRTAZAPINE 15 MG: 15 TABLET, FILM COATED ORAL at 20:31

## 2022-10-20 RX ADMIN — TIOTROPIUM BROMIDE INHALATION SPRAY 2 PUFF: 3.12 SPRAY, METERED RESPIRATORY (INHALATION) at 09:53

## 2022-10-20 RX ADMIN — SODIUM CHLORIDE, PRESERVATIVE FREE 10 ML: 5 INJECTION INTRAVENOUS at 20:32

## 2022-10-20 RX ADMIN — SERTRALINE 100 MG: 50 TABLET, FILM COATED ORAL at 08:46

## 2022-10-20 RX ADMIN — DOXAZOSIN 1 MG: 1 TABLET ORAL at 20:31

## 2022-10-20 RX ADMIN — AMLODIPINE BESYLATE 10 MG: 5 TABLET ORAL at 08:47

## 2022-10-20 RX ADMIN — Medication 2 PUFF: at 09:52

## 2022-10-20 RX ADMIN — TRAZODONE HYDROCHLORIDE 100 MG: 50 TABLET ORAL at 22:43

## 2022-10-20 RX ADMIN — SODIUM CHLORIDE, PRESERVATIVE FREE 10 ML: 5 INJECTION INTRAVENOUS at 08:48

## 2022-10-20 RX ADMIN — CHLORHEXIDINE GLUCONATE: 213 SOLUTION TOPICAL at 22:00

## 2022-10-20 ASSESSMENT — PAIN SCALES - GENERAL: PAINLEVEL_OUTOF10: 5

## 2022-10-20 ASSESSMENT — PAIN DESCRIPTION - LOCATION: LOCATION: HEAD

## 2022-10-20 NOTE — TELEPHONE ENCOUNTER
Patient's wife is calling to make sure Dr. Renetta Cuevas is aware that patient is currently at [facility] and is scheduled for triple by-pass surgery tomorrow. Any questions/concerns call Jc Retana at number provided. Tim Merchant will be performing the surgery.

## 2022-10-20 NOTE — PROGRESS NOTES
Patient requested his blood sugar be verified. Stated he checks his blood sugar  and takes Lantus at home. Patient made aware there is a hospitalist consult for blood glucose management. He expressed understanding and had no further requests.

## 2022-10-20 NOTE — CONSULTS
Consult received     CKD     Recommend to dose adjust all medications  based on renal functions  Maintain SBP> 90 mmHg   Daily weights   AVOID NSAIDs  Avoid Nephrotoxins  Monitor Intake/Output  Call if significant decrease in urine output      Full consult to follow

## 2022-10-20 NOTE — CONSULTS
Office : [phone]     Fax :[phone]       Nephrology Consult Note      Patient's Name: Amanda Kaufman  8:35 AM  10/20/2022    Reason for Consult:  CKD 3a      Requesting Physician:  Gardenia Rubi MD      Chief Complaint:  multivessel CAD      History of Present Ilness:    Amanda Kaufman is a 81 y.o. male with h/o CKD.,  HTN , CAD who has LHC yesterday and had multivessel CAD. Plan for CABG tomorrow     Denies any chest pain at this time   BP is stable   No SOB   No peripheral edema noted       I/O last 3 completed shifts:   In: 7.3 [I.V.:7.3]  Out: 450 [Urine:450]    Past Medical History:   Diagnosis Date    Chronic renal insufficiency     Due to chronic nephrolithiasis    Colloid cyst of third ventricle (HCC)     Coronary artery disease     DDD (degenerative disc disease), lumbar 2006    Disc bulge and facet arthropathy at L3-L4, L5-S1    Diabetes mellitus, type 2 ([address]     Diverticulitis of colon with perforation 11/07    Emergency colostomy    ED (erectile dysfunction)     Hyperlipidemia     Hypertension     Hypertensive retinopathy of both eyes 1/24/2013    Nephrolithiasis        Past Surgical History:   Procedure Laterality Date    BRAIN SURGERY  12/13/2007    Resection of colloid cyst of 3rd ventricle    CARDIAC CATHETERIZATION  5/02, 12/03,  3/08    COLOSTOMY  11/07    Emergent- due to diverticulitis with perforation    CORONARY ANGIOPLASTY  1995    RCA- unsuccessful    CORONARY ANGIOPLASTY WITH STENT PLACEMENT  10/05    Obtuse marginal branch of circumflex:  drug-eluting stent    KNEE ARTHROSCOPY  2000, 2005    Right    LITHOTRIPSY  1998    Bilateral    LITHOTRIPSY  1994    Bilateral with right stent placement    PARTIAL NEPHRECTOMY  1980    Right    REVISION COLOSTOMY  3/08 Colostomy takedown with sigmoid colectomy and coloproctostomy anastomosis    TOTAL KNEE ARTHROPLASTY Left 4/27/76    UMBILICAL HERNIA REPAIR         No family history on file. reports that he quit smoking about 30 years ago. His smoking use included cigarettes. He has a 20.00 pack-year smoking history. He has never used smokeless tobacco. He reports that he does not drink alcohol and does not use drugs.         Allergies:  Dilaudid [hydromorphone hcl] and Nubain [nalbuphine hcl]    Current Medications:    sodium chloride flush 0.9 % injection 5-40 mL, PRN  amLODIPine (NORVASC) tablet 10 mg, Daily  aspirin tablet 325 mg, Daily  doxazosin (CARDURA) tablet 1 mg, Daily  losartan (COZAAR) tablet 100 mg, Daily  rosuvastatin (CRESTOR) tablet 20 mg, Daily  sertraline (ZOLOFT) tablet 100 mg, Daily  sodium chloride flush 0.9 % injection 5-40 mL, 2 times per day  0.9 % sodium chloride infusion, PRN  acetaminophen (TYLENOL) tablet 650 mg, Q4H PRN  ondansetron (ZOFRAN) injection 4 mg, Q6H PRN  mometasone-formoterol (DULERA) 200-5 MCG/ACT inhaler 2 puff, BID  tiotropium (SPIRIVA RESPIMAT) 2.5 MCG/ACT inhaler 2 puff, Daily  nitroGLYCERIN 50 mg in dextrose 5% 250 mL infusion, Continuous  mirtazapine (REMERON) tablet 15 mg, Nightly  traZODone (DESYREL) tablet 50 mg, Nightly        Review of Systems:   14 point ROS obtained but were negative except mentioned in HPI      Physical exam:     Vitals:  BP (!) 149/81   Pulse 69   Temp 96.9 °F (36.1 °C) (Temporal)   Resp 16   Ht 6' (1.829 m)   Wt 158 lb 11.7 oz (72 kg)   SpO2 99%   BMI 21.53 kg/m²   Constitutional:  OAA X3 NAD  Skin: no rash, turgor wnl  Heent:  eomi, mmm  Neck: no bruits or jvd noted  Cardiovascular:  S1, S2 without m/r/g  Respiratory: CTA B without w/r/r  Abdomen:  +bs, soft, nt, nd  Ext: no  lower extremity edema  Psychiatric: mood and affect appropriate  Musculoskeletal:  Rom, muscular strength intact    Labs:  CBC:   Recent Labs     10/19/22  0705 10/20/22  0625   WBC 6.7 8.2   HGB 12.0* 11.8*    191     BMP:    Recent Labs     10/19/22  0740 10/20/22  0625    139   K 3.5 3.9    106   CO2 27 24   BUN 15 13   CREATININE 1.4* 1.2   GLUCOSE 124* 73     Ca/Mg/Phos:   Recent Labs     10/19/22  0740 10/20/22  0625   CALCIUM 9.9 9.8   MG  --  1.90     Hepatic:   Recent Labs     10/19/22  0915   AST 13*   ALT 8*   BILITOT 0.3   ALKPHOS 48     Troponin: No results for input(s): TROPONINI in the last 72 hours. BNP: No results for input(s): BNP in the last 72 hours. Lipids: No results for input(s): CHOL, TRIG, HDL, LDLCALC, LABVLDL in the last 72 hours. ABGs:   Recent Labs     10/19/22  0915   PHART 7.338*   PO2ART 62.2*   [id number] 46.1*     INR:   Recent Labs     10/19/22  0915   INR 1.42*     UA:  Recent Labs     10/19/22  2310   COLORU Yellow   CLARITYU Clear   GLUCOSEU Negative   BILIRUBINUR Negative   KETUA Negative   SPECGRAV 1.010   BLOODU Negative   PHUR 7.5   PROTEINU Negative   UROBILINOGEN 0.2   NITRU Negative   LEUKOCYTESUR Negative   LABMICR Not Indicated   URINETYPE NotGiven      Urine Microscopic: No results for input(s): LABCAST, BACTERIA, COMU, HYALCAST, WBCUA, RBCUA, EPIU in the last 72 hours. Urine Culture: No results for input(s): LABURIN in the last 72 hours. Urine Chemistry: No results for input(s): Laretta Sofia, PROTEINUR, NAUR in the last 72 hours. IMAGING:  VL DUP CAROTID BILATERAL         XR CHEST PORTABLE   Final Result   No acute cardiopulmonary disease. VL PRE OP VEIN MAPPING    (Results Pending)                       Assessment/Plan :      1.  CKD 3 A   Creatinine better today at 1.2   No edema noted   No SOB   Recommend to dose adjust all medications  based on renal functions  Maintain SBP> 90 mmHg   Daily weights   AVOID NSAIDs  Avoid Nephrotoxins  Monitor Intake/Output  Call if significant decrease in urine output      2. HTN. Better controlled today     3. Anemia. Hb 11.8   Monitor     4.   MVD . Plan for CABG tomorrow   Will monitor renal function post CABG   Valsartan has been held   Volume status is good.              D/w primary team      Thank you for allowing us to participate in care of Noe Capone         Electronically signed by: Lydia Rodriguez MD, 10/20/2022, 8:35 AM      [facility] [address]  Office : [phone]  Fax :[phone]

## 2022-10-20 NOTE — PROGRESS NOTES
Cardiothoracic Surgery Progress Note    CC: MVD including LM      Subjective:  Hemodynamically stable, RA, afebrile. Alert and oriented X 3.      Vital Signs:   BP (!) 145/80   Pulse 69   Temp 96.9 °F (36.1 °C) (Temporal)   Resp 16   Ht 6' (1.829 m)   Wt 158 lb 11.7 oz (72 kg)   SpO2 99%   BMI 21.53 kg/m²     Physical Exam:   Cardiac: regular, no murmur  Lungs: clear to auscultation, decreased bases bilaterally  Abdomen: soft, non-tender  Vascular:  pulses all palpable   Extremities: generalized, non-pitting edema 1+  :  voiding      Labs:   CBC:   Recent Labs     10/19/22  0740 10/20/22  0625   WBC 6.7 8.2   HGB 12.0* 11.8*   HCT 38.7* 37.3*    191     BMP:   Recent Labs     10/19/22  0740 10/20/22  0625   K 3.5 3.9   CREATININE 1.4* 1.2   CALCIUM 9.9 9.8   MG  --  1.90     PT/INR:   Recent Labs     10/19/22  0915   PROTIME 17.3*   INR 1.42*       Assessment/Plan:  As per CC:     Plan:   -pre-op CABG KELLY with placement of vascath on 10/21/22  -creatinine improved from yesterday 1.2 from 1.4  -nephrology will follow      Disp:  OR tomorrow      Electronically signed by ANTHONY Lawrence CNP on 10/20/2022 at 9:11 AM

## 2022-10-20 NOTE — ANESTHESIA PRE PROCEDURE
Department of Anesthesiology  Preprocedure Note       Name:  Amanda Kaufman   Age:  81 y.o.  :  1950                                          MRN:  [id number]         Date:  10/20/2022      Surgeon: Gui Park):  Nael Joshi MD    Procedure: Procedure(s):  CABG CORONARY ARTERY BYPASS/LIG KELLY/VAS-CATH PLACEMENT    Medications prior to admission:   Prior to Admission medications    Medication Sig Start Date End Date Taking? Authorizing Provider   glimepiride (AMARYL) 4 MG tablet TAKE 2 TABLETS BY MOUTH EVERY MORNING 22   Gardenia Rubi MD   rosuvastatin (CRESTOR) 20 MG tablet TAKE 1 TABLET BY MOUTH DAILY 22   Gardenia Rubi MD   meloxicam (MOBIC) 15 MG tablet TAKE 1 TABLET BY MOUTH EVERY DAY 22   Historical Provider, MD   blood glucose test strips (TRUE METRIX BLOOD GLUCOSE TEST) strip USE 1 STRIP TO TEST BLOOD SUGAR TWICE DAILY 22   Gardenia Rubi MD   mirtazapine (REMERON) 15 MG tablet Take 1.5 tablets by mouth nightly 22   Gardenia Rubi MD   sertraline (ZOLOFT) 100 MG tablet Take 1 tablet by mouth in the morning. 22   Gardenia Rubi MD   traZODone (DESYREL) 50 MG tablet Take 1 tablet by mouth nightly 22   Gardenia Rubi MD   losartan (COZAAR) 100 MG tablet Take 1 tablet by mouth in the morning. 22   Gardenia Rubi MD   amLODIPine (NORVASC) 10 MG tablet Take 1 tablet by mouth in the morning.  22   Gardenia Rubi MD   pantoprazole (PROTONIX) 40 MG tablet TAKE 1 TABLET BY MOUTH EVERY MORNING BEFORE BREAKFAST 22   Gardenia Rubi MD   insulin glargine (LANTUS SOLOSTAR) 100 UNIT/ML injection pen Inject 15 Units into the skin nightly 22   Gardenia Rubi MD   doxazosin (CARDURA) 4 MG tablet TAKE 1 TABLET BY MOUTH EVERY NIGHT 22   Gardenia Rubi MD   fenofibrate (TRIGLIDE) 160 MG tablet Take 1 tablet by mouth daily 22   Gardenia Rubi MD   Insulin Pen Needle (B-D UF III MINI PEN NEEDLES) 31G X 5 MM MISC USE AS DIRECTED TO INJECT ONCE DAILY 3/3/22   Mihaela Stewart MD   [place] 473-90.3-78 MCG/INH AEPB INHALE 1 PUFF INTO THE LUNGS DAILY 10/11/21   [facility] twice daily 6/19/19   Mihaela Stewart MD   albuterol sulfate HFA (VENTOLIN HFA) 108 (90 Base) MCG/ACT inhaler 2 Puff every 4-6 hours as needed for wheezing or shortness of breath 2/7/19   Mihaela Stewart MD   Blood Glucose Monitoring Suppl (PRECISION XTRA) w/Device KIT As directed 1/30/19   Mihaela Stewart MD   Blood Glucose Monitoring Suppl (ONE TOUCH ULTRA 2) W/DEVICE KIT 1 kit by Does not apply route daily as needed. 12/19/14   Mihaela Stewart MD   aspirin 325 MG tablet Take 325 mg by mouth daily. Historical Provider, MD   fish oil-omega-3 fatty acids 1000 MG capsule Take 2 g by mouth daily.     Historical Provider, MD       Current medications:    Current Facility-Administered Medications   Medication Dose Route Frequency Provider Last Rate Last Admin    sodium chloride flush 0.9 % injection 5-40 mL  5-40 mL IntraVENous PRN Consuelo Grayson MD        amLODIPine (NORVASC) tablet 10 mg  10 mg Oral Daily Consuelo Grayson MD        aspirin tablet 325 mg  325 mg Oral Daily Consuelo Grayson MD        doxazosin (CARDURA) tablet 1 mg  1 mg Oral Daily Consuelo Grayson MD   1 mg at 10/19/22 2201    losartan (COZAAR) tablet 100 mg  100 mg Oral Daily Consuelo Grayson MD        rosuvastatin (CRESTOR) tablet 20 mg  20 mg Oral Daily Consuelo Grayson MD        sertraline (ZOLOFT) tablet 100 mg  100 mg Oral Daily Consuelo Grayson MD        sodium chloride flush 0.9 % injection 5-40 mL  5-40 mL IntraVENous 2 times per day Consuelo Grayson MD   10 mL at 10/19/22 2201    0.9 % sodium chloride infusion   IntraVENous PRN Consuelo Grayson MD        acetaminophen (TYLENOL) tablet 650 mg  650 mg Oral Q4H PRN Consuelo Grayson MD   650 mg at 10/20/22 0346    ondansetron [facility] injection 4 mg  4 mg IntraVENous Q6H PRN Krish Correa MD        mometasone-formoterol [facility]) 200-5 MCG/ACT inhaler 2 puff  2 puff Inhalation BID Krish Correa MD   2 puff at 10/19/22 2022    tiotropium (SPIRIVA RESPIMAT) 2.5 MCG/ACT inhaler 2 puff  2 puff Inhalation Daily Krish Correa MD        nitroGLYCERIN 50 mg in dextrose 5% 250 mL infusion  5-200 mcg/min IntraVENous Continuous Krish Correa MD   Stopped at 10/19/22 2323    mirtazapine (REMERON) tablet 15 mg  15 mg Oral Nightly Renaee Huguenin, APRN - CNP   15 mg at 10/19/22 2201    traZODone (DESYREL) tablet 50 mg  50 mg Oral Nightly Renaee Huguenin, APRN - CNP   50 mg at 10/19/22 2201       Allergies:     Allergies   Allergen Reactions    Dilaudid [Hydromorphone Hcl] Other (See Comments)     Mental status changes    Nubain [Nalbuphine Hcl] Rash       Problem List:    Patient Active Problem List   Diagnosis Code    Essential hypertension I10    Chronic kidney disease, stage III (moderate) ([facility]) N18.30    Major depression ([facility] F32.9    Coronary artery disease I25.10    ED (erectile dysfunction) N52.9    Type 2 diabetes mellitus without complication ([address] N84.5    Hypertensive retinopathy of both eyes H35.033    Pulmonary nodule R91.1    DDD (degenerative disc disease), lumbar M51.36    Mixed hyperlipidemia E78.2    Primary insomnia F51.01    Centrilobular emphysema ([address] J43.2    Snoring R06.83    Iron deficiency anemia due to chronic blood loss D50.0    COPD, moderate ([facility]) J44.9    Unstable angina ([facility]) I20.0    Coronary artery disease involving native coronary artery of native heart with unstable angina pectoris ([facility]) I25.110       Past Medical History:        Diagnosis Date    Chronic renal insufficiency     Due to chronic nephrolithiasis    Colloid cyst of third ventricle ([facility])     Coronary artery disease     DDD (degenerative disc disease), lumbar 2006    Disc bulge and facet arthropathy at L3-L4, L5-S1    Diabetes mellitus, type 2 ([address]     Diverticulitis of colon with perforation     Emergency colostomy    ED (erectile dysfunction)     Hyperlipidemia     Hypertension     Hypertensive retinopathy of both eyes 2013    Nephrolithiasis        Past Surgical History:        Procedure Laterality Date    BRAIN SURGERY  2007    Resection of colloid cyst of 3rd ventricle    CARDIAC CATHETERIZATION  , ,  3/08    COLOSTOMY      Emergent- due to diverticulitis with perforation    CORONARY ANGIOPLASTY      RCA- unsuccessful    CORONARY ANGIOPLASTY WITH STENT PLACEMENT  10/05    Obtuse marginal branch of circumflex:  drug-eluting stent    KNEE ARTHROSCOPY  ,     Right    LITHOTRIPSY  1998    Bilateral    LITHOTRIPSY      Bilateral with right stent placement    PARTIAL NEPHRECTOMY      Right    REVISION COLOSTOMY  3/08    Colostomy takedown with sigmoid colectomy and coloproctostomy anastomosis    TOTAL KNEE ARTHROPLASTY Left     UMBILICAL HERNIA REPAIR         Social History:    Social History     Tobacco Use    Smoking status: Former     Packs/day: 1.00     Years: 20.00     Pack years: 20.00     Types: Cigarettes     Quit date: 3/3/1992     Years since quittin.6    Smokeless tobacco: Never   Substance Use Topics    Alcohol use:  No                                Counseling given: Not Answered      Vital Signs (Current):   Vitals:    10/20/22 0400 10/20/22 0600 10/20/22 0755 10/20/22 0758   BP: (!) 154/77  (!) 151/85 (!) 149/81   Pulse: 51  69 69   Resp: 16  16    Temp: 97.2 °F (36.2 °C)  96.9 °F (36.1 °C)    TempSrc: Temporal  Temporal    SpO2: 97%  99% 99%   Weight:  158 lb 11.7 oz (72 kg)     Height:                                                  BP Readings from Last 3 Encounters:   10/20/22 (!) 149/81   22 124/64   22 138/80       NPO Status: BMI:   Wt Readings from Last 3 Encounters:   10/20/22 158 lb 11.7 oz (72 kg)   09/14/22 174 lb (78.9 kg)   07/19/22 182 lb 9.6 oz (82.8 kg)     Body mass index is 21.53 kg/m².     CBC:   Lab Results   Component Value Date/Time    WBC 8.2 10/20/2022 06:25 AM    RBC 3.98 10/20/2022 06:25 AM    HGB 11.8 10/20/2022 06:25 AM    HCT 37.3 10/20/2022 06:25 AM    MCV 93.8 10/20/2022 06:25 AM    RDW 19.1 10/20/2022 06:25 AM     10/20/2022 06:25 AM       CMP:   Lab Results   Component Value Date/Time     10/20/2022 06:25 AM    K 3.9 10/20/2022 06:25 AM     10/20/2022 06:25 AM    CO2 24 10/20/2022 06:25 AM    BUN 13 10/20/2022 06:25 AM    CREATININE 1.2 10/20/2022 06:25 AM    GFRAA >60 07/28/2022 11:03 AM    GFRAA >60 05/10/2013 11:38 AM    AGRATIO 1.3 07/28/2022 11:03 AM    LABGLOM >60 10/20/2022 06:25 AM    GLUCOSE 73 10/20/2022 06:25 AM    PROT 6.5 10/19/2022 09:15 AM    PROT 7.3 01/18/2013 10:05 AM    CALCIUM 9.8 10/20/2022 06:25 AM    BILITOT 0.3 10/19/2022 09:15 AM    ALKPHOS 48 10/19/2022 09:15 AM    AST 13 10/19/2022 09:15 AM    ALT 8 10/19/2022 09:15 AM       POC Tests:   Recent Labs     10/19/22  2208   POCGLU 151*       Coags:   Lab Results   Component Value Date/Time    PROTIME 17.3 10/19/2022 09:15 AM    INR 1.42 10/19/2022 09:15 AM    APTT 67.7 10/19/2022 09:15 AM       HCG (If Applicable): No results found for: PREGTESTUR, PREGSERUM, HCG, HCGQUANT     ABGs:   Lab Results   Component Value Date/Time    PHART 7.338 10/19/2022 09:15 AM    PO2ART 62.2 10/19/2022 09:15 AM    ZGD6QCC 46.1 10/19/2022 09:15 AM    ZBU5RXZ 24.7 10/19/2022 09:15 AM    BEART -1.3 10/19/2022 09:15 AM    W5JDUMNA 89.8 10/19/2022 09:15 AM        Type & Screen (If Applicable):  No results found for: LABABO, LABRH    Drug/Infectious Status (If Applicable):  No results found for: HIV, HEPCAB    COVID-19 Screening (If Applicable): No results found for: COVID19        Anesthesia Evaluation  Patient summary reviewed and Nursing notes reviewed  Airway: Mallampati: II  TM distance: >3 FB   Neck ROM: full  Mouth opening: > = 3 FB   Dental:          Pulmonary:                              Cardiovascular:  Exercise tolerance: poor (<4 METS),   (+) hypertension: moderate, angina:, CAD: obstructive,                   Neuro/Psych:   (+) psychiatric history: stable with treatment            GI/Hepatic/Renal:   (+) renal disease: CRI,           Endo/Other:    (+) DiabetesType II DM, well controlled, , .                 Abdominal:             Vascular: Other Findings:           Anesthesia Plan      general         Induction: intravenous and rapid sequence. arterial line, central line, PA catheter, continuous noninvasive hemodynamic monitor and TIMUR  MIPS: Postoperative opioids intended, Prophylactic antiemetics administered and Postoperative ventilation. Anesthetic plan and risks discussed with patient.               Post-op pain plan if not by surgeon: single peripheral nerve block            Alvina Pulido MD   10/20/2022

## 2022-10-20 NOTE — PROGRESS NOTES
[address]   Cardiology Progress Note     Date: 10/20/2022  Admit Date: 10/19/2022       Chief Complaint: No chief complaint on file. History of Present Illness: History obtained from patient and medical record. Kelly Monzon is a 81 y.o. male who Presented for [facility] for abnormal stress.    (per H&P)    [facility] 10/19/22 with LM and severe MVD    Interval Hx: Today, he is feeling ok. No chest pain or SOB. Tele stable. Right radial procedure site c/d/I. No hematoma or bruising. Good pulses, color, warmth, and sensation to that extremity. Tele stable. Plan for OR tomorrow. Patient seen and examined. Clinical notes reviewed. Telemetry reviewed. No new complaints today. No major events overnight. Denies having chest pain, palpitations, shortness of breath, orthopnea/PND, cough, or dizziness at the time of this visit. Past Medical History:  Past Medical History:   Diagnosis Date    Chronic renal insufficiency     Due to chronic nephrolithiasis    Colloid cyst of third ventricle (HCC)     Coronary artery disease     DDD (degenerative disc disease), lumbar 2006    Disc bulge and facet arthropathy at L3-L4, L5-S1    Diabetes mellitus, type 2 ([facility] [address]     Diverticulitis of colon with perforation 11/07    Emergency colostomy    ED (erectile dysfunction)     Hyperlipidemia     Hypertension     Hypertensive retinopathy of both eyes 1/24/2013    Nephrolithiasis         Past Surgical History:    has a past surgical history that includes Cardiac catheterization (5/02, 12/03,  3/08); Revision Colostomy (3/08); Lithotripsy (1998); Lithotripsy (1994); partial nephrectomy (1980); Coronary angioplasty with stent (10/05); Coronary angioplasty (1995); Umbilical hernia repair; Knee arthroscopy (2000, 2005); colostomy (11/07); Total knee arthroplasty (Left, 9/29/14); and brain surgery (12/13/2007). Social History:  Reviewed. reports that he quit smoking about 30 years ago. His smoking use included cigarettes.  He has a 20.00 pack-year smoking history. He has never used smokeless tobacco. He reports that he does not drink alcohol and does not use drugs. Allergies: Allergies   Allergen Reactions    Dilaudid [Hydromorphone Hcl] Other (See Comments)     Mental status changes    Nubain [Nalbuphine Hcl] Rash       Family History:  Reviewed. family history is not on file. Denies family history of sudden cardiac death, arrhythmia, premature CAD    Home Meds:  Prior to Visit Medications    Medication Sig Taking? Authorizing Provider   glimepiride (AMARYL) 4 MG tablet TAKE 2 TABLETS BY MOUTH EVERY MORNING  Katharine Wakefield MD   rosuvastatin (CRESTOR) 20 MG tablet TAKE 1 TABLET BY MOUTH DAILY  Katharine Wakefield MD   meloxicam (MOBIC) 15 MG tablet TAKE 1 TABLET BY MOUTH EVERY DAY  Historical Provider, MD   blood glucose test strips (TRUE METRIX BLOOD GLUCOSE TEST) strip USE 1 STRIP TO TEST BLOOD SUGAR TWICE DAILY  Katharine Wakefield MD   mirtazapine (REMERON) 15 MG tablet Take 1.5 tablets by mouth nightly  Katharine Wakefield MD   sertraline (ZOLOFT) 100 MG tablet Take 1 tablet by mouth in the morning. Katharine Wakefield MD   traZODone (DESYREL) 50 MG tablet Take 1 tablet by mouth nightly  Katharine Wakefield MD   losartan (COZAAR) 100 MG tablet Take 1 tablet by mouth in the morning. Katharine Wakefield MD   amLODIPine (NORVASC) 10 MG tablet Take 1 tablet by mouth in the morning.   Katharine Wakefield MD   pantoprazole ([address]) 40 MG tablet TAKE 1 TABLET BY MOUTH EVERY MORNING BEFORE BREAKFAST  Katharine Wakefield MD   insulin glargine (LANTUS SOLOSTAR) 100 UNIT/ML injection pen Inject 15 Units into the skin nightly  Katharine Wakefield MD   doxazosin (CARDURA) 4 MG tablet TAKE 1 TABLET BY MOUTH EVERY NIGHT  Katharine Wakefield MD   fenofibrate (TRIGLIDE) 160 MG tablet Take 1 tablet by mouth daily  Katharine Wakefield MD   Insulin Pen Needle (B-D UF III MINI PEN NEEDLES) 31G X 5 MM MISC USE AS DIRECTED TO INJECT ONCE DAILY  Katharine Wakefield MD   [facility] 100-62.5-25 MCG/INH AEPB INHALE 1 PUFF INTO THE LUNGS DAILY  Chiara Llamas MD   [facility]  Mansoor Che MD   albuterol sulfate HFA (VENTOLIN HFA) 108 (90 Base) MCG/ACT inhaler 2 Puff every 4-6 hours as needed for wheezing or shortness of breath  Mansoor Che MD   Blood Glucose Monitoring Suppl (PRECISION XTRA) w/Device KIT As directed  Mansoor Che MD   Blood Glucose Monitoring Suppl (ONE TOUCH ULTRA 2) W/DEVICE KIT 1 kit by Does not apply route daily as needed. Mansoor Che MD   aspirin 325 MG tablet Take 325 mg by mouth daily. Historical Provider, MD   fish oil-omega-3 fatty acids 1000 MG capsule Take 2 g by mouth daily. Historical Provider, MD        Scheduled Meds:   amLODIPine  10 mg Oral Daily    aspirin  325 mg Oral Daily    doxazosin  1 mg Oral Daily    [Held by provider] losartan  100 mg Oral Daily    rosuvastatin  20 mg Oral Daily    sertraline  100 mg Oral Daily    sodium chloride flush  5-40 mL IntraVENous 2 times per day    mometasone-formoterol  2 puff Inhalation BID    tiotropium  2 puff Inhalation Daily    mirtazapine  15 mg Oral Nightly    traZODone  50 mg Oral Nightly     Continuous Infusions:   sodium chloride      nitroGLYCERIN Stopped (10/19/22 7906)     PRN Meds:sodium chloride flush, sodium chloride, acetaminophen, ondansetron     Review of Systems:  Constitutional: Negative for fever, night sweats, chills,  Skin: Negative for rash, pruritus, bleeding, or bruising    HEENT: Negative for vision changes, ringing in the ears, dysphagia  Respiratory: Reviewed in HPI  Cardiovascular: Reviewed in HPI  Gastrointestinal: Negative for abdominal pain, N/V/D, constipation, or black/tarry stools  Genito-Urinary: Negative for dysuria, incontinence, or hematuria  Musculoskeletal: Negative for joint swelling, muscle pain, or injuries  Neurological/Psych: Negative for confusion, seizures, headaches, or TIA-like symptoms.  No anxiety or depression. Physical Examination:  Vitals:    10/20/22 0847   BP: (!) 145/80   Pulse:    Resp:    Temp:    SpO2:       In: 7.3 [I.V.:7.3]  Out: 450    Wt Readings from Last 3 Encounters:   10/20/22 158 lb 11.7 oz (72 kg)   09/14/22 174 lb (78.9 kg)   07/19/22 182 lb 9.6 oz (82.8 kg)       Intake/Output Summary (Last 24 hours) at 10/20/2022 1112  Last data filed at 10/20/2022 0646  Gross per 24 hour   Intake 7.32 ml   Output 450 ml   Net -442.68 ml       Telemetry: Personally Reviewed  - Sinus rhythm   Constitutional: Cooperative and in no apparent distress, and appears well nourished  Skin: Warm and pink; no pallor, cyanosis, clubbing, or bruising   HEENT: Symmetric and normocephalic. PERRL. Conjunctiva pink with clear sclera. Mucus membranes moist.   Cardiovascular: Regular rate and rhythm. S1/S2 present without murmurs, no rubs or gallops. Peripheral pulses 2+, capillary refill < 3 seconds. No elevation of JVP. No peripheral edema  Respiratory: Respirations symmetric and unlabored. Lungs clear to auscultation bilaterally, no wheezing, crackles, or rhonchi  Gastrointestinal: Abdomen soft and round. Bowel sounds active without tenderness. Musculoskeletal: Bilateral upper and lower extremity strength 5/5 with full ROM  Neurologic/Psych: Awake and orientated to person, place and time. Calm affect, appropriate mood    Pertinent labs, diagnostic, device, and imaging results reviewed as a part of this visit    Labs:    BMP:   Recent Labs     10/19/22  0740 10/20/22  0625    139   K 3.5 3.9    106   CO2 27 24   BUN 15 13   CREATININE 1.4* 1.2   MG  --  1.90     Estimated Creatinine Clearance: 58 mL/min (based on SCr of 1.2 mg/dL).    CBC:   Recent Labs     10/19/22  0740 10/20/22  0625   WBC 6.7 8.2   HGB 12.0* 11.8*   HCT 38.7* 37.3*   MCV 94.7 93.8    191     Thyroid:   Lab Results   Component Value Date/Time    TSH 1.56 09/02/2016 09:50 AM     Lipids:   Lab Results   Component Value Date/Time CHOL 164 03/30/2017 10:42 AM    HDL 28 07/28/2022 11:03 AM    HDL 30 05/08/2012 01:59 PM    TRIG 264 03/30/2017 10:42 AM     LFTS:   Lab Results   Component Value Date/Time    ALT 8 10/19/2022 09:15 AM    AST 13 10/19/2022 09:15 AM    ALKPHOS 48 10/19/2022 09:15 AM    PROT 6.5 10/19/2022 09:15 AM    PROT 7.3 01/18/2013 10:05 AM    AGRATIO 1.3 07/28/2022 11:03 AM    BILITOT 0.3 10/19/2022 09:15 AM     Cardiac Enzymes: No results found for: CKTOTAL, CKMB, CKMBINDEX, TROPONINI  Coags:   Lab Results   Component Value Date/Time    PROTIME 17.3 10/19/2022 09:15 AM    INR 1.42 10/19/2022 09:15 AM       ECG: 10/19/22  Sinus bradycardia  Possible Inferior infarct (cited on or before 19-OCT-2022)  Abnormal ECG  When compared with ECG of 19-OCT-2022 07:34,  Premature ventricular complexes are no longer Present    ECHO:  11/2019   Summary   Normal left ventricle size, wall thickness, and systolic function with an   estimated ejection fraction of 60%. No regional wall motion abnormalities are seen. Normal diastolic function. Thickened mitral valve without evidence of stenosis. There is moderate   mitral regurgitation noted. There is mild tricuspid regurgitation with a PASP estimation of 45 mmHg. Mild pulmonary hypertension. Stress Test: 9/23/22  Summary    *Abnormal baseline EKG with inferolateral ST depression, minimal change with    lexiscan    *Normal LV size and function with EF of 61%    *SPECT imaging with moderate sized, moderate intensity decreased tracer    distribution in the apical inferolateral wall with stress with partial    redistribution at rest consistent with mixed ischemia and scar. Impression    *Abnormal baseline EKG with inferolateral ST depression, consider ischemia    *Normal LV function with EF 61%    *Abnormal myocardial perfusion imaging with apical inferolateral mixed    ischemia and scar.      Cath: 10/19/22  Findings  Artery Findings/Result   LM 70% distal, 20% ostial   LAD 20% mid   Cx 70% mid, patent mid stent   RI N/A   % prox with R-R and L-R collaterals   LVEDP 6   LVG 55%      Intervention(s)  None     Post Cath Dx:       MVD including LM, CABG referral  Consider CABG LAD, Cx, RCA, KELLY clip    Problem List:   Patient Active Problem List    Diagnosis Date Noted    Unstable angina ([address] 10/19/2022    Coronary artery disease involving native coronary artery of native heart with unstable angina pectoris ([address] 10/19/2022    COPD, moderate ([address] 10/10/2022    Iron deficiency anemia due to chronic blood loss 03/02/2022    Snoring 12/03/2019    Centrilobular emphysema ([address] 07/23/2019    Primary insomnia 01/11/2019    Mixed hyperlipidemia 09/15/2017    Pulmonary nodule 04/24/2015    Hypertensive retinopathy of both eyes 01/24/2013    Type 2 diabetes mellitus without complication [facility])     ED (erectile dysfunction) 01/31/2012    Essential hypertension 10/26/2010    Chronic kidney disease, stage III (moderate) ([address] 10/26/2010    Major depression ([address] 10/26/2010    Coronary artery disease     DDD (degenerative disc disease), lumbar 01/01/2006        Assessment and Plan:     Coronary artery disease   ~ hx of IWMI s/p Cx PCI '15  ~ abnormal stress test   ~ [facility] 10/19/22: revealed LM and MVD  ~ CTS consulted for surgical revascularization evaluation. Plan for CABG tomorrow. CKD   ~ creatinine stable today   ~ per nephrology     HTN- stable   HLD- LDL controlled, trig elevated on crestor 20  IDDM  COPD     Multiple medical conditions with risk of decompensation. All pertinent information and plan of care discussed with the physician. All questions and concerns were addressed to the patient. Alternatives to my treatment were discussed. I have discussed the above stated plan with patient and the nurse. The patient verbalized understanding and agreed with the plan. Thank you for allowing to us to participate in the care of Renae Denver.     Total visit time > 35 minutes; > 50% spend counseling / coordinating care. I reviewed interval history, physical exam, review of data including labs, imaging, development and implementation of treatment plan and coordination of complex care. Counseled on risk factor modifications. Agustin REYNOSO  [facility]   Office: [phone]    NOTE:  This report was transcribed using voice recognition software. Every effort was made to ensure accuracy; however, inadvertent computerized transcription errors may be present.

## 2022-10-21 ENCOUNTER — APPOINTMENT (OUTPATIENT)
Dept: GENERAL RADIOLOGY | Age: 72
DRG: 233 | End: 2022-10-21
Attending: INTERNAL MEDICINE
Payer: MEDICARE

## 2022-10-21 ENCOUNTER — ANESTHESIA (OUTPATIENT)
Dept: OPERATING ROOM | Age: 72
End: 2022-10-21
Payer: MEDICARE

## 2022-10-21 PROBLEM — E78.5 DYSLIPIDEMIA: Status: ACTIVE | Noted: 2022-10-21

## 2022-10-21 PROBLEM — Z72.0 TOBACCO ABUSE DISORDER: Status: ACTIVE | Noted: 2022-10-21

## 2022-10-21 LAB
ACTIVATED CLOTTING TIME: 113 SEC (ref 99–130)
ACTIVATED CLOTTING TIME: 126 SEC (ref 99–130)
ACTIVATED CLOTTING TIME: 427 SEC (ref 99–130)
ACTIVATED CLOTTING TIME: 456 SEC (ref 99–130)
ACTIVATED CLOTTING TIME: 493 SEC (ref 99–130)
ACTIVATED CLOTTING TIME: 499 SEC (ref 99–130)
ACTIVATED CLOTTING TIME: 525 SEC (ref 99–130)
ACTIVATED CLOTTING TIME: 546 SEC (ref 99–130)
ANION GAP SERPL CALCULATED.3IONS-SCNC: 7 MMOL/L (ref 3–16)
APTT: 35.8 SEC (ref 23–34.3)
BASE EXCESS ARTERIAL: -1 (ref -3–3)
BASE EXCESS ARTERIAL: -2 (ref -3–3)
BASE EXCESS ARTERIAL: -3 (ref -3–3)
BASE EXCESS ARTERIAL: 0 (ref -3–3)
BASE EXCESS ARTERIAL: 0 (ref -3–3)
BASE EXCESS ARTERIAL: 1 (ref -3–3)
BUN BLDV-MCNC: 17 MG/DL (ref 7–20)
CALCIUM IONIZED: 1.05 MMOL/L (ref 1.12–1.32)
CALCIUM IONIZED: 1.06 MMOL/L (ref 1.12–1.32)
CALCIUM IONIZED: 1.08 MMOL/L (ref 1.12–1.32)
CALCIUM IONIZED: 1.13 MMOL/L (ref 1.12–1.32)
CALCIUM IONIZED: 1.24 MMOL/L (ref 1.12–1.32)
CALCIUM IONIZED: 1.3 MMOL/L (ref 1.12–1.32)
CALCIUM IONIZED: 1.35 MMOL/L (ref 1.12–1.32)
CALCIUM IONIZED: 1.4 MMOL/L (ref 1.12–1.32)
CALCIUM SERPL-MCNC: 9.1 MG/DL (ref 8.3–10.6)
CHLORIDE BLD-SCNC: 109 MMOL/L (ref 99–110)
CO2: 25 MMOL/L (ref 21–32)
CREAT SERPL-MCNC: 1.6 MG/DL (ref 0.8–1.3)
ESTIMATED AVERAGE GLUCOSE: 159.9 MG/DL
GFR SERPL CREATININE-BSD FRML MDRD: 46 ML/MIN/{1.73_M2}
GLUCOSE BLD-MCNC: 102 MG/DL (ref 70–99)
GLUCOSE BLD-MCNC: 103 MG/DL (ref 70–99)
GLUCOSE BLD-MCNC: 107 MG/DL (ref 70–99)
GLUCOSE BLD-MCNC: 112 MG/DL (ref 70–99)
GLUCOSE BLD-MCNC: 117 MG/DL (ref 70–99)
GLUCOSE BLD-MCNC: 117 MG/DL (ref 70–99)
GLUCOSE BLD-MCNC: 123 MG/DL (ref 70–99)
GLUCOSE BLD-MCNC: 153 MG/DL (ref 70–99)
GLUCOSE BLD-MCNC: 154 MG/DL (ref 70–99)
GLUCOSE BLD-MCNC: 155 MG/DL (ref 70–99)
GLUCOSE BLD-MCNC: 162 MG/DL (ref 70–99)
GLUCOSE BLD-MCNC: 166 MG/DL (ref 70–99)
GLUCOSE BLD-MCNC: 172 MG/DL (ref 70–99)
GLUCOSE BLD-MCNC: 183 MG/DL (ref 70–99)
GLUCOSE BLD-MCNC: 189 MG/DL (ref 70–99)
GLUCOSE BLD-MCNC: 190 MG/DL (ref 70–99)
GLUCOSE BLD-MCNC: 199 MG/DL (ref 70–99)
HBA1C MFR BLD: 7.2 %
HCO3 ARTERIAL: 22.6 MMOL/L (ref 21–29)
HCO3 ARTERIAL: 23.4 MMOL/L (ref 21–29)
HCO3 ARTERIAL: 24.1 MMOL/L (ref 21–29)
HCO3 ARTERIAL: 24.2 MMOL/L (ref 21–29)
HCO3 ARTERIAL: 24.4 MMOL/L (ref 21–29)
HCO3 ARTERIAL: 24.4 MMOL/L (ref 21–29)
HCO3 ARTERIAL: 24.5 MMOL/L (ref 21–29)
HCO3 ARTERIAL: 24.6 MMOL/L (ref 21–29)
HCT VFR BLD CALC: 33.6 % (ref 40.5–52.5)
HEMOGLOBIN: 10.1 GM/DL (ref 13.5–17.5)
HEMOGLOBIN: 10.2 G/DL (ref 13.5–17.5)
HEMOGLOBIN: 11.2 GM/DL (ref 13.5–17.5)
HEMOGLOBIN: 11.3 GM/DL (ref 13.5–17.5)
HEMOGLOBIN: 11.5 GM/DL (ref 13.5–17.5)
HEMOGLOBIN: 7.5 GM/DL (ref 13.5–17.5)
HEMOGLOBIN: 7.7 GM/DL (ref 13.5–17.5)
HEMOGLOBIN: 8 GM/DL (ref 13.5–17.5)
HEMOGLOBIN: 8.1 GM/DL (ref 13.5–17.5)
HEMOGLOBIN: 8.6 GM/DL (ref 13.5–17.5)
INR BLD: 1.57 (ref 0.87–1.14)
LACTATE: 0.97 MMOL/L (ref 0.4–2)
LACTATE: 1.01 MMOL/L (ref 0.4–2)
LACTATE: 1.15 MMOL/L (ref 0.4–2)
LACTATE: 1.35 MMOL/L (ref 0.4–2)
LACTATE: 1.72 MMOL/L (ref 0.4–2)
LACTATE: 1.76 MMOL/L (ref 0.4–2)
LACTATE: 1.76 MMOL/L (ref 0.4–2)
LACTATE: 1.79 MMOL/L (ref 0.4–2)
MAGNESIUM: 3.4 MG/DL (ref 1.8–2.4)
MCH RBC QN AUTO: 29 PG (ref 26–34)
MCHC RBC AUTO-ENTMCNC: 30.5 G/DL (ref 31–36)
MCV RBC AUTO: 94.9 FL (ref 80–100)
O2 SAT, ARTERIAL: 100 % (ref 93–100)
O2 SAT, ARTERIAL: 96 % (ref 93–100)
O2 SAT, ARTERIAL: 97 % (ref 93–100)
O2 SAT, ARTERIAL: 97 % (ref 93–100)
PCO2 ARTERIAL: 34.3 MM HG (ref 35–45)
PCO2 ARTERIAL: 36.1 MM HG (ref 35–45)
PCO2 ARTERIAL: 36.1 MM HG (ref 35–45)
PCO2 ARTERIAL: 40 MM HG (ref 35–45)
PCO2 ARTERIAL: 40.5 MM HG (ref 35–45)
PCO2 ARTERIAL: 41.3 MM HG (ref 35–45)
PCO2 ARTERIAL: 42.3 MM HG (ref 35–45)
PCO2 ARTERIAL: 45.4 MM HG (ref 35–45)
PCO2 ARTERIAL: 46.1 MM HG (ref 35–45)
PCO2 ARTERIAL: 48.3 MM HG (ref 35–45)
PDW BLD-RTO: 19.7 % (ref 12.4–15.4)
PERFORMED ON: ABNORMAL
PH ARTERIAL: 7.31 (ref 7.35–7.45)
PH ARTERIAL: 7.33 (ref 7.35–7.45)
PH ARTERIAL: 7.34 (ref 7.35–7.45)
PH ARTERIAL: 7.34 (ref 7.35–7.45)
PH ARTERIAL: 7.37 (ref 7.35–7.45)
PH ARTERIAL: 7.38 (ref 7.35–7.45)
PH ARTERIAL: 7.39 (ref 7.35–7.45)
PH ARTERIAL: 7.43 (ref 7.35–7.45)
PH ARTERIAL: 7.43 (ref 7.35–7.45)
PH ARTERIAL: 7.46 (ref 7.35–7.45)
PLATELET # BLD: 159 K/UL (ref 135–450)
PMV BLD AUTO: 7.5 FL (ref 5–10.5)
PO2 ARTERIAL: 101.1 MM HG (ref 75–108)
PO2 ARTERIAL: 192.2 MM HG (ref 75–108)
PO2 ARTERIAL: 330.3 MM HG (ref 75–108)
PO2 ARTERIAL: 383.8 MM HG (ref 75–108)
PO2 ARTERIAL: 402.4 MM HG (ref 75–108)
PO2 ARTERIAL: 439.3 MM HG (ref 75–108)
PO2 ARTERIAL: 472.5 MM HG (ref 75–108)
PO2 ARTERIAL: 512.4 MM HG (ref 75–108)
PO2 ARTERIAL: 83.6 MM HG (ref 75–108)
PO2 ARTERIAL: 89.6 MM HG (ref 75–108)
POC HEMATOCRIT: 22 % (ref 40.5–52.5)
POC HEMATOCRIT: 23 % (ref 40.5–52.5)
POC HEMATOCRIT: 23 % (ref 40.5–52.5)
POC HEMATOCRIT: 24 % (ref 40.5–52.5)
POC HEMATOCRIT: 25 % (ref 40.5–52.5)
POC HEMATOCRIT: 30 % (ref 40.5–52.5)
POC HEMATOCRIT: 33 % (ref 40.5–52.5)
POC HEMATOCRIT: 33 % (ref 40.5–52.5)
POC HEMATOCRIT: 34 % (ref 40.5–52.5)
POC POTASSIUM: 3.7 MMOL/L (ref 3.5–5.1)
POC POTASSIUM: 4.1 MMOL/L (ref 3.5–5.1)
POC POTASSIUM: 4.1 MMOL/L (ref 3.5–5.1)
POC POTASSIUM: 4.2 MMOL/L (ref 3.5–5.1)
POC POTASSIUM: 4.6 MMOL/L (ref 3.5–5.1)
POC POTASSIUM: 4.6 MMOL/L (ref 3.5–5.1)
POC POTASSIUM: 4.7 MMOL/L (ref 3.5–5.1)
POC POTASSIUM: 4.8 MMOL/L (ref 3.5–5.1)
POC POTASSIUM: 5.6 MMOL/L (ref 3.5–5.1)
POC SAMPLE TYPE: ABNORMAL
POC SODIUM: 137 MMOL/L (ref 136–145)
POC SODIUM: 138 MMOL/L (ref 136–145)
POC SODIUM: 140 MMOL/L (ref 136–145)
POC SODIUM: 141 MMOL/L (ref 136–145)
POC SODIUM: 142 MMOL/L (ref 136–145)
POTASSIUM SERPL-SCNC: 4.7 MMOL/L (ref 3.5–5.1)
PROTHROMBIN TIME: 18.7 SEC (ref 11.7–14.5)
RBC # BLD: 3.54 M/UL (ref 4.2–5.9)
SODIUM BLD-SCNC: 141 MMOL/L (ref 136–145)
TCO2 ARTERIAL: 24 MMOL/L
TCO2 ARTERIAL: 25 MMOL/L
TCO2 ARTERIAL: 26 MMOL/L
WBC # BLD: 19.6 K/UL (ref 4–11)

## 2022-10-21 PROCEDURE — 6370000000 HC RX 637 (ALT 250 FOR IP): Performed by: THORACIC SURGERY (CARDIOTHORACIC VASCULAR SURGERY)

## 2022-10-21 PROCEDURE — 2000000000 HC ICU R&B

## 2022-10-21 PROCEDURE — 82947 ASSAY GLUCOSE BLOOD QUANT: CPT

## 2022-10-21 PROCEDURE — 6370000000 HC RX 637 (ALT 250 FOR IP): Performed by: INTERNAL MEDICINE

## 2022-10-21 PROCEDURE — 94640 AIRWAY INHALATION TREATMENT: CPT

## 2022-10-21 PROCEDURE — P9045 ALBUMIN (HUMAN), 5%, 250 ML: HCPCS | Performed by: THORACIC SURGERY (CARDIOTHORACIC VASCULAR SURGERY)

## 2022-10-21 PROCEDURE — C1729 CATH, DRAINAGE: HCPCS | Performed by: THORACIC SURGERY (CARDIOTHORACIC VASCULAR SURGERY)

## 2022-10-21 PROCEDURE — 33508 ENDOSCOPIC VEIN HARVEST: CPT

## 2022-10-21 PROCEDURE — C1786 PMKR, SINGLE, RATE-RESP: HCPCS | Performed by: THORACIC SURGERY (CARDIOTHORACIC VASCULAR SURGERY)

## 2022-10-21 PROCEDURE — 3700000001 HC ADD 15 MINUTES (ANESTHESIA): Performed by: THORACIC SURGERY (CARDIOTHORACIC VASCULAR SURGERY)

## 2022-10-21 PROCEDURE — 6370000000 HC RX 637 (ALT 250 FOR IP): Performed by: NURSE PRACTITIONER

## 2022-10-21 PROCEDURE — 33518 CABG ARTERY-VEIN TWO: CPT | Performed by: THORACIC SURGERY (CARDIOTHORACIC VASCULAR SURGERY)

## 2022-10-21 PROCEDURE — 6360000002 HC RX W HCPCS: Performed by: NURSE PRACTITIONER

## 2022-10-21 PROCEDURE — 021109W BYPASS CORONARY ARTERY, TWO ARTERIES FROM AORTA WITH AUTOLOGOUS VENOUS TISSUE, OPEN APPROACH: ICD-10-PCS | Performed by: THORACIC SURGERY (CARDIOTHORACIC VASCULAR SURGERY)

## 2022-10-21 PROCEDURE — 33533 CABG ARTERIAL SINGLE: CPT

## 2022-10-21 PROCEDURE — 2580000003 HC RX 258: Performed by: ANESTHESIOLOGY

## 2022-10-21 PROCEDURE — 71045 X-RAY EXAM CHEST 1 VIEW: CPT

## 2022-10-21 PROCEDURE — 33533 CABG ARTERIAL SINGLE: CPT | Performed by: THORACIC SURGERY (CARDIOTHORACIC VASCULAR SURGERY)

## 2022-10-21 PROCEDURE — 76942 ECHO GUIDE FOR BIOPSY: CPT | Performed by: ANESTHESIOLOGY

## 2022-10-21 PROCEDURE — 83605 ASSAY OF LACTIC ACID: CPT

## 2022-10-21 PROCEDURE — 2709999900 HC NON-CHARGEABLE SUPPLY: Performed by: THORACIC SURGERY (CARDIOTHORACIC VASCULAR SURGERY)

## 2022-10-21 PROCEDURE — 33268 EXCL LAA OPN OTH PX ANY METH: CPT | Performed by: THORACIC SURGERY (CARDIOTHORACIC VASCULAR SURGERY)

## 2022-10-21 PROCEDURE — 82803 BLOOD GASES ANY COMBINATION: CPT

## 2022-10-21 PROCEDURE — 06BQ4ZZ EXCISION OF LEFT SAPHENOUS VEIN, PERCUTANEOUS ENDOSCOPIC APPROACH: ICD-10-PCS | Performed by: THORACIC SURGERY (CARDIOTHORACIC VASCULAR SURGERY)

## 2022-10-21 PROCEDURE — 2500000003 HC RX 250 WO HCPCS: Performed by: THORACIC SURGERY (CARDIOTHORACIC VASCULAR SURGERY)

## 2022-10-21 PROCEDURE — 2700000000 HC OXYGEN THERAPY PER DAY

## 2022-10-21 PROCEDURE — 94761 N-INVAS EAR/PLS OXIMETRY MLT: CPT

## 2022-10-21 PROCEDURE — 85610 PROTHROMBIN TIME: CPT

## 2022-10-21 PROCEDURE — 6360000002 HC RX W HCPCS: Performed by: THORACIC SURGERY (CARDIOTHORACIC VASCULAR SURGERY)

## 2022-10-21 PROCEDURE — C9113 INJ PANTOPRAZOLE SODIUM, VIA: HCPCS | Performed by: NURSE PRACTITIONER

## 2022-10-21 PROCEDURE — C1750 CATH, HEMODIALYSIS,LONG-TERM: HCPCS | Performed by: THORACIC SURGERY (CARDIOTHORACIC VASCULAR SURGERY)

## 2022-10-21 PROCEDURE — 85347 COAGULATION TIME ACTIVATED: CPT

## 2022-10-21 PROCEDURE — B24BZZ4 ULTRASONOGRAPHY OF HEART WITH AORTA, TRANSESOPHAGEAL: ICD-10-PCS | Performed by: THORACIC SURGERY (CARDIOTHORACIC VASCULAR SURGERY)

## 2022-10-21 PROCEDURE — 85027 COMPLETE CBC AUTOMATED: CPT

## 2022-10-21 PROCEDURE — 3700000000 HC ANESTHESIA ATTENDED CARE: Performed by: THORACIC SURGERY (CARDIOTHORACIC VASCULAR SURGERY)

## 2022-10-21 PROCEDURE — 33518 CABG ARTERY-VEIN TWO: CPT

## 2022-10-21 PROCEDURE — 2500000003 HC RX 250 WO HCPCS: Performed by: ANESTHESIOLOGY

## 2022-10-21 PROCEDURE — 02100Z9 BYPASS CORONARY ARTERY, ONE ARTERY FROM LEFT INTERNAL MAMMARY, OPEN APPROACH: ICD-10-PCS | Performed by: THORACIC SURGERY (CARDIOTHORACIC VASCULAR SURGERY)

## 2022-10-21 PROCEDURE — 02L70CK OCCLUSION OF LEFT ATRIAL APPENDAGE WITH EXTRALUMINAL DEVICE, OPEN APPROACH: ICD-10-PCS | Performed by: THORACIC SURGERY (CARDIOTHORACIC VASCULAR SURGERY)

## 2022-10-21 PROCEDURE — 76998 US GUIDE INTRAOP: CPT | Performed by: THORACIC SURGERY (CARDIOTHORACIC VASCULAR SURGERY)

## 2022-10-21 PROCEDURE — 80048 BASIC METABOLIC PNL TOTAL CA: CPT

## 2022-10-21 PROCEDURE — 84132 ASSAY OF SERUM POTASSIUM: CPT

## 2022-10-21 PROCEDURE — A4216 STERILE WATER/SALINE, 10 ML: HCPCS | Performed by: THORACIC SURGERY (CARDIOTHORACIC VASCULAR SURGERY)

## 2022-10-21 PROCEDURE — 3600000018 HC SURGERY OHS ADDTL 15MIN: Performed by: THORACIC SURGERY (CARDIOTHORACIC VASCULAR SURGERY)

## 2022-10-21 PROCEDURE — 82330 ASSAY OF CALCIUM: CPT

## 2022-10-21 PROCEDURE — 33508 ENDOSCOPIC VEIN HARVEST: CPT | Performed by: THORACIC SURGERY (CARDIOTHORACIC VASCULAR SURGERY)

## 2022-10-21 PROCEDURE — 84295 ASSAY OF SERUM SODIUM: CPT

## 2022-10-21 PROCEDURE — 2580000003 HC RX 258: Performed by: NURSE PRACTITIONER

## 2022-10-21 PROCEDURE — 2720000010 HC SURG SUPPLY STERILE: Performed by: THORACIC SURGERY (CARDIOTHORACIC VASCULAR SURGERY)

## 2022-10-21 PROCEDURE — C1889 IMPLANT/INSERT DEVICE, NOC: HCPCS | Performed by: THORACIC SURGERY (CARDIOTHORACIC VASCULAR SURGERY)

## 2022-10-21 PROCEDURE — 2580000003 HC RX 258: Performed by: THORACIC SURGERY (CARDIOTHORACIC VASCULAR SURGERY)

## 2022-10-21 PROCEDURE — 2580000003 HC RX 258: Performed by: INTERNAL MEDICINE

## 2022-10-21 PROCEDURE — 3600000008 HC SURGERY OHS BASE: Performed by: THORACIC SURGERY (CARDIOTHORACIC VASCULAR SURGERY)

## 2022-10-21 PROCEDURE — 83735 ASSAY OF MAGNESIUM: CPT

## 2022-10-21 PROCEDURE — 85730 THROMBOPLASTIN TIME PARTIAL: CPT

## 2022-10-21 PROCEDURE — C1751 CATH, INF, PER/CENT/MIDLINE: HCPCS | Performed by: THORACIC SURGERY (CARDIOTHORACIC VASCULAR SURGERY)

## 2022-10-21 PROCEDURE — 33268 EXCL LAA OPN OTH PX ANY METH: CPT

## 2022-10-21 PROCEDURE — 94002 VENT MGMT INPAT INIT DAY: CPT

## 2022-10-21 PROCEDURE — P9045 ALBUMIN (HUMAN), 5%, 250 ML: HCPCS | Performed by: ANESTHESIOLOGY

## 2022-10-21 PROCEDURE — 85014 HEMATOCRIT: CPT

## 2022-10-21 PROCEDURE — 37799 UNLISTED PX VASCULAR SURGERY: CPT

## 2022-10-21 PROCEDURE — 94150 VITAL CAPACITY TEST: CPT

## 2022-10-21 PROCEDURE — 5A1221Z PERFORMANCE OF CARDIAC OUTPUT, CONTINUOUS: ICD-10-PCS | Performed by: THORACIC SURGERY (CARDIOTHORACIC VASCULAR SURGERY)

## 2022-10-21 PROCEDURE — 6360000002 HC RX W HCPCS: Performed by: ANESTHESIOLOGY

## 2022-10-21 DEVICE — IMPLANTABLE DEVICE: Type: IMPLANTABLE DEVICE | Status: FUNCTIONAL

## 2022-10-21 RX ORDER — FONDAPARINUX SODIUM 2.5 MG/.5ML
2.5 INJECTION SUBCUTANEOUS DAILY
Status: DISCONTINUED | OUTPATIENT
Start: 2022-10-22 | End: 2022-10-25

## 2022-10-21 RX ORDER — LIDOCAINE HYDROCHLORIDE 20 MG/ML
INJECTION, SOLUTION INTRAVENOUS PRN
Status: DISCONTINUED | OUTPATIENT
Start: 2022-10-21 | End: 2022-10-21 | Stop reason: SDUPTHER

## 2022-10-21 RX ORDER — ONDANSETRON 2 MG/ML
INJECTION INTRAMUSCULAR; INTRAVENOUS PRN
Status: DISCONTINUED | OUTPATIENT
Start: 2022-10-21 | End: 2022-10-21 | Stop reason: SDUPTHER

## 2022-10-21 RX ORDER — PROPOFOL 10 MG/ML
INJECTION, EMULSION INTRAVENOUS PRN
Status: DISCONTINUED | OUTPATIENT
Start: 2022-10-21 | End: 2022-10-21 | Stop reason: SDUPTHER

## 2022-10-21 RX ORDER — SODIUM CHLORIDE 9 MG/ML
INJECTION, SOLUTION INTRAVENOUS CONTINUOUS PRN
Status: DISCONTINUED | OUTPATIENT
Start: 2022-10-21 | End: 2022-10-21 | Stop reason: SDUPTHER

## 2022-10-21 RX ORDER — AMINOCAPROIC ACID 250 MG/ML
INJECTION, SOLUTION INTRAVENOUS PRN
Status: DISCONTINUED | OUTPATIENT
Start: 2022-10-21 | End: 2022-10-21 | Stop reason: SDUPTHER

## 2022-10-21 RX ORDER — VECURONIUM BROMIDE 1 MG/ML
INJECTION, POWDER, LYOPHILIZED, FOR SOLUTION INTRAVENOUS PRN
Status: DISCONTINUED | OUTPATIENT
Start: 2022-10-21 | End: 2022-10-21 | Stop reason: SDUPTHER

## 2022-10-21 RX ORDER — SUCCINYLCHOLINE/SOD CL,ISO/PF 200MG/10ML
SYRINGE (ML) INTRAVENOUS PRN
Status: DISCONTINUED | OUTPATIENT
Start: 2022-10-21 | End: 2022-10-21 | Stop reason: SDUPTHER

## 2022-10-21 RX ORDER — HEPARIN SODIUM 1000 [USP'U]/ML
INJECTION, SOLUTION INTRAVENOUS; SUBCUTANEOUS PRN
Status: DISCONTINUED | OUTPATIENT
Start: 2022-10-21 | End: 2022-10-21 | Stop reason: SDUPTHER

## 2022-10-21 RX ORDER — SODIUM CHLORIDE 9 MG/ML
INJECTION, SOLUTION INTRAVENOUS PRN
Status: DISCONTINUED | OUTPATIENT
Start: 2022-10-21 | End: 2022-11-01 | Stop reason: HOSPADM

## 2022-10-21 RX ORDER — MAGNESIUM SULFATE HEPTAHYDRATE 500 MG/ML
INJECTION, SOLUTION INTRAMUSCULAR; INTRAVENOUS PRN
Status: DISCONTINUED | OUTPATIENT
Start: 2022-10-21 | End: 2022-10-21 | Stop reason: SDUPTHER

## 2022-10-21 RX ORDER — ALBUTEROL SULFATE 90 UG/1
2 AEROSOL, METERED RESPIRATORY (INHALATION) EVERY 6 HOURS PRN
Status: DISCONTINUED | OUTPATIENT
Start: 2022-10-21 | End: 2022-10-23

## 2022-10-21 RX ORDER — MORPHINE SULFATE 4 MG/ML
4 INJECTION, SOLUTION INTRAMUSCULAR; INTRAVENOUS
Status: DISCONTINUED | OUTPATIENT
Start: 2022-10-21 | End: 2022-10-22

## 2022-10-21 RX ORDER — MORPHINE SULFATE 2 MG/ML
2 INJECTION, SOLUTION INTRAMUSCULAR; INTRAVENOUS
Status: DISCONTINUED | OUTPATIENT
Start: 2022-10-21 | End: 2022-10-22

## 2022-10-21 RX ORDER — NOREPINEPHRINE BIT/0.9 % NACL 16MG/250ML
1-100 INFUSION BOTTLE (ML) INTRAVENOUS CONTINUOUS PRN
Status: COMPLETED | OUTPATIENT
Start: 2022-10-21 | End: 2022-10-21

## 2022-10-21 RX ORDER — OXYCODONE HYDROCHLORIDE 5 MG/1
10 TABLET ORAL EVERY 4 HOURS PRN
Status: DISCONTINUED | OUTPATIENT
Start: 2022-10-21 | End: 2022-10-25

## 2022-10-21 RX ORDER — ONDANSETRON 2 MG/ML
4 INJECTION INTRAMUSCULAR; INTRAVENOUS EVERY 6 HOURS PRN
Status: DISCONTINUED | OUTPATIENT
Start: 2022-10-21 | End: 2022-11-01 | Stop reason: HOSPADM

## 2022-10-21 RX ORDER — METHOCARBAMOL 750 MG/1
750 TABLET, FILM COATED ORAL 4 TIMES DAILY
Status: DISCONTINUED | OUTPATIENT
Start: 2022-10-21 | End: 2022-10-25

## 2022-10-21 RX ORDER — CHLORHEXIDINE GLUCONATE 0.12 MG/ML
15 RINSE ORAL 2 TIMES DAILY
Status: DISCONTINUED | OUTPATIENT
Start: 2022-10-21 | End: 2022-10-24

## 2022-10-21 RX ORDER — ONDANSETRON 4 MG/1
4 TABLET, ORALLY DISINTEGRATING ORAL EVERY 8 HOURS PRN
Status: DISCONTINUED | OUTPATIENT
Start: 2022-10-21 | End: 2022-11-01 | Stop reason: HOSPADM

## 2022-10-21 RX ORDER — SODIUM CHLORIDE, SODIUM GLUCONATE, SODIUM ACETATE, POTASSIUM CHLORIDE AND MAGNESIUM CHLORIDE 526; 502; 368; 37; 30 MG/100ML; MG/100ML; MG/100ML; MG/100ML; MG/100ML
INJECTION, SOLUTION INTRAVENOUS CONTINUOUS PRN
Status: COMPLETED | OUTPATIENT
Start: 2022-10-21 | End: 2022-10-21

## 2022-10-21 RX ORDER — FUROSEMIDE 10 MG/ML
INJECTION INTRAMUSCULAR; INTRAVENOUS PRN
Status: DISCONTINUED | OUTPATIENT
Start: 2022-10-21 | End: 2022-10-21 | Stop reason: SDUPTHER

## 2022-10-21 RX ORDER — DOBUTAMINE HYDROCHLORIDE 200 MG/100ML
INJECTION INTRAVENOUS CONTINUOUS PRN
Status: DISCONTINUED | OUTPATIENT
Start: 2022-10-21 | End: 2022-10-21 | Stop reason: SDUPTHER

## 2022-10-21 RX ORDER — NITROGLYCERIN 40 MG/1
1 PATCH TRANSDERMAL DAILY
Status: DISCONTINUED | OUTPATIENT
Start: 2022-10-22 | End: 2022-10-22

## 2022-10-21 RX ORDER — SODIUM CHLORIDE, SODIUM LACTATE, POTASSIUM CHLORIDE, CALCIUM CHLORIDE 600; 310; 30; 20 MG/100ML; MG/100ML; MG/100ML; MG/100ML
INJECTION, SOLUTION INTRAVENOUS CONTINUOUS
Status: DISCONTINUED | OUTPATIENT
Start: 2022-10-21 | End: 2022-10-21

## 2022-10-21 RX ORDER — FUROSEMIDE 10 MG/ML
40 INJECTION INTRAMUSCULAR; INTRAVENOUS
Status: DISCONTINUED | OUTPATIENT
Start: 2022-10-21 | End: 2022-10-22

## 2022-10-21 RX ORDER — NITROGLYCERIN 20 MG/100ML
10 INJECTION INTRAVENOUS CONTINUOUS PRN
Status: DISCONTINUED | OUTPATIENT
Start: 2022-10-21 | End: 2022-10-22

## 2022-10-21 RX ORDER — SODIUM CHLORIDE 9 MG/ML
INJECTION, SOLUTION INTRAVENOUS CONTINUOUS
Status: DISCONTINUED | OUTPATIENT
Start: 2022-10-21 | End: 2022-10-23

## 2022-10-21 RX ORDER — FAMOTIDINE 20 MG/1
20 TABLET, FILM COATED ORAL 2 TIMES DAILY
Status: DISCONTINUED | OUTPATIENT
Start: 2022-10-21 | End: 2022-10-25

## 2022-10-21 RX ORDER — MAGNESIUM HYDROXIDE 1200 MG/15ML
LIQUID ORAL CONTINUOUS PRN
Status: COMPLETED | OUTPATIENT
Start: 2022-10-21 | End: 2022-10-21

## 2022-10-21 RX ORDER — ASPIRIN 81 MG/1
81 TABLET ORAL DAILY
Status: DISCONTINUED | OUTPATIENT
Start: 2022-10-22 | End: 2022-10-21 | Stop reason: DRUGHIGH

## 2022-10-21 RX ORDER — FENTANYL CITRATE 50 UG/ML
INJECTION, SOLUTION INTRAMUSCULAR; INTRAVENOUS PRN
Status: DISCONTINUED | OUTPATIENT
Start: 2022-10-21 | End: 2022-10-21 | Stop reason: SDUPTHER

## 2022-10-21 RX ORDER — ESMOLOL HYDROCHLORIDE 10 MG/ML
5 INJECTION INTRAVENOUS ONCE
Status: COMPLETED | OUTPATIENT
Start: 2022-10-21 | End: 2022-10-21

## 2022-10-21 RX ORDER — ASPIRIN 300 MG/1
300 SUPPOSITORY RECTAL ONCE
Status: DISCONTINUED | OUTPATIENT
Start: 2022-10-21 | End: 2022-10-24

## 2022-10-21 RX ORDER — MAGNESIUM SULFATE IN WATER 40 MG/ML
2000 INJECTION, SOLUTION INTRAVENOUS PRN
Status: DISCONTINUED | OUTPATIENT
Start: 2022-10-21 | End: 2022-10-25

## 2022-10-21 RX ORDER — METOCLOPRAMIDE HYDROCHLORIDE 5 MG/ML
INJECTION INTRAMUSCULAR; INTRAVENOUS PRN
Status: DISCONTINUED | OUTPATIENT
Start: 2022-10-21 | End: 2022-10-21 | Stop reason: SDUPTHER

## 2022-10-21 RX ORDER — ASPIRIN 325 MG
325 TABLET ORAL DAILY
Status: DISCONTINUED | OUTPATIENT
Start: 2022-10-22 | End: 2022-10-25

## 2022-10-21 RX ORDER — ROPIVACAINE HYDROCHLORIDE 5 MG/ML
INJECTION, SOLUTION EPIDURAL; INFILTRATION; PERINEURAL
Status: COMPLETED | OUTPATIENT
Start: 2022-10-21 | End: 2022-10-21

## 2022-10-21 RX ORDER — SODIUM CHLORIDE, SODIUM LACTATE, POTASSIUM CHLORIDE, CALCIUM CHLORIDE 600; 310; 30; 20 MG/100ML; MG/100ML; MG/100ML; MG/100ML
250 INJECTION, SOLUTION INTRAVENOUS CONTINUOUS PRN
Status: DISCONTINUED | OUTPATIENT
Start: 2022-10-21 | End: 2022-10-22

## 2022-10-21 RX ORDER — PROTAMINE SULFATE 10 MG/ML
50 INJECTION, SOLUTION INTRAVENOUS
Status: DISPENSED | OUTPATIENT
Start: 2022-10-21 | End: 2022-10-22

## 2022-10-21 RX ORDER — DEXTROSE MONOHYDRATE 100 MG/ML
INJECTION, SOLUTION INTRAVENOUS CONTINUOUS PRN
Status: DISCONTINUED | OUTPATIENT
Start: 2022-10-21 | End: 2022-11-01 | Stop reason: HOSPADM

## 2022-10-21 RX ORDER — MEPERIDINE HYDROCHLORIDE 25 MG/ML
25 INJECTION INTRAMUSCULAR; INTRAVENOUS; SUBCUTANEOUS
Status: DISCONTINUED | OUTPATIENT
Start: 2022-10-21 | End: 2022-10-22

## 2022-10-21 RX ORDER — DIPHENHYDRAMINE HCL 25 MG
25 TABLET ORAL NIGHTLY PRN
Status: DISCONTINUED | OUTPATIENT
Start: 2022-10-22 | End: 2022-11-01 | Stop reason: HOSPADM

## 2022-10-21 RX ORDER — ALBUMIN, HUMAN INJ 5% 5 %
SOLUTION INTRAVENOUS PRN
Status: DISCONTINUED | OUTPATIENT
Start: 2022-10-21 | End: 2022-10-21 | Stop reason: SDUPTHER

## 2022-10-21 RX ORDER — CALCIUM CHLORIDE 100 MG/ML
INJECTION INTRAVENOUS; INTRAVENTRICULAR PRN
Status: DISCONTINUED | OUTPATIENT
Start: 2022-10-21 | End: 2022-10-21 | Stop reason: SDUPTHER

## 2022-10-21 RX ORDER — MIDAZOLAM HYDROCHLORIDE 5 MG/ML
INJECTION INTRAMUSCULAR; INTRAVENOUS PRN
Status: DISCONTINUED | OUTPATIENT
Start: 2022-10-21 | End: 2022-10-21 | Stop reason: SDUPTHER

## 2022-10-21 RX ORDER — ATORVASTATIN CALCIUM 40 MG/1
40 TABLET, FILM COATED ORAL NIGHTLY
Status: DISCONTINUED | OUTPATIENT
Start: 2022-10-22 | End: 2022-10-21 | Stop reason: ALTCHOICE

## 2022-10-21 RX ORDER — SODIUM CHLORIDE 0.9 % (FLUSH) 0.9 %
5-40 SYRINGE (ML) INJECTION PRN
Status: DISCONTINUED | OUTPATIENT
Start: 2022-10-21 | End: 2022-11-01 | Stop reason: HOSPADM

## 2022-10-21 RX ORDER — GABAPENTIN 100 MG/1
100 CAPSULE ORAL 3 TIMES DAILY
Status: DISCONTINUED | OUTPATIENT
Start: 2022-10-21 | End: 2022-10-22

## 2022-10-21 RX ORDER — CALCIUM CHLORIDE 100 MG/ML
INJECTION INTRAVENOUS; INTRAVENTRICULAR
Status: COMPLETED | OUTPATIENT
Start: 2022-10-21 | End: 2022-10-21

## 2022-10-21 RX ORDER — FAMOTIDINE 10 MG/ML
INJECTION, SOLUTION INTRAVENOUS PRN
Status: DISCONTINUED | OUTPATIENT
Start: 2022-10-21 | End: 2022-10-21 | Stop reason: SDUPTHER

## 2022-10-21 RX ORDER — MILRINONE LACTATE 0.2 MG/ML
.125-.25 INJECTION, SOLUTION INTRAVENOUS CONTINUOUS PRN
Status: DISCONTINUED | OUTPATIENT
Start: 2022-10-21 | End: 2022-10-22

## 2022-10-21 RX ORDER — POTASSIUM CHLORIDE 29.8 MG/ML
20 INJECTION INTRAVENOUS PRN
Status: DISCONTINUED | OUTPATIENT
Start: 2022-10-21 | End: 2022-11-01 | Stop reason: HOSPADM

## 2022-10-21 RX ORDER — POTASSIUM CHLORIDE 750 MG/1
10 TABLET, FILM COATED, EXTENDED RELEASE ORAL
Status: DISCONTINUED | OUTPATIENT
Start: 2022-10-22 | End: 2022-10-27

## 2022-10-21 RX ORDER — DOBUTAMINE HYDROCHLORIDE 200 MG/100ML
2 INJECTION INTRAVENOUS CONTINUOUS PRN
Status: DISCONTINUED | OUTPATIENT
Start: 2022-10-21 | End: 2022-10-22

## 2022-10-21 RX ORDER — OXYCODONE HYDROCHLORIDE 5 MG/1
5 TABLET ORAL EVERY 4 HOURS PRN
Status: DISCONTINUED | OUTPATIENT
Start: 2022-10-21 | End: 2022-10-25

## 2022-10-21 RX ORDER — SODIUM CHLORIDE 0.9 % (FLUSH) 0.9 %
5-40 SYRINGE (ML) INJECTION EVERY 12 HOURS SCHEDULED
Status: DISCONTINUED | OUTPATIENT
Start: 2022-10-21 | End: 2022-11-01 | Stop reason: HOSPADM

## 2022-10-21 RX ORDER — MIDAZOLAM HYDROCHLORIDE 2 MG/2ML
1 INJECTION, SOLUTION INTRAMUSCULAR; INTRAVENOUS
Status: DISCONTINUED | OUTPATIENT
Start: 2022-10-21 | End: 2022-10-22

## 2022-10-21 RX ORDER — DEXMEDETOMIDINE HYDROCHLORIDE 4 UG/ML
INJECTION, SOLUTION INTRAVENOUS CONTINUOUS PRN
Status: DISCONTINUED | OUTPATIENT
Start: 2022-10-21 | End: 2022-10-21 | Stop reason: SDUPTHER

## 2022-10-21 RX ORDER — INSULIN GLARGINE 100 [IU]/ML
0.15 INJECTION, SOLUTION SUBCUTANEOUS NIGHTLY
Status: DISCONTINUED | OUTPATIENT
Start: 2022-10-22 | End: 2022-10-24

## 2022-10-21 RX ORDER — ALBUMIN, HUMAN INJ 5% 5 %
25 SOLUTION INTRAVENOUS PRN
Status: DISCONTINUED | OUTPATIENT
Start: 2022-10-21 | End: 2022-10-25

## 2022-10-21 RX ORDER — PROTAMINE SULFATE 10 MG/ML
INJECTION, SOLUTION INTRAVENOUS PRN
Status: DISCONTINUED | OUTPATIENT
Start: 2022-10-21 | End: 2022-10-21 | Stop reason: SDUPTHER

## 2022-10-21 RX ORDER — FUROSEMIDE 10 MG/ML
40 INJECTION INTRAMUSCULAR; INTRAVENOUS 2 TIMES DAILY
Status: DISCONTINUED | OUTPATIENT
Start: 2022-10-22 | End: 2022-10-23

## 2022-10-21 RX ADMIN — MIRTAZAPINE 15 MG: 15 TABLET, FILM COATED ORAL at 21:27

## 2022-10-21 RX ADMIN — PROPOFOL 100 MG: 10 INJECTION, EMULSION INTRAVENOUS at 07:38

## 2022-10-21 RX ADMIN — METHOCARBAMOL 750 MG: 750 TABLET ORAL at 16:21

## 2022-10-21 RX ADMIN — FENTANYL CITRATE 100 MCG: 50 INJECTION, SOLUTION INTRAMUSCULAR; INTRAVENOUS at 10:55

## 2022-10-21 RX ADMIN — GABAPENTIN 100 MG: 100 CAPSULE ORAL at 21:20

## 2022-10-21 RX ADMIN — SODIUM CHLORIDE, POTASSIUM CHLORIDE, SODIUM LACTATE AND CALCIUM CHLORIDE: 600; 310; 30; 20 INJECTION, SOLUTION INTRAVENOUS at 06:39

## 2022-10-21 RX ADMIN — Medication 2 PUFF: at 20:57

## 2022-10-21 RX ADMIN — ONDANSETRON 4 MG: 2 INJECTION INTRAMUSCULAR; INTRAVENOUS at 08:00

## 2022-10-21 RX ADMIN — SODIUM BICARBONATE 50 MEQ: 84 INJECTION, SOLUTION INTRAVENOUS at 09:44

## 2022-10-21 RX ADMIN — SODIUM CHLORIDE 2 UNITS/HR: 9 INJECTION, SOLUTION INTRAVENOUS at 07:50

## 2022-10-21 RX ADMIN — HEPARIN SODIUM 30000 UNITS: 1000 INJECTION INTRAVENOUS; SUBCUTANEOUS at 09:04

## 2022-10-21 RX ADMIN — ONDANSETRON 4 MG: 2 INJECTION INTRAMUSCULAR; INTRAVENOUS at 19:35

## 2022-10-21 RX ADMIN — MAGNESIUM SULFATE HEPTAHYDRATE 1 G: 500 INJECTION, SOLUTION INTRAMUSCULAR; INTRAVENOUS at 08:47

## 2022-10-21 RX ADMIN — DOBUTAMINE HYDROCHLORIDE 2 MCG/KG/MIN: 200 INJECTION INTRAVENOUS at 08:23

## 2022-10-21 RX ADMIN — SODIUM CHLORIDE: 9 INJECTION, SOLUTION INTRAVENOUS at 14:38

## 2022-10-21 RX ADMIN — MIDAZOLAM 1 MG: 5 INJECTION INTRAMUSCULAR; INTRAVENOUS at 07:27

## 2022-10-21 RX ADMIN — FUROSEMIDE 40 MG: 10 INJECTION INTRAMUSCULAR; INTRAVENOUS at 08:22

## 2022-10-21 RX ADMIN — CHLORHEXIDINE GLUCONATE 15 ML: 1.2 RINSE ORAL at 21:16

## 2022-10-21 RX ADMIN — CALCIUM CHLORIDE 0.5 G: 100 INJECTION, SOLUTION INTRAVENOUS at 11:39

## 2022-10-21 RX ADMIN — Medication 10 ML: at 21:27

## 2022-10-21 RX ADMIN — GABAPENTIN 100 MG: 100 CAPSULE ORAL at 16:21

## 2022-10-21 RX ADMIN — OXYCODONE 5 MG: 5 TABLET ORAL at 16:52

## 2022-10-21 RX ADMIN — FENTANYL CITRATE 200 MCG: 50 INJECTION, SOLUTION INTRAMUSCULAR; INTRAVENOUS at 09:41

## 2022-10-21 RX ADMIN — ROPIVACAINE HYDROCHLORIDE 20 ML: 5 INJECTION, SOLUTION EPIDURAL; INFILTRATION; PERINEURAL at 07:33

## 2022-10-21 RX ADMIN — SODIUM CHLORIDE: 9 INJECTION, SOLUTION INTRAVENOUS at 07:36

## 2022-10-21 RX ADMIN — FAMOTIDINE 20 MG: 10 INJECTION, SOLUTION INTRAVENOUS at 15:03

## 2022-10-21 RX ADMIN — MIDAZOLAM 1 MG: 5 INJECTION INTRAMUSCULAR; INTRAVENOUS at 06:58

## 2022-10-21 RX ADMIN — FUROSEMIDE 10 MG/HR: 10 INJECTION, SOLUTION INTRAVENOUS at 14:35

## 2022-10-21 RX ADMIN — AMINOCAPROIC ACID 5000 MG: 250 INJECTION, SOLUTION INTRAVENOUS at 07:37

## 2022-10-21 RX ADMIN — DEXMEDETOMIDINE HYDROCHLORIDE 0.4 MCG/KG/HR: 400 INJECTION INTRAVENOUS at 07:54

## 2022-10-21 RX ADMIN — ASPIRIN 325 MG: 325 TABLET, COATED ORAL at 19:35

## 2022-10-21 RX ADMIN — PANTOPRAZOLE SODIUM 40 MG: 40 INJECTION, POWDER, FOR SOLUTION INTRAVENOUS at 05:28

## 2022-10-21 RX ADMIN — FENTANYL CITRATE 50 MCG: 50 INJECTION, SOLUTION INTRAMUSCULAR; INTRAVENOUS at 07:21

## 2022-10-21 RX ADMIN — METOCLOPRAMIDE HYDROCHLORIDE 10 MG: 5 INJECTION INTRAMUSCULAR; INTRAVENOUS at 07:38

## 2022-10-21 RX ADMIN — VANCOMYCIN HYDROCHLORIDE 1500 MG: 1 INJECTION, POWDER, LYOPHILIZED, FOR SOLUTION INTRAVENOUS at 07:36

## 2022-10-21 RX ADMIN — ACETAMINOPHEN 650 MG: 325 TABLET ORAL at 19:34

## 2022-10-21 RX ADMIN — Medication 100 MG: at 07:38

## 2022-10-21 RX ADMIN — CEFAZOLIN 2000 MG: 2 INJECTION, POWDER, FOR SOLUTION INTRAMUSCULAR; INTRAVENOUS at 07:50

## 2022-10-21 RX ADMIN — DEXMEDETOMIDINE HYDROCHLORIDE 0.4 MCG/KG/HR: 400 INJECTION INTRAVENOUS at 08:47

## 2022-10-21 RX ADMIN — MUPIROCIN: 20 OINTMENT TOPICAL at 21:16

## 2022-10-21 RX ADMIN — METHOCARBAMOL 750 MG: 750 TABLET ORAL at 21:28

## 2022-10-21 RX ADMIN — ALBUMIN HUMAN 12.5 G: 0.05 INJECTION, SOLUTION INTRAVENOUS at 14:09

## 2022-10-21 RX ADMIN — DOBUTAMINE HYDROCHLORIDE 1 MCG/KG/MIN: 200 INJECTION INTRAVENOUS at 14:30

## 2022-10-21 RX ADMIN — ALBUMIN (HUMAN) 250 ML: 12.5 INJECTION, SOLUTION INTRAVENOUS at 12:02

## 2022-10-21 RX ADMIN — DOBUTAMINE HYDROCHLORIDE 2 MCG/KG/MIN: 200 INJECTION INTRAVENOUS at 09:46

## 2022-10-21 RX ADMIN — PROTAMINE SULFATE 350 MG: 10 INJECTION, SOLUTION INTRAVENOUS at 11:37

## 2022-10-21 RX ADMIN — Medication 0.5 MCG/MIN: at 08:08

## 2022-10-21 RX ADMIN — VECURONIUM BROMIDE 10 MG: 1 INJECTION, POWDER, LYOPHILIZED, FOR SOLUTION INTRAVENOUS at 08:04

## 2022-10-21 RX ADMIN — HYDROCORTISONE SODIUM SUCCINATE 200 MG: 100 INJECTION, POWDER, FOR SOLUTION INTRAMUSCULAR; INTRAVENOUS at 08:47

## 2022-10-21 RX ADMIN — PROTAMINE SULFATE 50 MG: 10 INJECTION, SOLUTION INTRAVENOUS at 11:59

## 2022-10-21 RX ADMIN — CEFAZOLIN 2000 MG: 2 INJECTION, POWDER, FOR SOLUTION INTRAMUSCULAR; INTRAVENOUS at 11:07

## 2022-10-21 RX ADMIN — Medication 10 ML: at 05:31

## 2022-10-21 RX ADMIN — ALBUMIN HUMAN 12.5 G: 0.05 INJECTION, SOLUTION INTRAVENOUS at 19:56

## 2022-10-21 RX ADMIN — HEPARIN SODIUM 3000 UNITS: 1000 INJECTION INTRAVENOUS; SUBCUTANEOUS at 08:00

## 2022-10-21 RX ADMIN — POTASSIUM CHLORIDE 20 MEQ: 29.8 INJECTION, SOLUTION INTRAVENOUS at 18:06

## 2022-10-21 RX ADMIN — MIDAZOLAM 1 MG: 5 INJECTION INTRAMUSCULAR; INTRAVENOUS at 07:36

## 2022-10-21 RX ADMIN — GABAPENTIN 600 MG: 300 CAPSULE ORAL at 05:28

## 2022-10-21 RX ADMIN — FENTANYL CITRATE 150 MCG: 50 INJECTION, SOLUTION INTRAMUSCULAR; INTRAVENOUS at 07:38

## 2022-10-21 RX ADMIN — ROPIVACAINE HYDROCHLORIDE 20 ML: 5 INJECTION, SOLUTION EPIDURAL; INFILTRATION; PERINEURAL at 07:32

## 2022-10-21 RX ADMIN — MIDAZOLAM 5 MG: 5 INJECTION INTRAMUSCULAR; INTRAVENOUS at 09:41

## 2022-10-21 RX ADMIN — ESMOLOL HYDROCHLORIDE 5 MG: 100 INJECTION, SOLUTION INTRAVENOUS at 06:35

## 2022-10-21 RX ADMIN — MIDAZOLAM 2 MG: 5 INJECTION INTRAMUSCULAR; INTRAVENOUS at 10:55

## 2022-10-21 RX ADMIN — CALCIUM CHLORIDE 0.5 G: 100 INJECTION, SOLUTION INTRAVENOUS at 11:28

## 2022-10-21 RX ADMIN — FAMOTIDINE 20 MG: 10 INJECTION, SOLUTION INTRAVENOUS at 08:00

## 2022-10-21 RX ADMIN — OXYCODONE 5 MG: 5 TABLET ORAL at 21:20

## 2022-10-21 RX ADMIN — FUROSEMIDE 20 MG: 10 INJECTION, SOLUTION INTRAMUSCULAR; INTRAVENOUS at 08:25

## 2022-10-21 RX ADMIN — SODIUM CHLORIDE 2.8 UNITS/HR: 9 INJECTION, SOLUTION INTRAVENOUS at 14:32

## 2022-10-21 RX ADMIN — LIDOCAINE HYDROCHLORIDE 100 MG: 20 INJECTION, SOLUTION INTRAVENOUS at 07:36

## 2022-10-21 RX ADMIN — MUPIROCIN: 20 OINTMENT TOPICAL at 05:29

## 2022-10-21 RX ADMIN — SODIUM BICARBONATE 50 MEQ: 84 INJECTION, SOLUTION INTRAVENOUS at 10:54

## 2022-10-21 RX ADMIN — AMINOCAPROIC ACID 1 G/HR: 250 INJECTION, SOLUTION INTRAVENOUS at 07:37

## 2022-10-21 RX ADMIN — CEFAZOLIN 2000 MG: 2 INJECTION, POWDER, FOR SOLUTION INTRAMUSCULAR; INTRAVENOUS at 19:37

## 2022-10-21 ASSESSMENT — PAIN DESCRIPTION - ORIENTATION
ORIENTATION: LEFT;OTHER (COMMENT)
ORIENTATION: LEFT;OUTER
ORIENTATION: LEFT

## 2022-10-21 ASSESSMENT — PULMONARY FUNCTION TESTS
PIF_VALUE: 15
PIF_VALUE: 20
PIF_VALUE: 20

## 2022-10-21 ASSESSMENT — PAIN DESCRIPTION - LOCATION
LOCATION: CHEST
LOCATION: BACK
LOCATION: CHEST

## 2022-10-21 ASSESSMENT — PAIN SCALES - GENERAL
PAINLEVEL_OUTOF10: 8
PAINLEVEL_OUTOF10: 2
PAINLEVEL_OUTOF10: 9
PAINLEVEL_OUTOF10: 2
PAINLEVEL_OUTOF10: 9

## 2022-10-21 ASSESSMENT — COPD QUESTIONNAIRES: CAT_SEVERITY: MODERATE

## 2022-10-21 NOTE — CARE COORDINATION
CM reviewed chart for d/c planning. Pt had CABG today. CM will follow for progress and plan for hc on discharge. Will need to follow up with pt when stable for hc choices.       Mahogany Mcelroy RN, BSN  [phone]

## 2022-10-21 NOTE — ANESTHESIA PROCEDURE NOTES
Arterial Line:    An arterial line was placed using surface landmarks, in the pre-op for the following indication(s): continuous blood pressure monitoring and blood sampling needed. A 20 gauge (size), 1 and 3/8 inch (length), Angiocath (type) catheter was placed, Seldinger technique used, into the left brachial artery, secured by tape and Tegaderm. Anesthesia type: Local  Local infiltration: Injection    Events:  patient tolerated procedure well with no complications and EBL < 5mL. 10/21/2022 6:47 AM10/21/2022 6:51 AM  Anesthesiologist: Wilson Barber MD  Preanesthetic Checklist  Completed: patient identified, IV checked, site marked, risks and benefits discussed, surgical/procedural consents, equipment checked, pre-op evaluation, timeout performed, anesthesia consent given, oxygen available and monitors applied/VS acknowledged

## 2022-10-21 NOTE — ANESTHESIA PROCEDURE NOTES
Central Venous Line:    A central venous line was placed using surface landmarks, in the pre-op for the following indication(s): central venous access, CVP monitoring and Vas Cath insertion on request of surgeon. 10/21/2022 7:09 AM10/21/2022 7:19 AM    Sterility preparation included the following: hand hygiene performed prior to procedure, maximum sterile barriers used and sterile technique used to drape from head to toe. The patient was placed in Trendelenburg position. The right internal jugular vein was prepped. The site was prepped with Chloraprep. A 12 Fr (size), double lumen was placed. During the procedure, the following specific steps were taken: target vein identified, needle advanced into vein and blood aspirated and guidewire advanced into vein. Intravenous verification was obtained by ultrasound and venous blood return. Post insertion care included: all ports aspirated, all ports flushed easily, guidewire removed intact, Biopatch applied, line sutured in place and dressing applied. During the procedure the patient experienced: patient tolerated procedure well with no complications and EBL < 5mL. Outcomes: uncomplicated  Real-time US image taken/store: yes  Anesthesia type: general.. No    Additional notes:  U/S [id number].  (1) US was used to identify the  vessel. (2) It was assessed and patent. (3) US was used to visualize vascular needle entry into the  vessel. (4) The selected vessel appeared anatomically normal and (5) there were no apparent abnormal findings. (6) A permanent ultrasound image was saved in the patient's record.       Staffing  Anesthesiologist: Tami Barthel, MD  Preanesthetic Checklist  Completed: patient identified, IV checked, site marked, risks and benefits discussed, surgical/procedural consents, equipment checked, pre-op evaluation, timeout performed, anesthesia consent given, oxygen available, monitors applied/VS acknowledged, fire risk safety assessment completed and verbalized and blood product R/B/A discussed and consented

## 2022-10-21 NOTE — PROGRESS NOTES
Pt verified information regarding surgery, name, birth date, surgeon, procedure and allergies. Patient transferred to [address] [place] [address] for surgery. Appropriate antibiotics ordered: Vancomycin and Ancef given by anesthesia. Beta blocker: Esmolol @ [id number]. Lab work within normal limits, 2 units of RBC's on call to OR, vital signs stable, Mepilex sacral border applied and 2% chlorhexidine gluconate skin prep given. Patient  educated about surgery and pain management. Arterial line placed in left brachial, Vas Cath on right and TLC introducer in right IJ with PA line by Dr. Lola Zacarias. To surgery per bed at 0432.

## 2022-10-21 NOTE — ANESTHESIA PROCEDURE NOTES
Procedure Performed: TIMUR       Start Time:  10/21/2022 12:30 PM       End Time:   10/21/2022 1:00 PM    Preanesthesia Checklist:  Patient identified, IV assessed, risks and benefits discussed, monitors and equipment assessed, procedure being performed at surgeon's request and anesthesia consent obtained. General Procedure Information  Diagnostic Indications for Echo:  assessment of surgical repair, hemodynamic monitoring and assessment of valve function  Physician Requesting Echo: Gonzalez Fitzpatrick MD  Location performed:  OR  Intubated  Bite block placed  Heart visualized  Probe Insertion:  Easy  Probe Type:  3D and mulitplane  Modalities:  3D, color flow mapping, pulse wave Doppler and continuous wave Doppler    Echocardiographic and Doppler Measurements    Ventricles    Right Ventricle:  Cavity size normal.  Hypertrophy not present. Thrombus not present. Global function normal.  Ejection Fraction 50%. Left Ventricle:  Cavity size normal.  Hypertrophy not present. Thrombus not present. Global Function normal.  Ejection Fraction 70%. Ventricular Regional Function:  1- Basal Anteroseptal:  normal  2- Basal Anterior:  normal  3- Basal Anterolateral:  normal  4- Basal Inferolateral:  normal  5- Basal Inferior:  normal  6- Basal Inferoseptal:  normal  7- Mid Anteroseptal:  normal  8- Mid Anterior:  normal  9- Mid Anterolateral:  normal  10- Mid Inferolateral:  normal  11- Mid Inferior:  normal  12- Mid Inferoseptal:  normal  13- Apical Anterior:  normal  14- Apical Lateral:  normal  15- Apical Inferior:  normal  16- Apical Septal:  normal  17- [place]:  normal      Valves    Aortic Valve: Annulus normal.  Stenosis not present. Regurgitation none. Leaflets normal.  Leaflet motions normal.      Mitral Valve: Annulus normal.  Stenosis not present. Regurgitation mild. Leaflets normal.  Leaflet motions normal.      Tricuspid Valve: Annulus normal.  Stenosis not present. Regurgitation none.   Leaflets normal.  Leaflet motions normal.    Pulmonic Valve: Annulus normal.  Stenosis not present. Regurgitation none. Aorta    Ascending Aorta:  Size normal.  Dissection not present. Aortic Arch:  Size normal.  Dissection not present. Descending Aorta:  Size normal.  Dissection not present. Atria    Right Atrium:  Size normal.  Spontaneous echo contrast not present. Thrombus not present. Tumor not present. Device not present. Left Atrium:  Size normal.  Spontaneous echo contrast not present. Thrombus not present. Tumor not present. Device not present.     Left atrial appendage normal.      Septa    Atrial Septum:  Intra-atrial septal morphology normal.      Ventricular Septum:  Intra-ventricular septum morphology normal.          Other Findings  Pericardium:  normal  Pleural Effusion:  none  Pulmonary Arteries:  normal  Pulmonary Venous Flow:  normal    Anesthesia Information  Performed Personally

## 2022-10-21 NOTE — ANESTHESIA PROCEDURE NOTES
Procedure Performed: TIMUR       Start Time:  10/21/2022 8:17 AM       End Time:   10/21/2022 8:47 AM    Preanesthesia Checklist:  Patient identified, IV assessed, risks and benefits discussed, monitors and equipment assessed, procedure being performed at surgeon's request and anesthesia consent obtained. General Procedure Information  Diagnostic Indications for Echo:  assessment of surgical repair, hemodynamic monitoring and assessment of valve function  Physician Requesting Echo: Gonzalez Fitzpatrick MD  Location performed:  OR  Intubated  Bite block placed  Heart visualized  Probe Insertion:  Easy  Probe Type:  3D and mulitplane  Modalities:  3D, color flow mapping, pulse wave Doppler and continuous wave Doppler    Echocardiographic and Doppler Measurements    Ventricles    Right Ventricle:  Cavity size normal.  Hypertrophy not present. Thrombus not present. Global function normal.  Ejection Fraction 40%. Left Ventricle:  Cavity size normal.  Hypertrophy not present. Thrombus not present. Global Function mildly impaired. Ejection Fraction 40%. Ventricular Regional Function:  1- Basal Anteroseptal:  hypokinetic  2- Basal Anterior:  hypokinetic  3- Basal Anterolateral:  hypokinetic  4- Basal Inferolateral:  normal  5- Basal Inferior:  normal  6- Basal Inferoseptal:  normal  7- Mid Anteroseptal:  hypokinetic  8- Mid Anterior:  hypokinetic  9- Mid Anterolateral:  hypokinetic  10- Mid Inferolateral:  normal  11- Mid Inferior:  normal  12- Mid Inferoseptal:  normal  13- Apical Anterior:  normal  14- Apical Lateral:  normal  15- Apical Inferior:  normal  16- Apical Septal:  normal  17- [place]:  normal      Valves    Aortic Valve: Annulus normal.  Stenosis not present. Regurgitation none. Leaflets normal.  Leaflet motions normal.      Mitral Valve: Annulus normal.  Stenosis not present. Regurgitation mild. Leaflets normal.  Leaflet motions normal.      Tricuspid Valve:   Annulus normal.  Stenosis not present. Regurgitation none. Leaflets normal.  Leaflet motions normal.    Pulmonic Valve: Annulus normal.  Stenosis not present. Regurgitation none. Aorta    Ascending Aorta:  Size normal.  Dissection not present. Aortic Arch:  Size normal.  Dissection not present. Descending Aorta:  Size normal.  Dissection not present. Atria    Right Atrium:  Size normal.  Spontaneous echo contrast not present. Thrombus not present. Tumor not present. Device not present. Left Atrium:  Size normal.  Spontaneous echo contrast not present. Thrombus not present. Tumor not present. Device not present.     Left atrial appendage normal.      Septa    Atrial Septum:  Intra-atrial septal morphology normal.      Ventricular Septum:  Intra-ventricular septum morphology normal.          Other Findings  Pericardium:  normal  Pleural Effusion:  none  Pulmonary Arteries:  normal  Pulmonary Venous Flow:  normal    Anesthesia Information  Performed Personally

## 2022-10-21 NOTE — BRIEF OP NOTE
Brief Postoperative Note      Patient: Louis Grant  YOB: 1950  MRN: [id number]    Date of Procedure: 10/21/2022    Pre-Op Diagnosis: Coronary artery disease involving native heart, unspecified vessel or lesion type, unspecified whether angina present [I25.10]. HTN. NIDDM. CKD stage 3. Dyslipidemia    Post-Op Diagnosis: Same       Procedure(s):  CABG X 3, LIMA  GRAFT TO LAD, VEIN GRAFT TO DISTAL RCA  AND OM, EVH LEFT SAPHENOUS VEIN. LIGATION KELLY WITH 35 MM ATRICLIP, TIMUR, TCPB, DOPPLER BYPASS GRAFT FLOW VERIFICATION, EPIAORTIC ECHOCARDIOGRAM, INSERTION OF VENTRICULAR AND ATRIAL PACING WIRES. Vas Cath placement    Surgeon(s):  Godfrey Lozano MD    Assistant:  Surgical Assistant: Coral Caruso Assistant: Cyndi Arthur RN  Physician Assistant: Hilda Canchola PA-C    Anesthesia: General ET/ Renee Sosa MD    Estimated Blood Loss (mL): less than 100     Replacements:  None    Pump time:  112 min    Cross clamp time:  91 min    Complications: None    Specimens:   * No specimens in log *    Implants:  Implant Name Type Inv. Item Serial No.  Lot No. LRB No. Used Action   DEVICE OCCL CLP L35MM KELLY EXCLUSION SYS ATRICLP PRO V - [id number]  DEVICE OCCL CLP L35MM KELLY EXCLUSION SYS ATRICLP PRO V  [employer] [id number] N/A 1 Implanted         Drains:   Chest Tube Pleural;Pericardial 2 (Active)       Urinary Catheter 10/21/22 2 Way (Active)       Findings: Trivial MR. No AS. Normal ascending aorta.   EF 65%    Electronically signed by Godfrey Lozano MD on 10/21/2022 at 12:54 PM  [id number]

## 2022-10-21 NOTE — ANESTHESIA POSTPROCEDURE EVALUATION
Department of Anesthesiology  Postprocedure Note    Patient: Luma Moyer  MRN: [id number]  YOB: 1950  Date of evaluation: 10/21/2022      Procedure Summary     Date: 10/21/22 Room / Location: [address]    Anesthesia Start: 5529 Anesthesia Stop:     Procedure: CABG X 3, LIMA  GRAFT TO LAD, VEIN GRAFT TO DISTAL RCA  AND OM, EVH LEFT SAPHENOUS VEIN. LIGATION KELLY WITH 35 MM ATRICLIP, TIMUR, TCPB, DOPPLER BYPASS GRAFT FLOW VERIFICATION, EPIAORTIC ECHOCARDIOGRAM, INSERTION OF VENTRICULAR AND ATRIAL PACING WIRES Diagnosis:       Coronary artery disease involving native heart, unspecified vessel or lesion type, unspecified whether angina present      (Coronary artery disease involving native heart, unspecified vessel or lesion type, unspecified whether angina present [I25.10])    Surgeons: Angelika Lopez MD Responsible Provider: Florinda Mullen MD    Anesthesia Type: general ASA Status: 4          Anesthesia Type: No value filed.     Tory Phase I: Tory Score: 10    Tory Phase II:        Anesthesia Post Evaluation    Patient location during evaluation: ICU  Patient participation: complete - patient cannot participate  Level of consciousness: sedated and ventilated  Pain score: 2  Airway patency: patent  Nausea & Vomiting: no vomiting  Complications: no  Cardiovascular status: blood pressure returned to baseline  Respiratory status: intubated, acceptable and ventilator  Hydration status: euvolemic  Multimodal analgesia pain management approach

## 2022-10-21 NOTE — PROGRESS NOTES
Patient admitted to CVU from [place] [address] and attached to ventilator and monitors. Report received from anesthesiologist.  Chest x-ray ordered. Labs drawn and sent. Assessment complete. Hemodymanics stable and will continue to monitor. Family let back to see patient. Visiting hours reviewed and all questions answered. Family aware of discharge class. Primary RN Juanjose.     EDUIN/ Nai Iqbal

## 2022-10-21 NOTE — ANESTHESIA PRE PROCEDURE
Department of Anesthesiology  Preprocedure Note       Name:  Gena Ceron   Age:  81 y.o.  :  1950                                          MRN:  [id number]         Date:  10/21/2022      Surgeon: Rashaad Morgan):  Mega Maria MD    Procedure: Procedure(s):  CABG CORONARY ARTERY BYPASS/LIG KELLY/VAS-CATH PLACEMENT    Medications prior to admission:   Prior to Admission medications    Medication Sig Start Date End Date Taking? Authorizing Provider   glimepiride (AMARYL) 4 MG tablet TAKE 2 TABLETS BY MOUTH EVERY MORNING 22   Adelia Bob MD   rosuvastatin (CRESTOR) 20 MG tablet TAKE 1 TABLET BY MOUTH DAILY 22   Adelia Bob MD   meloxicam (MOBIC) 15 MG tablet TAKE 1 TABLET BY MOUTH EVERY DAY 22   Historical Provider, MD   blood glucose test strips (TRUE METRIX BLOOD GLUCOSE TEST) strip USE 1 STRIP TO TEST BLOOD SUGAR TWICE DAILY 22   Adelia Bob MD   mirtazapine (REMERON) 15 MG tablet Take 1.5 tablets by mouth nightly 22   Adelia Bob MD   sertraline (ZOLOFT) 100 MG tablet Take 1 tablet by mouth in the morning. 22   Adelia Bob MD   traZODone (DESYREL) 50 MG tablet Take 1 tablet by mouth nightly 22   Adeila Bob MD   losartan (COZAAR) 100 MG tablet Take 1 tablet by mouth in the morning. 22   Adelia Bob MD   amLODIPine (NORVASC) 10 MG tablet Take 1 tablet by mouth in the morning.  22   Adelia oBb MD   pantoprazole (PROTONIX) 40 MG tablet TAKE 1 TABLET BY MOUTH EVERY MORNING BEFORE BREAKFAST 22   Adelia Bob MD   insulin glargine (LANTUS SOLOSTAR) 100 UNIT/ML injection pen Inject 15 Units into the skin nightly 22   Adelia Bob MD   doxazosin (CARDURA) 4 MG tablet TAKE 1 TABLET BY MOUTH EVERY NIGHT 22   Adelia Bob MD   fenofibrate (TRIGLIDE) 160 MG tablet Take 1 tablet by mouth daily 22   Adelia Bob MD   Insulin Pen Needle (B-D UF III MINI PEN NEEDLES) 31G X 5 MM MISC USE AS DIRECTED TO INJECT ONCE DAILY 3/3/22   MD Fahad Hardingodilon Duque 272-58.5-52 MCG/INH AEPB INHALE 1 PUFF INTO THE LUNGS DAILY 10/11/21   MD Kimmie Bynum Patient test twice daily 6/19/19   Adam Fan MD   albuterol sulfate HFA (VENTOLIN HFA) 108 (90 Base) MCG/ACT inhaler 2 Puff every 4-6 hours as needed for wheezing or shortness of breath 2/7/19   Adam Fan MD   Blood Glucose Monitoring Suppl (PRECISION XTRA) w/Device KIT As directed 1/30/19   Adam Fan MD   Blood Glucose Monitoring Suppl (ONE TOUCH ULTRA 2) W/DEVICE KIT 1 kit by Does not apply route daily as needed. 12/19/14   Adam Fan MD   aspirin 325 MG tablet Take 325 mg by mouth daily. Historical Provider, MD   fish oil-omega-3 fatty acids 1000 MG capsule Take 2 g by mouth daily. Historical Provider, MD       Current medications:    No current facility-administered medications for this visit. No current outpatient medications on file.      Facility-Administered Medications Ordered in Other Visits   Medication Dose Route Frequency Provider Last Rate Last Admin    insulin regular (HUMULIN R;NOVOLIN R) 100 Units in sodium chloride 0.9 % 100 mL infusion  0.1 Units/kg/hr IntraVENous Continuous PRN Scott Shannon MD        phenylephrine (BONITA-SYNEPHRINE) 50 mg in dextrose 5 % 250 mL infusion  100 mcg/min IntraVENous Once PRN Scott Shannon MD        aminocaproic acid (AMICAR) 5,000 mg in sodium chloride 0.9 % 250 mL infusion bolus  5,000 mg IntraVENous Once PRN Scott Shannon MD        norepinephrine (LEVOPHED) 16 mg in sodium chloride 0.9 % 250 mL infusion  1-100 mcg/min IntraVENous Continuous PRN Scott Shannon MD        lactated ringers infusion   IntraVENous Continuous ANTHONY Del Rio CNP 50 mL/hr at 10/21/22 0639 New Bag at 10/21/22 0639    ceFAZolin (ANCEF) 2,000 mg in dextrose 5 % 50 mL IVPB (mini-bag)  2,000 mg IntraVENous On Call to [address] [place], APRN - CNP       [facility] vancomycin 1000 mg IVPB in 250 mL D5W addavial  1,000 mg IntraVENous On Call to [address], APRN - CNP        traZODone (DESYREL) tablet 100 mg  100 mg Oral Nightly Abilio Felling, APRN - CNP   100 mg at 10/20/22 2243    sodium chloride flush 0.9 % injection 5-40 mL  5-40 mL IntraVENous PRN Fred Garcia MD   10 mL at 10/21/22 0531    amLODIPine (NORVASC) tablet 10 mg  10 mg Oral Daily Fred Garcia MD   10 mg at 10/20/22 5962    aspirin tablet 325 mg  325 mg Oral Daily Fred Garcia MD   325 mg at 10/20/22 8257    doxazosin (CARDURA) tablet 1 mg  1 mg Oral Daily Fred Garcia MD   1 mg at 10/20/22 2031    [Held by provider] losartan (COZAAR) tablet 100 mg  100 mg Oral Daily Fred Garcia MD        rosuvastatin (CRESTOR) tablet 20 mg  20 mg Oral Daily Fred Garcia MD   20 mg at 10/20/22 2140    sertraline (ZOLOFT) tablet 100 mg  100 mg Oral Daily Fred Garcia MD   100 mg at 10/20/22 0846    sodium chloride flush 0.9 % injection 5-40 mL  5-40 mL IntraVENous 2 times per day Fred Garcia MD   10 mL at 10/20/22 2032    0.9 % sodium chloride infusion   IntraVENous PRN Fred Garcia MD        acetaminophen (TYLENOL) tablet 650 mg  650 mg Oral Q4H PRN Fred Garcia MD   650 mg at 10/20/22 8199    ondansetron (ZOFRAN) injection 4 mg  4 mg IntraVENous Q6H PRN Fred Garcia MD        mometasone-formoterol [facility]) 200-5 MCG/ACT inhaler 2 puff  2 puff Inhalation BID Fred Garcia MD   2 puff at 10/20/22 3913    tiotropium (SPIRIVA RESPIMAT) 2.5 MCG/ACT inhaler 2 puff  2 puff Inhalation Daily Fred Garcia MD   2 puff at 10/20/22 0953    nitroGLYCERIN 50 mg in dextrose 5% 250 mL infusion  5-200 mcg/min IntraVENous Continuous Fred Garcia MD   Stopped at 10/19/22 2323    mirtazapine (REMERON) tablet 15 mg  15 mg Oral Nightly ANTHONY Ingram - CNP   15 mg at 10/20/22 2031       Allergies:     Allergies Allergen Reactions    Dilaudid [Hydromorphone Hcl] Other (See Comments)     Mental status changes    Nubain [Nalbuphine Hcl] Rash       Problem List:    Patient Active Problem List   Diagnosis Code    Essential hypertension I10    Chronic kidney disease, stage III (moderate) ([address] N18.30    Major depression ([address] F32.9    Coronary artery disease I25.10    ED (erectile dysfunction) N52.9    Type 2 diabetes mellitus without complication ([address] H73.2    Hypertensive retinopathy of both eyes H35.033    Pulmonary nodule R91.1    DDD (degenerative disc disease), lumbar M51.36    Mixed hyperlipidemia E78.2    Primary insomnia F51.01    Centrilobular emphysema ([address] J43.2    Snoring R06.83    Iron deficiency anemia due to chronic blood loss D50.0    COPD, moderate (HCC) J44.9    Unstable angina (HCC) I20.0    Coronary artery disease involving native coronary artery of native heart with unstable angina pectoris (HCC) I25.110       Past Medical History:        Diagnosis Date    Chronic renal insufficiency     Due to chronic nephrolithiasis    Colloid cyst of third ventricle (HCC)     Coronary artery disease     DDD (degenerative disc disease), lumbar 2006    Disc bulge and facet arthropathy at L3-L4, L5-S1    Diabetes mellitus, type 2 ([address]     Diverticulitis of colon with perforation 11/07    Emergency colostomy    ED (erectile dysfunction)     Hyperlipidemia     Hypertension     Hypertensive retinopathy of both eyes 1/24/2013    Nephrolithiasis        Past Surgical History:        Procedure Laterality Date    BRAIN SURGERY  12/13/2007    Resection of colloid cyst of 3rd ventricle    CARDIAC CATHETERIZATION  5/02, 12/03,  3/08    COLOSTOMY  11/07    Emergent- due to diverticulitis with perforation    CORONARY ANGIOPLASTY  1995    RCA- unsuccessful    CORONARY ANGIOPLASTY WITH STENT PLACEMENT  10/05    Obtuse marginal branch of circumflex:  drug-eluting stent    KNEE ARTHROSCOPY  2000,     Right    LITHOTRIPSY  1998    Bilateral    LITHOTRIPSY      Bilateral with right stent placement    PARTIAL NEPHRECTOMY      Right    REVISION COLOSTOMY  3/08    Colostomy takedown with sigmoid colectomy and coloproctostomy anastomosis    TOTAL KNEE ARTHROPLASTY Left     UMBILICAL HERNIA REPAIR         Social History:    Social History     Tobacco Use    Smoking status: Former     Packs/day: 1.00     Years: 20.00     Pack years: 20.00     Types: Cigarettes     Quit date: 3/3/1992     Years since quittin.6    Smokeless tobacco: Never   Substance Use Topics    Alcohol use: No                                Counseling given: Not Answered      Vital Signs (Current): There were no vitals filed for this visit.                                            BP Readings from Last 3 Encounters:   10/21/22 (!) 152/78   22 124/64   22 138/80       NPO Status:                                                                                 BMI:   Wt Readings from Last 3 Encounters:   10/20/22 158 lb 11.7 oz (72 kg)   22 174 lb (78.9 kg)   22 182 lb 9.6 oz (82.8 kg)     There is no height or weight on file to calculate BMI.    CBC:   Lab Results   Component Value Date/Time    WBC 8.2 10/20/2022 06:25 AM    RBC 3.98 10/20/2022 06:25 AM    HGB 11.8 10/20/2022 06:25 AM    HCT 37.3 10/20/2022 06:25 AM    MCV 93.8 10/20/2022 06:25 AM    RDW 19.1 10/20/2022 06:25 AM     10/20/2022 06:25 AM       CMP:   Lab Results   Component Value Date/Time     10/20/2022 06:25 AM    K 3.9 10/20/2022 06:25 AM     10/20/2022 06:25 AM    CO2 24 10/20/2022 06:25 AM    BUN 13 10/20/2022 06:25 AM    CREATININE 1.2 10/20/2022 06:25 AM    GFRAA >60 2022 11:03 AM    GFRAA >60 05/10/2013 11:38 AM    AGRATIO 1.3 2022 11:03 AM    LABGLOM >60 10/20/2022 06:25 AM    GLUCOSE 73 10/20/2022 06:25 AM    PROT 6.5 10/19/2022 09:15 AM    PROT 7.3 2013 10:05 AM    CALCIUM 9.8 10/20/2022 06:25 AM    BILITOT 0.3 10/19/2022 09:15 AM    ALKPHOS 48 10/19/2022 09:15 AM    AST 13 10/19/2022 09:15 AM    ALT 8 10/19/2022 09:15 AM       POC Tests:   Recent Labs     10/19/22  2208   POCGLU 151*       Coags:   Lab Results   Component Value Date/Time    PROTIME 17.3 10/19/2022 09:15 AM    INR 1.42 10/19/2022 09:15 AM    APTT 67.7 10/19/2022 09:15 AM       HCG (If Applicable): No results found for: PREGTESTUR, PREGSERUM, HCG, HCGQUANT     ABGs:   Lab Results   Component Value Date/Time    PHART 7.338 10/19/2022 09:15 AM    PO2ART 62.2 10/19/2022 09:15 AM    CQY4FIL 46.1 10/19/2022 09:15 AM    NWL6VLU 24.7 10/19/2022 09:15 AM    BEART -1.3 10/19/2022 09:15 AM    P2PYMIAC 89.8 10/19/2022 09:15 AM        Type & Screen (If Applicable):  No results found for: LABABO, LABRH    Drug/Infectious Status (If Applicable):  No results found for: HIV, HEPCAB    COVID-19 Screening (If Applicable): No results found for: COVID19        Anesthesia Evaluation  Patient summary reviewed and Nursing notes reviewed  Airway: Mallampati: II  TM distance: >3 FB   Neck ROM: full  Mouth opening: > = 3 FB   Dental:          Pulmonary:   (+) COPD: moderate,                             Cardiovascular:  Exercise tolerance: poor (<4 METS),   (+) hypertension: moderate, angina:, CAD: obstructive,                   Neuro/Psych:   (+) psychiatric history: stable with treatment            GI/Hepatic/Renal:   (+) renal disease: CRI,           Endo/Other:    (+) DiabetesType II DM, well controlled, , .                 Abdominal:             Vascular: Other Findings:             Anesthesia Plan      general     ASA 4       Induction: intravenous and rapid sequence. arterial line, central line, PA catheter, continuous noninvasive hemodynamic monitor and TIMUR  MIPS: Postoperative opioids intended, Prophylactic antiemetics administered and Postoperative ventilation.   Anesthetic plan and risks discussed with patient.               Post-op pain plan if not by surgeon: single peripheral nerve block            Alvina Pulido MD   10/21/2022

## 2022-10-21 NOTE — PROGRESS NOTES
In  surgery   For CABG today   Will monitor renal function post surgery   Monitor I/O     Mira Acosta MD

## 2022-10-21 NOTE — PROGRESS NOTES
Patient awake and following commands. Patient placed on CPAP per open heart protocol. ABG drawn 32 minutes later and WNL. Respiratory called for weaning parameters. NIF - 28. Patient suctioned and extubated to 3 liters nasal cannula  OG tube discontinued. Patient tolerated well. Oxygen saturation 95 on 3 liters nasal cannula. Patient alert and oriented X 3.

## 2022-10-21 NOTE — ANESTHESIA PROCEDURE NOTES
Central Venous Line:    A central venous line was placed using surface landmarks, in the pre-op for the following indication(s): central venous access and CVP monitoring. 10/21/2022 6:58 AM10/21/2022 7:03 AM    Sterility preparation included the following: hand hygiene performed prior to procedure, maximum sterile barriers used and sterile technique used to drape from head to toe. The patient was placed in Trendelenburg position. The right internal jugular vein was prepped. The site was prepped with Chloraprep. A 9 Fr (size), introducer triple lumen was placed. During the procedure, the following specific steps were taken: target vein identified, needle advanced into vein and blood aspirated and guidewire advanced into vein. Intravenous verification was obtained by ultrasound and venous blood return. Post insertion care included: all ports aspirated, all ports flushed easily, guidewire removed intact, Biopatch applied, line sutured in place and dressing applied. During the procedure the patient experienced: patient tolerated procedure well with no complications and EBL < 5mL. Anesthesia type: generalA(n) oximetric, 7.5 (size) Pulmonary Artery Catheter (PAC) was placed through the Introducer CVL in the right internal jugular vein. The PAC placement was confirmed by pressure tracing changes. The patient experienced the following events during the procedure: patient tolerated procedure well with no complications. PA Cath placed?: Yes    Additional notes:  U/S [id number].  (1) US was used to identify the  vessel. (2) It was assessed and patent. (3) US was used to visualize vascular needle entry into the  vessel. (4) The selected vessel appeared anatomically normal and (5) there were no apparent abnormal findings. (6) A permanent ultrasound image was saved in the patient's record.       Staffing  Anesthesiologist: Maddison Escoto MD  Preanesthetic Checklist  Completed: patient identified, IV checked, site marked, risks and benefits discussed, surgical/procedural consents, equipment checked, pre-op evaluation, timeout performed, anesthesia consent given, oxygen available, monitors applied/VS acknowledged, fire risk safety assessment completed and verbalized and blood product R/B/A discussed and consented

## 2022-10-21 NOTE — ANESTHESIA PROCEDURE NOTES
Peripheral Block    Patient location during procedure: OR  Reason for block: procedure for pain, post-op pain management and primary anesthetic  Start time: 10/21/2022 7:32 AM  End time: 10/21/2022 7:34 AM  Staffing  Anesthesiologist: Deisy Martínez MD  Preanesthetic Checklist  Completed: patient identified, IV checked, site marked, risks and benefits discussed, surgical/procedural consents, equipment checked, pre-op evaluation, timeout performed, anesthesia consent given, oxygen available, monitors applied/VS acknowledged, fire risk safety assessment completed and verbalized and blood product R/B/A discussed and consented  Peripheral Block   Patient position: supine  Prep: ChloraPrep  Provider prep: mask, sterile gloves and sterile gown  Patient monitoring: cardiac monitor, continuous pulse ox, continuous capnometry, frequent blood pressure checks, oxygen and IV access  Block type: PECS II and PECS I  Laterality: bilateral  Injection technique: single-shot  Guidance: ultrasound guided    Needle   Needle type: insulated echogenic nerve stimulator needle   Needle gauge: 22 G  Needle localization: anatomical landmarks and ultrasound guidance  Test dose: negative  Assessment   Injection assessment: negative aspiration for heme, no paresthesia on injection, local visualized surrounding nerve on ultrasound, no intravascular symptoms and low pressure verified by pressure monitor  Slow fractionated injection: yes  Hemodynamics: stable  Real-time US image taken/store: yes  Outcomes: uncomplicated    Medications Administered  ropivacaine (NAROPIN) injection 0.5% - Perineural   20 mL - 10/21/2022 7:32:00 AM   20 mL - 10/21/2022 7:33:00 AM

## 2022-10-22 LAB
ANION GAP SERPL CALCULATED.3IONS-SCNC: 8 MMOL/L (ref 3–16)
BUN BLDV-MCNC: 18 MG/DL (ref 7–20)
CALCIUM IONIZED: 1.16 MMOL/L (ref 1.12–1.32)
CALCIUM IONIZED: 1.17 MMOL/L (ref 1.12–1.32)
CALCIUM SERPL-MCNC: 8.6 MG/DL (ref 8.3–10.6)
CHLORIDE BLD-SCNC: 108 MMOL/L (ref 99–110)
CO2: 25 MMOL/L (ref 21–32)
CREAT SERPL-MCNC: 2 MG/DL (ref 0.8–1.3)
GFR SERPL CREATININE-BSD FRML MDRD: 35 ML/MIN/{1.73_M2}
GLUCOSE BLD-MCNC: 101 MG/DL (ref 70–99)
GLUCOSE BLD-MCNC: 106 MG/DL (ref 70–99)
GLUCOSE BLD-MCNC: 106 MG/DL (ref 70–99)
GLUCOSE BLD-MCNC: 107 MG/DL (ref 70–99)
GLUCOSE BLD-MCNC: 108 MG/DL (ref 70–99)
GLUCOSE BLD-MCNC: 109 MG/DL (ref 70–99)
GLUCOSE BLD-MCNC: 110 MG/DL (ref 70–99)
GLUCOSE BLD-MCNC: 110 MG/DL (ref 70–99)
GLUCOSE BLD-MCNC: 111 MG/DL (ref 70–99)
GLUCOSE BLD-MCNC: 111 MG/DL (ref 70–99)
GLUCOSE BLD-MCNC: 113 MG/DL (ref 70–99)
GLUCOSE BLD-MCNC: 115 MG/DL (ref 70–99)
GLUCOSE BLD-MCNC: 115 MG/DL (ref 70–99)
GLUCOSE BLD-MCNC: 121 MG/DL (ref 70–99)
GLUCOSE BLD-MCNC: 124 MG/DL (ref 70–99)
GLUCOSE BLD-MCNC: 140 MG/DL (ref 70–99)
GLUCOSE BLD-MCNC: 165 MG/DL (ref 70–99)
GLUCOSE BLD-MCNC: 170 MG/DL (ref 70–99)
GLUCOSE BLD-MCNC: 173 MG/DL (ref 70–99)
GLUCOSE BLD-MCNC: 185 MG/DL (ref 70–99)
GLUCOSE BLD-MCNC: 93 MG/DL (ref 70–99)
GLUCOSE BLD-MCNC: 96 MG/DL (ref 70–99)
GLUCOSE BLD-MCNC: 99 MG/DL (ref 70–99)
HCT VFR BLD CALC: 27.7 % (ref 40.5–52.5)
HEMOGLOBIN: 10.3 GM/DL (ref 13.5–17.5)
HEMOGLOBIN: 8.6 G/DL (ref 13.5–17.5)
HEMOGLOBIN: 9.6 GM/DL (ref 13.5–17.5)
INR BLD: 1.31 (ref 0.87–1.14)
MAGNESIUM: 2.3 MG/DL (ref 1.8–2.4)
MCH RBC QN AUTO: 29.7 PG (ref 26–34)
MCHC RBC AUTO-ENTMCNC: 31.2 G/DL (ref 31–36)
MCV RBC AUTO: 95.3 FL (ref 80–100)
PDW BLD-RTO: 19.8 % (ref 12.4–15.4)
PERFORMED ON: ABNORMAL
PERFORMED ON: NORMAL
PERFORMED ON: NORMAL
PLATELET # BLD: 127 K/UL (ref 135–450)
PMV BLD AUTO: 8 FL (ref 5–10.5)
POC HEMATOCRIT: 28 % (ref 40.5–52.5)
POC HEMATOCRIT: 30 % (ref 40.5–52.5)
POC PATIENT TEMP: 97
POC PATIENT TEMP: 98
POC POTASSIUM: 3.8 MMOL/L (ref 3.5–5.1)
POC POTASSIUM: 4.6 MMOL/L (ref 3.5–5.1)
POC SAMPLE TYPE: ABNORMAL
POC SAMPLE TYPE: ABNORMAL
POC SODIUM: 142 MMOL/L (ref 136–145)
POC SODIUM: 144 MMOL/L (ref 136–145)
POTASSIUM SERPL-SCNC: 4.2 MMOL/L (ref 3.5–5.1)
POTASSIUM SERPL-SCNC: 4.2 MMOL/L (ref 3.5–5.1)
PROTHROMBIN TIME: 16.2 SEC (ref 11.7–14.5)
RBC # BLD: 2.9 M/UL (ref 4.2–5.9)
SODIUM BLD-SCNC: 141 MMOL/L (ref 136–145)
WBC # BLD: 12.9 K/UL (ref 4–11)

## 2022-10-22 PROCEDURE — 6370000000 HC RX 637 (ALT 250 FOR IP)

## 2022-10-22 PROCEDURE — A4216 STERILE WATER/SALINE, 10 ML: HCPCS | Performed by: THORACIC SURGERY (CARDIOTHORACIC VASCULAR SURGERY)

## 2022-10-22 PROCEDURE — 6370000000 HC RX 637 (ALT 250 FOR IP): Performed by: NURSE PRACTITIONER

## 2022-10-22 PROCEDURE — 36592 COLLECT BLOOD FROM PICC: CPT

## 2022-10-22 PROCEDURE — 6370000000 HC RX 637 (ALT 250 FOR IP): Performed by: FAMILY MEDICINE

## 2022-10-22 PROCEDURE — 2500000003 HC RX 250 WO HCPCS: Performed by: THORACIC SURGERY (CARDIOTHORACIC VASCULAR SURGERY)

## 2022-10-22 PROCEDURE — 85610 PROTHROMBIN TIME: CPT

## 2022-10-22 PROCEDURE — 6370000000 HC RX 637 (ALT 250 FOR IP): Performed by: INTERNAL MEDICINE

## 2022-10-22 PROCEDURE — 80048 BASIC METABOLIC PNL TOTAL CA: CPT

## 2022-10-22 PROCEDURE — 97162 PT EVAL MOD COMPLEX 30 MIN: CPT

## 2022-10-22 PROCEDURE — 97530 THERAPEUTIC ACTIVITIES: CPT

## 2022-10-22 PROCEDURE — 99024 POSTOP FOLLOW-UP VISIT: CPT | Performed by: THORACIC SURGERY (CARDIOTHORACIC VASCULAR SURGERY)

## 2022-10-22 PROCEDURE — 82947 ASSAY GLUCOSE BLOOD QUANT: CPT

## 2022-10-22 PROCEDURE — 84132 ASSAY OF SERUM POTASSIUM: CPT

## 2022-10-22 PROCEDURE — 94640 AIRWAY INHALATION TREATMENT: CPT

## 2022-10-22 PROCEDURE — 2580000003 HC RX 258: Performed by: THORACIC SURGERY (CARDIOTHORACIC VASCULAR SURGERY)

## 2022-10-22 PROCEDURE — 2000000000 HC ICU R&B

## 2022-10-22 PROCEDURE — 84295 ASSAY OF SERUM SODIUM: CPT

## 2022-10-22 PROCEDURE — 94761 N-INVAS EAR/PLS OXIMETRY MLT: CPT

## 2022-10-22 PROCEDURE — 6360000002 HC RX W HCPCS: Performed by: THORACIC SURGERY (CARDIOTHORACIC VASCULAR SURGERY)

## 2022-10-22 PROCEDURE — 6370000000 HC RX 637 (ALT 250 FOR IP): Performed by: THORACIC SURGERY (CARDIOTHORACIC VASCULAR SURGERY)

## 2022-10-22 PROCEDURE — 82330 ASSAY OF CALCIUM: CPT

## 2022-10-22 PROCEDURE — 83735 ASSAY OF MAGNESIUM: CPT

## 2022-10-22 PROCEDURE — 2700000000 HC OXYGEN THERAPY PER DAY

## 2022-10-22 PROCEDURE — 97166 OT EVAL MOD COMPLEX 45 MIN: CPT

## 2022-10-22 PROCEDURE — 94669 MECHANICAL CHEST WALL OSCILL: CPT

## 2022-10-22 PROCEDURE — 99233 SBSQ HOSP IP/OBS HIGH 50: CPT | Performed by: INTERNAL MEDICINE

## 2022-10-22 PROCEDURE — 85027 COMPLETE CBC AUTOMATED: CPT

## 2022-10-22 PROCEDURE — 85014 HEMATOCRIT: CPT

## 2022-10-22 RX ORDER — GABAPENTIN 100 MG/1
100 CAPSULE ORAL NIGHTLY
Status: DISCONTINUED | OUTPATIENT
Start: 2022-10-23 | End: 2022-10-22

## 2022-10-22 RX ORDER — ACETAMINOPHEN 325 MG/1
650 TABLET ORAL EVERY 6 HOURS
Status: DISCONTINUED | OUTPATIENT
Start: 2022-10-22 | End: 2022-11-01 | Stop reason: HOSPADM

## 2022-10-22 RX ORDER — ALPRAZOLAM 0.25 MG/1
0.25 TABLET ORAL EVERY 6 HOURS PRN
Status: DISCONTINUED | OUTPATIENT
Start: 2022-10-22 | End: 2022-11-01 | Stop reason: HOSPADM

## 2022-10-22 RX ORDER — GABAPENTIN 300 MG/1
300 CAPSULE ORAL 3 TIMES DAILY
Status: DISCONTINUED | OUTPATIENT
Start: 2022-10-22 | End: 2022-11-01 | Stop reason: HOSPADM

## 2022-10-22 RX ORDER — GABAPENTIN 300 MG/1
300 CAPSULE ORAL 3 TIMES DAILY
Status: DISCONTINUED | OUTPATIENT
Start: 2022-10-22 | End: 2022-10-22

## 2022-10-22 RX ORDER — ACETAMINOPHEN 325 MG/1
650 TABLET ORAL EVERY 6 HOURS
Status: DISCONTINUED | OUTPATIENT
Start: 2022-10-22 | End: 2022-10-22

## 2022-10-22 RX ORDER — ALBUTEROL SULFATE 90 UG/1
2 AEROSOL, METERED RESPIRATORY (INHALATION) 2 TIMES DAILY
Status: DISCONTINUED | OUTPATIENT
Start: 2022-10-22 | End: 2022-10-23

## 2022-10-22 RX ORDER — TRAZODONE HYDROCHLORIDE 50 MG/1
50 TABLET ORAL NIGHTLY
Status: DISCONTINUED | OUTPATIENT
Start: 2022-10-22 | End: 2022-11-01 | Stop reason: HOSPADM

## 2022-10-22 RX ORDER — ACETAMINOPHEN 325 MG/1
TABLET ORAL
Status: COMPLETED
Start: 2022-10-22 | End: 2022-10-22

## 2022-10-22 RX ORDER — SODIUM CHLORIDE 9 MG/ML
INJECTION, SOLUTION INTRAVENOUS
Status: DISPENSED
Start: 2022-10-22 | End: 2022-10-22

## 2022-10-22 RX ADMIN — VANCOMYCIN HYDROCHLORIDE 1000 MG: 1 INJECTION, POWDER, LYOPHILIZED, FOR SOLUTION INTRAVENOUS at 07:52

## 2022-10-22 RX ADMIN — GABAPENTIN 100 MG: 100 CAPSULE ORAL at 08:10

## 2022-10-22 RX ADMIN — ASPIRIN 325 MG: 325 TABLET ORAL at 08:10

## 2022-10-22 RX ADMIN — CEFAZOLIN 2000 MG: 2 INJECTION, POWDER, FOR SOLUTION INTRAMUSCULAR; INTRAVENOUS at 18:34

## 2022-10-22 RX ADMIN — POTASSIUM CHLORIDE 20 MEQ: 29.8 INJECTION, SOLUTION INTRAVENOUS at 05:10

## 2022-10-22 RX ADMIN — POTASSIUM CHLORIDE 20 MEQ: 29.8 INJECTION, SOLUTION INTRAVENOUS at 23:47

## 2022-10-22 RX ADMIN — INSULIN GLARGINE 12 UNITS: 100 INJECTION, SOLUTION SUBCUTANEOUS at 22:57

## 2022-10-22 RX ADMIN — TRAZODONE HYDROCHLORIDE 50 MG: 50 TABLET ORAL at 20:27

## 2022-10-22 RX ADMIN — CHLORHEXIDINE GLUCONATE 15 ML: 1.2 RINSE ORAL at 11:11

## 2022-10-22 RX ADMIN — OXYCODONE 5 MG: 5 TABLET ORAL at 05:42

## 2022-10-22 RX ADMIN — Medication 10 ML: at 08:24

## 2022-10-22 RX ADMIN — OXYCODONE 5 MG: 5 TABLET ORAL at 09:51

## 2022-10-22 RX ADMIN — POTASSIUM CHLORIDE 10 MEQ: 750 TABLET, FILM COATED, EXTENDED RELEASE ORAL at 08:15

## 2022-10-22 RX ADMIN — OXYCODONE 5 MG: 5 TABLET ORAL at 01:20

## 2022-10-22 RX ADMIN — ACETAMINOPHEN 650 MG: 325 TABLET ORAL at 20:26

## 2022-10-22 RX ADMIN — Medication 2 PUFF: at 07:58

## 2022-10-22 RX ADMIN — METHOCARBAMOL 750 MG: 750 TABLET ORAL at 16:56

## 2022-10-22 RX ADMIN — POTASSIUM CHLORIDE 20 MEQ: 29.8 INJECTION, SOLUTION INTRAVENOUS at 01:19

## 2022-10-22 RX ADMIN — SERTRALINE 100 MG: 50 TABLET, FILM COATED ORAL at 08:12

## 2022-10-22 RX ADMIN — POTASSIUM CHLORIDE 10 MEQ: 750 TABLET, FILM COATED, EXTENDED RELEASE ORAL at 16:56

## 2022-10-22 RX ADMIN — TIOTROPIUM BROMIDE INHALATION SPRAY 2 PUFF: 3.12 SPRAY, METERED RESPIRATORY (INHALATION) at 07:58

## 2022-10-22 RX ADMIN — Medication 2 PUFF: at 20:39

## 2022-10-22 RX ADMIN — Medication 2 PUFF: at 20:37

## 2022-10-22 RX ADMIN — METHOCARBAMOL 750 MG: 750 TABLET ORAL at 08:15

## 2022-10-22 RX ADMIN — GABAPENTIN 300 MG: 300 CAPSULE ORAL at 13:53

## 2022-10-22 RX ADMIN — OXYCODONE 5 MG: 5 TABLET ORAL at 18:29

## 2022-10-22 RX ADMIN — ACETAMINOPHEN 650 MG: 325 TABLET ORAL at 03:12

## 2022-10-22 RX ADMIN — ALPRAZOLAM 0.25 MG: 0.25 TABLET ORAL at 20:35

## 2022-10-22 RX ADMIN — METHOCARBAMOL 750 MG: 750 TABLET ORAL at 13:53

## 2022-10-22 RX ADMIN — POTASSIUM CHLORIDE 10 MEQ: 750 TABLET, FILM COATED, EXTENDED RELEASE ORAL at 13:53

## 2022-10-22 RX ADMIN — METHOCARBAMOL 750 MG: 750 TABLET ORAL at 20:26

## 2022-10-22 RX ADMIN — ACETAMINOPHEN 650 MG: 325 TABLET ORAL at 09:53

## 2022-10-22 RX ADMIN — POTASSIUM CHLORIDE 20 MEQ: 29.8 INJECTION, SOLUTION INTRAVENOUS at 00:21

## 2022-10-22 RX ADMIN — FUROSEMIDE 5 MG/HR: 10 INJECTION, SOLUTION INTRAVENOUS at 03:25

## 2022-10-22 RX ADMIN — ROSUVASTATIN CALCIUM 20 MG: 20 TABLET, FILM COATED ORAL at 08:12

## 2022-10-22 RX ADMIN — MIRTAZAPINE 15 MG: 15 TABLET, FILM COATED ORAL at 20:26

## 2022-10-22 RX ADMIN — MUPIROCIN: 20 OINTMENT TOPICAL at 20:26

## 2022-10-22 RX ADMIN — OXYCODONE 5 MG: 5 TABLET ORAL at 13:53

## 2022-10-22 RX ADMIN — Medication 10 ML: at 20:29

## 2022-10-22 RX ADMIN — CEFAZOLIN 2000 MG: 2 INJECTION, POWDER, FOR SOLUTION INTRAMUSCULAR; INTRAVENOUS at 06:14

## 2022-10-22 RX ADMIN — FAMOTIDINE 20 MG: 20 TABLET, FILM COATED ORAL at 20:26

## 2022-10-22 RX ADMIN — MUPIROCIN: 20 OINTMENT TOPICAL at 11:10

## 2022-10-22 RX ADMIN — FAMOTIDINE 20 MG: 10 INJECTION, SOLUTION INTRAVENOUS at 08:10

## 2022-10-22 RX ADMIN — CHLORHEXIDINE GLUCONATE 15 ML: 1.2 RINSE ORAL at 20:30

## 2022-10-22 RX ADMIN — GABAPENTIN 300 MG: 300 CAPSULE ORAL at 20:27

## 2022-10-22 ASSESSMENT — PAIN DESCRIPTION - LOCATION
LOCATION: CHEST
LOCATION: CHEST;BACK
LOCATION: CHEST
LOCATION: CHEST;BACK
LOCATION: CHEST
LOCATION: BACK;CHEST
LOCATION: BACK
LOCATION: CHEST

## 2022-10-22 ASSESSMENT — PAIN SCALES - GENERAL
PAINLEVEL_OUTOF10: 8
PAINLEVEL_OUTOF10: 6
PAINLEVEL_OUTOF10: 7
PAINLEVEL_OUTOF10: 8
PAINLEVEL_OUTOF10: 2
PAINLEVEL_OUTOF10: 10
PAINLEVEL_OUTOF10: 9
PAINLEVEL_OUTOF10: 4
PAINLEVEL_OUTOF10: 7
PAINLEVEL_OUTOF10: 8
PAINLEVEL_OUTOF10: 9

## 2022-10-22 ASSESSMENT — PAIN DESCRIPTION - ORIENTATION
ORIENTATION: LEFT
ORIENTATION: LEFT;OUTER
ORIENTATION: LEFT;OUTER
ORIENTATION: LEFT
ORIENTATION: LEFT;OUTER
ORIENTATION: LEFT;OUTER
ORIENTATION: LEFT
ORIENTATION: LEFT;OUTER

## 2022-10-22 NOTE — RT PROTOCOL NOTE
RT Inhaler-Nebulizer Bronchodilator Protocol Note    There is a bronchodilator order in the chart from a provider indicating to follow the RT Bronchodilator Protocol and there is an Initiate RT Inhaler-Nebulizer Bronchodilator Protocol order as well (see protocol at bottom of note). CXR Findings:  XR CHEST PORTABLE    Result Date: 10/21/2022  1. Sequela from CABG including median sternotomy and clips about the heart. 2.  Endotracheal tube is in place, tip at a level between that of the clavicular heads and aortic knob. 3.  NG tube extends below the left hemidiaphragm, into the upper abdomen, tip overlying the expected level of the stomach, left upper quadrant. 4. Right IJ [place]-Jemima catheter via introducer sheath has its tip overlying the right pulmonary artery level. A 2nd right IJ CVC is demonstrated, tip at the superior cavoatrial junction level. 5. Left side and right side pleural catheters appear unremarkable. 6. No pneumomediastinum or pneumothorax. 7. Minimal atelectasis at the lung bases and possible small volume left pleural effusion. 8. No fracture evident. The findings from the last RT Protocol Assessment were as follows:   History Pulmonary Disease: Smoker 15 pack years or more  Respiratory Pattern: Regular pattern and RR 12-20 bpm  Breath Sounds: Clear breath sounds  Cough: Weak, non-productive  Indication for Bronchodilator Therapy: Decreased or absent breath sounds  Bronchodilator Assessment Score: 4    Aerosolized bronchodilator medication orders have been revised according to the RT Inhaler-Nebulizer Bronchodilator Protocol below. Respiratory Therapist to perform RT Therapy Protocol Assessment initially then follow the protocol. Repeat RT Therapy Protocol Assessment PRN for score 0-3 or on second treatment, BID, and PRN for scores above 3.     No Indications - adjust the frequency to every 6 hours PRN wheezing or bronchospasm, if no treatments needed after 48 hours then discontinue using Per Protocol order mode. If indication present, adjust the RT bronchodilator orders based on the Bronchodilator Assessment Score as indicated below. Use Inhaler orders unless patient has one or more of the following: on home nebulizer, not able to hold breath for 10 seconds, is not alert and oriented, cannot activate and use MDI correctly, or respiratory rate 25 breaths per minute or more, then use the equivalent nebulizer order(s) with same Frequency and PRN reasons based on the score. If a patient is on this medication at home then do not decrease Frequency below that used at home. 0-3 - enter or revise RT bronchodilator order(s) to equivalent RT Bronchodilator order with Frequency of every 4 hours PRN for wheezing or increased work of breathing using Per Protocol order mode. 4-6 - enter or revise RT Bronchodilator order(s) to two equivalent RT bronchodilator orders with one order with BID Frequency and one order with Frequency of every 4 hours PRN wheezing or increased work of breathing using Per Protocol order mode. 7-10 - enter or revise RT Bronchodilator order(s) to two equivalent RT bronchodilator orders with one order with TID Frequency and one order with Frequency of every 4 hours PRN wheezing or increased work of breathing using Per Protocol order mode. 11-13 - enter or revise RT Bronchodilator order(s) to one equivalent RT bronchodilator order with QID Frequency and an Albuterol order with Frequency of every 4 hours PRN wheezing or increased work of breathing using Per Protocol order mode. Greater than 13 - enter or revise RT Bronchodilator order(s) to one equivalent RT bronchodilator order with every 4 hours Frequency and an Albuterol order with Frequency of every 2 hours PRN wheezing or increased work of breathing using Per Protocol order mode. RT to enter RT Home Evaluation for COPD & MDI Assessment order using Per Protocol order mode.     Electronically signed by Nicola Winchester RCP on 10/22/2022 at 1:15 AM

## 2022-10-22 NOTE — PROGRESS NOTES
Office : [phone]     Fax :[phone]       Nephrology progress Note      Patient's Name: Shemar Nair  8:42 AM  10/22/2022    Reason for Consult:  CKD 3a          History of Present Ilness:    Shemar Nair is a 81 y.o. male with h/o CKD.,  HTN , CAD who has LHC yesterday and had multivessel CAD. Plan for CABG tomorrow     Denies any chest pain at this time   BP is stable   No SOB   No peripheral edema noted     Interval hx    S/p CABG  POD #1   Making good urine volume   Creatinine increased to 2.0           I/O last 3 completed shifts:   In: 3314.1 [P.O.:880; I.V.:1278.6; Blood:650; IV Piggyback:505.6]  Out: 7227 [SHZGU:4861; Chest Tube:210]    Past Medical History:   Diagnosis Date    Chronic renal insufficiency     Due to chronic nephrolithiasis    Colloid cyst of third ventricle (HCC)     Coronary artery disease     DDD (degenerative disc disease), lumbar 2006    Disc bulge and facet arthropathy at L3-L4, L5-S1    Diabetes mellitus, type 2 ([facility] [address]     Diverticulitis of colon with perforation 11/07    Emergency colostomy    ED (erectile dysfunction)     Hyperlipidemia     Hypertension     Hypertensive retinopathy of both eyes 1/24/2013    Nephrolithiasis        Past Surgical History:   Procedure Laterality Date    BRAIN SURGERY  12/13/2007    Resection of colloid cyst of 3rd ventricle    CARDIAC CATHETERIZATION  5/02, 12/03,  3/08    COLOSTOMY  11/07    Emergent- due to diverticulitis with perforation    CORONARY ANGIOPLASTY  1995    RCA- unsuccessful    CORONARY ANGIOPLASTY WITH STENT PLACEMENT  10/05    Obtuse marginal branch of circumflex:  drug-eluting stent    KNEE ARTHROSCOPY  2000, 2005    Right    LITHOTRIPSY  1998    Bilateral    LITHOTRIPSY  1994    Bilateral with right stent placement    PARTIAL NEPHRECTOMY  1980    Right    REVISION COLOSTOMY  3/08    Colostomy takedown with sigmoid colectomy and coloproctostomy anastomosis    TOTAL KNEE ARTHROPLASTY Left 0/33/82    UMBILICAL HERNIA REPAIR         Current Medications:    albuterol sulfate HFA (PROVENTIL;VENTOLIN;PROAIR) 108 (90 Base) MCG/ACT inhaler 2 puff, BID  sodium chloride 0.9 % infusion,   insulin regular (HUMULIN R;NOVOLIN R) 100 Units in sodium chloride 0.9 % 100 mL infusion, Continuous PRN  protamine injection 50 mg, Once PRN  sodium chloride flush 0.9 % injection 5-40 mL, 2 times per day  sodium chloride flush 0.9 % injection 5-40 mL, PRN  0.9 % sodium chloride infusion, PRN  fondaparinux (ARIXTRA) injection 2.5 mg, Daily  ondansetron (ZOFRAN-ODT) disintegrating tablet 4 mg, Q8H PRN   Or  ondansetron (ZOFRAN) injection 4 mg, Q6H PRN  aspirin suppository 300 mg, Once  oxyCODONE (ROXICODONE) immediate release tablet 5 mg, Q4H PRN   Or  oxyCODONE (ROXICODONE) immediate release tablet 10 mg, Q4H PRN  morphine (PF) injection 2 mg, Q2H PRN   Or  morphine injection 4 mg, Q2H PRN  meperidine (DEMEROL) injection 25 mg, Once PRN  chlorhexidine (PERIDEX) 0.12 % solution 15 mL, BID  [Held by provider] furosemide (LASIX) injection 40 mg, BID  mupirocin (BACTROBAN) 2 % ointment, BID  nitroGLYCERIN (NITRODUR) 0.2 MG/HR 1 patch, Daily  potassium chloride (KLOR-CON) extended release tablet 10 mEq, TID WC  midazolam PF (VERSED) injection 1 mg, Q1H PRN  diphenhydrAMINE (BENADRYL) tablet 25 mg, Nightly PRN  metoprolol tartrate (LOPRESSOR) tablet 12.5 mg, BID  famotidine (PEPCID) tablet 20 mg, BID   Or  famotidine (PEPCID) 20 mg in sodium chloride (PF) 10 mL injection, BID  vancomycin 1000 mg IVPB in 250 mL D5W addavial, Q24H  potassium chloride 20 mEq/50 mL IVPB (Central Line), PRN  magnesium sulfate 2000 mg in 50 mL IVPB premix, PRN  calcium chloride 1,000 mg in sodium chloride 0.9 % 100 mL IVPB, PRN   Or  calcium chloride 2,000 mg in sodium chloride 0.9 % 100 mL IVPB, PRN  albuterol sulfate HFA (PROVENTIL;VENTOLIN;PROAIR) 108 (90 Base) MCG/ACT inhaler 2 puff, Q6H PRN  albumin human 5 % IV solution 25 g, PRN  lactated ringers infusion 250 mL, Continuous PRN  DOBUTamine (DOBUTREX) 500 mg in dextrose 5 % 250 mL infusion, Continuous PRN  furosemide (LASIX) injection 40 mg, Once PRN  milrinone (PRIMACOR) 20 mg in dextrose 5 % 100 mL infusion, Continuous PRN  nitroGLYCERIN 50 mg in dextrose 5% 250 mL infusion, Continuous PRN  insulin regular (HUMULIN R;NOVOLIN R) 100 Units in sodium chloride 0.9 % 100 mL infusion, Continuous  insulin glargine (LANTUS) injection vial 12 Units, Nightly  dextrose bolus 10% 125 mL, PRN   Or  dextrose bolus 10% 250 mL, PRN  glucagon (rDNA) injection 1 mg, PRN  dextrose 10 % infusion, Continuous PRN  furosemide (LASIX) 100 mg in dextrose 5 % 100 mL infusion, Continuous  ceFAZolin (ANCEF) 2,000 mg in dextrose 5 % 50 mL IVPB (mini-bag), Q12H  aspirin tablet 325 mg, Daily  0.9 % sodium chloride infusion, Continuous  gabapentin (NEURONTIN) capsule 100 mg, TID  methocarbamol (ROBAXIN) tablet 750 mg, 4x Daily  traZODone (DESYREL) tablet 100 mg, Nightly  sodium chloride flush 0.9 % injection 5-40 mL, PRN  amLODIPine (NORVASC) tablet 10 mg, Daily  doxazosin (CARDURA) tablet 1 mg, Daily  [Held by provider] losartan (COZAAR) tablet 100 mg, Daily  rosuvastatin (CRESTOR) tablet 20 mg, Daily  sertraline (ZOLOFT) tablet 100 mg, Daily  acetaminophen (TYLENOL) tablet 650 mg, Q4H PRN  mometasone-formoterol (DULERA) 200-5 MCG/ACT inhaler 2 puff, BID  tiotropium (SPIRIVA RESPIMAT) 2.5 MCG/ACT inhaler 2 puff, Daily  nitroGLYCERIN 50 mg in dextrose 5% 250 mL infusion, Continuous  mirtazapine (REMERON) tablet 15 mg, Nightly        Physical exam:     Vitals:  BP (!) 152/78   Pulse 80   Temp 99.5 °F (37.5 °C)   Resp 22   Ht 6' (1.829 m)   Wt 163 lb 5.8 oz (74.1 kg)   SpO2 90%   BMI 22.16 kg/m²   Constitutional:  OAA X3 NAD  Skin: no rash, turgor wnl  Heent:  eomi, mmm  Neck: no bruits or jvd noted  Cardiovascular:  S1, S2 without m/r/g  Respiratory: CTA B without w/r/r  Abdomen:  +bs, soft, nt, nd  Ext: no  lower extremity edema      Labs:  CBC:   Recent Labs     10/20/22  0625 10/21/22  0819 10/21/22  1330 10/21/22  1332 10/21/22  2010 10/22/22  0014 10/22/22  0415   WBC 8.2  --  19.6*  --   --   --  12.9*   HGB 11.8*   < > 10.2*   < > 10.3* 9.6* 8.6*     --  159  --   --   --  127*    < > = values in this interval not displayed. BMP:    Recent Labs     10/20/22  0625 10/21/22  1338 10/22/22  0415    141 141   K 3.9 4.7 4.2    109 108   CO2 24 25 25   BUN 13 17 18   CREATININE 1.2 1.6* 2.0*   GLUCOSE 73 154* 115*     Ca/Mg/Phos:   Recent Labs     10/20/22  0625 10/21/22  1338 10/22/22  0415   CALCIUM 9.8 9.1 8.6   MG 1.90 3.40* 2.30     Hepatic:   Recent Labs     10/19/22  0915   AST 13*   ALT 8*   BILITOT 0.3   ALKPHOS 48     Troponin: No results for input(s): TROPONINI in the last 72 hours. BNP: No results for input(s): BNP in the last 72 hours. Lipids:   Recent Labs     10/20/22  0625   CHOL 121   TRIG 231*   HDL 25*   LDLCALC 50   LABVLDL 46     ABGs:   Recent Labs     10/21/22  1524   PHART 7.380   PO2ART 89.6   BLN1VIW 41.3     INR:   Recent Labs     10/19/22  0915 10/21/22  1330 10/22/22  0415   INR 1.42* 1.57* 1.31*     UA:  Recent Labs     10/19/22  2310   COLORU Yellow   CLARITYU Clear   GLUCOSEU Negative   BILIRUBINUR Negative   KETUA Negative   SPECGRAV 1.010   BLOODU Negative   PHUR 7.5   PROTEINU Negative   UROBILINOGEN 0.2   NITRU Negative   LEUKOCYTESUR Negative   LABMICR Not Indicated   URINETYPE NotGiven      Urine Microscopic: No results for input(s): LABCAST, BACTERIA, COMU, HYALCAST, WBCUA, RBCUA, EPIU in the last 72 hours. Urine Culture: No results for input(s): LABURIN in the last 72 hours. Urine Chemistry: No results for input(s): Al Necessary, PROTEINUR, NAUR in the last 72 hours. IMAGING:  XR CHEST PORTABLE   Final Result   1. Sequela from CABG including median sternotomy and clips about the heart. 2.  Endotracheal tube is in place, tip at a level between that of the   clavicular heads and aortic knob. 3.  NG tube extends below the left hemidiaphragm, into the upper abdomen, tip   overlying the expected level of the stomach, left upper quadrant. 4. Right IJ Wayne-Jemima catheter via introducer sheath has its tip overlying   the right pulmonary artery level. A 2nd right IJ CVC is demonstrated, tip at   the superior cavoatrial junction level. 5. Left side and right side pleural catheters appear unremarkable. 6. No pneumomediastinum or pneumothorax. 7. Minimal atelectasis at the lung bases and possible small volume left   pleural effusion. 8. No fracture evident. VL DUP CAROTID BILATERAL   Final Result      VL PRE OP VEIN MAPPING   Final Result      XR CHEST PORTABLE   Final Result   No acute cardiopulmonary disease. Assessment/Plan :      1.  CKD 3 A   Creatinine level increased to 2.0     No edema noted   No SOB     Decrease lasix drip to 5 mg / hr     Recommend to dose adjust all medications  based on renal functions  Maintain SBP> 90 mmHg   Daily weights   AVOID NSAIDs  Avoid Nephrotoxins  Monitor Intake/Output  Call if significant decrease in urine output      2. HTN. Better controlled today     3. Anemia. Hb 11.8   Monitor     4. MVD . P    S/p CABG     Valsartan has been held   Volume status is good.        Monitor renal function closely       D/w primary team      Thank you for allowing us to participate in care of Elly Goyal         Electronically signed by: Gregoria Cast MD, 10/22/2022, 8:42 AM      [facility] [address]  Office : [phone]  Fax :[phone]

## 2022-10-22 NOTE — PROGRESS NOTES
POD #1    NSR in high 70's to low 80's. A-pace at 61 as backup today. Off all drips just now. Will d/c monitoring lines in a few hours if he remains stable and begin routine rehab. Sore this morning. Will adjust pain meds a bit. Bright and alert. All questions answered. Overall coming along well.

## 2022-10-22 NOTE — OP NOTE
[facility]                     [facility], [address]                                OPERATIVE REPORT    PATIENT NAME: Estela Randle                       :        1950  MED REC NO:   [id number]                          ROOM:       2908  ACCOUNT NO:   [de-identified]                           ADMIT DATE: 10/19/2022  PROVIDER:     Kelsie Cummins MD    DATE OF PROCEDURE:  10/21/2022    PREOPERATIVE DIAGNOSES:  Coronary artery disease with silent myocardial  ischemia, no chest pain, hypertension, dyslipidemia, non-insulin  dependent diabetes, chronic kidney disease stage III. POSTOPERATIVE DIAGNOSES:  Coronary artery disease with silent myocardial  ischemia, no chest pain, hypertension, dyslipidemia, non-insulin  dependent diabetes, chronic kidney disease stage III. OPERATION PERFORMED:  Coronary bypass grafting surgery x3 with single  reverse saphenous vein graft to the obtuse marginal branch of the  circumflex, separate sequential reverse saphenous vein graft to the  distal right coronary artery, pedicle left internal artery to the LAD,  endoscopic vein harvest of the left greater saphenous vein, left atrial  appendage obliteration with 35-mm AtriCure left atrial clip, total  cardiopulmonary bypass, transesophageal echo, Doppler verification of  graft, epiaortic ultrasound, and vas cath placement. SURGEON:  Kelsie Cummins MD    ASSISTANT:  Alysia Cogan, [place]; Dina Rasheed [facility] HealthRESHMA; Gregory Gibbs PA-C. Gregory Gibbs' assistance was required for sternotomy and  takedown of left internal artery because of his particular expertise in  cardiac surgery. ANESTHESIA:  General endotracheal anesthesia.     ANESTHESIOLOGIST:  Nitin Rivers MD    INDICATIONS AND CONSENT:  The patient is a 77-year-old gentleman with a  significant past medical history of coronary artery disease, having  undergone coronary stenting in the past.  He has chronic kidney disease  stage III, non-insulin dependent diabetes, dyslipidemia, hypertension,  previous smoker, and COPD. He presents with increasing fatigue and  dyspnea on exertion which is relatively new. Stress testing was  abnormal and was referred for cardiac catheterization, and cardiac  catheterization demonstrated significant three-vessel coronary artery  disease with left main trunk component. These findings were not felt to  be amenable to percutaneous interventional therapy, so he was referred  for surgery. I saw the patient in consultation, examined the patient, reviewed his  imaging studies. I recommended coronary artery bypass grafting surgery  for him. I discussed this operation with the patient and his family. We discussed the risks, benefits, and alternatives including the risk of  infection, bleeding, stroke, MI, arrhythmias, and mortality risk in the  1-2% range. Questions were answered and consent was obtained to proceed  with the surgery. INTRAOPERATIVE FINDINGS:  Left ventricular function:  Preprocedural TIMUR  showed an ejection fraction of about 50%. He had trivial mitral regurg,  no aortic insufficiency, mild global systolic dysfunction. Postprocedural TIMUR showed ejection fraction of 65% or greater that was  on low dose dobutamine. He had trivial mitral valve regurg, no aortic  insufficiency. No other structural changes in the heart. Vein graft  utilized was of very good quality, uniform in diameter through its  length, and had excellent pulsatile flow. No calcific wall disease. The ascending aorta was seen on epiaortic ultrasound, which I  independently performed and interpreted based on the surgical plans. I  saw no atherosclerotic disease of the ascending aorta. Pump time was 112 minutes. Cross-clamp time was 91 minutes. Coldest  core temperature was 34 degrees centigrade. Ancef and vancomycin both  were administered prior to incision.     OPERATIVE PROCEDURE:  The patient was brought to the operating room and  placed on the operating table in the supine position. Monitoring lines  were placed in preop holding and included right internal jugular vas  cath. That was placed by Anesthesia. In the operating room, general endotracheal anesthesia was accomplished. He was prepped and draped in sterile fashion for open-heart surgery. Time-out process was carried out, appropriately identifying the patient  and the procedure. The heart was exposed through the mediastinal incision while a portion  of left greater saphenous vein was harvested using vein harvest  technique. This was accomplished successfully. Esdras-Eldridge drain  placed and leg closed. Simultaneously, left internal mammary artery was  taken down from its position underneath the sternum with electrocautery. Systemic heparin was administered. Distal SHANON was clipped with surgical  clips, divided, stump oversewn with Ethibond suture, and pedicle placed  in the left upper chest as needed for bypass. The pericardium was opened longitudinally, tacked up laterally creating  a pericardial cradle. The ascending aorta was inspected by epiaortic  ultrasound, which I independently performed and interpreted based on the  surgical plans for this procedure. I had seen no atherosclerotic  disease. The ascending aorta was cannulated for arterial return from  the pump. Retrograde cardioplegia line was placed adjacently and  three-stage single venous cannula was placed through the right atrial  appendage into the inferior vena cava. The patient was placed on  bypass. A needle vent was placed in the ascending aorta and secured  with pledget pursestring. The method of myocardial protection employed was cold blood  cardioplegia, given antegrade to achieve cardiac standstill followed by  retrograde cardioplegia.   In 15-20 minute intervals or between distal  anastomosis, retrograde cardioplegia was administered along with vein  graft cardioplegia of the veins were completed. Antegrade cardioplegia  administered to distant aorta for vein graft measurements and a hotshot  dose of cardioplegia administered, 500 mL over 3 minutes, half  retrograde and the rest antegrade. Cross-clamp was applied, cardioplegia administered, and rapid cardiac  standstill achieved. First graft to be done was the obtuse marginal  branch of the circumflex. The coronary vein was repaired. The coronary  opened longitudinally. Two-vessel joined end-to-side with 7-0 Prolene  suture in a running fashion. Anastomosis was checked for hemostasis and  found satisfactory. Retrograde vein graft cardioplegia was  administered. The left atrial appendage was sized to a 35-mm AtriClip  that was brought to the field and applied through the transverse sinus  successfully. Retrograde cardioplegia was administered. That vein was brought to the left side of the aorta, which was distended  with cardioplegia. Vein graft trimmed for appropriate length. Anastomosis completed with a 6-0 Prolene suture in a running fashion  after creating aortotomy with 4.8 mm punch. Retrograde cardioplegia  administered. Second graft to be done was the distal right coronary artery. The PDA  itself was quite small as I went through the distal right coronary  artery just proximal to the bifurcation. It was opened longitudinally,  a separate vein prepared in a two vessel end-to-side with 7-0 Prolene  suture in running fashion. The anastomosis was checked for hemostasis,  found satisfactory. Retrograde and vein graft cardioplegia was  administered. The SHANON was brought into the field through a pericardial window behind  the left lateral thymic fat pad. The LAD was dissected and opened  longitudinally. The distal end of the SHANON was prepared in a two vessel  joined end-to-side with 7-0 Prolene suture in running fashion.   The  anastomosis was checked for hemostasis and found to be satisfactory by  removing the bulldog. Bulldog was reapplied after checking anastomosis. Side branches were clipped and the pedicle was secured to the myocardium  with 5-0 Prolene suture to prevent twisting and torquing at the  anastomotic site. During this time frame, the patient was systemically rewarmed. We made  a proximal anastomosis, completed the ascending aorta, bringing the  right coronary vein graft to the right side of aorta, distended, vein  graft trimmed for full length, and aortotomy created with 4.8 mm punch. The proximal anastomosis was then completed with a 6-0 Prolene suture in  a running fashion. The patient was placed in Trendelenburg position. Hotshot dose of  cardioplegia administered, 500 mL over 3 minutes, half retrograde and  the rest antegrade. The cross-clamp was then removed. No cardioversion  required. Sinus rhythm returned spontaneously. ST segments all  normalized. The retrograde cardioplegia line was removed and the pursestring suture  secured and tied and reinforced with 3-0 Prolene suture in a  figure-of-eight fashion. Temporary cardiac pacer wires were placed, two  inserted at the right ventricle, two at the inner atrial groove, brought  out through the skin and secured with Ethibond suture and he was  atrially paced for a while until he had satisfactory rate and rhythm on  his own with normalized ST segments. With the patient fully re-warmed,  he was ventilated and then  from bypass with low-dose  dobutamine. He remained stable on bypass. The venous cannula was  removed. Pursestring suture was secured, it was tied and reinforced  with 3-0 Prolene suture in a figure-of-eight fashion. The needle vent was removed from the ascending aorta and the pursestring  suture was secured and it was tied. The heparin effect was reversed  with protamine returning ACT towards baseline.   As I did that, the  aortic cannula was removed and the pursestring suture securing it was  tied x2 and reinforced with a pledgetted 3-0 Prolene suture placed in  horizontal mattress fashion. Doppler was used to check each of the graft and was found to have  satisfactory systolic and diastolic signals. With satisfactory  hemostasis, two 32-Yi chest tubes were placed, one in the left  pleural cavity and the other in the anterior pericardium overlying the  right ventricle, both secured with Ethibond sutures and connected to  suction drainage device. The pericardium was reapproximated with interrupted 0 Ethibond sutures. Platelet-poor gel was placed inside the pericardium as well as  underneath the left chest wall as the chest was closed. Platelet-rich  gel between the sternum as it was closed. The sternum was  reapproximated with stainless steel wires. Pectoralis block was  completed by Anesthesia, so no intercostal nerve block was achieved  separately. The sternum was irrigated with copious amounts of warm  saline and closed in appropriate layers with Vicryl suture including  subcuticular tissue and skin. ESTIMATED BLOOD LOSS:  100 mL. REPLACEMENTS:  None. Sponge count correct x2.         Aleksandra Link MD    D: 10/21/2022 13:09:53       T: 10/22/2022 3:35:20     [email or web address]  Job#: [id number]     Doc#: [id number]    CC:  MD Dagoberto Rivers MD Sherell Blas, MD

## 2022-10-22 NOTE — PROGRESS NOTES
[facility] [address] Department   Phone: [phone]    Occupational Therapy    [x] Initial Evaluation            [] Daily Treatment Note         [] Discharge Summary      Patient: Ana Lilia Pelletier   : 1950   MRN: [id number]   Date of Service:  10/22/2022    Admitting Diagnosis:  Coronary artery disease involving native coronary artery of native heart with unstable angina pectoris [facility])  Current Admission Summary: Pt at [facility] due to abnormal stress test. Pt had angiogram, followed by CABG on 10/20/22. Past Medical History:  has a past medical history of Chronic renal insufficiency, Colloid cyst of third ventricle (HCC), Coronary artery disease, DDD (degenerative disc disease), lumbar, Diabetes mellitus, type 2 ([address] Diverticulitis of colon with perforation, ED (erectile dysfunction), Hyperlipidemia, Hypertension, Hypertensive retinopathy of both eyes, and Nephrolithiasis. Past Surgical History:  has a past surgical history that includes Cardiac catheterization (, ,  3/08); Revision Colostomy (3/08); Lithotripsy (); Lithotripsy (); partial nephrectomy (); Coronary angioplasty with stent (10/05); Coronary angioplasty (); Umbilical hernia repair; Knee arthroscopy (, ); colostomy (); Total knee arthroplasty (Left, 14); and brain surgery (2007). Discharge Recommendations: Ana Lilia Pelletier scored a 10/ on the AM-PAC ADL Inpatient form. Current research shows that an AM-PAC score of 18 or greater is typically associated with a discharge to the patient's home setting. Based on the patient's AM-PAC score, and their current ADL deficits, it is recommended that the patient have 2-3 sessions per week of Occupational Therapy at d/c to increase the patient's independence. At this time, this patient demonstrates the endurance and safety to discharge home with home services and a follow up treatment frequency of 2-3x/wk.    Please see assessment section for further patient specific details. While pt has a lower AMPAC score than usually associated with discharge home, pt was previously independent and expected to improved enough to go home. If patient discharges prior to next session this note will serve as a discharge summary. Please see below for the latest assessment towards goals. DME Required For Discharge: DME to be determined pending patient progress    Precautions/Restrictions: high fall risk, cardiac  Weight Bearing Restrictions: no restrictions  [] Right Upper Extremity  [] Left Upper Extremity [] Right Lower Extremity  [] Left Lower Extremity     Required Braces/Orthotics: no braces required   [] Right  [] Left  Positional Restrictions:Sternal Precautions - No Pushing, Pulling, Lifting > 5 lbs    Pre-Admission Information   Lives With: spouse    Type of Home: house  Home Layout: one level, laundry in basement, doesn't have to go the basement  Home Access:  1 step to enter without rails   Bathroom Layout: walker accessible, tub/shower unit  Bathroom Equipment: grab bars in shower, bedside commode  Toilet Height: standard height  Home Equipment: rollator - 4 wheeled walker, reacher  Transfer Assistance: Independent without use of device  Ambulation Assistance:Independent without use of device  ADL Assistance: independent with all ADL's  IADL Assistance: requires assistance with cleaning, Wife does all IADLs; cleaning lady every 2 wks. Active :        [x] Yes  [] No  Hand Dominance: [] Left  [x] Right  Current Employment: retired.   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 Decision Making: medium complexity  Clinical Presentation: evolving      Subjective  General: Pt supine in bed, pleasant and agreeable to OT eval. Pt goes by \"Marco. \"  Pain: 8/10. Location: 7-8 in chest  Pain Interventions: RN in room; pt due in 45 min        Activities of Daily Living  Basic Activities of Daily Living  Lower Extremity Dressing: dependent Comment: socks  Instrumental Activities of Daily Living  No IADL completed on this date. Functional Mobility  Bed Mobility  Supine to Sit: 2 person assistance with D of 2, HOB elevated   Scooting: moderate assistance  Comments:  Transfers  Sit to stand transfer:minimal assistance  Stand to sit transfer: minimal assistance  Stand pivot transfer: minimal assistance  Comments: No AD  Functional Mobility:  Sitting Balance: stand by assistance. Other Therapeutic Interventions    Functional Outcomes  AM-PAC Inpatient Daily Activity Raw Score: 10    Cognition  WFL  Orientation:    alert and oriented x 4  Command Following:   [facility]     Education  Barriers To Learning: none  Patient Education: patient educated on goals, OT role and benefits, plan of care, precautions, transfer training, discharge recommendations  Learning Assessment:  patient verbalizes and demonstrates understanding    Assessment  Activity Tolerance: Tolerated well, but limited by fatigue  Impairments Requiring Therapeutic Intervention: decreased functional mobility, decreased ADL status, decreased strength, decreased endurance, decreased balance  Prognosis: good  Clinical Assessment: Pt is well below his baseline level of occupational function, based on the above deficits associated with s/p CABG.  Pt was previously independent at baseline and currently requires Max A of 2 for supine to sit and Min A for bed to chair transfer. Pt would benefit from continued skilled acute OT services to address these deficits and return him to his baseline level of independence. Safety Interventions: patient left in chair, chair alarm in place, call light within reach, gait belt, patient at risk for falls, and nurse notified    Plan  Frequency: 3-5 x/per week  Current Treatment Recommendations: balance training, functional mobility training, transfer training, endurance training, ADL/self-care training, and safety education    Goals  Patient Goals:  To return home to his prior level of independence  Short Term Goals:  Time Frame: Discharge  Patient will complete upper body ADL at Independent   Patient will complete lower body ADL at [facility], with AE   Patient will complete toileting at modified independent   Patient will complete grooming at [facility] independent, in stance at sink   Patient will complete functional transfers at [facility] independent, with LRAD   Patient will complete functional mobility at [facility], with LRAD     Therapy Session Time     Individual Group Co-treatment   Time In    1300   Time Out    1332   Minutes    32        Timed Code Treatment Minutes:   17 min  Total Treatment Minutes:  32 min       Electronically Signed By: GAYATRI Shaikh, GAYATRIR/L, [id number]

## 2022-10-22 NOTE — PROCEDURES
Pulmonary Function Testing      Patient name:  Ha Chacon     [facility] Unit #:   [id number]   Date of test:  10/19/2022  Date of interpretation:   10/22/2022    Mr. Ha Chacon is a 81y.o. year-old former smoker. The spirometry data were acceptable and reproducible. Spirometry:  Flow volume loops were normal. The FEV-1/FVC ratio was normal. The FEV-1 was 2.53 liters (72% of predicted), which was moderately decreased. The FVC was 3.16 liters (66% of predicted), which was decreased. Response to inhaled bronchodilators (albuterol) was not performed. Lung volumes:  Lung volumes were not tested by plethysmography. Diffusion capacity was not performed. Interpretation:  Possible restriction noted. Consider lung volume study and DLCO testing if clinically indicated.     Comments:

## 2022-10-22 NOTE — PROGRESS NOTES
[facility] [address] Department   Phone: [phone]    Physical Therapy    [x] Initial Evaluation            [] Daily Treatment Note         [] Discharge Summary      Patient: Kelly Monzon   : 1950   MRN: [id number]   Date of Service:  10/22/2022  Admitting Diagnosis: Coronary artery disease involving native coronary artery of native heart with unstable angina pectoris [facility])  Current Admission Summary: The pt was admitted for a CABG on 10/21. Past Medical History:  has a past medical history of Chronic renal insufficiency, Colloid cyst of third ventricle (HCC), Coronary artery disease, DDD (degenerative disc disease), lumbar, Diabetes mellitus, type 2 ([address] Diverticulitis of colon with perforation, ED (erectile dysfunction), Hyperlipidemia, Hypertension, Hypertensive retinopathy of both eyes, and Nephrolithiasis. Past Surgical History:  has a past surgical history that includes Cardiac catheterization (, ,  3/08); Revision Colostomy (3/08); Lithotripsy (); Lithotripsy (); partial nephrectomy (); Coronary angioplasty with stent (10/05); Coronary angioplasty (); Umbilical hernia repair; Knee arthroscopy (, ); colostomy (); Total knee arthroplasty (Left, 14); and brain surgery (2007). Discharge Recommendations: Kelly Monzon scored a 8/24 on the AM-PAC short mobility form. Current research shows that an AM-PAC score of 18 or greater is typically associated with a discharge to the patient's home setting. Anticipate pt to improve quickly during his hospital stay. Based on the patient's AM-PAC score and their current functional mobility deficits, it is recommended that the patient have 2-3 sessions per week of Physical Therapy at d/c to increase the patient's independence. At this time, this patient demonstrates the endurance and safety to discharge home with home services and a follow up treatment frequency of 2-3x/wk.   Please see assessment section for further patient specific details. If patient discharges prior to next session this note will serve as a discharge summary. Please see below for the latest assessment towards goals. HOME HEALTH CARE: LEVEL 1 STANDARD    - Initial home health evaluation to occur within 24-48 hours, in patient home   - Therapy to evaluate with goal of regaining prior level of functioning   - Therapy to evaluate if patient has [address] needs for personal care   DME Required For Discharge: DME to be determined pending patient progress  Precautions/Restrictions: high fall risk  Weight Bearing Restrictions: no restrictions  [] Right Upper Extremity  [] Left Upper Extremity [] Right Lower Extremity  [] Left Lower Extremity     Required Braces/Orthotics: no braces required   [] Right  [] Left  Positional Restrictions:Sternal Precautions - No Pushing, Pulling, Lifting > 5 lbs    Pre-Admission Information   Lives With: spouse                  Type of Home: house  Home Layout: one level, laundry in basement, doesn't have to go the basement  Home Access:  1 step to enter without rails   Bathroom Layout: walker accessible, tub/shower unit  Bathroom Equipment: grab bars in shower, bedside commode  Toilet Height: standard height  Home Equipment: rollator - 4 wheeled walker, reacher  Transfer Assistance: Independent without use of device  Ambulation Assistance:Independent without use of device  ADL Assistance: independent with all ADL's  IADL Assistance: requires assistance with cleaning, Wife does all IADLs; cleaning lady every 2 wks. Active :        [x] Yes                 [] No  Hand Dominance: [] Left                 [x] Right  Current Employment: retired.   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; then worked at a [employer]  Hobbies: [email or web address]: No falls    Examination   Vision:   Vision Corrective Device: wears glasses for reading  Hearing:   hard of hearing, left hearing aid, right hearing aid  Observation:   General Observation:  many lines and equipment attached to pt per protocol as pt was POD #1 from CABG, RN present to assist with line management  Posture:   good  Sensation:   WFL  Proprioception:    WFL  Tone:   Normotonic  Coordination Testing:   WFL    ROM:   (L) Hip: WFL     (R) Hip: WFL  (L) Knee: limited flexion to <90 degrees due to TKR     (R) Knee: WFL  (L) Ankle: WFL     (R) Ankle: WFL  Strength:   (B) LE strength grossly WFL  Decision Making: medium complexity  Clinical Presentation: evolving      Subjective  General: pt was supine in bed upon PT arrival, agreeable to PT  Pain: 8/10. Location: chest pain  Pain Interventions: pain medication in place prior to arrival, RN notified, and repositioned        Functional Mobility  Bed Mobility  Supine to Sit: 2 person assistance with max A of 2   Scooting: moderate assistance, PT assisted pt with first scoot, pt completed second scoot with CGA  Bridging: stand by assistance  Comments:  Transfers  Sit to stand transfer: contact guard assistance  Stand to sit transfer: minimal assistance  Stand pivot transfer: minimal assistance  Comments:  Ambulation  Ambulation not tested on this date secondary to POD #1 of CABG. Distance:   Gait Mechanics:   Comments:    Stair Mobility  Stair mobility not completed on this date. Comments:  Wheelchair Mobility:  No w/c mobility completed on this date.   Comments:  Balance  Static Sitting Balance: fair: maintains balance at CGA without use of UE support  Dynamic Sitting Balance: poor (+): requires min (A) to maintain balance  Static Standing Balance: poor (+): requires min (A) to maintain balance  Dynamic Standing Balance: poor (+): requires min (A) to maintain balance  Comments: intermittent posterior leaning in sitting but able to correct with VC     Other Therapeutic Interventions  Ankle pumps x10 reps BLE    Functional Outcomes  AM-PAC Inpatient Mobility Raw Score : 8              Cognition  WFL  Orientation:    alert and oriented x 4  Command Following:   [facility]    Education  Barriers To Learning: hearing  Patient Education: patient educated on PT role and benefits, plan of care, precautions, transfer training  Learning Assessment:  patient verbalizes and demonstrates understanding    Assessment  Activity Tolerance: Not appropriate for gait this date due to medical status  Impairments Requiring Therapeutic Intervention: decreased functional mobility, decreased ROM, decreased strength, decreased endurance, increased pain, decreased posture  Prognosis: good  Clinical Assessment: The pt presented with decreased strength, balance and activity tolerance s/p CABG. Anticipate pt to improve with his transfers and need for assistance over the next several days. Anticipate pt to return home at [place] but will need to continue to assess over the next few days. Safety Interventions: patient left in chair, chair alarm in place, call light within reach, and nurse notified    Plan  Frequency: 3-5 x/per week  Current Treatment Recommendations: strengthening, ROM, balance training, functional mobility training, transfer training, gait training, stair training, endurance training, neuromuscular re-education, and safety education    Goals  Patient Goals: return home at [facility]:  Time Frame:  To be met prior to DC  Patient will complete bed mobility at minimal assistance   Patient will complete transfers at contact guard assistance   Patient will ambulate 300 ft with use of LRAD at supervision  Patient will ascend/descend 3 stairs with (R) ascending handrail at contact guard assistance    Therapy Session Time      Individual Group Co-treatment   Time In     1300   Time Out     1332   Minutes     32     Timed Code Treatment Minutes:  17 Minutes  Total Treatment Minutes:  32       Electronically Signed By: Miles Goodwin PT DPT [id number]

## 2022-10-22 NOTE — PROGRESS NOTES
Notified Nephrology about change in urine output, received orders to change rate of lasix drip. Rate changed in MAR and on IV pump.

## 2022-10-23 LAB
ANION GAP SERPL CALCULATED.3IONS-SCNC: 11 MMOL/L (ref 3–16)
BLOOD BANK DISPENSE STATUS: NORMAL
BLOOD BANK DISPENSE STATUS: NORMAL
BLOOD BANK PRODUCT CODE: NORMAL
BLOOD BANK PRODUCT CODE: NORMAL
BPU ID: NORMAL
BPU ID: NORMAL
BUN BLDV-MCNC: 21 MG/DL (ref 7–20)
CALCIUM SERPL-MCNC: 8.3 MG/DL (ref 8.3–10.6)
CHLORIDE BLD-SCNC: 102 MMOL/L (ref 99–110)
CO2: 24 MMOL/L (ref 21–32)
CREAT SERPL-MCNC: 2.1 MG/DL (ref 0.8–1.3)
DESCRIPTION BLOOD BANK: NORMAL
DESCRIPTION BLOOD BANK: NORMAL
GFR SERPL CREATININE-BSD FRML MDRD: 33 ML/MIN/{1.73_M2}
GLUCOSE BLD-MCNC: 101 MG/DL (ref 70–99)
GLUCOSE BLD-MCNC: 101 MG/DL (ref 70–99)
GLUCOSE BLD-MCNC: 102 MG/DL (ref 70–99)
GLUCOSE BLD-MCNC: 102 MG/DL (ref 70–99)
GLUCOSE BLD-MCNC: 104 MG/DL (ref 70–99)
GLUCOSE BLD-MCNC: 107 MG/DL (ref 70–99)
GLUCOSE BLD-MCNC: 110 MG/DL (ref 70–99)
GLUCOSE BLD-MCNC: 113 MG/DL (ref 70–99)
GLUCOSE BLD-MCNC: 167 MG/DL (ref 70–99)
GLUCOSE BLD-MCNC: 167 MG/DL (ref 70–99)
GLUCOSE BLD-MCNC: 177 MG/DL (ref 70–99)
GLUCOSE BLD-MCNC: 178 MG/DL (ref 70–99)
GLUCOSE BLD-MCNC: 203 MG/DL (ref 70–99)
GLUCOSE BLD-MCNC: 207 MG/DL (ref 70–99)
GLUCOSE BLD-MCNC: 222 MG/DL (ref 70–99)
GLUCOSE BLD-MCNC: 235 MG/DL (ref 70–99)
GLUCOSE BLD-MCNC: 259 MG/DL (ref 70–99)
HCT VFR BLD CALC: 26.3 % (ref 40.5–52.5)
HEMOGLOBIN: 8 G/DL (ref 13.5–17.5)
MAGNESIUM: 2 MG/DL (ref 1.8–2.4)
MCH RBC QN AUTO: 29.6 PG (ref 26–34)
MCHC RBC AUTO-ENTMCNC: 30.3 G/DL (ref 31–36)
MCV RBC AUTO: 97.8 FL (ref 80–100)
PDW BLD-RTO: 20 % (ref 12.4–15.4)
PERFORMED ON: ABNORMAL
PLATELET # BLD: 114 K/UL (ref 135–450)
PMV BLD AUTO: 8.2 FL (ref 5–10.5)
POTASSIUM SERPL-SCNC: 4.5 MMOL/L (ref 3.5–5.1)
RBC # BLD: 2.69 M/UL (ref 4.2–5.9)
SODIUM BLD-SCNC: 137 MMOL/L (ref 136–145)
WBC # BLD: 14.7 K/UL (ref 4–11)

## 2022-10-23 PROCEDURE — 6370000000 HC RX 637 (ALT 250 FOR IP): Performed by: NURSE PRACTITIONER

## 2022-10-23 PROCEDURE — 6360000002 HC RX W HCPCS: Performed by: HOSPITALIST

## 2022-10-23 PROCEDURE — 85027 COMPLETE CBC AUTOMATED: CPT

## 2022-10-23 PROCEDURE — 2580000003 HC RX 258: Performed by: INTERNAL MEDICINE

## 2022-10-23 PROCEDURE — 94640 AIRWAY INHALATION TREATMENT: CPT

## 2022-10-23 PROCEDURE — 6370000000 HC RX 637 (ALT 250 FOR IP): Performed by: INTERNAL MEDICINE

## 2022-10-23 PROCEDURE — 6370000000 HC RX 637 (ALT 250 FOR IP): Performed by: HOSPITALIST

## 2022-10-23 PROCEDURE — 2580000003 HC RX 258: Performed by: THORACIC SURGERY (CARDIOTHORACIC VASCULAR SURGERY)

## 2022-10-23 PROCEDURE — 94660 CPAP INITIATION&MGMT: CPT

## 2022-10-23 PROCEDURE — 36592 COLLECT BLOOD FROM PICC: CPT

## 2022-10-23 PROCEDURE — 80048 BASIC METABOLIC PNL TOTAL CA: CPT

## 2022-10-23 PROCEDURE — 6370000000 HC RX 637 (ALT 250 FOR IP): Performed by: THORACIC SURGERY (CARDIOTHORACIC VASCULAR SURGERY)

## 2022-10-23 PROCEDURE — 92526 ORAL FUNCTION THERAPY: CPT

## 2022-10-23 PROCEDURE — 6360000002 HC RX W HCPCS: Performed by: INTERNAL MEDICINE

## 2022-10-23 PROCEDURE — 99233 SBSQ HOSP IP/OBS HIGH 50: CPT | Performed by: INTERNAL MEDICINE

## 2022-10-23 PROCEDURE — 94669 MECHANICAL CHEST WALL OSCILL: CPT

## 2022-10-23 PROCEDURE — 94761 N-INVAS EAR/PLS OXIMETRY MLT: CPT

## 2022-10-23 PROCEDURE — 99024 POSTOP FOLLOW-UP VISIT: CPT | Performed by: THORACIC SURGERY (CARDIOTHORACIC VASCULAR SURGERY)

## 2022-10-23 PROCEDURE — 6360000002 HC RX W HCPCS: Performed by: THORACIC SURGERY (CARDIOTHORACIC VASCULAR SURGERY)

## 2022-10-23 PROCEDURE — 2700000000 HC OXYGEN THERAPY PER DAY

## 2022-10-23 PROCEDURE — 92610 EVALUATE SWALLOWING FUNCTION: CPT

## 2022-10-23 PROCEDURE — 83735 ASSAY OF MAGNESIUM: CPT

## 2022-10-23 PROCEDURE — 2000000000 HC ICU R&B

## 2022-10-23 RX ORDER — FUROSEMIDE 10 MG/ML
20 INJECTION INTRAMUSCULAR; INTRAVENOUS EVERY 8 HOURS SCHEDULED
Status: DISCONTINUED | OUTPATIENT
Start: 2022-10-23 | End: 2022-10-26

## 2022-10-23 RX ORDER — INSULIN LISPRO 100 [IU]/ML
0-8 INJECTION, SOLUTION INTRAVENOUS; SUBCUTANEOUS
Status: DISCONTINUED | OUTPATIENT
Start: 2022-10-23 | End: 2022-10-24

## 2022-10-23 RX ORDER — INSULIN LISPRO 100 [IU]/ML
0-4 INJECTION, SOLUTION INTRAVENOUS; SUBCUTANEOUS NIGHTLY
Status: DISCONTINUED | OUTPATIENT
Start: 2022-10-23 | End: 2022-10-24

## 2022-10-23 RX ORDER — AMIODARONE HYDROCHLORIDE 200 MG/1
400 TABLET ORAL 2 TIMES DAILY
Status: COMPLETED | OUTPATIENT
Start: 2022-10-23 | End: 2022-10-27

## 2022-10-23 RX ORDER — LEVALBUTEROL INHALATION SOLUTION 1.25 MG/3ML
1.25 SOLUTION RESPIRATORY (INHALATION) EVERY 4 HOURS PRN
Status: DISCONTINUED | OUTPATIENT
Start: 2022-10-23 | End: 2022-11-01 | Stop reason: HOSPADM

## 2022-10-23 RX ORDER — ALBUTEROL SULFATE 2.5 MG/3ML
2.5 SOLUTION RESPIRATORY (INHALATION) EVERY 4 HOURS PRN
Status: DISCONTINUED | OUTPATIENT
Start: 2022-10-23 | End: 2022-10-23

## 2022-10-23 RX ORDER — AMIODARONE HYDROCHLORIDE 200 MG/1
200 TABLET ORAL DAILY
Status: DISCONTINUED | OUTPATIENT
Start: 2022-10-28 | End: 2022-11-01 | Stop reason: HOSPADM

## 2022-10-23 RX ORDER — LEVALBUTEROL INHALATION SOLUTION 1.25 MG/3ML
1.25 SOLUTION RESPIRATORY (INHALATION) 2 TIMES DAILY
Status: DISCONTINUED | OUTPATIENT
Start: 2022-10-23 | End: 2022-11-01 | Stop reason: HOSPADM

## 2022-10-23 RX ADMIN — METHOCARBAMOL 750 MG: 750 TABLET ORAL at 21:20

## 2022-10-23 RX ADMIN — METHOCARBAMOL 750 MG: 750 TABLET ORAL at 18:00

## 2022-10-23 RX ADMIN — CHLORHEXIDINE GLUCONATE 15 ML: 1.2 RINSE ORAL at 21:21

## 2022-10-23 RX ADMIN — Medication 2 PUFF: at 19:47

## 2022-10-23 RX ADMIN — SODIUM CHLORIDE 300 ML: 9 INJECTION, SOLUTION INTRAVENOUS at 06:19

## 2022-10-23 RX ADMIN — MUPIROCIN: 20 OINTMENT TOPICAL at 21:19

## 2022-10-23 RX ADMIN — INSULIN GLARGINE 12 UNITS: 100 INJECTION, SOLUTION SUBCUTANEOUS at 20:20

## 2022-10-23 RX ADMIN — FUROSEMIDE 20 MG: 10 INJECTION, SOLUTION INTRAMUSCULAR; INTRAVENOUS at 18:01

## 2022-10-23 RX ADMIN — GABAPENTIN 300 MG: 300 CAPSULE ORAL at 13:45

## 2022-10-23 RX ADMIN — ACETAMINOPHEN 650 MG: 325 TABLET ORAL at 08:06

## 2022-10-23 RX ADMIN — FUROSEMIDE 5 MG/HR: 10 INJECTION, SOLUTION INTRAVENOUS at 00:42

## 2022-10-23 RX ADMIN — ROSUVASTATIN CALCIUM 20 MG: 20 TABLET, FILM COATED ORAL at 08:07

## 2022-10-23 RX ADMIN — ASPIRIN 325 MG: 325 TABLET ORAL at 08:06

## 2022-10-23 RX ADMIN — METHOCARBAMOL 750 MG: 750 TABLET ORAL at 13:45

## 2022-10-23 RX ADMIN — AMIODARONE HYDROCHLORIDE 400 MG: 200 TABLET ORAL at 14:00

## 2022-10-23 RX ADMIN — OXYCODONE 5 MG: 5 TABLET ORAL at 18:01

## 2022-10-23 RX ADMIN — MUPIROCIN: 20 OINTMENT TOPICAL at 08:08

## 2022-10-23 RX ADMIN — AMIODARONE HYDROCHLORIDE 0.5 MG/MIN: 50 INJECTION, SOLUTION INTRAVENOUS at 20:35

## 2022-10-23 RX ADMIN — LEVALBUTEROL HYDROCHLORIDE 1.25 MG: 1.25 SOLUTION RESPIRATORY (INHALATION) at 19:47

## 2022-10-23 RX ADMIN — Medication 2 PUFF: at 08:50

## 2022-10-23 RX ADMIN — VANCOMYCIN HYDROCHLORIDE 1000 MG: 1 INJECTION, POWDER, LYOPHILIZED, FOR SOLUTION INTRAVENOUS at 08:04

## 2022-10-23 RX ADMIN — ACETAMINOPHEN 650 MG: 325 TABLET ORAL at 13:45

## 2022-10-23 RX ADMIN — ACETAMINOPHEN 650 MG: 325 TABLET ORAL at 21:20

## 2022-10-23 RX ADMIN — METHOCARBAMOL 750 MG: 750 TABLET ORAL at 08:06

## 2022-10-23 RX ADMIN — AMIODARONE HYDROCHLORIDE 150 MG: 50 INJECTION, SOLUTION INTRAVENOUS at 13:53

## 2022-10-23 RX ADMIN — INSULIN LISPRO 2 UNITS: 100 INJECTION, SOLUTION INTRAVENOUS; SUBCUTANEOUS at 20:19

## 2022-10-23 RX ADMIN — AMIODARONE HYDROCHLORIDE 1 MG/MIN: 50 INJECTION, SOLUTION INTRAVENOUS at 14:32

## 2022-10-23 RX ADMIN — SERTRALINE 100 MG: 50 TABLET, FILM COATED ORAL at 08:07

## 2022-10-23 RX ADMIN — FUROSEMIDE 20 MG: 10 INJECTION, SOLUTION INTRAMUSCULAR; INTRAVENOUS at 21:20

## 2022-10-23 RX ADMIN — CEFAZOLIN 2000 MG: 2 INJECTION, POWDER, FOR SOLUTION INTRAMUSCULAR; INTRAVENOUS at 06:21

## 2022-10-23 RX ADMIN — POTASSIUM CHLORIDE 10 MEQ: 750 TABLET, FILM COATED, EXTENDED RELEASE ORAL at 13:00

## 2022-10-23 RX ADMIN — POTASSIUM CHLORIDE 10 MEQ: 750 TABLET, FILM COATED, EXTENDED RELEASE ORAL at 18:01

## 2022-10-23 RX ADMIN — FAMOTIDINE 20 MG: 20 TABLET, FILM COATED ORAL at 21:20

## 2022-10-23 RX ADMIN — LEVALBUTEROL HYDROCHLORIDE 1.25 MG: 1.25 SOLUTION RESPIRATORY (INHALATION) at 16:02

## 2022-10-23 RX ADMIN — METOPROLOL TARTRATE 12.5 MG: 25 TABLET, FILM COATED ORAL at 14:08

## 2022-10-23 RX ADMIN — POTASSIUM CHLORIDE 10 MEQ: 750 TABLET, FILM COATED, EXTENDED RELEASE ORAL at 08:07

## 2022-10-23 RX ADMIN — ACETAMINOPHEN 650 MG: 325 TABLET ORAL at 06:14

## 2022-10-23 RX ADMIN — OXYCODONE 10 MG: 5 TABLET ORAL at 22:15

## 2022-10-23 RX ADMIN — GABAPENTIN 300 MG: 300 CAPSULE ORAL at 21:20

## 2022-10-23 RX ADMIN — TRAZODONE HYDROCHLORIDE 50 MG: 50 TABLET ORAL at 21:20

## 2022-10-23 RX ADMIN — Medication 10 ML: at 21:21

## 2022-10-23 RX ADMIN — MIRTAZAPINE 15 MG: 15 TABLET, FILM COATED ORAL at 21:20

## 2022-10-23 RX ADMIN — GABAPENTIN 300 MG: 300 CAPSULE ORAL at 08:06

## 2022-10-23 RX ADMIN — TIOTROPIUM BROMIDE INHALATION SPRAY 2 PUFF: 3.12 SPRAY, METERED RESPIRATORY (INHALATION) at 08:50

## 2022-10-23 RX ADMIN — FAMOTIDINE 20 MG: 20 TABLET, FILM COATED ORAL at 08:06

## 2022-10-23 RX ADMIN — AMIODARONE HYDROCHLORIDE 400 MG: 200 TABLET ORAL at 21:20

## 2022-10-23 ASSESSMENT — PAIN DESCRIPTION - PAIN TYPE: TYPE: CHRONIC PAIN

## 2022-10-23 ASSESSMENT — PAIN SCALES - GENERAL
PAINLEVEL_OUTOF10: 5
PAINLEVEL_OUTOF10: 9
PAINLEVEL_OUTOF10: 0
PAINLEVEL_OUTOF10: 3
PAINLEVEL_OUTOF10: 5
PAINLEVEL_OUTOF10: 0

## 2022-10-23 ASSESSMENT — PAIN DESCRIPTION - FREQUENCY: FREQUENCY: CONTINUOUS

## 2022-10-23 ASSESSMENT — PAIN DESCRIPTION - LOCATION: LOCATION: BACK

## 2022-10-23 ASSESSMENT — PAIN DESCRIPTION - ORIENTATION: ORIENTATION: LOWER

## 2022-10-23 ASSESSMENT — PAIN DESCRIPTION - DESCRIPTORS: DESCRIPTORS: ACHING

## 2022-10-23 ASSESSMENT — PAIN - FUNCTIONAL ASSESSMENT: PAIN_FUNCTIONAL_ASSESSMENT: PREVENTS OR INTERFERES SOME ACTIVE ACTIVITIES AND ADLS

## 2022-10-23 NOTE — PROGRESS NOTES
Facility/Department: [facility] CVU  Initial Assessment  DYSPHAGIA BEDSIDE SWALLOW EVALUATION     Patient: Jesus Ovalle   : 1950   MRN: [id number]      Evaluation Date: 10/23/2022   Admitting Diagnosis: Atherosclerotic heart disease of native coronary artery without angina pectoris [I25.10]  Abnormal result of other cardiovascular function study [R94.39]  Chest pain, unspecified [R07.9]  Coronary artery disease involving native coronary artery of native heart with unstable angina pectoris ([address] [I25.110]  Pain: Did not state                                                       H&P:   The patient is a 81 y.o. male with significant past medical history of  of CAD with a stent in the past [place], CKD3, DM2, HLD, HTN, past smoker, depression, COPD,  who presents to the cath lab as an OP  with several months of SOB, fatigue, twitches left upper chest.  Left heart angiogram found  multivessel coronary artery disease with significant Left Main disease. CVTS was consulted for surgical evaluation for coronary artery bypass grafting. Imaging:  Chest X-ray: 10/21/22  Impression   1. Sequela from CABG including median sternotomy and clips about the heart. 2.  Endotracheal tube is in place, tip at a level between that of the   clavicular heads and aortic knob. 3.  NG tube extends below the left hemidiaphragm, into the upper abdomen, tip   overlying the expected level of the stomach, left upper quadrant. 4. Right IJ Alvord-Jemima catheter via introducer sheath has its tip overlying   the right pulmonary artery level. A 2nd right IJ CVC is demonstrated, tip at   the superior cavoatrial junction level. 5. Left side and right side pleural catheters appear unremarkable. 6. No pneumomediastinum or pneumothorax. 7. Minimal atelectasis at the lung bases and possible small volume left   pleural effusion. 8. No fracture evident.        History/Prior Level of Function:   Living Status: Home  Prior Dysphagia History: Pt denied dysphagia prior to hospitalization. Pt reports new onset of coughing with pills taken with water. After questioning, pt reports occasional coughing with liquids when taken in large amounts. Reason for referral: SLP evaluation orders received due to coughing with pills with water     Dysphagia Impressions/Diagnosis: Oropharyngeal Dysphagia   Pt positioned upright in bed with O2 via nasal cannula upon arrival.  Family members and nurse present in room throughout. Pt and nurse denied difficulty with lunch meal this date. Oral [facility] exam revealed slightly decreased lingual/labial coordination. Pt provided various texture trials to evaluate overall swallow function. Pt exhibits mild oropharyngeal dysphagia characterized by prolonged, but effective mastication, suspected premature loss of bolus to pharynx, delayed swallow initiation, and slightly reduced hyolaryngeal elevation upon manual palpation. Thin liquids via cup and straw tolerated with no overt clinical s/s of aspiration/penetration. Regular textures tolerated with prolonged, but effective mastication with adequate oral clearance. No overt clinical s/s of aspiration/penetration with any solids textures. Pills x2 taken one at a time with thin water revealed delayed throat clear/cough with desaturation to 88%, increased WOB, and increased expiratory wheeze. Pt tolerated pills in puree with no overt clinical s/s of aspiration/penetration. Recommend continue Regular texture diet with thin liquids, meds in puree. Education provided re: anatomy and physiology, current diet recommendations, safe swallowing strategies, and mixed consistencies. Pt verbalized understanding and agreement. Recommended Diet and Intervention 10/23/2022:  Diet Solids Recommendation:  Regular texture diet  Liquid Consistency Recommendation: Thin liquids  Recommended form of Meds: Meds in puree ;  Larger pills may need to be crushed          Compensatory Swallowing Strategies:  Upright as possible with all PO intake , No straws , Small bites/sips , Swallow 2 times per bite , Eat/feed slowly, Remain upright 30-45 min     SHORT TERM DYSPHAGIA GOALS/PLAN OF CARE: Speech therapy for dysphagia tx 3-5 times per week during acute care stay.     Pt will functionally tolerate recommended diet with no overt clinical s/s of aspiration   Pt will demonstrate understanding of aspiration risk and precautions via education/demonstration with occasional prompting  If clinical s/s of aspiration/penetration continue to be noted, Pt will participate in Modified Barium Swallow Study     Dysphagia Therapeutic Intervention:  Oral Care, Laryngeal Exercises , Pharyngeal Exercise, Diet Tolerance Monitoring , Patient/Family Education     Discharge Recommendations: Discharge recommendations to be determined pending ongoing follow-up during acute care stay    Patient Positioning: Upright in bed     Current Diet Level (prior to evaluation): Regular texture diet  Thin liquids      Respiratory Status:   []Room Air   [x]O2 via nasal cannula   []Other:    Dentition:  [x]Adequate  []Dentures   []Missing Many Teeth  []Edentulous  []Other:    Baseline Vocal Quality:  []Normal  []Dysphonic   []Aphonic   [x]Hoarse  [x]Wet  []Weak  []Other:    Volitional Cough:  Elicited: Strong , Congested , and Wet     Volitional Swallow:   []Absent   [x]Delayed     []Adequate     []Required use of drink     Oral Mechanism Exam:  []WFL [x]Mild   [] Moderate  []Severe  []To be assessed  Impaired:   []Left side      []Right side    [x]Labial ROM/Coordination    []Labial Symmetry   [x]Lingual ROM/Coordination   []Lingual Symmetry  []Gag  []Other:     Oral Phase: []WFL [x]Mild   [] Moderate  []Severe  []To be assessed   [x]Impaired/Prolonged Mastication:   []Oral Holding:   []Spillage Left:   []Spillage Right:  []Pocketing Left:   []Pocketing Right:   [x]Decreased Anterior to Posterior Transit:   [x]Suspected Premature Bolus Loss:   []Lingual/Palatal Residue:   []Other:     Pharyngeal Phase: []WFL [x]Mild   [x] Moderate  []Severe  []To be assessed   [x]Delayed Swallow:   [x]Suspected Pharyngeal Pooling:   []Decreased Laryngeal Elevation:   []Absent Swallow:  []Wet Vocal Quality:   [x]Throat Clearing-Immediate: mixed consistencies (I.e. pill with water); thin liquid via straw   []Throat Clearing-Delayed:   []Cough-Immediate:   []Cough-Delayed:mixed consistencies (I.e. pill with water); thin liquid via straw  [x]Change in Vital Signs: increased WOB; O2 desaturation with mixed consistencies  []Suspected Delayed Pharyngeal Clearing:  []Other:     Eating Assistance:  []Independent  []Setup or clean-up assistance   [x] Supervision or touching assistance   [] Partial or moderate assistance   [] Substantial or maximal assistance  [] Dependent     EDUCATION:   Provided education regarding role of SLP, results of assessment, recommendations and general speech pathology plan of care. [x] Pt verbalized understanding and agreement   [x] Pt requires ongoing learning   [] No evidence of comprehension     If patient discharges prior to next visit, this note will serve as discharge. Treatment time:  Timed Code Treatment Minutes: 0 minutes  Total Treatment time: 28 minutes    Electronically signed by:     Elvia NUNEZ CCC-SLP TROY [id number]  Speech-Language Pathologist

## 2022-10-23 NOTE — PROGRESS NOTES
POD #2    NSR w/ brief runs of SVT, VSS  May need amio protocol later today if he goes into afib. Discussed this with him. CT's to water seal.  Garcia in to monitor urine output given elevated Cr. Comfortable. Will d/c PA line and a-line today which will help with mobilization.

## 2022-10-23 NOTE — PROGRESS NOTES
Office : [phone]     Fax :[phone]       Nephrology progress Note      Patient's Name: Lorelei Marie  9:12 AM  10/23/2022    Reason for Consult:  CKD 3a          History of Present Ilness:    Lorelei Marie is a 81 y.o. male with h/o CKD.,  HTN , CAD who has LHC yesterday and had multivessel CAD. Plan for CABG tomorrow     Denies any chest pain at this time   BP is stable   No SOB   No peripheral edema noted     Interval hx    S/p CABG  POD #2  Making good urine volume   Creatinine increased to 2.0 --> 2.1           I/O last 3 completed shifts: In: 3073.5 [P.O.:1660; I.V.:528.5; IV Piggyback:885]  Out: 2087 [Urine:4140;  Chest Tube:345]    Past Medical History:   Diagnosis Date    Chronic renal insufficiency     Due to chronic nephrolithiasis    Colloid cyst of third ventricle (HCC)     Coronary artery disease     DDD (degenerative disc disease), lumbar 2006    Disc bulge and facet arthropathy at L3-L4, L5-S1    Diabetes mellitus, type 2 ([facility] [address]     Diverticulitis of colon with perforation 11/07    Emergency colostomy    ED (erectile dysfunction)     Hyperlipidemia     Hypertension     Hypertensive retinopathy of both eyes 1/24/2013    Nephrolithiasis        Past Surgical History:   Procedure Laterality Date    BRAIN SURGERY  12/13/2007    Resection of colloid cyst of 3rd ventricle    CARDIAC CATHETERIZATION  5/02, 12/03,  3/08    COLOSTOMY  11/07    Emergent- due to diverticulitis with perforation    CORONARY ANGIOPLASTY  1995    RCA- unsuccessful    CORONARY ANGIOPLASTY WITH STENT PLACEMENT  10/05    Obtuse marginal branch of circumflex:  drug-eluting stent    KNEE ARTHROSCOPY  2000, 2005    Right    LITHOTRIPSY  1998    Bilateral    LITHOTRIPSY  1994    Bilateral with right stent placement    PARTIAL NEPHRECTOMY  1980    Right    REVISION COLOSTOMY  3/08    Colostomy takedown with sigmoid colectomy and coloproctostomy anastomosis    TOTAL KNEE ARTHROPLASTY Left 4/12/66    UMBILICAL HERNIA REPAIR         Current Medications:    albuterol sulfate HFA (PROVENTIL;VENTOLIN;PROAIR) 108 (90 Base) MCG/ACT inhaler 2 puff, BID  traZODone (DESYREL) tablet 50 mg, Nightly  acetaminophen (TYLENOL) tablet 650 mg, Q6H  ALPRAZolam (XANAX) tablet 0.25 mg, Q6H PRN  gabapentin (NEURONTIN) capsule 300 mg, TID  insulin regular (HUMULIN R;NOVOLIN R) 100 Units in sodium chloride 0.9 % 100 mL infusion, Continuous PRN  sodium chloride flush 0.9 % injection 5-40 mL, 2 times per day  sodium chloride flush 0.9 % injection 5-40 mL, PRN  0.9 % sodium chloride infusion, PRN  fondaparinux (ARIXTRA) injection 2.5 mg, Daily  ondansetron (ZOFRAN-ODT) disintegrating tablet 4 mg, Q8H PRN   Or  ondansetron (ZOFRAN) injection 4 mg, Q6H PRN  aspirin suppository 300 mg, Once  oxyCODONE (ROXICODONE) immediate release tablet 5 mg, Q4H PRN   Or  oxyCODONE (ROXICODONE) immediate release tablet 10 mg, Q4H PRN  chlorhexidine (PERIDEX) 0.12 % solution 15 mL, BID  [Held by provider] furosemide (LASIX) injection 40 mg, BID  mupirocin (BACTROBAN) 2 % ointment, BID  potassium chloride (KLOR-CON) extended release tablet 10 mEq, TID WC  diphenhydrAMINE (BENADRYL) tablet 25 mg, Nightly PRN  metoprolol tartrate (LOPRESSOR) tablet 12.5 mg, BID  famotidine (PEPCID) tablet 20 mg, BID   Or  famotidine (PEPCID) 20 mg in sodium chloride (PF) 10 mL injection, BID  potassium chloride 20 mEq/50 mL IVPB (Central Line), PRN  magnesium sulfate 2000 mg in 50 mL IVPB premix, PRN  albuterol sulfate HFA (PROVENTIL;VENTOLIN;PROAIR) 108 (90 Base) MCG/ACT inhaler 2 puff, Q6H PRN  albumin human 5 % IV solution 25 g, PRN  insulin regular (HUMULIN R;NOVOLIN R) 100 Units in sodium chloride 0.9 % 100 mL infusion, Continuous  insulin glargine (LANTUS) injection vial 12 Units, Nightly  dextrose bolus 10% 125 mL, PRN   Or  dextrose bolus 10% 250 mL, PRN  glucagon (rDNA) injection 1 mg, PRN  dextrose 10 % infusion, Continuous PRN  furosemide (LASIX) 100 mg in dextrose 5 % 100 mL infusion, Continuous  aspirin tablet 325 mg, Daily  0.9 % sodium chloride infusion, Continuous  methocarbamol (ROBAXIN) tablet 750 mg, 4x Daily  sodium chloride flush 0.9 % injection 5-40 mL, PRN  amLODIPine (NORVASC) tablet 10 mg, Daily  [Held by provider] losartan (COZAAR) tablet 100 mg, Daily  rosuvastatin (CRESTOR) tablet 20 mg, Daily  sertraline (ZOLOFT) tablet 100 mg, Daily  mometasone-formoterol (DULERA) 200-5 MCG/ACT inhaler 2 puff, BID  tiotropium (SPIRIVA RESPIMAT) 2.5 MCG/ACT inhaler 2 puff, Daily  mirtazapine (REMERON) tablet 15 mg, Nightly        Physical exam:     Vitals:  BP (!) 118/50   Pulse 79   Temp 99.9 °F (37.7 °C) (Core)   Resp 23   Ht 6' (1.829 m)   Wt 170 lb 13.7 oz (77.5 kg)   SpO2 97%   BMI 23.17 kg/m²   Constitutional:  OAA X3 NAD  Skin: no rash, turgor wnl  Heent:  eomi, mmm  Neck: no bruits or jvd noted  Cardiovascular:  S1, S2 without m/r/g  Respiratory: CTA B without w/r/r  Abdomen:  +bs, soft, nt, nd  Ext: no  lower extremity edema      Labs:  CBC:   Recent Labs     10/21/22  1330 10/21/22  1332 10/22/22  0014 10/22/22  0415 10/23/22  0410   WBC 19.6*  --   --  12.9* 14.7*   HGB 10.2*   < > 9.6* 8.6* 8.0*     --   --  127* 114*    < > = values in this interval not displayed. BMP:    Recent Labs     10/21/22  1338 10/22/22  0415 10/22/22  2311 10/23/22  0410    141  --  137   K 4.7 4.2 4.2 4.5    108  --  102   CO2 25 25  --  24   BUN 17 18  --  21*   CREATININE 1.6* 2.0*  --  2.1*   GLUCOSE 154* 115*  --  101*     Ca/Mg/Phos:   Recent Labs     10/21/22  1338 10/22/22  0415 10/23/22  0410   CALCIUM 9.1 8.6 8.3   MG 3.40* 2.30 2.00     Hepatic:   No results for input(s): AST, ALT, ALB, BILITOT, ALKPHOS in the last 72 hours.     Troponin: No results for input(s): TROPONINI in the last 72 hours. BNP: No results for input(s): BNP in the last 72 hours. Lipids:   No results for input(s): CHOL, TRIG, HDL, LDLCALC, LABVLDL in the last 72 hours. ABGs:   Recent Labs     10/21/22  1524   PHART 7.380   PO2ART 89.6   OQG3CFT 41.3     INR:   Recent Labs     10/21/22  1330 10/22/22  0415   INR 1.57* 1.31*     UA:  No results for input(s): COLORU, CLARITYU, GLUCOSEU, BILIRUBINUR, KETUA, SPECGRAV, BLOODU, PHUR, PROTEINU, UROBILINOGEN, NITRU, LEUKOCYTESUR, Moise Guild in the last 72 hours. Urine Microscopic: No results for input(s): LABCAST, BACTERIA, COMU, HYALCAST, WBCUA, RBCUA, EPIU in the last 72 hours. Urine Culture: No results for input(s): LABURIN in the last 72 hours. Urine Chemistry: No results for input(s): Luz Golds, PROTEINUR, NAUR in the last 72 hours. IMAGING:  XR CHEST PORTABLE   Final Result   1. Sequela from CABG including median sternotomy and clips about the heart. 2.  Endotracheal tube is in place, tip at a level between that of the   clavicular heads and aortic knob. 3.  NG tube extends below the left hemidiaphragm, into the upper abdomen, tip   overlying the expected level of the stomach, left upper quadrant. 4. Right IJ [place]-Jemima catheter via introducer sheath has its tip overlying   the right pulmonary artery level. A 2nd right IJ CVC is demonstrated, tip at   the superior cavoatrial junction level. 5. Left side and right side pleural catheters appear unremarkable. 6. No pneumomediastinum or pneumothorax. 7. Minimal atelectasis at the lung bases and possible small volume left   pleural effusion. 8. No fracture evident. VL DUP CAROTID BILATERAL   Final Result      VL PRE OP VEIN MAPPING   Final Result      XR CHEST PORTABLE   Final Result   No acute cardiopulmonary disease.                              Assessment/Plan :      1.  CKD 3 A   Creatinine level increased to 2.0 -->2.1  Good uOP   No edema noted   No SOB       Decrease lasix drip to 2.5 mg / hr     Recommend to dose adjust all medications  based on renal functions  Maintain SBP> 90 mmHg   Daily weights   AVOID NSAIDs  Avoid Nephrotoxins  Monitor Intake/Output  Call if significant decrease in urine output      2. HTN. Better controlled today     3. Anemia. Hb 11.8   Monitor     4. MVD . P    S/p CABG     Valsartan has been held   Volume status is good.        Monitor renal function closely       D/w primary team      Thank you for allowing us to participate in care of Amanda Kaufman         Electronically signed by: Kathy Sahu MD, 10/23/2022, [address] [facility]      [facility] [address]  Office : [phone]  Fax :[phone]

## 2022-10-24 ENCOUNTER — APPOINTMENT (OUTPATIENT)
Dept: GENERAL RADIOLOGY | Age: 72
DRG: 233 | End: 2022-10-24
Attending: INTERNAL MEDICINE
Payer: MEDICARE

## 2022-10-24 LAB
ANION GAP SERPL CALCULATED.3IONS-SCNC: 6 MMOL/L (ref 3–16)
BUN BLDV-MCNC: 29 MG/DL (ref 7–20)
CALCIUM SERPL-MCNC: 8.3 MG/DL (ref 8.3–10.6)
CHLORIDE BLD-SCNC: 97 MMOL/L (ref 99–110)
CO2: 29 MMOL/L (ref 21–32)
CREAT SERPL-MCNC: 1.9 MG/DL (ref 0.8–1.3)
GFR SERPL CREATININE-BSD FRML MDRD: 37 ML/MIN/{1.73_M2}
GLUCOSE BLD-MCNC: 168 MG/DL (ref 70–99)
GLUCOSE BLD-MCNC: 179 MG/DL (ref 70–99)
GLUCOSE BLD-MCNC: 197 MG/DL (ref 70–99)
GLUCOSE BLD-MCNC: 206 MG/DL (ref 70–99)
GLUCOSE BLD-MCNC: 216 MG/DL (ref 70–99)
GLUCOSE BLD-MCNC: 253 MG/DL (ref 70–99)
HCT VFR BLD CALC: 26.4 % (ref 40.5–52.5)
HEMOGLOBIN: 8.1 G/DL (ref 13.5–17.5)
MCH RBC QN AUTO: 29.7 PG (ref 26–34)
MCHC RBC AUTO-ENTMCNC: 30.8 G/DL (ref 31–36)
MCV RBC AUTO: 96.5 FL (ref 80–100)
PDW BLD-RTO: 20.3 % (ref 12.4–15.4)
PERFORMED ON: ABNORMAL
PLATELET # BLD: 151 K/UL (ref 135–450)
PMV BLD AUTO: 8.5 FL (ref 5–10.5)
POTASSIUM SERPL-SCNC: 5 MMOL/L (ref 3.5–5.1)
RBC # BLD: 2.74 M/UL (ref 4.2–5.9)
SODIUM BLD-SCNC: 132 MMOL/L (ref 136–145)
WBC # BLD: 16.1 K/UL (ref 4–11)

## 2022-10-24 PROCEDURE — 6370000000 HC RX 637 (ALT 250 FOR IP)

## 2022-10-24 PROCEDURE — 6370000000 HC RX 637 (ALT 250 FOR IP): Performed by: THORACIC SURGERY (CARDIOTHORACIC VASCULAR SURGERY)

## 2022-10-24 PROCEDURE — 2580000003 HC RX 258: Performed by: THORACIC SURGERY (CARDIOTHORACIC VASCULAR SURGERY)

## 2022-10-24 PROCEDURE — 94761 N-INVAS EAR/PLS OXIMETRY MLT: CPT

## 2022-10-24 PROCEDURE — 6370000000 HC RX 637 (ALT 250 FOR IP): Performed by: INTERNAL MEDICINE

## 2022-10-24 PROCEDURE — 2000000000 HC ICU R&B

## 2022-10-24 PROCEDURE — 6370000000 HC RX 637 (ALT 250 FOR IP): Performed by: NURSE PRACTITIONER

## 2022-10-24 PROCEDURE — 2700000000 HC OXYGEN THERAPY PER DAY

## 2022-10-24 PROCEDURE — 93005 ELECTROCARDIOGRAM TRACING: CPT | Performed by: THORACIC SURGERY (CARDIOTHORACIC VASCULAR SURGERY)

## 2022-10-24 PROCEDURE — 6360000002 HC RX W HCPCS: Performed by: THORACIC SURGERY (CARDIOTHORACIC VASCULAR SURGERY)

## 2022-10-24 PROCEDURE — 71045 X-RAY EXAM CHEST 1 VIEW: CPT

## 2022-10-24 PROCEDURE — 36592 COLLECT BLOOD FROM PICC: CPT

## 2022-10-24 PROCEDURE — APPNB45 APP NON BILLABLE 31-45 MINUTES

## 2022-10-24 PROCEDURE — 85027 COMPLETE CBC AUTOMATED: CPT

## 2022-10-24 PROCEDURE — 94640 AIRWAY INHALATION TREATMENT: CPT

## 2022-10-24 PROCEDURE — 99233 SBSQ HOSP IP/OBS HIGH 50: CPT | Performed by: INTERNAL MEDICINE

## 2022-10-24 PROCEDURE — 80048 BASIC METABOLIC PNL TOTAL CA: CPT

## 2022-10-24 PROCEDURE — 94669 MECHANICAL CHEST WALL OSCILL: CPT

## 2022-10-24 PROCEDURE — 6360000002 HC RX W HCPCS: Performed by: HOSPITALIST

## 2022-10-24 PROCEDURE — 94660 CPAP INITIATION&MGMT: CPT

## 2022-10-24 RX ORDER — POLYETHYLENE GLYCOL 3350 17 G/17G
17 POWDER, FOR SOLUTION ORAL DAILY PRN
Status: DISCONTINUED | OUTPATIENT
Start: 2022-10-24 | End: 2022-10-25

## 2022-10-24 RX ORDER — INSULIN LISPRO 100 [IU]/ML
7 INJECTION, SOLUTION INTRAVENOUS; SUBCUTANEOUS
Status: DISCONTINUED | OUTPATIENT
Start: 2022-10-25 | End: 2022-10-26

## 2022-10-24 RX ORDER — SENNA PLUS 8.6 MG/1
1 TABLET ORAL 2 TIMES DAILY
Status: DISCONTINUED | OUTPATIENT
Start: 2022-10-24 | End: 2022-11-01 | Stop reason: HOSPADM

## 2022-10-24 RX ORDER — DOCUSATE SODIUM 100 MG/1
100 CAPSULE, LIQUID FILLED ORAL 2 TIMES DAILY
Status: DISCONTINUED | OUTPATIENT
Start: 2022-10-24 | End: 2022-11-01 | Stop reason: HOSPADM

## 2022-10-24 RX ORDER — INSULIN GLARGINE 100 [IU]/ML
15 INJECTION, SOLUTION SUBCUTANEOUS NIGHTLY
Status: DISCONTINUED | OUTPATIENT
Start: 2022-10-24 | End: 2022-10-24

## 2022-10-24 RX ORDER — INSULIN LISPRO 100 [IU]/ML
0-16 INJECTION, SOLUTION INTRAVENOUS; SUBCUTANEOUS
Status: DISCONTINUED | OUTPATIENT
Start: 2022-10-24 | End: 2022-11-01 | Stop reason: HOSPADM

## 2022-10-24 RX ORDER — INSULIN GLARGINE 100 [IU]/ML
20 INJECTION, SOLUTION SUBCUTANEOUS NIGHTLY
Status: DISCONTINUED | OUTPATIENT
Start: 2022-10-24 | End: 2022-10-25

## 2022-10-24 RX ORDER — INSULIN LISPRO 100 [IU]/ML
4 INJECTION, SOLUTION INTRAVENOUS; SUBCUTANEOUS
Status: DISCONTINUED | OUTPATIENT
Start: 2022-10-24 | End: 2022-10-24

## 2022-10-24 RX ORDER — INSULIN LISPRO 100 [IU]/ML
0-4 INJECTION, SOLUTION INTRAVENOUS; SUBCUTANEOUS NIGHTLY
Status: DISCONTINUED | OUTPATIENT
Start: 2022-10-24 | End: 2022-11-01 | Stop reason: HOSPADM

## 2022-10-24 RX ORDER — INSULIN LISPRO 100 [IU]/ML
0-16 INJECTION, SOLUTION INTRAVENOUS; SUBCUTANEOUS ONCE
Status: COMPLETED | OUTPATIENT
Start: 2022-10-24 | End: 2022-10-24

## 2022-10-24 RX ADMIN — Medication 10 ML: at 20:53

## 2022-10-24 RX ADMIN — METHOCARBAMOL 750 MG: 750 TABLET ORAL at 20:51

## 2022-10-24 RX ADMIN — ACETAMINOPHEN 650 MG: 325 TABLET ORAL at 09:15

## 2022-10-24 RX ADMIN — DIPHENHYDRAMINE HYDROCHLORIDE 25 MG: 25 TABLET ORAL at 23:33

## 2022-10-24 RX ADMIN — OXYCODONE 5 MG: 5 TABLET ORAL at 19:29

## 2022-10-24 RX ADMIN — SENNOSIDES 8.6 MG: 8.6 TABLET, FILM COATED ORAL at 11:39

## 2022-10-24 RX ADMIN — MUPIROCIN: 20 OINTMENT TOPICAL at 20:53

## 2022-10-24 RX ADMIN — ACETAMINOPHEN 650 MG: 325 TABLET ORAL at 16:45

## 2022-10-24 RX ADMIN — LEVALBUTEROL HYDROCHLORIDE 1.25 MG: 1.25 SOLUTION RESPIRATORY (INHALATION) at 21:33

## 2022-10-24 RX ADMIN — ASPIRIN 325 MG: 325 TABLET ORAL at 09:16

## 2022-10-24 RX ADMIN — FUROSEMIDE 20 MG: 10 INJECTION, SOLUTION INTRAMUSCULAR; INTRAVENOUS at 22:15

## 2022-10-24 RX ADMIN — INSULIN LISPRO 8 UNITS: 100 INJECTION, SOLUTION INTRAVENOUS; SUBCUTANEOUS at 11:42

## 2022-10-24 RX ADMIN — FUROSEMIDE 20 MG: 10 INJECTION, SOLUTION INTRAMUSCULAR; INTRAVENOUS at 13:09

## 2022-10-24 RX ADMIN — ACETAMINOPHEN 650 MG: 325 TABLET ORAL at 20:50

## 2022-10-24 RX ADMIN — TIOTROPIUM BROMIDE INHALATION SPRAY 2 PUFF: 3.12 SPRAY, METERED RESPIRATORY (INHALATION) at 09:06

## 2022-10-24 RX ADMIN — Medication 2 PUFF: at 21:33

## 2022-10-24 RX ADMIN — ALPRAZOLAM 0.25 MG: 0.25 TABLET ORAL at 22:13

## 2022-10-24 RX ADMIN — ROSUVASTATIN CALCIUM 20 MG: 20 TABLET, FILM COATED ORAL at 09:22

## 2022-10-24 RX ADMIN — Medication 10 ML: at 09:21

## 2022-10-24 RX ADMIN — MIRTAZAPINE 15 MG: 15 TABLET, FILM COATED ORAL at 20:52

## 2022-10-24 RX ADMIN — GABAPENTIN 300 MG: 300 CAPSULE ORAL at 13:09

## 2022-10-24 RX ADMIN — AMIODARONE HYDROCHLORIDE 0.5 MG/MIN: 50 INJECTION, SOLUTION INTRAVENOUS at 01:58

## 2022-10-24 RX ADMIN — GABAPENTIN 300 MG: 300 CAPSULE ORAL at 20:52

## 2022-10-24 RX ADMIN — FUROSEMIDE 20 MG: 10 INJECTION, SOLUTION INTRAMUSCULAR; INTRAVENOUS at 05:33

## 2022-10-24 RX ADMIN — FAMOTIDINE 20 MG: 20 TABLET, FILM COATED ORAL at 09:16

## 2022-10-24 RX ADMIN — MUPIROCIN: 20 OINTMENT TOPICAL at 09:21

## 2022-10-24 RX ADMIN — ACETAMINOPHEN 650 MG: 325 TABLET ORAL at 03:55

## 2022-10-24 RX ADMIN — FAMOTIDINE 20 MG: 20 TABLET, FILM COATED ORAL at 20:52

## 2022-10-24 RX ADMIN — SENNOSIDES 8.6 MG: 8.6 TABLET, FILM COATED ORAL at 20:50

## 2022-10-24 RX ADMIN — METHOCARBAMOL 750 MG: 750 TABLET ORAL at 09:16

## 2022-10-24 RX ADMIN — OXYCODONE 10 MG: 5 TABLET ORAL at 23:33

## 2022-10-24 RX ADMIN — LEVALBUTEROL HYDROCHLORIDE 1.25 MG: 1.25 SOLUTION RESPIRATORY (INHALATION) at 09:06

## 2022-10-24 RX ADMIN — INSULIN GLARGINE 20 UNITS: 100 INJECTION, SOLUTION SUBCUTANEOUS at 20:49

## 2022-10-24 RX ADMIN — METHOCARBAMOL 750 MG: 750 TABLET ORAL at 13:09

## 2022-10-24 RX ADMIN — METHOCARBAMOL 750 MG: 750 TABLET ORAL at 16:46

## 2022-10-24 RX ADMIN — CHLORHEXIDINE GLUCONATE 15 ML: 1.2 RINSE ORAL at 09:21

## 2022-10-24 RX ADMIN — OXYCODONE 10 MG: 5 TABLET ORAL at 05:33

## 2022-10-24 RX ADMIN — AMIODARONE HYDROCHLORIDE 400 MG: 200 TABLET ORAL at 20:51

## 2022-10-24 RX ADMIN — GABAPENTIN 300 MG: 300 CAPSULE ORAL at 09:16

## 2022-10-24 RX ADMIN — DOCUSATE SODIUM 100 MG: 100 CAPSULE, LIQUID FILLED ORAL at 20:51

## 2022-10-24 RX ADMIN — INSULIN LISPRO 4 UNITS: 100 INJECTION, SOLUTION INTRAVENOUS; SUBCUTANEOUS at 09:25

## 2022-10-24 RX ADMIN — AMIODARONE HYDROCHLORIDE 400 MG: 200 TABLET ORAL at 09:15

## 2022-10-24 RX ADMIN — SERTRALINE 100 MG: 50 TABLET, FILM COATED ORAL at 09:18

## 2022-10-24 RX ADMIN — DOCUSATE SODIUM 100 MG: 100 CAPSULE, LIQUID FILLED ORAL at 11:39

## 2022-10-24 RX ADMIN — TRAZODONE HYDROCHLORIDE 50 MG: 50 TABLET ORAL at 20:52

## 2022-10-24 RX ADMIN — Medication 2 PUFF: at 09:06

## 2022-10-24 RX ADMIN — LEVALBUTEROL HYDROCHLORIDE 1.25 MG: 1.25 SOLUTION RESPIRATORY (INHALATION) at 01:26

## 2022-10-24 RX ADMIN — INSULIN LISPRO 4 UNITS: 100 INJECTION, SOLUTION INTRAVENOUS; SUBCUTANEOUS at 16:46

## 2022-10-24 ASSESSMENT — PAIN DESCRIPTION - ORIENTATION
ORIENTATION: LOWER
ORIENTATION: LOWER

## 2022-10-24 ASSESSMENT — PAIN DESCRIPTION - DESCRIPTORS
DESCRIPTORS: ACHING
DESCRIPTORS: ACHING

## 2022-10-24 ASSESSMENT — PAIN DESCRIPTION - LOCATION
LOCATION: BACK;CHEST
LOCATION: BACK
LOCATION: BACK

## 2022-10-24 ASSESSMENT — PAIN - FUNCTIONAL ASSESSMENT: PAIN_FUNCTIONAL_ASSESSMENT: PREVENTS OR INTERFERES SOME ACTIVE ACTIVITIES AND ADLS

## 2022-10-24 ASSESSMENT — PAIN SCALES - GENERAL
PAINLEVEL_OUTOF10: 3
PAINLEVEL_OUTOF10: 3
PAINLEVEL_OUTOF10: 6
PAINLEVEL_OUTOF10: 8
PAINLEVEL_OUTOF10: 0
PAINLEVEL_OUTOF10: 9

## 2022-10-24 ASSESSMENT — PAIN DESCRIPTION - PAIN TYPE: TYPE: CHRONIC PAIN

## 2022-10-24 NOTE — PROGRESS NOTES
Incentive Spirometry education and demonstration completed by Respiratory Therapy Yes      Response to education: Excellent     Teaching Time: 5 minutes    Minimum Predicted Vital Capacity - 776 mL. Patient's Actual Vital Capacity -  800 mL. Turning over to Nursing for routine follow-up Yes. Comments: pt understands the use and purpose of the IS at this time.     Electronically signed by Jewell Galan RCP on 10/23/2022 at 9:56 PM

## 2022-10-24 NOTE — PROGRESS NOTES
Office : [phone]     Fax :[phone]       Nephrology progress Note      Patient's Name: Amanda Kaufman  8:22 AM  10/24/2022    Reason for Consult:  CKD 3a      History of Present Ilness:    Amanda Kaufman is a 81 y.o. male with h/o CKD.,  HTN , CAD who has LHC yesterday and had multivessel CAD. Plan for CABG tomorrow     Denies any chest pain at this time   BP is stable   No SOB   No peripheral edema noted     Interval hx    S/p CABG    Making good urine volume   Creatinine increased to 2.0 --> 2.1 ---> 1.9     CXR shows atelectasis       I/O last 3 completed shifts: In: 1702 [P. O.:480; I.V.:825.7; IV Piggyback:396.3]  Out: 8401 [Urine:3260;  Chest Tube:160]    Past Medical History:   Diagnosis Date    Chronic renal insufficiency     Due to chronic nephrolithiasis    Colloid cyst of third ventricle (HCC)     Coronary artery disease     DDD (degenerative disc disease), lumbar 2006    Disc bulge and facet arthropathy at L3-L4, L5-S1    Diabetes mellitus, type 2 ([facility] [address]     Diverticulitis of colon with perforation 11/07    Emergency colostomy    ED (erectile dysfunction)     Hyperlipidemia     Hypertension     Hypertensive retinopathy of both eyes 1/24/2013    Nephrolithiasis        Past Surgical History:   Procedure Laterality Date    BRAIN SURGERY  12/13/2007    Resection of colloid cyst of 3rd ventricle    CARDIAC CATHETERIZATION  5/02, 12/03,  3/08    COLOSTOMY  11/07    Emergent- due to diverticulitis with perforation    CORONARY ANGIOPLASTY  1995    RCA- unsuccessful    CORONARY ANGIOPLASTY WITH STENT PLACEMENT  10/05    Obtuse marginal branch of circumflex:  drug-eluting stent    KNEE ARTHROSCOPY  2000, 2005    Right    LITHOTRIPSY  1998    Bilateral    LITHOTRIPSY  1994    Bilateral with right stent placement    PARTIAL NEPHRECTOMY  1980    Right    REVISION COLOSTOMY  3/08    Colostomy takedown with sigmoid colectomy and coloproctostomy anastomosis    TOTAL KNEE ARTHROPLASTY Left 5/90/99    UMBILICAL HERNIA REPAIR         Current Medications:    amiodarone (CORDARONE) 450 mg in dextrose 5 % 250 mL infusion, Continuous  amiodarone (CORDARONE) tablet 400 mg, BID   Followed by  Haydergael Willisumbling ON 10/28/2022] amiodarone (CORDARONE) tablet 200 mg, Daily  furosemide (LASIX) injection 20 mg, 3 times per day  levalbuterol (XOPENEX) nebulizer solution 1.25 mg, BID  levalbuterol (XOPENEX) nebulizer solution 1.25 mg, Q4H PRN  insulin lispro (HUMALOG) injection vial 0-8 Units, TID WC  insulin lispro (HUMALOG) injection vial 0-4 Units, Nightly  traZODone (DESYREL) tablet 50 mg, Nightly  acetaminophen (TYLENOL) tablet 650 mg, Q6H  ALPRAZolam (XANAX) tablet 0.25 mg, Q6H PRN  gabapentin (NEURONTIN) capsule 300 mg, TID  insulin regular (HUMULIN R;NOVOLIN R) 100 Units in sodium chloride 0.9 % 100 mL infusion, Continuous PRN  sodium chloride flush 0.9 % injection 5-40 mL, 2 times per day  sodium chloride flush 0.9 % injection 5-40 mL, PRN  0.9 % sodium chloride infusion, PRN  fondaparinux (ARIXTRA) injection 2.5 mg, Daily  ondansetron (ZOFRAN-ODT) disintegrating tablet 4 mg, Q8H PRN   Or  ondansetron (ZOFRAN) injection 4 mg, Q6H PRN  aspirin suppository 300 mg, Once  oxyCODONE (ROXICODONE) immediate release tablet 5 mg, Q4H PRN   Or  oxyCODONE (ROXICODONE) immediate release tablet 10 mg, Q4H PRN  chlorhexidine (PERIDEX) 0.12 % solution 15 mL, BID  mupirocin (BACTROBAN) 2 % ointment, BID  potassium chloride (KLOR-CON) extended release tablet 10 mEq, TID WC  diphenhydrAMINE (BENADRYL) tablet 25 mg, Nightly PRN  metoprolol tartrate (LOPRESSOR) tablet 12.5 mg, BID  famotidine (PEPCID) tablet 20 mg, BID   Or  famotidine (PEPCID) 20 mg in sodium chloride (PF) 10 mL injection, BID  potassium chloride 20 mEq/50 mL IVPB (Central Line), PRN  magnesium sulfate 2000 mg in 50 mL IVPB premix, PRN  albumin human 5 % IV solution 25 g, PRN  insulin regular (HUMULIN R;NOVOLIN R) 100 Units in sodium chloride 0.9 % 100 mL infusion, Continuous  insulin glargine (LANTUS) injection vial 12 Units, Nightly  dextrose bolus 10% 125 mL, PRN   Or  dextrose bolus 10% 250 mL, PRN  glucagon (rDNA) injection 1 mg, PRN  dextrose 10 % infusion, Continuous PRN  aspirin tablet 325 mg, Daily  methocarbamol (ROBAXIN) tablet 750 mg, 4x Daily  sodium chloride flush 0.9 % injection 5-40 mL, PRN  amLODIPine (NORVASC) tablet 10 mg, Daily  rosuvastatin (CRESTOR) tablet 20 mg, Daily  sertraline (ZOLOFT) tablet 100 mg, Daily  mometasone-formoterol (DULERA) 200-5 MCG/ACT inhaler 2 puff, BID  tiotropium (SPIRIVA RESPIMAT) 2.5 MCG/ACT inhaler 2 puff, Daily  mirtazapine (REMERON) tablet 15 mg, Nightly        Physical exam:     Vitals:  /61   Pulse 64   Temp 97.6 °F (36.4 °C) (Temporal)   Resp 26   Ht 6' (1.829 m)   Wt 173 lb 1 oz (78.5 kg)   SpO2 96%   BMI 23.47 kg/m²   Constitutional:  OAA X3 NAD  Skin: no rash, turgor wnl  Heent:  eomi, mmm  Neck: no bruits or jvd noted  Cardiovascular:  S1, S2 without m/r/g  Respiratory: CTA B without w/r/r  Abdomen:  +bs, soft, nt, nd  Ext: no  lower extremity edema      Labs:  CBC:   Recent Labs     10/22/22  0415 10/23/22  0410 10/24/22  0405   WBC 12.9* 14.7* 16.1*   HGB 8.6* 8.0* 8.1*   * 114* 151     BMP:    Recent Labs     10/22/22  0415 10/22/22  2311 10/23/22  0410 10/24/22  0405     --  137 132*   K 4.2 4.2 4.5 5.0     --  102 97*   CO2 25  --  24 29   BUN 18  --  21* 29*   CREATININE 2.0*  --  2.1* 1.9*   GLUCOSE 115*  --  101* 197*     Ca/Mg/Phos:   Recent Labs     10/21/22  1338 10/22/22  0415 10/23/22  0410 10/24/22  0405   CALCIUM 9.1 8.6 8.3 8.3   MG 3.40* 2.30 2.00  --      Hepatic:   No results for input(s): AST, ALT, ALB, BILITOT, ALKPHOS in the last 72 hours.     Troponin: No results for input(s): TROPONINI in the last 72 hours. BNP: No results for input(s): BNP in the last 72 hours. Lipids:   No results for input(s): CHOL, TRIG, HDL, LDLCALC, LABVLDL in the last 72 hours. ABGs:   Recent Labs     10/21/22  1524   PHART 7.380   PO2ART 89.6   MOA3KVO 41.3     INR:   Recent Labs     10/21/22  1330 10/22/22  0415   INR 1.57* 1.31*              IMAGING:  XR CHEST PORTABLE   Final Result   Persistent bibasilar atelectasis or airspace disease and small pleural   effusions, similar to prior. XR CHEST PORTABLE   Final Result   1. Sequela from CABG including median sternotomy and clips about the heart. 2.  Endotracheal tube is in place, tip at a level between that of the   clavicular heads and aortic knob. 3.  NG tube extends below the left hemidiaphragm, into the upper abdomen, tip   overlying the expected level of the stomach, left upper quadrant. 4. Right IJ [place]-Jemima catheter via introducer sheath has its tip overlying   the right pulmonary artery level. A 2nd right IJ CVC is demonstrated, tip at   the superior cavoatrial junction level. 5. Left side and right side pleural catheters appear unremarkable. 6. No pneumomediastinum or pneumothorax. 7. Minimal atelectasis at the lung bases and possible small volume left   pleural effusion. 8. No fracture evident. VL DUP CAROTID BILATERAL   Final Result      VL PRE OP VEIN MAPPING   Final Result      XR CHEST PORTABLE   Final Result   No acute cardiopulmonary disease. Assessment/Plan :      1.  CKD 3 A   Creatinine level increased to 2.0 -->2.1 ---> 1.9   Good uOP   No edema noted   No SOB     Lasix iv intermittent doses     Recommend to dose adjust all medications  based on renal functions  Maintain SBP> 90 mmHg   Daily weights   AVOID NSAIDs  Avoid Nephrotoxins  Monitor Intake/Output  Call if significant decrease in urine output      2. HTN. Better controlled today     3. Anemia.  Hb 11.8   Monitor 4. MVD . P    S/p CABG     Valsartan has been held   Volume status is good.        Monitor renal function closely   Encourage incentive spirometry   D/w primary team      Thank you for allowing us to participate in care of Kena Crain         Electronically signed by: Eileen Morgan MD, 10/24/2022, 8:22 AM      [facility] [address]  Office : [phone]  Fax :[phone]

## 2022-10-24 NOTE — PROGRESS NOTES
Patient able to maintain saturation of 93% on 8 L NC, however exhibits labored breathing and sounds wheezy on auscultation. Breathing treatment given, without much improvement. Dr Andres Spencer made aware, ordered BiPap for the night.

## 2022-10-24 NOTE — PROGRESS NOTES
Cardiothoracic Surgery Progress Note    CC: coronary artery disease , s/p CABGx3, KELLY clip  POD# 3    Subjective:  Hemodynamically stable, on BiPAP vs high-flow NC, afebrile. Alert and oriented X 3. Weight above pre-op this am. Paced overnight    WT:78.5 kg/77.5 kg   pre-op 77.6 kg    Vital Signs:   /61   Pulse 64   Temp 97.6 °F (36.4 °C) (Temporal)   Resp 26   Ht 6' (1.829 m)   Wt 173 lb 1 oz (78.5 kg)   SpO2 96%   BMI 23.47 kg/m²     [place] removed    Gtts: amiodarone    Physical Exam:   Cardiac: regular, no murmur. Lungs: clear to auscultation, decreased bases bilaterally  Abdomen:  no BM  Vascular:  pulses all palpable   Extremities: generalized, non-pitting edema 1+  :  /405/680   Lasix 20mg IV q8h  Chest Tube(s) & Incision(s):    Chest Tubes pleural: 10/20/0 no airleak, no crepitus, water seal  Pericardial: 20/0/0 no airleak, no crepitus, water seal  Sternum: stable,C/D/I  Edges approximated, no drainage  Chest tube site: C/D/I Purse string intact   left leg incision: C/D/I  Edges well approximated, no drainage Dressing w/ scant ss drainage       Labs:   CBC:   Recent Labs     10/23/22  0410 10/24/22  0405   WBC 14.7* 16.1*   HGB 8.0* 8.1*   HCT 26.3* 26.4*   * 151     BMP:   Recent Labs     10/22/22  0415 10/22/22  2311 10/23/22  0410 10/24/22  0405   K 4.2   < > 4.5 5.0   CREATININE 2.0*  --  2.1* 1.9*   CALCIUM 8.6  --  8.3 8.3   MG 2.30  --  2.00  --     < > = values in this interval not displayed.      PT/INR:   Recent Labs     10/21/22  1330 10/22/22  0415   PROTIME 18.7* 16.2*   INR 1.57* 1.31*       Assessment/Plan:  As per CC:     S/P CABG  -paced overnight due to short pauses  -chest tubes to waterseal  -pulmonary toileting, out of bed, IS, acapella, wean O2  -asa, Arixtra  -Pepcid  -start bowel regimen    A.fib  -amiodarone protocol  -was atrial paced overnight due to pauses, turned rate to 50, maintaining HR in 60s  -d/c amlodipine  - holding BB for now due to SB, Left voicemail reminding patient of 1p Echo   repoerted short pauses that required intermittent pacing last night  - keep TPW in for today      CKD  -nephrology following  -creat 2.1->1.9  -lasix 20mg TID  -aruna for monitoring UOP  -vascath placed pre-op  -K+ supplementation with hold parameters    DM  -increase SSI and lantus  -carb control diet    HLD  -continue statin      Electronically signed by ANTHONY Munoz CNP on 10/24/2022 at 8:51 AM     Attending Supervising Enoch Joya  The patient met the criteria for direct supervision. I saw and examined the patient and discussed the findings and plans with the nurse practitioner and agree as documented in her note .

## 2022-10-24 NOTE — PROGRESS NOTES
Occupational Therapy/ Physical Therapy   Alhaji Alley    Patient just completed ambulation upon entering the room. Spoke to nurse who stated walked about 150 feet with 4WW. Attempted to see for PT and OT this date but patient declined due to fatigue and wanting to return to bed. Will attempt later as schedule allows. Thank you,  Collette Mejia.  Stewart Hathaway, OTR/L [address]  [facility], [place], [place] [address]

## 2022-10-24 NOTE — PROGRESS NOTES
Patient's blood sugar 253, 4 hours after insulin gtt turned off. Dr Nathaly Zayas made aware, no new orders for now, will recheck in the morning after Lantus takes effect.

## 2022-10-25 PROBLEM — E44.0 MODERATE MALNUTRITION (HCC): Chronic | Status: ACTIVE | Noted: 2022-10-25

## 2022-10-25 LAB
ANION GAP SERPL CALCULATED.3IONS-SCNC: 10 MMOL/L (ref 3–16)
BUN BLDV-MCNC: 36 MG/DL (ref 7–20)
CALCIUM SERPL-MCNC: 8.3 MG/DL (ref 8.3–10.6)
CHLORIDE BLD-SCNC: 98 MMOL/L (ref 99–110)
CO2: 27 MMOL/L (ref 21–32)
CREAT SERPL-MCNC: 2 MG/DL (ref 0.8–1.3)
EKG ATRIAL RATE: 60 BPM
EKG DIAGNOSIS: NORMAL
EKG P AXIS: 41 DEGREES
EKG P-R INTERVAL: 132 MS
EKG Q-T INTERVAL: 444 MS
EKG QRS DURATION: 88 MS
EKG QTC CALCULATION (BAZETT): 444 MS
EKG R AXIS: -3 DEGREES
EKG T AXIS: 7 DEGREES
EKG VENTRICULAR RATE: 60 BPM
GFR SERPL CREATININE-BSD FRML MDRD: 35 ML/MIN/{1.73_M2}
GLUCOSE BLD-MCNC: 143 MG/DL (ref 70–99)
GLUCOSE BLD-MCNC: 154 MG/DL (ref 70–99)
GLUCOSE BLD-MCNC: 185 MG/DL (ref 70–99)
GLUCOSE BLD-MCNC: 202 MG/DL (ref 70–99)
GLUCOSE BLD-MCNC: 234 MG/DL (ref 70–99)
HCT VFR BLD CALC: 25.9 % (ref 40.5–52.5)
HEMOGLOBIN: 8.1 G/DL (ref 13.5–17.5)
MCH RBC QN AUTO: 30 PG (ref 26–34)
MCHC RBC AUTO-ENTMCNC: 31.2 G/DL (ref 31–36)
MCV RBC AUTO: 96.3 FL (ref 80–100)
PDW BLD-RTO: 20.1 % (ref 12.4–15.4)
PERFORMED ON: ABNORMAL
PLATELET # BLD: 176 K/UL (ref 135–450)
PMV BLD AUTO: 8.5 FL (ref 5–10.5)
POTASSIUM SERPL-SCNC: 5 MMOL/L (ref 3.5–5.1)
RBC # BLD: 2.69 M/UL (ref 4.2–5.9)
SODIUM BLD-SCNC: 135 MMOL/L (ref 136–145)
WBC # BLD: 13.1 K/UL (ref 4–11)

## 2022-10-25 PROCEDURE — 6370000000 HC RX 637 (ALT 250 FOR IP): Performed by: NURSE PRACTITIONER

## 2022-10-25 PROCEDURE — 6370000000 HC RX 637 (ALT 250 FOR IP): Performed by: INTERNAL MEDICINE

## 2022-10-25 PROCEDURE — 97530 THERAPEUTIC ACTIVITIES: CPT

## 2022-10-25 PROCEDURE — 2140000000 HC CCU INTERMEDIATE R&B

## 2022-10-25 PROCEDURE — 85027 COMPLETE CBC AUTOMATED: CPT

## 2022-10-25 PROCEDURE — 93010 ELECTROCARDIOGRAM REPORT: CPT | Performed by: INTERNAL MEDICINE

## 2022-10-25 PROCEDURE — 36592 COLLECT BLOOD FROM PICC: CPT

## 2022-10-25 PROCEDURE — 92526 ORAL FUNCTION THERAPY: CPT

## 2022-10-25 PROCEDURE — 6360000002 HC RX W HCPCS

## 2022-10-25 PROCEDURE — 6360000002 HC RX W HCPCS: Performed by: THORACIC SURGERY (CARDIOTHORACIC VASCULAR SURGERY)

## 2022-10-25 PROCEDURE — 80048 BASIC METABOLIC PNL TOTAL CA: CPT

## 2022-10-25 PROCEDURE — 97110 THERAPEUTIC EXERCISES: CPT

## 2022-10-25 PROCEDURE — 2580000003 HC RX 258: Performed by: THORACIC SURGERY (CARDIOTHORACIC VASCULAR SURGERY)

## 2022-10-25 PROCEDURE — 6370000000 HC RX 637 (ALT 250 FOR IP): Performed by: THORACIC SURGERY (CARDIOTHORACIC VASCULAR SURGERY)

## 2022-10-25 PROCEDURE — APPNB45 APP NON BILLABLE 31-45 MINUTES

## 2022-10-25 PROCEDURE — 99233 SBSQ HOSP IP/OBS HIGH 50: CPT | Performed by: INTERNAL MEDICINE

## 2022-10-25 PROCEDURE — 94669 MECHANICAL CHEST WALL OSCILL: CPT

## 2022-10-25 PROCEDURE — 6370000000 HC RX 637 (ALT 250 FOR IP)

## 2022-10-25 PROCEDURE — 94640 AIRWAY INHALATION TREATMENT: CPT

## 2022-10-25 PROCEDURE — 97116 GAIT TRAINING THERAPY: CPT

## 2022-10-25 PROCEDURE — 2700000000 HC OXYGEN THERAPY PER DAY

## 2022-10-25 PROCEDURE — 6360000002 HC RX W HCPCS: Performed by: HOSPITALIST

## 2022-10-25 PROCEDURE — 99024 POSTOP FOLLOW-UP VISIT: CPT | Performed by: THORACIC SURGERY (CARDIOTHORACIC VASCULAR SURGERY)

## 2022-10-25 PROCEDURE — 36556 INSERT NON-TUNNEL CV CATH: CPT

## 2022-10-25 PROCEDURE — 94761 N-INVAS EAR/PLS OXIMETRY MLT: CPT

## 2022-10-25 RX ORDER — CLOPIDOGREL BISULFATE 75 MG/1
75 TABLET ORAL DAILY
Status: DISCONTINUED | OUTPATIENT
Start: 2022-10-25 | End: 2022-11-01 | Stop reason: HOSPADM

## 2022-10-25 RX ORDER — FAMOTIDINE 20 MG/1
20 TABLET, FILM COATED ORAL DAILY
Status: DISCONTINUED | OUTPATIENT
Start: 2022-10-26 | End: 2022-11-01 | Stop reason: HOSPADM

## 2022-10-25 RX ORDER — POLYETHYLENE GLYCOL 3350 17 G/17G
17 POWDER, FOR SOLUTION ORAL DAILY
Status: DISCONTINUED | OUTPATIENT
Start: 2022-10-26 | End: 2022-11-01 | Stop reason: HOSPADM

## 2022-10-25 RX ORDER — BISACODYL 10 MG
10 SUPPOSITORY, RECTAL RECTAL DAILY PRN
Status: DISCONTINUED | OUTPATIENT
Start: 2022-10-25 | End: 2022-11-01 | Stop reason: HOSPADM

## 2022-10-25 RX ORDER — INSULIN GLARGINE 100 [IU]/ML
10 INJECTION, SOLUTION SUBCUTANEOUS 2 TIMES DAILY
Status: DISCONTINUED | OUTPATIENT
Start: 2022-10-25 | End: 2022-10-27

## 2022-10-25 RX ORDER — ASPIRIN 81 MG/1
81 TABLET, CHEWABLE ORAL DAILY
Status: DISCONTINUED | OUTPATIENT
Start: 2022-10-26 | End: 2022-11-01 | Stop reason: HOSPADM

## 2022-10-25 RX ORDER — TRAMADOL HYDROCHLORIDE 50 MG/1
50 TABLET ORAL EVERY 4 HOURS PRN
Status: DISCONTINUED | OUTPATIENT
Start: 2022-10-25 | End: 2022-11-01 | Stop reason: HOSPADM

## 2022-10-25 RX ORDER — HEPARIN SODIUM 5000 [USP'U]/ML
5000 INJECTION, SOLUTION INTRAVENOUS; SUBCUTANEOUS EVERY 8 HOURS SCHEDULED
Status: DISCONTINUED | OUTPATIENT
Start: 2022-10-25 | End: 2022-11-01 | Stop reason: HOSPADM

## 2022-10-25 RX ADMIN — INSULIN LISPRO 4 UNITS: 100 INJECTION, SOLUTION INTRAVENOUS; SUBCUTANEOUS at 13:36

## 2022-10-25 RX ADMIN — ASPIRIN 325 MG: 325 TABLET ORAL at 08:19

## 2022-10-25 RX ADMIN — INSULIN GLARGINE 10 UNITS: 100 INJECTION, SOLUTION SUBCUTANEOUS at 09:24

## 2022-10-25 RX ADMIN — INSULIN LISPRO 7 UNITS: 100 INJECTION, SOLUTION INTRAVENOUS; SUBCUTANEOUS at 16:44

## 2022-10-25 RX ADMIN — ACETAMINOPHEN 650 MG: 325 TABLET ORAL at 19:35

## 2022-10-25 RX ADMIN — Medication 2 PUFF: at 09:29

## 2022-10-25 RX ADMIN — CLOPIDOGREL BISULFATE 75 MG: 75 TABLET ORAL at 13:31

## 2022-10-25 RX ADMIN — Medication 2 PUFF: at 20:50

## 2022-10-25 RX ADMIN — AMIODARONE HYDROCHLORIDE 400 MG: 200 TABLET ORAL at 19:35

## 2022-10-25 RX ADMIN — ACETAMINOPHEN 650 MG: 325 TABLET ORAL at 04:12

## 2022-10-25 RX ADMIN — FUROSEMIDE 20 MG: 10 INJECTION, SOLUTION INTRAMUSCULAR; INTRAVENOUS at 05:14

## 2022-10-25 RX ADMIN — INSULIN GLARGINE 10 UNITS: 100 INJECTION, SOLUTION SUBCUTANEOUS at 19:38

## 2022-10-25 RX ADMIN — GABAPENTIN 300 MG: 300 CAPSULE ORAL at 19:35

## 2022-10-25 RX ADMIN — MUPIROCIN: 20 OINTMENT TOPICAL at 19:40

## 2022-10-25 RX ADMIN — HEPARIN SODIUM 5000 UNITS: 5000 INJECTION INTRAVENOUS; SUBCUTANEOUS at 13:31

## 2022-10-25 RX ADMIN — SERTRALINE 100 MG: 50 TABLET, FILM COATED ORAL at 08:19

## 2022-10-25 RX ADMIN — HEPARIN SODIUM 5000 UNITS: 5000 INJECTION INTRAVENOUS; SUBCUTANEOUS at 20:48

## 2022-10-25 RX ADMIN — Medication 10 ML: at 19:39

## 2022-10-25 RX ADMIN — TRAZODONE HYDROCHLORIDE 50 MG: 50 TABLET ORAL at 19:35

## 2022-10-25 RX ADMIN — MUPIROCIN: 20 OINTMENT TOPICAL at 08:21

## 2022-10-25 RX ADMIN — DOCUSATE SODIUM 100 MG: 100 CAPSULE, LIQUID FILLED ORAL at 19:35

## 2022-10-25 RX ADMIN — MIRTAZAPINE 15 MG: 15 TABLET, FILM COATED ORAL at 19:35

## 2022-10-25 RX ADMIN — SENNOSIDES 8.6 MG: 8.6 TABLET, FILM COATED ORAL at 19:35

## 2022-10-25 RX ADMIN — HEPARIN SODIUM 5000 UNITS: 5000 INJECTION INTRAVENOUS; SUBCUTANEOUS at 09:24

## 2022-10-25 RX ADMIN — OXYCODONE 10 MG: 5 TABLET ORAL at 05:14

## 2022-10-25 RX ADMIN — FAMOTIDINE 20 MG: 20 TABLET, FILM COATED ORAL at 08:19

## 2022-10-25 RX ADMIN — INSULIN LISPRO 7 UNITS: 100 INJECTION, SOLUTION INTRAVENOUS; SUBCUTANEOUS at 09:26

## 2022-10-25 RX ADMIN — ROSUVASTATIN CALCIUM 20 MG: 20 TABLET, FILM COATED ORAL at 08:19

## 2022-10-25 RX ADMIN — LEVALBUTEROL HYDROCHLORIDE 1.25 MG: 1.25 SOLUTION RESPIRATORY (INHALATION) at 20:50

## 2022-10-25 RX ADMIN — TRAMADOL HYDROCHLORIDE 50 MG: 50 TABLET, COATED ORAL at 23:39

## 2022-10-25 RX ADMIN — ACETAMINOPHEN 650 MG: 325 TABLET ORAL at 13:32

## 2022-10-25 RX ADMIN — AMIODARONE HYDROCHLORIDE 400 MG: 200 TABLET ORAL at 08:19

## 2022-10-25 RX ADMIN — METHOCARBAMOL 750 MG: 750 TABLET ORAL at 08:19

## 2022-10-25 RX ADMIN — POLYETHYLENE GLYCOL 3350 17 G: 17 POWDER, FOR SOLUTION ORAL at 06:33

## 2022-10-25 RX ADMIN — TIOTROPIUM BROMIDE INHALATION SPRAY 2 PUFF: 3.12 SPRAY, METERED RESPIRATORY (INHALATION) at 09:29

## 2022-10-25 RX ADMIN — ACETAMINOPHEN 650 MG: 325 TABLET ORAL at 08:18

## 2022-10-25 RX ADMIN — GABAPENTIN 300 MG: 300 CAPSULE ORAL at 08:19

## 2022-10-25 RX ADMIN — GABAPENTIN 300 MG: 300 CAPSULE ORAL at 13:31

## 2022-10-25 RX ADMIN — FUROSEMIDE 20 MG: 10 INJECTION, SOLUTION INTRAMUSCULAR; INTRAVENOUS at 20:47

## 2022-10-25 RX ADMIN — LEVALBUTEROL HYDROCHLORIDE 1.25 MG: 1.25 SOLUTION RESPIRATORY (INHALATION) at 09:29

## 2022-10-25 RX ADMIN — Medication 10 ML: at 08:21

## 2022-10-25 RX ADMIN — FUROSEMIDE 20 MG: 10 INJECTION, SOLUTION INTRAMUSCULAR; INTRAVENOUS at 13:31

## 2022-10-25 RX ADMIN — INSULIN LISPRO 7 UNITS: 100 INJECTION, SOLUTION INTRAVENOUS; SUBCUTANEOUS at 13:35

## 2022-10-25 RX ADMIN — INSULIN LISPRO 4 UNITS: 100 INJECTION, SOLUTION INTRAVENOUS; SUBCUTANEOUS at 16:44

## 2022-10-25 RX ADMIN — METOPROLOL TARTRATE 6.25 MG: 25 TABLET, FILM COATED ORAL at 20:43

## 2022-10-25 RX ADMIN — DOCUSATE SODIUM 100 MG: 100 CAPSULE, LIQUID FILLED ORAL at 08:19

## 2022-10-25 RX ADMIN — SENNOSIDES 8.6 MG: 8.6 TABLET, FILM COATED ORAL at 08:19

## 2022-10-25 ASSESSMENT — PAIN SCALES - GENERAL
PAINLEVEL_OUTOF10: 7
PAINLEVEL_OUTOF10: 7
PAINLEVEL_OUTOF10: 8
PAINLEVEL_OUTOF10: 7
PAINLEVEL_OUTOF10: 0
PAINLEVEL_OUTOF10: 7
PAINLEVEL_OUTOF10: 3
PAINLEVEL_OUTOF10: 4
PAINLEVEL_OUTOF10: 3
PAINLEVEL_OUTOF10: 5
PAINLEVEL_OUTOF10: 0

## 2022-10-25 ASSESSMENT — PAIN DESCRIPTION - LOCATION
LOCATION: STERNUM
LOCATION: BACK;CHEST
LOCATION: STERNUM

## 2022-10-25 ASSESSMENT — PULMONARY FUNCTION TESTS: PEFR_L/MIN: 8

## 2022-10-25 ASSESSMENT — PAIN DESCRIPTION - DESCRIPTORS: DESCRIPTORS: ACHING

## 2022-10-25 ASSESSMENT — PAIN DESCRIPTION - ORIENTATION: ORIENTATION: MID

## 2022-10-25 NOTE — PROGRESS NOTES
Nutrition Note    RECOMMENDATIONS  PO Diet: Added cardiac restriction, liberalized CCC, fluid restrictions. ONS: Ordered Glucerna BID  Other: Please document % po intake of meals    NUTRITION ASSESSMENT   Pt is s/p CABG x3 10/21. Upon visiting, pt reported decreased appetite and po intake for 8 to 10 weeks prior to current admission, was feeling fatigued and just did not feel like eating. However, also reported unintentional wt loss since January/February with poor appetite/intake. Reported UBW of 185 lb, admission wt of 158 lb. Wt hx in EMR indicates wt of 182 lb in July. If accurate, indicates significant wt loss. Would like to receive ONS to offer additional nutrition. RD will order and continue to monitor for adequate po intake. Nutrition Related Findings: -6.3L. Na 135, Cl 98. HbA1c of 7.2% on 10/20. Glucose 168-253 yesterday, 185 today. LBM 10/20, GI WDL, receiving bowel regimen. No edema noted. Wounds: Multiple, Surgical Incision  Nutrition Education:  Education needed (needs cardiac nutrition education prior to discharge)   Nutrition Goals: PO intake 50% or greater, by next RD assessment     MALNUTRITION ASSESSMENT   Acute Illness  Malnutrition Status:  Moderate malnutrition  Findings of the 6 clinical characteristics of malnutrition:  Energy Intake:  75% or less of estimated energy requirements for 7 or more days  Weight Loss:  Greater than 7.5% over 3 months (wt hx in EMR indicates 24 lb or 13% wt loss in 3 months since July)     Body Fat Loss:  Unable to assess     Muscle Mass Loss:  Unable to assess    Fluid Accumulation:  No significant fluid accumulation     Strength:  Not Performed    NUTRITION DIAGNOSIS   Inadequate oral intake related to inadequate protein-energy intake as evidenced by poor intake prior to admission, weight loss  Increased nutrient needs related to increase demand for energy/nutrients as evidenced by other (comment) (s/p OHS)    CURRENT NUTRITION THERAPIES  ADULT DIET; Regular; 3 carb choices (45 gm/meal); No Added Salt (3-4 gm); 1500 ml     PO Intake: Unable to assess (one documented intake)   PO Supplement Intake:None Ordered    ANTHROPOMETRICS  Current Height: 6' (182.9 cm)  Current Weight: 171 lb 1.2 oz (77.6 kg)    Admission weight: 158 lb (71.7 kg) (bed wt)  Ideal Body Weight (IBW): 178 lbs  (81 kg)    Usual Bodyweight 185 lb (83.9 kg)       BMI: 23.2    COMPARATIVE STANDARDS  Energy (kcal):  0167-9426     Protein (g):  108-144       Fluid (mL/day):  1800    The patient will be monitored per nutrition standards of care. Consult dietitian if additional nutrition interventions are needed prior to RD reassessment.      Wally Katz MS, RD, LD    Contact: [phone]

## 2022-10-25 NOTE — PROGRESS NOTES
Truman: A&O x4. Lethargic    Cardiac: NSR    Resp: 10L high flow, SpO2 93%    GI: voiding via valdovinos, clear yellow urine    : Active BS+, No BM    Skin: OHS incisions. CTx2    Lines: RT IJ CVC, RT IJ vas cath, CT x2,  A/V pacing wires    Gtts: None    Plan: up to chair. See flow sheets for details, VS, MAR for all medication and titration of gtts.

## 2022-10-25 NOTE — ACP (ADVANCE CARE PLANNING)
Advanced Care Planning Note. Purpose of Encounter: Advanced care planning in light of CAD  Parties In Attendance: Patient  Decisional Capacity: Yes  Subjective: Patient with expected incisional pain  Objective: Cr 1.9  Goals of Care Determination: Patient wants full support (CPR, vent, surgery, HD, trach, PEG)  Plan:  IV Lasix, Lantus/Humalog, Renal consult  Code Status: Full code   Time spent on Advanced care Plannin minutes  Advanced Care Planning Documents: Completed advanced directives on chart, wife is the POA.     Christian Charles MD  10/24/2022 10:43 PM

## 2022-10-25 NOTE — PROGRESS NOTES
[facility] Department   Phone: [phone]    Physical Therapy    [] Initial Evaluation            [x] Daily Treatment Note         [] Discharge Summary      Patient: Keon Parra   : 1950   MRN: [id number]   Date of Service:  10/25/2022  Admitting Diagnosis: Coronary artery disease involving native coronary artery of native heart with unstable angina pectoris [facility])  Current Admission Summary: The pt was admitted for a CABG on 10/21. Past Medical History:  has a past medical history of Chronic renal insufficiency, Colloid cyst of third ventricle (HCC), Coronary artery disease, DDD (degenerative disc disease), lumbar, Diabetes mellitus, type 2 ([address] Diverticulitis of colon with perforation, ED (erectile dysfunction), Hyperlipidemia, Hypertension, Hypertensive retinopathy of both eyes, and Nephrolithiasis. Past Surgical History:  has a past surgical history that includes Cardiac catheterization (, ,  3/08); Revision Colostomy (3/08); Lithotripsy (); Lithotripsy (); partial nephrectomy (); Coronary angioplasty with stent (10/05); Coronary angioplasty (); Umbilical hernia repair; Knee arthroscopy (, ); colostomy (); Total knee arthroplasty (Left, 14); brain surgery (2007); and Coronary artery bypass graft (N/A, 10/21/2022). Discharge Recommendations: Keon Parra scored a 13/24 on the AM-PAC short mobility form. Current research shows that an AM-PAC score of 18 or greater is typically associated with a discharge to the patient's home setting. Anticipate pt to improve quickly during his hospital stay. Based on the patient's AM-PAC score and their current functional mobility deficits, it is recommended that the patient have 2-3 sessions per week of Physical Therapy at d/c to increase the patient's independence.   At this time, this patient demonstrates the endurance and safety to discharge home with home services and a follow up treatment frequency of 2-3x/wk. Please see assessment section for further patient specific details. If patient discharges prior to next session this note will serve as a discharge summary. Please see below for the latest assessment towards goals. HOME HEALTH CARE: LEVEL 1 STANDARD    - Initial home health evaluation to occur within 24-48 hours, in patient home   - Therapy to evaluate with goal of regaining prior level of functioning   - Therapy to evaluate if patient has [address] needs for personal care   DME Required For Discharge: DME to be determined pending patient progress  Precautions/Restrictions: high fall risk  Weight Bearing Restrictions: no restrictions  [] Right Upper Extremity  [] Left Upper Extremity [] Right Lower Extremity  [] Left Lower Extremity     Required Braces/Orthotics: no braces required   [] Right  [] Left  Positional Restrictions:Sternal Precautions - No Pushing, Pulling, Lifting > 5 lbs    Pre-Admission Information   Lives With: spouse                  Type of Home: house  Home Layout: one level, laundry in basement, doesn't have to go the basement  Home Access:  1 step to enter without rails   Bathroom Layout: walker accessible, tub/shower unit  Bathroom Equipment: grab bars in shower, bedside commode  Toilet Height: standard height  Home Equipment: rollator - 4 wheeled walker, reacher  Transfer Assistance: Independent without use of device  Ambulation Assistance:Independent without use of device  ADL Assistance: independent with all ADL's  IADL Assistance: requires assistance with cleaning, Wife does all IADLs; cleaning lady every 2 wks. Active :        [x] Yes                 [] No  Hand Dominance: [] Left                 [x] Right  Current Employment: retired.   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; then worked at a [employer]  Hobbies: [address]: No falls    Examination   Vision:   Vision Corrective Device: wears glasses for reading  Hearing:   hard of hearing, left hearing aid, right hearing aid  Observation:   General Observation:  many lines and equipment attached to pt per protocol as pt was POD #1 from CABG, RN present to assist with line management  Posture:   good  Sensation:   WFL  Proprioception:    WFL  Tone:   Normotonic  Coordination Testing:   WFL    ROM:   (L) Hip: WFL     (R) Hip: WFL  (L) Knee: limited flexion to <90 degrees due to TKR     (R) Knee: WFL  (L) Ankle: WFL     (R) Ankle: WFL  Strength:   (B) LE strength grossly WFL  Decision Making: medium complexity  Clinical Presentation: evolving      Subjective  General: pt seated in chair upon PT arrival, agreeable to PT. Very drowsy and difficulty waking up. Pt on 10LO2 and remaining 91% or better during PT treatment. Pain: 7/10. Location: chest pain  Pain Interventions: RN notified of patient request for pain medication and repositioned        Functional Mobility  Bed Mobility  Scooting: contact guard assistance,    Comments:scooting forward and back in chair, VCs needed to maintain UE sternal precautions. Transfers  Sit to stand transfer: moderate assistance  Stand to sit transfer: minimal assistance  Comments: VCs for hand placement and keeping sternal precautions. Hard post lean upon standing. Min A for static standing balance. Ambulation  Surface:level surface  Assistive Device: rollator (8BXD)  Other Appliance: supplemental O2, @10L, 2 chest tubes, catheter, pacer with wires. Assistance: minimal assistance  Distance: 300ft  Gait Mechanics: decreased step length and height, shuffling steps occasionally, flexed trunk that increased with fatigue, VCs and manual assist to keep RW close to him. Comments:  3 standing rest breaks, progressive postural fatigue during amb, min A for balance and VCs for upright standing. Pt declining seated rest multiple times during treatment.    Stair Mobility  Stair mobility not completed on this date. Comments:  Wheelchair Mobility:  No w/c mobility completed on this date. Comments:  Balance  Static Sitting Balance: fair: maintains balance at CGA without use of UE support  Dynamic Sitting Balance: poor (+): requires min (A) to maintain balance  Static Standing Balance: poor (+): requires min (A) to maintain balance  Dynamic Standing Balance: poor (+): requires min (A) to maintain balance  Comments: post lean upon standing, forward lean with fatigue using 4ww. Other Therapeutic Interventions  Ankle pumps x10 reps BLE with min A and VCs, positioning with pillow for support and posture alignment. Assist with breakfast and set up. Line management of many lines. Functional Outcomes  AM-PAC Inpatient Mobility Raw Score : 13              Cognition  WFL  Orientation:    alert and oriented x 4  Command Following:   [facility]    Education  Barriers To Learning: hearing  Patient Education: patient educated on PT role and benefits, plan of care, precautions, transfer training  Learning Assessment:  patient verbalizes and demonstrates understanding    Assessment  Activity Tolerance: Limited by fatigue, endurance. Impairments Requiring Therapeutic Intervention: decreased functional mobility, decreased ROM, decreased strength, decreased endurance, increased pain, decreased posture  Prognosis: good  Clinical Assessment: Mod A standing balance and min A with gait using 4ww. Fatigue progressing with walking and assist progressing too. The pt presented with decreased strength, balance and activity tolerance s/p CABG. Anticipate pt to improve with his transfers and need for assistance over the next several days. Anticipate pt to return home at [place] but will need to continue to assess over the next few days.      Safety Interventions: patient left in chair, call light within reach, and nurse notified    Plan  Frequency: 3-5 x/per week  Current Treatment Recommendations: strengthening, ROM, balance training, functional mobility training, transfer training, gait training, stair training, endurance training, neuromuscular re-education, and safety education    Goals  Patient Goals: return home at [facility]:  Time Frame:  To be met prior to DC  Patient will complete bed mobility at minimal assistance   Patient will complete transfers at contact guard assistance   Patient will ambulate 300 ft with use of LRAD at supervision  Patient will ascend/descend 3 stairs with (R) ascending handrail at contact guard assistance  **No goals met in full this date, 10/25    Therapy Session Time      Individual Group Co-treatment   Time In 0813       Time Out 0853       Minutes 40         Total Treatment Minutes:  40 minutes       Electronically Signed By: Lauro Shoemaker, [facility]

## 2022-10-25 NOTE — PROGRESS NOTES
[facility]  Speech Language Pathology   Dysphagia Treatment Note    Patient: Kyle Muniz   : 1950   MRN: [id number]      Evaluation Date: 10/25/2022      Admitting Dx: Atherosclerotic heart disease of native coronary artery without angina pectoris [I25.10]  Abnormal result of other cardiovascular function study [R94.39]  Chest pain, unspecified [R07.9]  Coronary artery disease involving native coronary artery of native heart with unstable angina pectoris ([facility] [address] [I25.110]  Treatment Diagnosis: Oropharyngeal Dysphagia   Pain: Denies                                              Diet and Treatment Recommendations 10/25/2022:  Diet Solids Recommendation:  Dysphagia III Soft and bite sized  Liquid Consistency Recommendation: Thin liquids  Recommended form of Meds: Meds whole with water or Meds in puree         Compensatory strategies:   Upright as possible with all PO intake , No straws , Small bites/sips , Eat/feed slowly    Assessment of Texture Tolerance:  Diet level prior to treatment: Regular texture diet , Thin liquids   Tolerance of Current Diet Level: Other:  Per RN pt has been sleeping heavily throughout the morning. She held lunch until SLP arrived. Reported pt required increased oxygen support when up with therapy this morning but she has slowly weaned him back to 10-12 L. Impressions: Pt was positioned Upright in bed , awake and alert. Currently on  10L O2 via nasal cannula . Oxygen saturation 90-96% throughout session. Pt with congested weak coughing prior to and during intake. Trials of thin liquids, soft solids, and regular solids  were provided to assess swallow function. Mastication was minimally prolonged but achieved adequate oral clearance. Timing and strength of laryngeal elevation was minimally reduced with both textures. Congested coughing occurred between textures, once during mastication.  It did not correlate with drop in oxygen saturation but increased wheezing and work of breathing. Pt presents at increased risk for aspiration due to his increasing oxygen requirements and intermittent fatigue throughout the day. Therefore, recommend downgrade to Dysphagia III Soft and bite sized  with Thin liquids, No straws , Meds whole with water or Meds in puree . If respiratory status declines, recommend NPO with further SLP assessment. Instrumental assessment may be beneficial pending ongoing follow up. Dysphagia Goals:   Pt will functionally tolerate recommended diet with no overt clinical s/s of aspiration (Ongoing 10/25/22)  Pt will demonstrate understanding of aspiration risk and precautions via education/demonstration with occasional prompting (Ongoing 10/25/22)  Pt will participate in instrumental swallow study (FEES or MBS) to further assess pharyngeal phase of swallow, assist with diet recommendations and to further direct plan of care (Ongoing 10/25/22)    Plan:   3-5 times per week during acute care stay. Patient/Family Education:  Provided education regarding role of SLP, recommendations and general speech pathology plan of care. [x] Pt verbalized understanding and agreement   [] Pt requires ongoing learning   [] No evidence of comprehension     Discharge Recommendations:    Discharge recommendations to be determined pending ongoing follow-up during acute care stay    Treatment time:  Timed Code Treatment Minutes: 0  Total Treatment time:25    If patient discharges prior to next session this note will serve as a discharge summary. Signature:  RYAN Mansfield [id number]  Speech-Language Pathologist   10/25/2022 2:03 PM

## 2022-10-25 NOTE — CARE COORDINATION
Pt sleeping at this time. S/p CABG. CM will discuss hc at more appropriate time.     Prakash Wong RN, BSN  [phone]

## 2022-10-25 NOTE — PROGRESS NOTES
10/25/22 1726   Incentive Spirometry Tx   Treatment Effort Subsequent;Assisted by RT   Predicted Volume 776   Achieved Volume (mL) 1500 mL

## 2022-10-25 NOTE — PROGRESS NOTES
Office : [phone]     Fax :[phone]       Nephrology progress Note      Patient's Name: Amanda Kaufman  8:54 AM  10/25/2022    Reason for Consult:  CKD 3a      History of Present Ilness:    Amanda Kaufman is a 81 y.o. male with h/o CKD.,  HTN , CAD who has LHC yesterday and had multivessel CAD. Plan for CABG tomorrow     Denies any chest pain at this time   BP is stable   No SOB   No peripheral edema noted     Interval hx    S/p CABG    Making good urine volume   Creatinine level 2.0     CXR shows atelectasis     Good UOP       I/O last 3 completed shifts: In: 1286.8 [P.O.:480; I.V.:573.5; IV Piggyback:233.4]  Out: 3833 [Urine:2650;  Chest Tube:80]    Past Medical History:   Diagnosis Date    Chronic renal insufficiency     Due to chronic nephrolithiasis    Colloid cyst of third ventricle (HCC)     Coronary artery disease     DDD (degenerative disc disease), lumbar 2006    Disc bulge and facet arthropathy at L3-L4, L5-S1    Diabetes mellitus, type 2 ([facility] [address]     Diverticulitis of colon with perforation 11/07    Emergency colostomy    ED (erectile dysfunction)     Hyperlipidemia     Hypertension     Hypertensive retinopathy of both eyes 1/24/2013    Nephrolithiasis        Past Surgical History:   Procedure Laterality Date    BRAIN SURGERY  12/13/2007    Resection of colloid cyst of 3rd ventricle    CARDIAC CATHETERIZATION  5/02, 12/03,  3/08    COLOSTOMY  11/07    Emergent- due to diverticulitis with perforation    CORONARY ANGIOPLASTY  1995    RCA- unsuccessful    CORONARY ANGIOPLASTY WITH STENT PLACEMENT  10/05    Obtuse marginal branch of circumflex:  drug-eluting stent    CORONARY ARTERY BYPASS GRAFT N/A 10/21/2022    CABG X 3, LIMA  GRAFT TO LAD, VEIN GRAFT TO DISTAL RCA  AND OM, EVH LEFT SAPHENOUS VEIN.  LIGATION KELLY WITH 35 MM ATRICLIP, TIMUR, TCPB, DOPPLER BYPASS GRAFT FLOW VERIFICATION, EPIAORTIC ECHOCARDIOGRAM, INSERTION OF VENTRICULAR AND ATRIAL PACING WIRES performed by Caryle Code, MD at [address]  2000, 2005    Right    LITHOTRIPSY  1998    Bilateral    LITHOTRIPSY  1994    Bilateral with right stent placement    PARTIAL NEPHRECTOMY  1980    Right    REVISION COLOSTOMY  3/08    Colostomy takedown with sigmoid colectomy and coloproctostomy anastomosis    TOTAL KNEE ARTHROPLASTY Left 2/86/21    UMBILICAL HERNIA REPAIR         Current Medications:    insulin glargine (LANTUS) injection vial 10 Units, BID  heparin (porcine) injection 5,000 Units, 3 times per day  [START ON 10/26/2022] famotidine (PEPCID) 20 mg in sodium chloride (PF) 10 mL injection, Daily   Or  [START ON 10/26/2022] famotidine (PEPCID) tablet 20 mg, Daily  insulin lispro (HUMALOG) injection vial 0-16 Units, TID WC  insulin lispro (HUMALOG) injection vial 0-4 Units, Nightly  [Held by provider] metoprolol tartrate (LOPRESSOR) tablet 6.25 mg, BID  docusate sodium (COLACE) capsule 100 mg, BID  senna (SENOKOT) tablet 8.6 mg, BID  polyethylene glycol (GLYCOLAX) packet 17 g, Daily PRN  insulin lispro (HUMALOG) injection vial 7 Units, TID WC  amiodarone (CORDARONE) tablet 400 mg, BID   Followed by  Simon Aguilar ON 10/28/2022] amiodarone (CORDARONE) tablet 200 mg, Daily  furosemide (LASIX) injection 20 mg, 3 times per day  levalbuterol (XOPENEX) nebulizer solution 1.25 mg, BID  levalbuterol (XOPENEX) nebulizer solution 1.25 mg, Q4H PRN  traZODone (DESYREL) tablet 50 mg, Nightly  acetaminophen (TYLENOL) tablet 650 mg, Q6H  ALPRAZolam (XANAX) tablet 0.25 mg, Q6H PRN  gabapentin (NEURONTIN) capsule 300 mg, TID  insulin regular (HUMULIN R;NOVOLIN R) 100 Units in sodium chloride 0.9 % 100 mL infusion, Continuous PRN  sodium chloride flush 0.9 % injection 5-40 mL, 2 times per day  sodium chloride flush 0.9 % injection 5-40 mL, PRN  0.9 % sodium chloride infusion, PRN  ondansetron (ZOFRAN-ODT) disintegrating tablet 4 mg, Q8H PRN   Or  ondansetron (ZOFRAN) injection 4 mg, Q6H PRN  oxyCODONE (ROXICODONE) immediate release tablet 5 mg, Q4H PRN   Or  oxyCODONE (ROXICODONE) immediate release tablet 10 mg, Q4H PRN  mupirocin (BACTROBAN) 2 % ointment, BID  potassium chloride (KLOR-CON) extended release tablet 10 mEq, TID WC  diphenhydrAMINE (BENADRYL) tablet 25 mg, Nightly PRN  potassium chloride 20 mEq/50 mL IVPB (Central Line), PRN  magnesium sulfate 2000 mg in 50 mL IVPB premix, PRN  albumin human 5 % IV solution 25 g, PRN  dextrose bolus 10% 125 mL, PRN   Or  dextrose bolus 10% 250 mL, PRN  glucagon (rDNA) injection 1 mg, PRN  dextrose 10 % infusion, Continuous PRN  aspirin tablet 325 mg, Daily  methocarbamol (ROBAXIN) tablet 750 mg, 4x Daily  sodium chloride flush 0.9 % injection 5-40 mL, PRN  rosuvastatin (CRESTOR) tablet 20 mg, Daily  sertraline (ZOLOFT) tablet 100 mg, Daily  mometasone-formoterol (DULERA) 200-5 MCG/ACT inhaler 2 puff, BID  tiotropium (SPIRIVA RESPIMAT) 2.5 MCG/ACT inhaler 2 puff, Daily  mirtazapine (REMERON) tablet 15 mg, Nightly        Physical exam:     Vitals:  /77   Pulse 79   Temp 97.9 °F (36.6 °C) (Temporal)   Resp 25   Ht 6' (1.829 m)   Wt 171 lb 1.2 oz (77.6 kg)   SpO2 93%   BMI 23.20 kg/m²   Constitutional:  OAA X3 NAD  Skin: no rash, turgor wnl  Heent:  eomi, mmm  Neck: no bruits or jvd noted  Cardiovascular:  S1, S2 without m/r/g  Respiratory: CTA B without w/r/r  Abdomen:  +bs, soft, nt, nd  Ext: no  lower extremity edema      Labs:  CBC:   Recent Labs     10/23/22  0410 10/24/22  0405 10/25/22  0510   WBC 14.7* 16.1* 13.1*   HGB 8.0* 8.1* 8.1*   * 151 176     BMP:    Recent Labs     10/23/22  0410 10/24/22  0405 10/25/22  0510    132* 135*   K 4.5 5.0 5.0    97* 98*   CO2 24 29 27   BUN 21* 29* 36*   CREATININE 2.1* 1.9* 2.0*   GLUCOSE 101* 197* 143*     Ca/Mg/Phos: Recent Labs     10/23/22  0410 10/24/22  0405 10/25/22  0510   CALCIUM 8.3 8.3 8.3   MG 2.00  --   --      Hepatic:   No results for input(s): AST, ALT, ALB, BILITOT, ALKPHOS in the last 72 hours. Troponin: No results for input(s): TROPONINI in the last 72 hours. BNP: No results for input(s): BNP in the last 72 hours. Lipids:   No results for input(s): CHOL, TRIG, HDL, LDLCALC, LABVLDL in the last 72 hours. ABGs:   No results for input(s): PHART, PO2ART, VLX7AZR in the last 72 hours. INR:   No results for input(s): INR in the last 72 hours. IMAGING:  XR CHEST PORTABLE   Final Result   Persistent bibasilar atelectasis or airspace disease and small pleural   effusions, similar to prior. XR CHEST PORTABLE   Final Result   1. Sequela from CABG including median sternotomy and clips about the heart. 2.  Endotracheal tube is in place, tip at a level between that of the   clavicular heads and aortic knob. 3.  NG tube extends below the left hemidiaphragm, into the upper abdomen, tip   overlying the expected level of the stomach, left upper quadrant. 4. Right IJ [place]-Jemima catheter via introducer sheath has its tip overlying   the right pulmonary artery level. A 2nd right IJ CVC is demonstrated, tip at   the superior cavoatrial junction level. 5. Left side and right side pleural catheters appear unremarkable. 6. No pneumomediastinum or pneumothorax. 7. Minimal atelectasis at the lung bases and possible small volume left   pleural effusion. 8. No fracture evident. VL DUP CAROTID BILATERAL   Final Result      VL PRE OP VEIN MAPPING   Final Result      XR CHEST PORTABLE   Final Result   No acute cardiopulmonary disease. Assessment/Plan :      1.  CKD 3 A   Creatinine level increased to 2.0 -->2.1 ---> 1.9 --> 2.0   Good uOP   No edema noted   No SOB     Lasix iv intermittent doses     Ok to remove vas cath       2. HTN.  Better controlled today 3. Anemia. Monitor   Hb dropped since admission   Transfuse if HB < 7.0     4. MVD . S/p CABG     Valsartan has been held   Volume status is good.        Monitor renal function closely   Encourage incentive spirometry   D/w primary team  Recommend to dose adjust all medications  based on renal functions  Maintain SBP> 90 mmHg   Daily weights   AVOID NSAIDs  Avoid Nephrotoxins  Monitor Intake/Output  Call if significant decrease in urine output      Thank you for allowing us to participate in care of Andrei Herr         Electronically signed by: Jesus Driscoll MD, 10/25/2022, 8:54 AM      [facility] of [address]  Office : [phone]  Fax :[phone]

## 2022-10-25 NOTE — PROGRESS NOTES
Hospitalist Progress Note      PCP: Neema Rosenbaum MD    Date of Admission: 10/19/2022    Chief Complaint: \"I had a bypass surgery\"    Hospital Course: 81 y.o. male on Kadi Elliott MD service who was admitted on 10/19/2022 for unstable angina and CAD. Patient with history of DM 2, HTN, CKD 3, COPD and hyperlipidemia. 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 Underwent Coronary bypass grafting surgery x3 with single  reverse saphenous vein graft to the obtuse marginal branch of the  circumflex, separate sequential reverse saphenous vein graft to the  distal right coronary artery, pedicle left internal artery to the LAD,  endoscopic vein harvest of the left greater saphenous vein, left atrial  appendage obliteration with 35-mm AtriCure left atrial clip, total  cardiopulmonary bypass, transesophageal echo, Doppler verification of  graft, epiaortic ultrasound, and vas cath placement on 10/21/22. Subjective:  Patient sitting in chair. On 10 L HFNC. Coughing. Some wheezing. Expected chest tube and incisional pain. No BM despite suppository. No HA, dizziness, fevers or abdominal pain.        Medications:  Reviewed    Infusion Medications    insulin (HUMAN R) weight based infusion Stopped (10/23/22 1811)    sodium chloride Stopped (10/23/22 0804)    dextrose       Scheduled Medications    insulin glargine  10 Units SubCUTAneous BID    insulin lispro  0-16 Units SubCUTAneous TID WC    insulin lispro  0-4 Units SubCUTAneous Nightly    [Held by provider] metoprolol tartrate  6.25 mg Oral BID    docusate sodium  100 mg Oral BID    senna  1 tablet Oral BID    insulin lispro  7 Units SubCUTAneous TID     amiodarone  400 mg Oral BID    Followed by    Fang Bailey ON 10/28/2022] amiodarone  200 mg Oral Daily    furosemide  20 mg IntraVENous 3 times per day    levalbuterol  1.25 mg Nebulization BID    traZODone  50 mg Oral Nightly    acetaminophen  650 mg Oral Q6H    gabapentin  300 mg Oral TID    sodium chloride flush  5-40 mL IntraVENous 2 times per day    fondaparinux  2.5 mg SubCUTAneous Daily    mupirocin   Nasal BID    potassium chloride  10 mEq Oral TID WC    famotidine  20 mg Oral BID    Or    famotidine (PEPCID) injection  20 mg IntraVENous BID    aspirin  325 mg Oral Daily    methocarbamol  750 mg Oral 4x Daily    rosuvastatin  20 mg Oral Daily    sertraline  100 mg Oral Daily    mometasone-formoterol  2 puff Inhalation BID    tiotropium  2 puff Inhalation Daily    mirtazapine  15 mg Oral Nightly     PRN Meds: polyethylene glycol, levalbuterol, ALPRAZolam, insulin (HUMAN R) weight based infusion, sodium chloride flush, sodium chloride, ondansetron **OR** ondansetron, oxyCODONE **OR** oxyCODONE, diphenhydramine, potassium chloride, magnesium sulfate, albumin human, dextrose bolus **OR** dextrose bolus, glucagon (rDNA), dextrose, sodium chloride flush      Intake/Output Summary (Last 24 hours) at 10/25/2022 0759  Last data filed at 10/25/2022 0600  Gross per 24 hour   Intake 240 ml   Output 1800 ml   Net -1560 ml       Physical Exam Performed:    /77   Pulse 79   Temp 97.9 °F (36.6 °C) (Temporal)   Resp 25   Ht 6' (1.829 m)   Wt 171 lb 1.2 oz (77.6 kg)   SpO2 93%   BMI 23.20 kg/m²     General appearance: Appears comfortable. Well nourished  Eyes: Sclera clear, pupils equal  ENT: Moist mucus membranes, no thrush  Neck: Trachea midline, symmetrical  Cardiovascular: Regular rhythm, normal S1, S2. No murmur, gallop, rub. Trace edema in  lower extremities  Respiratory: Trace bibasilar rales. BL wheezing. No rhonchi. Gastrointestinal: Abdomen soft, not tender, not distended, normal bowel sounds  Musculoskeletal: No cyanosis in digits, warm extremities  Neurologic: Grossly intact, no motor or speech deficits. Psychiatric: Normal affect. Alert and oriented to time, place and person.   Skin: Warm, dry, normal turgor, no rash      Labs:   Recent Labs 10/23/22  0410 10/24/22  0405 10/25/22  0510   WBC 14.7* 16.1* 13.1*   HGB 8.0* 8.1* 8.1*   HCT 26.3* 26.4* 25.9*   * 151 176     Recent Labs     10/23/22  0410 10/24/22  0405 10/25/22  0510    132* 135*   K 4.5 5.0 5.0    97* 98*   CO2 24 29 27   BUN 21* 29* 36*   CREATININE 2.1* 1.9* 2.0*   CALCIUM 8.3 8.3 8.3     No results for input(s): AST, ALT, BILIDIR, BILITOT, ALKPHOS in the last 72 hours. No results for input(s): INR in the last 72 hours. No results for input(s): [place] in the last 72 hours. Urinalysis:      Lab Results   Component Value Date/Time    NITRU Negative 10/19/2022 11:10 PM    WBCUA 0 04/25/2019 01:47 PM    RBCUA 4 04/25/2019 01:47 PM    BLOODU Negative 10/19/2022 11:10 PM    SPECGRAV 1.010 10/19/2022 11:10 PM    GLUCOSEU Negative 10/19/2022 11:10 PM       Radiology:  XR CHEST PORTABLE   Final Result   Persistent bibasilar atelectasis or airspace disease and small pleural   effusions, similar to prior. XR CHEST PORTABLE   Final Result   1. Sequela from CABG including median sternotomy and clips about the heart. 2.  Endotracheal tube is in place, tip at a level between that of the   clavicular heads and aortic knob. 3.  NG tube extends below the left hemidiaphragm, into the upper abdomen, tip   overlying the expected level of the stomach, left upper quadrant. 4. Right IJ [place]-Jemima catheter via introducer sheath has its tip overlying   the right pulmonary artery level. A 2nd right IJ CVC is demonstrated, tip at   the superior cavoatrial junction level. 5. Left side and right side pleural catheters appear unremarkable. 6. No pneumomediastinum or pneumothorax. 7. Minimal atelectasis at the lung bases and possible small volume left   pleural effusion. 8. No fracture evident. VL DUP CAROTID BILATERAL   Final Result      VL PRE OP VEIN MAPPING   Final Result      XR CHEST PORTABLE   Final Result   No acute cardiopulmonary disease. Assessment/Plan:    Active Hospital Problems    Diagnosis     Dyslipidemia [E78.5]      Priority: Medium    Tobacco abuse disorder [Z72.0]      Priority: Medium    Unstable angina (HCC) [I20.0]      Priority: Medium    Coronary artery disease involving native coronary artery of native heart with unstable angina pectoris ([facility] [I25.110]      Priority: Medium    COPD, moderate ([facility] [J44.9]      Priority: Medium    DM2 (diabetes mellitus, type 2) ([facility] [E11.9]     Essential hypertension [I10]     Chronic kidney disease, stage III (moderate) (HCC) [N18.30]      Change Lantus 10 U bid  Continue Humalog 7 U with meals  Continue Lasix 20 mg IV q8h  Continue Amio, ASA, Crestor  Continue Dulera and Xopenex  Wean O2 as tolerated  Arixtra for DVT ppx  Consider steroids for wheezing, defer to CTS is ok    DVT Prophylaxis: Arixtra  Diet: ADULT DIET; Regular; 3 carb choices (45 gm/meal); No Added Salt (3-4 gm); 1500 ml  Code Status: Full Code  PT/OT Eval Status: Following    Dispo - Home with home PT/OT    Discussed with patient and CVU nursing. Consider steroids for wheezing if ok with CTS.     Christian Charles MD

## 2022-10-25 NOTE — PROGRESS NOTES
Speech Language Pathology  Hold Note    Elly Goyal  1950    SLP attempted to see pt this date for dysphagia intervention. Chart reviewed, spoke with RN who recommended hold as pt was sleeping soundly. Will attempt again as pt is more appropriate to participate.     Esme Houser, [address] [place] [address]  Speech-Language Pathologist  [phone]

## 2022-10-25 NOTE — PROGRESS NOTES
Cardiothoracic Surgery Progress Note    CC: coronary artery disease , s/p CABGx3, KELLY clip  POD# 4    Subjective:  Hemodynamically stable, on 10L high flow NC, afebrile. Alert and oriented X 3. At pre-op weight this AM.     10/24 Was atrial paced overnight due to pauses    WT:77.6 kg/78.5 kg   pre-op 77.6 kg    Vital Signs:   /77   Pulse 79   Temp 97.9 °F (36.6 °C) (Temporal)   Resp 25   Ht 6' (1.829 m)   Wt 171 lb 1.2 oz (77.6 kg)   SpO2 93%   BMI 23.20 kg/m²       Physical Exam:   Cardiac: regular, no murmur. Lungs: clear to auscultation, decreased bases bilaterally  Abdomen:  no BM abdomen soft, non-distended  Vascular:  pulses all palpable   Extremities: generalized, non-pitting edema 1+  :  /570/795   Lasix 20mg IV q8h  Chest Tube(s) & Incision(s):    Chest Tubes pleural: 10/0/0 no airleak, no crepitus, water seal  Pericardial: 50/0/0 no airleak, no crepitus, water seal  Sternum: stable,C/D/I  Edges approximated, no drainage  Chest tube site: C/D/I Purse string intact   left leg incision: C/D/I  Edges well approximated, no drainage TE      Labs:   CBC:   Recent Labs     10/24/22  0405 10/25/22  0510   WBC 16.1* 13.1*   HGB 8.1* 8.1*   HCT 26.4* 25.9*    176     BMP:   Recent Labs     10/23/22  0410 10/24/22  0405 10/25/22  0510   K 4.5 5.0 5.0   CREATININE 2.1* 1.9* 2.0*   CALCIUM 8.3 8.3 8.3   MG 2.00  --   --      PT/INR: No results for input(s): PROTIME, INR in the last 72 hours.     Assessment/Plan:  As per CC:        S/P CABG  -chest tubes to waterseal-pull 4 hours after TPW removed  -pulmonary toileting, out of bed, IS, acapella, wean O2, Ezpap  -asa, sub q heparin TID  -Pepcid  -continue bowel regimen-add ducolax suppository   -start Plavix after TPW removed    A.fib  -amiodarone protocol  - holding BB for now due to SB, reported short pauses that required intermittent pacing 10/24  - d/c pacing wires today    HTN  -d/c amlodipine     CKD  -nephrology following  -creat 1.9->2  -lasix 20mg TID  -K+ supplementation with hold parameters  -remove valdovinos and vascath     DM  - SSI and lantus  -carb control diet     HLD  -continue statin      Electronically signed by ANTHONY Willett - CNP on 10/25/2022 at 8:23 AM   Note reviewed, events of last 24 hours reviewed along with vital signs and I/Os and patient examined. Assessment and plans discussed and are as outlined above.      Jose L Dee MD, FACS, [facility], FACCP, [place]

## 2022-10-25 NOTE — PROGRESS NOTES
Chest tubes meet criteria to remove per open heart protocol. No  air leak. no crepitus. Pt instructed on procedure. Site cleansed and prepped per protocol. Chest tubes X 2 removed without difficulty. Dry sterile dressing applied. Bilateral breath sounds audible. O2 Sats 93% on 10L nasal cannula.   Jose Gabriel RN

## 2022-10-25 NOTE — PROGRESS NOTES
Criteria met per clinical pathway to remove epicardial pacing wires & MD order received. Procedure explained to patient. Pacing wire site within normal limits. Cleansed site with Chloroprep. Artrial pacing wires and Ventricular pacing wires removed per policy without difficulty. Dry sterile dressing applied. Vital signs taken every 15 minutes X 2, every 30 minutes X 1, and every hour X 1 recorded in Doc Flowsheets. Patient tolerated procedure well, heart tones easily auscultated, oxygen saturation within normal limits. No signs/symtoms of cardiac tamponade.   Ben Gomez RN

## 2022-10-26 ENCOUNTER — APPOINTMENT (OUTPATIENT)
Dept: GENERAL RADIOLOGY | Age: 72
DRG: 233 | End: 2022-10-26
Attending: INTERNAL MEDICINE
Payer: MEDICARE

## 2022-10-26 PROBLEM — J90 PLEURAL EFFUSION ON LEFT: Status: ACTIVE | Noted: 2022-10-26

## 2022-10-26 PROBLEM — J96.01 ACUTE RESPIRATORY FAILURE WITH HYPOXIA (HCC): Status: ACTIVE | Noted: 2022-10-26

## 2022-10-26 PROBLEM — Z95.1 S/P CABG X 3: Status: ACTIVE | Noted: 2022-10-26

## 2022-10-26 LAB
ABO/RH: NORMAL
ANION GAP SERPL CALCULATED.3IONS-SCNC: 10 MMOL/L (ref 3–16)
ANISOCYTOSIS: ABNORMAL
ANTIBODY SCREEN: NORMAL
BASOPHILS ABSOLUTE: 0 K/UL (ref 0–0.2)
BASOPHILS RELATIVE PERCENT: 0 %
BLOOD BANK DISPENSE STATUS: NORMAL
BLOOD BANK PRODUCT CODE: NORMAL
BPU ID: NORMAL
BUN BLDV-MCNC: 37 MG/DL (ref 7–20)
CALCIUM SERPL-MCNC: 8.3 MG/DL (ref 8.3–10.6)
CHLORIDE BLD-SCNC: 98 MMOL/L (ref 99–110)
CO2: 27 MMOL/L (ref 21–32)
CREAT SERPL-MCNC: 1.9 MG/DL (ref 0.8–1.3)
DESCRIPTION BLOOD BANK: NORMAL
EOSINOPHILS ABSOLUTE: 0 K/UL (ref 0–0.6)
EOSINOPHILS RELATIVE PERCENT: 0 %
GFR SERPL CREATININE-BSD FRML MDRD: 37 ML/MIN/{1.73_M2}
GLUCOSE BLD-MCNC: 103 MG/DL (ref 70–99)
GLUCOSE BLD-MCNC: 130 MG/DL (ref 70–99)
GLUCOSE BLD-MCNC: 176 MG/DL (ref 70–99)
GLUCOSE BLD-MCNC: 198 MG/DL (ref 70–99)
GLUCOSE BLD-MCNC: 204 MG/DL (ref 70–99)
HCT VFR BLD CALC: 24.1 % (ref 40.5–52.5)
HCT VFR BLD CALC: 29.4 % (ref 40.5–52.5)
HEMOGLOBIN: 7.6 G/DL (ref 13.5–17.5)
HEMOGLOBIN: 9.1 G/DL (ref 13.5–17.5)
LYMPHOCYTES ABSOLUTE: 0.8 K/UL (ref 1–5.1)
LYMPHOCYTES RELATIVE PERCENT: 10 %
MCH RBC QN AUTO: 30.3 PG (ref 26–34)
MCHC RBC AUTO-ENTMCNC: 31.4 G/DL (ref 31–36)
MCV RBC AUTO: 96.4 FL (ref 80–100)
MICROCYTES: ABNORMAL
MONOCYTES ABSOLUTE: 0.5 K/UL (ref 0–1.3)
MONOCYTES RELATIVE PERCENT: 6 %
NEUTROPHILS ABSOLUTE: 7 K/UL (ref 1.7–7.7)
NEUTROPHILS RELATIVE PERCENT: 84 %
NUCLEATED RED BLOOD CELLS: 2 /100 WBC
OVALOCYTES: ABNORMAL
PDW BLD-RTO: 20.2 % (ref 12.4–15.4)
PERFORMED ON: ABNORMAL
PLATELET # BLD: 173 K/UL (ref 135–450)
PMV BLD AUTO: 8.8 FL (ref 5–10.5)
POLYCHROMASIA: ABNORMAL
POTASSIUM REFLEX MAGNESIUM: 4.8 MMOL/L (ref 3.5–5.1)
RBC # BLD: 2.5 M/UL (ref 4.2–5.9)
SODIUM BLD-SCNC: 135 MMOL/L (ref 136–145)
WBC # BLD: 8.3 K/UL (ref 4–11)

## 2022-10-26 PROCEDURE — 71045 X-RAY EXAM CHEST 1 VIEW: CPT

## 2022-10-26 PROCEDURE — 80048 BASIC METABOLIC PNL TOTAL CA: CPT

## 2022-10-26 PROCEDURE — 97535 SELF CARE MNGMENT TRAINING: CPT

## 2022-10-26 PROCEDURE — 2140000000 HC CCU INTERMEDIATE R&B

## 2022-10-26 PROCEDURE — 36592 COLLECT BLOOD FROM PICC: CPT

## 2022-10-26 PROCEDURE — 6360000002 HC RX W HCPCS: Performed by: HOSPITALIST

## 2022-10-26 PROCEDURE — 85025 COMPLETE CBC W/AUTO DIFF WBC: CPT

## 2022-10-26 PROCEDURE — 85018 HEMOGLOBIN: CPT

## 2022-10-26 PROCEDURE — APPNB45 APP NON BILLABLE 31-45 MINUTES

## 2022-10-26 PROCEDURE — 97116 GAIT TRAINING THERAPY: CPT

## 2022-10-26 PROCEDURE — 6370000000 HC RX 637 (ALT 250 FOR IP): Performed by: THORACIC SURGERY (CARDIOTHORACIC VASCULAR SURGERY)

## 2022-10-26 PROCEDURE — 6360000002 HC RX W HCPCS: Performed by: INTERNAL MEDICINE

## 2022-10-26 PROCEDURE — 6370000000 HC RX 637 (ALT 250 FOR IP): Performed by: INTERNAL MEDICINE

## 2022-10-26 PROCEDURE — 86923 COMPATIBILITY TEST ELECTRIC: CPT

## 2022-10-26 PROCEDURE — 6360000002 HC RX W HCPCS

## 2022-10-26 PROCEDURE — 6360000002 HC RX W HCPCS: Performed by: THORACIC SURGERY (CARDIOTHORACIC VASCULAR SURGERY)

## 2022-10-26 PROCEDURE — 36430 TRANSFUSION BLD/BLD COMPNT: CPT

## 2022-10-26 PROCEDURE — 86901 BLOOD TYPING SEROLOGIC RH(D): CPT

## 2022-10-26 PROCEDURE — 2580000003 HC RX 258: Performed by: THORACIC SURGERY (CARDIOTHORACIC VASCULAR SURGERY)

## 2022-10-26 PROCEDURE — 97530 THERAPEUTIC ACTIVITIES: CPT

## 2022-10-26 PROCEDURE — 86900 BLOOD TYPING SEROLOGIC ABO: CPT

## 2022-10-26 PROCEDURE — 99233 SBSQ HOSP IP/OBS HIGH 50: CPT | Performed by: INTERNAL MEDICINE

## 2022-10-26 PROCEDURE — 94761 N-INVAS EAR/PLS OXIMETRY MLT: CPT

## 2022-10-26 PROCEDURE — 85014 HEMATOCRIT: CPT

## 2022-10-26 PROCEDURE — 2700000000 HC OXYGEN THERAPY PER DAY

## 2022-10-26 PROCEDURE — 2580000003 HC RX 258: Performed by: INTERNAL MEDICINE

## 2022-10-26 PROCEDURE — 94669 MECHANICAL CHEST WALL OSCILL: CPT

## 2022-10-26 PROCEDURE — 94640 AIRWAY INHALATION TREATMENT: CPT

## 2022-10-26 PROCEDURE — 2500000003 HC RX 250 WO HCPCS: Performed by: INTERNAL MEDICINE

## 2022-10-26 PROCEDURE — 6370000000 HC RX 637 (ALT 250 FOR IP)

## 2022-10-26 PROCEDURE — 86850 RBC ANTIBODY SCREEN: CPT

## 2022-10-26 PROCEDURE — 6370000000 HC RX 637 (ALT 250 FOR IP): Performed by: NURSE PRACTITIONER

## 2022-10-26 PROCEDURE — 99223 1ST HOSP IP/OBS HIGH 75: CPT | Performed by: INTERNAL MEDICINE

## 2022-10-26 PROCEDURE — P9016 RBC LEUKOCYTES REDUCED: HCPCS

## 2022-10-26 PROCEDURE — 99024 POSTOP FOLLOW-UP VISIT: CPT | Performed by: THORACIC SURGERY (CARDIOTHORACIC VASCULAR SURGERY)

## 2022-10-26 RX ORDER — METHYLPREDNISOLONE SODIUM SUCCINATE 40 MG/ML
40 INJECTION, POWDER, LYOPHILIZED, FOR SOLUTION INTRAMUSCULAR; INTRAVENOUS EVERY 8 HOURS
Status: DISCONTINUED | OUTPATIENT
Start: 2022-10-26 | End: 2022-10-29

## 2022-10-26 RX ORDER — SODIUM CHLORIDE 9 MG/ML
INJECTION, SOLUTION INTRAVENOUS PRN
Status: DISCONTINUED | OUTPATIENT
Start: 2022-10-26 | End: 2022-11-01 | Stop reason: HOSPADM

## 2022-10-26 RX ORDER — FUROSEMIDE 10 MG/ML
20 INJECTION INTRAMUSCULAR; INTRAVENOUS 2 TIMES DAILY
Status: DISCONTINUED | OUTPATIENT
Start: 2022-10-26 | End: 2022-10-28

## 2022-10-26 RX ORDER — INSULIN LISPRO 100 [IU]/ML
5 INJECTION, SOLUTION INTRAVENOUS; SUBCUTANEOUS
Status: DISCONTINUED | OUTPATIENT
Start: 2022-10-26 | End: 2022-10-27

## 2022-10-26 RX ADMIN — FUROSEMIDE 20 MG: 10 INJECTION, SOLUTION INTRAMUSCULAR; INTRAVENOUS at 05:14

## 2022-10-26 RX ADMIN — INSULIN LISPRO 5 UNITS: 100 INJECTION, SOLUTION INTRAVENOUS; SUBCUTANEOUS at 11:30

## 2022-10-26 RX ADMIN — TRAMADOL HYDROCHLORIDE 50 MG: 50 TABLET, COATED ORAL at 20:51

## 2022-10-26 RX ADMIN — CLOPIDOGREL BISULFATE 75 MG: 75 TABLET ORAL at 07:59

## 2022-10-26 RX ADMIN — POLYETHYLENE GLYCOL 3350 17 G: 17 POWDER, FOR SOLUTION ORAL at 07:58

## 2022-10-26 RX ADMIN — INSULIN GLARGINE 10 UNITS: 100 INJECTION, SOLUTION SUBCUTANEOUS at 09:16

## 2022-10-26 RX ADMIN — GABAPENTIN 300 MG: 300 CAPSULE ORAL at 20:52

## 2022-10-26 RX ADMIN — HEPARIN SODIUM 5000 UNITS: 5000 INJECTION INTRAVENOUS; SUBCUTANEOUS at 13:59

## 2022-10-26 RX ADMIN — ACETAMINOPHEN 650 MG: 325 TABLET ORAL at 20:51

## 2022-10-26 RX ADMIN — MUPIROCIN: 20 OINTMENT TOPICAL at 08:03

## 2022-10-26 RX ADMIN — GABAPENTIN 300 MG: 300 CAPSULE ORAL at 07:59

## 2022-10-26 RX ADMIN — HEPARIN SODIUM 5000 UNITS: 5000 INJECTION INTRAVENOUS; SUBCUTANEOUS at 05:14

## 2022-10-26 RX ADMIN — GABAPENTIN 300 MG: 300 CAPSULE ORAL at 13:59

## 2022-10-26 RX ADMIN — SENNOSIDES 8.6 MG: 8.6 TABLET, FILM COATED ORAL at 08:00

## 2022-10-26 RX ADMIN — AMIODARONE HYDROCHLORIDE 400 MG: 200 TABLET ORAL at 20:51

## 2022-10-26 RX ADMIN — MIRTAZAPINE 15 MG: 15 TABLET, FILM COATED ORAL at 20:51

## 2022-10-26 RX ADMIN — Medication 2 PUFF: at 20:22

## 2022-10-26 RX ADMIN — BISACODYL 10 MG: 10 SUPPOSITORY RECTAL at 12:24

## 2022-10-26 RX ADMIN — AMIODARONE HYDROCHLORIDE 400 MG: 200 TABLET ORAL at 08:00

## 2022-10-26 RX ADMIN — FUROSEMIDE 20 MG: 10 INJECTION, SOLUTION INTRAMUSCULAR; INTRAVENOUS at 17:23

## 2022-10-26 RX ADMIN — LEVALBUTEROL HYDROCHLORIDE 1.25 MG: 1.25 SOLUTION RESPIRATORY (INHALATION) at 07:53

## 2022-10-26 RX ADMIN — DOXYCYCLINE 100 MG: 100 INJECTION, POWDER, LYOPHILIZED, FOR SOLUTION INTRAVENOUS at 15:36

## 2022-10-26 RX ADMIN — ROSUVASTATIN CALCIUM 20 MG: 20 TABLET, FILM COATED ORAL at 08:00

## 2022-10-26 RX ADMIN — INSULIN GLARGINE 10 UNITS: 100 INJECTION, SOLUTION SUBCUTANEOUS at 20:51

## 2022-10-26 RX ADMIN — ACETAMINOPHEN 650 MG: 325 TABLET ORAL at 07:58

## 2022-10-26 RX ADMIN — FAMOTIDINE 20 MG: 20 TABLET ORAL at 07:59

## 2022-10-26 RX ADMIN — LEVALBUTEROL HYDROCHLORIDE 1.25 MG: 1.25 SOLUTION RESPIRATORY (INHALATION) at 20:18

## 2022-10-26 RX ADMIN — INSULIN LISPRO 5 UNITS: 100 INJECTION, SOLUTION INTRAVENOUS; SUBCUTANEOUS at 17:19

## 2022-10-26 RX ADMIN — SERTRALINE 100 MG: 50 TABLET, FILM COATED ORAL at 07:59

## 2022-10-26 RX ADMIN — ALPRAZOLAM 0.25 MG: 0.25 TABLET ORAL at 20:51

## 2022-10-26 RX ADMIN — ACETAMINOPHEN 650 MG: 325 TABLET ORAL at 13:59

## 2022-10-26 RX ADMIN — TRAZODONE HYDROCHLORIDE 50 MG: 50 TABLET ORAL at 20:50

## 2022-10-26 RX ADMIN — TRAMADOL HYDROCHLORIDE 50 MG: 50 TABLET, COATED ORAL at 13:59

## 2022-10-26 RX ADMIN — TRAMADOL HYDROCHLORIDE 50 MG: 50 TABLET, COATED ORAL at 02:50

## 2022-10-26 RX ADMIN — INSULIN LISPRO 4 UNITS: 100 INJECTION, SOLUTION INTRAVENOUS; SUBCUTANEOUS at 11:30

## 2022-10-26 RX ADMIN — TRAMADOL HYDROCHLORIDE 50 MG: 50 TABLET, COATED ORAL at 08:00

## 2022-10-26 RX ADMIN — DOCUSATE SODIUM 100 MG: 100 CAPSULE, LIQUID FILLED ORAL at 07:58

## 2022-10-26 RX ADMIN — METHYLPREDNISOLONE SODIUM SUCCINATE 40 MG: 40 INJECTION, POWDER, FOR SOLUTION INTRAMUSCULAR; INTRAVENOUS at 15:12

## 2022-10-26 RX ADMIN — HEPARIN SODIUM 5000 UNITS: 5000 INJECTION INTRAVENOUS; SUBCUTANEOUS at 20:52

## 2022-10-26 RX ADMIN — METOPROLOL TARTRATE 6.25 MG: 25 TABLET, FILM COATED ORAL at 14:10

## 2022-10-26 RX ADMIN — Medication 10 ML: at 08:02

## 2022-10-26 RX ADMIN — METOPROLOL TARTRATE 6.25 MG: 25 TABLET, FILM COATED ORAL at 20:52

## 2022-10-26 RX ADMIN — ASPIRIN 81 MG 81 MG: 81 TABLET ORAL at 07:58

## 2022-10-26 RX ADMIN — TIOTROPIUM BROMIDE INHALATION SPRAY 2 PUFF: 3.12 SPRAY, METERED RESPIRATORY (INHALATION) at 07:53

## 2022-10-26 RX ADMIN — SODIUM CHLORIDE 5 ML/HR: 9 INJECTION, SOLUTION INTRAVENOUS at 15:35

## 2022-10-26 RX ADMIN — Medication 2 PUFF: at 07:53

## 2022-10-26 RX ADMIN — ACETAMINOPHEN 650 MG: 325 TABLET ORAL at 02:50

## 2022-10-26 RX ADMIN — Medication 10 ML: at 20:59

## 2022-10-26 ASSESSMENT — PAIN DESCRIPTION - PAIN TYPE: TYPE: SURGICAL PAIN

## 2022-10-26 ASSESSMENT — PAIN SCALES - GENERAL
PAINLEVEL_OUTOF10: 8
PAINLEVEL_OUTOF10: 7
PAINLEVEL_OUTOF10: 7
PAINLEVEL_OUTOF10: 0
PAINLEVEL_OUTOF10: 7
PAINLEVEL_OUTOF10: 7
PAINLEVEL_OUTOF10: 0
PAINLEVEL_OUTOF10: 7

## 2022-10-26 ASSESSMENT — PAIN DESCRIPTION - LOCATION
LOCATION: STERNUM
LOCATION: STERNUM

## 2022-10-26 ASSESSMENT — PAIN DESCRIPTION - DESCRIPTORS: DESCRIPTORS: ACHING

## 2022-10-26 ASSESSMENT — PAIN DESCRIPTION - ORIENTATION
ORIENTATION: MID
ORIENTATION: MID

## 2022-10-26 ASSESSMENT — PAIN - FUNCTIONAL ASSESSMENT: PAIN_FUNCTIONAL_ASSESSMENT: PREVENTS OR INTERFERES SOME ACTIVE ACTIVITIES AND ADLS

## 2022-10-26 ASSESSMENT — PAIN DESCRIPTION - ONSET: ONSET: ON-GOING

## 2022-10-26 ASSESSMENT — PAIN DESCRIPTION - FREQUENCY: FREQUENCY: CONTINUOUS

## 2022-10-26 NOTE — PROGRESS NOTES
Hospitalist Progress Note      PCP: Jesika Ivory MD    Date of Admission: 10/19/2022    Chief Complaint: \"I had a bypass surgery\"    Hospital Course: 81 y.o. male on Pamela Sloan MD service who was admitted on 10/19/2022 for unstable angina and CAD. Patient with history of DM 2, HTN, CKD 3, COPD and hyperlipidemia. 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 Underwent Coronary bypass grafting surgery x3 with single  reverse saphenous vein graft to the obtuse marginal branch of the  circumflex, separate sequential reverse saphenous vein graft to the  distal right coronary artery, pedicle left internal artery to the LAD,  endoscopic vein harvest of the left greater saphenous vein, left atrial  appendage obliteration with 35-mm AtriCure left atrial clip, total  cardiopulmonary bypass, transesophageal echo, Doppler verification of  graft, epiaortic ultrasound, and vas cath placement on 10/21/22. Subjective:  Patient still on 10 L HFNC. Coughing but denies SOB. No HA, dizziness, fevers or abdominal pain.        Medications:  Reviewed    Infusion Medications    sodium chloride      sodium chloride Stopped (10/23/22 0804)    dextrose       Scheduled Medications    insulin lispro  5 Units SubCUTAneous TID WC    furosemide  20 mg IntraVENous BID    methylPREDNISolone  40 mg IntraVENous Q8H    doxycycline (VIBRAMYCIN) IV  100 mg IntraVENous Q12H    insulin glargine  10 Units SubCUTAneous BID    heparin (porcine)  5,000 Units SubCUTAneous 3 times per day    famotidine  20 mg Oral Daily    polyethylene glycol  17 g Oral Daily    clopidogrel  75 mg Oral Daily    aspirin  81 mg Oral Daily    insulin lispro  0-16 Units SubCUTAneous TID WC    insulin lispro  0-4 Units SubCUTAneous Nightly    metoprolol tartrate  6.25 mg Oral BID    docusate sodium  100 mg Oral BID    senna  1 tablet Oral BID    amiodarone  400 mg Oral BID    Followed by    Bryan Morse ON 10/28/2022] amiodarone  200 mg Oral Daily    levalbuterol  1.25 mg Nebulization BID    traZODone  50 mg Oral Nightly    acetaminophen  650 mg Oral Q6H    gabapentin  300 mg Oral TID    sodium chloride flush  5-40 mL IntraVENous 2 times per day    potassium chloride  10 mEq Oral TID WC    rosuvastatin  20 mg Oral Daily    sertraline  100 mg Oral Daily    mometasone-formoterol  2 puff Inhalation BID    tiotropium  2 puff Inhalation Daily    mirtazapine  15 mg Oral Nightly     PRN Meds: sodium chloride, bisacodyl, traMADol, levalbuterol, ALPRAZolam, sodium chloride flush, sodium chloride, ondansetron **OR** ondansetron, diphenhydramine, potassium chloride, dextrose bolus **OR** dextrose bolus, glucagon (rDNA), dextrose, sodium chloride flush      Intake/Output Summary (Last 24 hours) at 10/26/2022 1456  Last data filed at 10/26/2022 1400  Gross per 24 hour   Intake 1070 ml   Output 2850 ml   Net -1780 ml         Physical Exam Performed:    BP (!) 159/90   Pulse 75   Temp 98 °F (36.7 °C) (Temporal)   Resp 20   Ht 6' (1.829 m)   Wt 177 lb 0.5 oz (80.3 kg)   SpO2 90%   BMI 24.01 kg/m²     General appearance: Appears comfortable. Well nourished  Eyes: Sclera clear, pupils equal  ENT: Moist mucus membranes, no thrush  Neck: Trachea midline, symmetrical  Cardiovascular: Regular rhythm, normal S1, S2. No murmur, gallop, rub. No edema in  lower extremities  Respiratory: No rales. BL wheezing. No rhonchi. Gastrointestinal: Abdomen soft, not tender, not distended, normal bowel sounds  Musculoskeletal: No cyanosis in digits, warm extremities  Neurologic: Grossly intact, no motor or speech deficits. Psychiatric: Normal affect. Alert and oriented to time, place and person.   Skin: Warm, dry, normal turgor, no rash      Labs:   Recent Labs     10/24/22  0405 10/25/22  0510 10/26/22  0502 10/26/22  1420   WBC 16.1* 13.1* 8.3  --    HGB 8.1* 8.1* 7.6* 9.1*   HCT 26.4* 25.9* 24.1* 29.4*    176 173  --        Recent Labs 10/24/22  0405 10/25/22  0510 10/26/22  0520   * 135* 135*   K 5.0 5.0 4.8   CL 97* 98* 98*   CO2 29 27 27   BUN 29* 36* 37*   CREATININE 1.9* 2.0* 1.9*   CALCIUM 8.3 8.3 8.3       No results for input(s): AST, ALT, BILIDIR, BILITOT, ALKPHOS in the last 72 hours. No results for input(s): INR in the last 72 hours. No results for input(s): [place] in the last 72 hours. Urinalysis:      Lab Results   Component Value Date/Time    NITRU Negative 10/19/2022 11:10 PM    WBCUA 0 04/25/2019 01:47 PM    RBCUA 4 04/25/2019 01:47 PM    BLOODU Negative 10/19/2022 11:10 PM    SPECGRAV 1.010 10/19/2022 11:10 PM    GLUCOSEU Negative 10/19/2022 11:10 PM       Radiology:  XR CHEST PORTABLE   Final Result   Bibasilar atelectasis or airspace disease, slightly increased as compared to   prior. Suspected small pleural effusions. No pneumothorax. XR CHEST PORTABLE   Final Result   Persistent bibasilar atelectasis or airspace disease and small pleural   effusions, similar to prior. XR CHEST PORTABLE   Final Result   1. Sequela from CABG including median sternotomy and clips about the heart. 2.  Endotracheal tube is in place, tip at a level between that of the   clavicular heads and aortic knob. 3.  NG tube extends below the left hemidiaphragm, into the upper abdomen, tip   overlying the expected level of the stomach, left upper quadrant. 4. Right IJ [place]-Jemima catheter via introducer sheath has its tip overlying   the right pulmonary artery level. A 2nd right IJ CVC is demonstrated, tip at   the superior cavoatrial junction level. 5. Left side and right side pleural catheters appear unremarkable. 6. No pneumomediastinum or pneumothorax. 7. Minimal atelectasis at the lung bases and possible small volume left   pleural effusion. 8. No fracture evident.          VL DUP CAROTID BILATERAL   Final Result      VL PRE OP VEIN MAPPING   Final Result      XR CHEST PORTABLE   Final Result   No acute cardiopulmonary disease. Assessment/Plan:    Active Hospital Problems    Diagnosis     Moderate malnutrition [facility] [E44.0]      Priority: Medium    Dyslipidemia [E78.5]      Priority: Medium    Tobacco abuse disorder [Z72.0]      Priority: Medium    Unstable angina (HCC) [I20.0]      Priority: Medium    Coronary artery disease involving native coronary artery of native heart with unstable angina pectoris ([facility] [I25.110]      Priority: Medium    COPD, moderate ([facility] [J44.9]      Priority: Medium    DM2 (diabetes mellitus, type 2) ([facility] [E11.9]     Essential hypertension [I10]     Chronic kidney disease, stage III (moderate) (HCC) [N18.30]      Change Lantus 10 U bid  Decrease Humalog 5 U with meals  Continue Lasix 20 mg IV q12h  Started on Solumedrol 40 mg IV q8h  Continue Amio, ASA, Crestor  Continue Dulera and Xopenex  Wean O2 as tolerated  Arixtra for DVT ppx      DVT Prophylaxis: Arixtra  Diet: ADULT ORAL NUTRITION SUPPLEMENT; Breakfast, Lunch; Diabetic Oral Supplement  ADULT DIET; Dysphagia - Soft and Bite Sized; 5 carb choices (75 gm/meal); Low Fat/Low Chol/High Fiber/2 gm Na; 1800 ml  Code Status: Full Code  PT/OT Eval Status: Following    Dispo - Home with home PT/OT    Discussed with patient, CVU nursing and CM. Looks good with chest tubes out.     Ny Zamarripa MD

## 2022-10-26 NOTE — PROGRESS NOTES
[facility] [address]   Phone: [phone]    Occupational Therapy    [] Initial Evaluation            [x] Daily Treatment Note         [] Discharge Summary      Patient: Shemar Nair   : 1950   MRN: [id number]   Date of Service:  10/26/2022    Admitting Diagnosis:  Coronary artery disease involving native coronary artery of native heart with unstable angina pectoris [facility])  Current Admission Summary: Pt at [facility] due to abnormal stress test. Pt had angiogram, followed by CABG on 10/20/22. Past Medical History:  has a past medical history of Chronic renal insufficiency, Colloid cyst of third ventricle (HCC), Coronary artery disease, DDD (degenerative disc disease), lumbar, Diabetes mellitus, type 2 ([address] Diverticulitis of colon with perforation, ED (erectile dysfunction), Hyperlipidemia, Hypertension, Hypertensive retinopathy of both eyes, and Nephrolithiasis. Past Surgical History:  has a past surgical history that includes Cardiac catheterization (, ,  3/08); Revision Colostomy (3/08); Lithotripsy (); Lithotripsy (); partial nephrectomy (); Coronary angioplasty with stent (10/05); Coronary angioplasty (); Umbilical hernia repair; Knee arthroscopy (, ); colostomy (); Total knee arthroplasty (Left, 14); brain surgery (2007); and Coronary artery bypass graft (N/A, 10/21/2022). Discharge Recommendations: Shemar Nair scored a 16/24 on the AM-PAC ADL Inpatient form. Current research shows that an AM-PAC score of 17 or less is typically not associated with a discharge to the patient's home setting. Based on the patient's AM-PAC score and their current ADL deficits, it is recommended that the patient have 5-7 sessions per week of Occupational Therapy at d/c to increase the patient's independence.   At this time, this patient demonstrates complex nursing, medical, and rehabilitative needs, and would benefit from intensive rehabilitation services upon discharge from the Inpatient setting. This patient demonstrates the ability to participate in and benefit from an intensive therapy program with a coordinated interdisciplinary team approach to foster frequent, structured, and documented communication among disciplines, who will work together to establish, prioritize, and achieve treatment goals. Please see assessment section for further patient specific details. If patient discharges prior to next session this note will serve as a discharge summary. Please see below for the latest assessment towards goals. DME Required For Discharge: DME to be determined pending patient progress    Precautions/Restrictions: high fall risk, cardiac  Weight Bearing Restrictions: no restrictions  [] Right Upper Extremity  [] Left Upper Extremity [] Right Lower Extremity  [] Left Lower Extremity     Required Braces/Orthotics: no braces required   [] Right  [] Left  Positional Restrictions:Sternal Precautions - No Pushing, Pulling, Lifting > 5 lbs    Pre-Admission Information   Lives With: spouse    Type of Home: house  Home Layout: one level, laundry in basement, doesn't have to go the basement  Home Access:  1 step to enter without rails   Bathroom Layout: walker accessible, tub/shower unit  Bathroom Equipment: grab bars in shower, bedside commode  Toilet Height: standard height  Home Equipment: rollator - 4 wheeled walker, reacher  Transfer Assistance: Independent without use of device  Ambulation Assistance:Independent without use of device  ADL Assistance: independent with all ADL's  IADL Assistance: requires assistance with cleaning, Wife does all IADLs; cleaning lady every 2 wks. Active :        [x] Yes  [] No  Hand Dominance: [] Left  [x] Right  Current Employment: retired.   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 Toileting Equipment: none  Toileting Comments: assist to bring pants fully over hips, umang care complete while seated on commode  General Comments: very limited by decreased endurance with ADLs requiring increased time and rest breaks inbetween all ADLs. Instrumental Activities of Daily Living  No IADL completed on this date. Functional Mobility    Bed Mobility  Supine to Sit: moderate assistance  Sit to Supine: minimal assistance  Scootin person assistance with max(A)x2 towards HOB   Bridging: minimal assistance  Comments:  Transfers  Sit to stand transfer:2 person assistance with mod(A)x2 from recliner, min(A) from elevated bed, mod(A) from commode   Stand to sit transfer: moderate assistance  Toilet transfer: moderate assistance  Toilet transfer equipment: standard toilet  Toilet transfer comments: verbal cues to adhere to sternal precautions for all transfers  Comments:   Functional Mobility:  Sitting Balance: stand by assistance, contact guard assistance. Sitting Balance Comment: CGA initially progressing to SBA  Standing Balance: minimal assistance.     Standing Balance Comment: CGA for static, min(A) for dynamic  Functional Mobility: .  contact guard assistance, minimal assistance  Functional Mobility Activity: 10' x2 to<>from bathroom, 79' in hallway  Functional Mobility Device Use: rollator ([id number])  Functional Mobility Comment: significantly limited by decreased endurance, pt on 15 L 02 while ambulating    Other Therapeutic modified independent, with LRAD   Patient will complete functional mobility at [facility], with LRAD     Therapy Session Time     Individual Group Co-treatment   Time In    1425   Time Out    1520   Minutes    55        Timed Code Treatment Minutes:   55  Total Treatment Minutes:  55 minutes       Electronically Signed By: Zeinab Zavala OT, Zeinab Zavala OTR/L [id number]

## 2022-10-26 NOTE — PROGRESS NOTES
Pt became lethargic and dyspneic post shower in the bathroom. Pt helped back to bed. HOB elevated and deep breathing exercises instructed. Pt quickly recovered.

## 2022-10-26 NOTE — PROGRESS NOTES
Speech Language Pathology  Attempt    Louis Grant  1950    Attempted to see patient for dysphagia therapy. Pt is currently working with PT/OT. Will hold and follow-up as schedule allows.     Zakia Chopra M.A., [address] [place] [address]  Speech-Language Pathologist

## 2022-10-26 NOTE — PROGRESS NOTES
[facility] [address]   Phone: [phone]    Physical Therapy    [] Initial Evaluation            [x] Daily Treatment Note         [] Discharge Summary      Patient: Marianela Conrad   : 1950   MRN: [id number]   Date of Service:  10/26/2022  Admitting Diagnosis: Coronary artery disease involving native coronary artery of native heart with unstable angina pectoris [facility])  Current Admission Summary: The pt was admitted for a CABG on 10/21. Past Medical History:  has a past medical history of Chronic renal insufficiency, Colloid cyst of third ventricle (HCC), Coronary artery disease, DDD (degenerative disc disease), lumbar, Diabetes mellitus, type 2 ([address] Diverticulitis of colon with perforation, ED (erectile dysfunction), Hyperlipidemia, Hypertension, Hypertensive retinopathy of both eyes, and Nephrolithiasis. Past Surgical History:  has a past surgical history that includes Cardiac catheterization (, ,  3/08); Revision Colostomy (3/08); Lithotripsy (); Lithotripsy (); partial nephrectomy (); Coronary angioplasty with stent (10/05); Coronary angioplasty (); Umbilical hernia repair; Knee arthroscopy (, ); colostomy (); Total knee arthroplasty (Left, 14); brain surgery (2007); and Coronary artery bypass graft (N/A, 10/21/2022). Discharge Recommendations:   Marianela Conrad scored a 9/24 on the AM-PAC short mobility form. Current research shows that an AM-PAC score of 17 or less is typically not associated with a discharge to the patient's home setting. Based on the patient's AM-PAC score and their current functional mobility deficits, it is recommended that the patient have 5-7 sessions per week of Physical Therapy at d/c to increase the patient's independence.   At this time, this patient demonstrates complex nursing, medical, and rehabilitative needs, and would benefit from intensive rehabilitation services upon discharge from the Inpatient setting. This patient demonstrates the ability to participate in and benefit from an intensive therapy program with a coordinated interdisciplinary team approach to foster frequent, structured, and documented communication among disciplines, who will work together to establish, prioritize, and achieve treatment goals. Please see assessment section for further patient specific details. If patient discharges prior to next session this note will serve as a discharge summary. Please see below for the latest assessment towards goals. DME Required For Discharge: DME to be determined pending patient progress  Precautions/Restrictions: high fall risk  Weight Bearing Restrictions: no restrictions  [] Right Upper Extremity  [] Left Upper Extremity [] Right Lower Extremity  [] Left Lower Extremity     Required Braces/Orthotics: no braces required   [] Right  [] Left  Positional Restrictions:Sternal Precautions - No Pushing, Pulling, Lifting > 5 lbs    Pre-Admission Information   Lives With: spouse                  Type of Home: house  Home Layout: one level, laundry in basement, doesn't have to go the basement  Home Access:  1 step to enter without rails   Bathroom Layout: walker accessible, tub/shower unit  Bathroom Equipment: grab bars in shower, bedside commode  Toilet Height: standard height  Home Equipment: rollator - 4 wheeled walker, reacher  Transfer Assistance: Independent without use of device  Ambulation Assistance:Independent without use of device  ADL Assistance: independent with all ADL's  IADL Assistance: requires assistance with cleaning, Wife does all IADLs; cleaning lady every 2 wks. Active :        [x] Yes                 [] No  Hand Dominance: [] Left                 [x] Right  Current Employment: retired.   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 Patient required OT assistance with LE dressing after toileting. Other Therapeutic Interventions  Ankle pumps x10 reps BLE with min A and VCs, positioning with pillow for support and posture alignment. Patient given handout with exercises to perform while in hospital-APs, QS, GS    Functional Outcomes  AM-PAC Inpatient Mobility Raw Score : 9              Cognition  WFL  Orientation:    alert and oriented x 4  Command Following:   [facility]    Education  Barriers To Learning: hearing  Patient Education: patient educated on PT role and benefits, plan of care, precautions, transfer training  Learning Assessment:  patient verbalizes and demonstrates understanding    Assessment  Activity Tolerance: Limited by fatigue and endurance during treatment session. O2 increased from 10L to 15L with mobility during session. Patient's Sp O2 dropped to upper 70s with coughing episode. Post-ambulation, Sp O2 dropped to 83% but was able to return shortly to 90-93%. O2 was decreased to 10L when leaving patient room. Impairments Requiring Therapeutic Intervention: decreased functional mobility, decreased ROM, decreased strength, decreased endurance, increased pain, decreased posture  Prognosis: good  Clinical Assessment: Patient continues to demonstrate decreased activity tolerance and significant fatigue during session. Patient required assistance with all bed mobility and transfers. Patient able to ambulate 70 feet but postural and gait deviations seen as patient was fatigued. Patient was receptive to idea of rehab stay before going home. Patient's ability to perform sit to stand transfers is greatly affected by L knee weakness and lack of ROM. Pt also remains on 10-15 L of O2 due to respiratory status.  Patient would benefit from more intensive skilled therapy before returning home to ensure patient is back to baseline function. Safety Interventions: patient left in bed, call light within reach, gait belt, patient at risk for falls, and nurse notified    Plan  Frequency: 3-5 x/per week  Current Treatment Recommendations: strengthening, ROM, balance training, functional mobility training, transfer training, gait training, stair training, endurance training, neuromuscular re-education, and safety education    Goals  Patient Goals: return home at [facility]:  Time Frame:  To be met prior to DC  Patient will complete bed mobility at minimal assistance   Patient will complete transfers at contact guard assistance   Patient will ambulate 300 ft with use of LRAD at supervision  Patient will ascend/descend 3 stairs with (R) ascending handrail at contact guard assistance  **No goals met in full this date, 10/26    Therapy Session Time      Individual Group Co-treatment   Time In     0225   Time Out     0320   Minutes     55     Total Treatment Minutes:  55 minutes     MEGHAN Lauren  Electronically Signed By: [place] [id number]  This evaluation/treatment was observed and supervised by Cinthya Barrios, [facility]

## 2022-10-26 NOTE — PROGRESS NOTES
Office : [phone]     Fax :[phone]       Nephrology progress Note      Patient's Name: Raheem Finch  9:10 AM  10/26/2022    Reason for Consult:  CKD 3a      History of Present Ilness:    Raheem Finch is a 81 y.o. male with h/o CKD.,  HTN , CAD who has LHC yesterday and had multivessel CAD. Plan for CABG tomorrow     Denies any chest pain at this time   BP is stable   No SOB   No peripheral edema noted     Interval hx    S/p CABG    Making good urine volume   Creatinine level 2.0 ---> 1.9     CXR shows atelectasis     Good UOP       I/O last 3 completed shifts: In: 960 [P.O.:960]  Out: 9049 [Urine:3265; Chest Tube:10]    Past Medical History:   Diagnosis Date    Chronic renal insufficiency     Due to chronic nephrolithiasis    Colloid cyst of third ventricle (HCC)     Coronary artery disease     DDD (degenerative disc disease), lumbar 2006    Disc bulge and facet arthropathy at L3-L4, L5-S1    Diabetes mellitus, type 2 ([address]     Diverticulitis of colon with perforation 11/07    Emergency colostomy    ED (erectile dysfunction)     Hyperlipidemia     Hypertension     Hypertensive retinopathy of both eyes 1/24/2013    Nephrolithiasis        Past Surgical History:   Procedure Laterality Date    BRAIN SURGERY  12/13/2007    Resection of colloid cyst of 3rd ventricle    CARDIAC CATHETERIZATION  5/02, 12/03,  3/08    COLOSTOMY  11/07    Emergent- due to diverticulitis with perforation    CORONARY ANGIOPLASTY  1995    RCA- unsuccessful    CORONARY ANGIOPLASTY WITH STENT PLACEMENT  10/05    Obtuse marginal branch of circumflex:  drug-eluting stent    CORONARY ARTERY BYPASS GRAFT N/A 10/21/2022    CABG X 3, LIMA  GRAFT TO LAD, VEIN GRAFT TO DISTAL RCA  AND OM, EVH LEFT SAPHENOUS VEIN.  LIGATION KELLY WITH 35 MM ATRICLIP, TIMUR, TCPB, DOPPLER BYPASS GRAFT FLOW VERIFICATION, EPIAORTIC ECHOCARDIOGRAM, INSERTION OF VENTRICULAR AND ATRIAL PACING WIRES performed by James Sutton MD at [facility]  2000, 2005    Right    LITHOTRIPSY  1998    Bilateral    LITHOTRIPSY  1994    Bilateral with right stent placement    PARTIAL NEPHRECTOMY  1980    Right    REVISION COLOSTOMY  3/08    Colostomy takedown with sigmoid colectomy and coloproctostomy anastomosis    TOTAL KNEE ARTHROPLASTY Left 4/02/32    UMBILICAL HERNIA REPAIR         Current Medications:    insulin lispro (HUMALOG) injection vial 5 Units, TID WC  0.9 % sodium chloride infusion, PRN  insulin glargine (LANTUS) injection vial 10 Units, BID  heparin (porcine) injection 5,000 Units, 3 times per day  famotidine (PEPCID) tablet 20 mg, Daily  polyethylene glycol (GLYCOLAX) packet 17 g, Daily  clopidogrel (PLAVIX) tablet 75 mg, Daily  bisacodyl (DULCOLAX) suppository 10 mg, Daily PRN  aspirin chewable tablet 81 mg, Daily  traMADol (ULTRAM) tablet 50 mg, Q4H PRN  insulin lispro (HUMALOG) injection vial 0-16 Units, TID WC  insulin lispro (HUMALOG) injection vial 0-4 Units, Nightly  metoprolol tartrate (LOPRESSOR) tablet 6.25 mg, BID  docusate sodium (COLACE) capsule 100 mg, BID  senna (SENOKOT) tablet 8.6 mg, BID  amiodarone (CORDARONE) tablet 400 mg, BID   Followed by  Deana Holloway ON 10/28/2022] amiodarone (CORDARONE) tablet 200 mg, Daily  furosemide (LASIX) injection 20 mg, 3 times per day  levalbuterol (XOPENEX) nebulizer solution 1.25 mg, BID  levalbuterol (XOPENEX) nebulizer solution 1.25 mg, Q4H PRN  traZODone (DESYREL) tablet 50 mg, Nightly  acetaminophen (TYLENOL) tablet 650 mg, Q6H  ALPRAZolam (XANAX) tablet 0.25 mg, Q6H PRN  gabapentin (NEURONTIN) capsule 300 mg, TID  sodium chloride flush 0.9 % injection 5-40 mL, 2 times per day  sodium chloride flush 0.9 % injection 5-40 mL, PRN  0.9 % sodium chloride infusion, PRN  ondansetron (ZOFRAN-ODT) disintegrating tablet 4 mg, Q8H PRN   Or  ondansetron (ZOFRAN) injection 4 mg, Q6H PRN  potassium chloride (KLOR-CON) extended release tablet 10 mEq, TID WC  diphenhydrAMINE (BENADRYL) tablet 25 mg, Nightly PRN  potassium chloride 20 mEq/50 mL IVPB (Central Line), PRN  dextrose bolus 10% 125 mL, PRN   Or  dextrose bolus 10% 250 mL, PRN  glucagon (rDNA) injection 1 mg, PRN  dextrose 10 % infusion, Continuous PRN  sodium chloride flush 0.9 % injection 5-40 mL, PRN  rosuvastatin (CRESTOR) tablet 20 mg, Daily  sertraline (ZOLOFT) tablet 100 mg, Daily  mometasone-formoterol (DULERA) 200-5 MCG/ACT inhaler 2 puff, BID  tiotropium (SPIRIVA RESPIMAT) 2.5 MCG/ACT inhaler 2 puff, Daily  mirtazapine (REMERON) tablet 15 mg, Nightly        Physical exam:     Vitals:  /61   Pulse 60   Temp 98 °F (36.7 °C) (Temporal)   Resp 20   Ht 6' (1.829 m)   Wt 177 lb 0.5 oz (80.3 kg)   SpO2 96%   BMI 24.01 kg/m²   Constitutional:  OAA X3 NAD  Skin: no rash, turgor wnl  Heent:  eomi, mmm  Neck: no bruits or jvd noted  Cardiovascular:  S1, S2 without m/r/g  Respiratory: CTA B without w/r/r  Abdomen:  +bs, soft, nt, nd  Ext: no  lower extremity edema      Labs:  CBC:   Recent Labs     10/24/22  0405 10/25/22  0510 10/26/22  0502   WBC 16.1* 13.1* 8.3   HGB 8.1* 8.1* 7.6*    176 173     BMP:    Recent Labs     10/24/22  0405 10/25/22  0510 10/26/22  0520   * 135* 135*   K 5.0 5.0 4.8   CL 97* 98* 98*   CO2 29 27 27   BUN 29* 36* 37*   CREATININE 1.9* 2.0* 1.9*   GLUCOSE 197* 143* 130*     Ca/Mg/Phos:   Recent Labs     10/24/22  0405 10/25/22  0510 10/26/22  0520   CALCIUM 8.3 8.3 8.3     Hepatic:   No results for input(s): AST, ALT, ALB, BILITOT, ALKPHOS in the last 72 hours. Troponin: No results for input(s): TROPONINI in the last 72 hours. BNP: No results for input(s): BNP in the last 72 hours. Lipids:   No results for input(s): CHOL, TRIG, HDL, LDLCALC, LABVLDL in the last 72 hours.     ABGs:   No results for input(s): PHART, PO2ART, TCJ5BQI in the last 72 hours. INR:   No results for input(s): INR in the last 72 hours. IMAGING:  XR CHEST PORTABLE   Final Result   Bibasilar atelectasis or airspace disease, slightly increased as compared to   prior. Suspected small pleural effusions. No pneumothorax. XR CHEST PORTABLE   Final Result   Persistent bibasilar atelectasis or airspace disease and small pleural   effusions, similar to prior. XR CHEST PORTABLE   Final Result   1. Sequela from CABG including median sternotomy and clips about the heart. 2.  Endotracheal tube is in place, tip at a level between that of the   clavicular heads and aortic knob. 3.  NG tube extends below the left hemidiaphragm, into the upper abdomen, tip   overlying the expected level of the stomach, left upper quadrant. 4. Right IJ Conover-Jemima catheter via introducer sheath has its tip overlying   the right pulmonary artery level. A 2nd right IJ CVC is demonstrated, tip at   the superior cavoatrial junction level. 5. Left side and right side pleural catheters appear unremarkable. 6. No pneumomediastinum or pneumothorax. 7. Minimal atelectasis at the lung bases and possible small volume left   pleural effusion. 8. No fracture evident. VL DUP CAROTID BILATERAL   Final Result      VL PRE OP VEIN MAPPING   Final Result      XR CHEST PORTABLE   Final Result   No acute cardiopulmonary disease. Assessment/Plan :      1.  CKD 3 A   Creatinine level increased to 2.0 -->2.1 ---> 1.9 --> 2.0 -->1.9   Good uOP   No edema noted   No SOB     Lasix iv . Change to 20 mg iv BID         2. HTN. Better controlled today     3. Anemia. Monitor   Hb dropped since admission   Transfuse if HB < 7.0     4. MVD . S/p CABG     Valsartan has been held   Volume status is good.        Monitor renal function closely   Encourage incentive spirometry   D/w primary team  Recommend to dose adjust all medications  based on renal functions  Maintain SBP> 90 mmHg   Daily weights   AVOID NSAIDs  Avoid Nephrotoxins  Monitor Intake/Output  Call if significant decrease in urine output      Thank you for allowing us to participate in care of Gena Ceron         Electronically signed by: Terrell Bonilla MD, 10/26/2022, 9:10 AM      [facility] [address]  Office : [phone]  Fax :[phone]

## 2022-10-26 NOTE — PROGRESS NOTES
10/26/22 0755   Oxygen Therapy/Pulse Ox   O2 Device High flow nasal cannula   O2 Flow Rate (L/min) 10 L/min  (WEANED TO 8L)   Heart Rate 65   SpO2 96 %

## 2022-10-26 NOTE — CONSULTS
[facility] Pulmonary and Critical Care   Consult Note      Reason for Consult: Shortness of breath and persistent hypoxia  Requesting Physician: Trenton Castro    Subjective:   CHIEF COMPLAINT: Shortness of breath and wheezing     HPI: Patient was hospitalized for abnormal stress test-cardiac cath revealed multivessel disease, status post CABG x3, 10/21. Chest tube was removed yesterday. However patient has been complaining of worsening shortness of breath and cough. Visibly appears to be in some respiratory distress and has audible wheeze. Patient states that the cough is nonproductive, but feels very congested. Currently requiring 10 L O2. pulmonary consultation has been requested by CTS for evaluation of COPD. History of COPD-no obvious obstruction noted on recent PFT study. Follows with Dr. Abrahan Duron and albuterol inhaler. No home oxygen. Also has history of CKD stage III and hypertension. Former smoker quit in 1992.        The patient is a 81 y.o. male with significant past medical history of:      Diagnosis Date    Chronic renal insufficiency     Due to chronic nephrolithiasis    Colloid cyst of third ventricle (HCC)     Coronary artery disease     DDD (degenerative disc disease), lumbar 2006    Disc bulge and facet arthropathy at L3-L4, L5-S1    Diabetes mellitus, type 2 ([facility] [address]     Diverticulitis of colon with perforation 11/07    Emergency colostomy    ED (erectile dysfunction)     Hyperlipidemia     Hypertension     Hypertensive retinopathy of both eyes 1/24/2013    Nephrolithiasis         Past Surgical History:        Procedure Laterality Date    BRAIN SURGERY  12/13/2007    Resection of colloid cyst of 3rd ventricle    CARDIAC CATHETERIZATION  5/02, 12/03,  3/08    COLOSTOMY  11/07    Emergent- due to diverticulitis with perforation    CORONARY ANGIOPLASTY  1995    RCA- unsuccessful    CORONARY ANGIOPLASTY WITH STENT PLACEMENT  10/05    Obtuse marginal branch of circumflex:  drug-eluting stent CORONARY ARTERY BYPASS GRAFT N/A 10/21/2022    CABG X 3, LIMA  GRAFT TO LAD, VEIN GRAFT TO DISTAL RCA  AND OM, EVH LEFT SAPHENOUS VEIN.  LIGATION KELLY WITH 35 MM ATRICLIP, TIMRU, TCPB, DOPPLER BYPASS GRAFT FLOW VERIFICATION, EPIAORTIC ECHOCARDIOGRAM, INSERTION OF VENTRICULAR AND ATRIAL PACING WIRES performed by Trey White MD at [address]  2000, 2005    Right    LITHOTRIPSY  1998    Bilateral    LITHOTRIPSY  1994    Bilateral with right stent placement    PARTIAL NEPHRECTOMY  1980    Right    REVISION COLOSTOMY  3/08    Colostomy takedown with sigmoid colectomy and coloproctostomy anastomosis    TOTAL KNEE ARTHROPLASTY Left 9/29/76    UMBILICAL HERNIA REPAIR       Current Medications:    Current Facility-Administered Medications: insulin lispro (HUMALOG) injection vial 5 Units, 5 Units, SubCUTAneous, TID WC  0.9 % sodium chloride infusion, , IntraVENous, PRN  furosemide (LASIX) injection 20 mg, 20 mg, IntraVENous, BID  methylPREDNISolone sodium (SOLU-MEDROL) injection 40 mg, 40 mg, IntraVENous, Q8H  doxycycline (VIBRAMYCIN) 100 mg in dextrose 5 % 100 mL IVPB, 100 mg, IntraVENous, Q12H  insulin glargine (LANTUS) injection vial 10 Units, 10 Units, SubCUTAneous, BID  heparin (porcine) injection 5,000 Units, 5,000 Units, SubCUTAneous, 3 times per day  famotidine (PEPCID) tablet 20 mg, 20 mg, Oral, Daily **OR** [DISCONTINUED] famotidine (PEPCID) 20 mg in sodium chloride (PF) 10 mL injection, 20 mg, IntraVENous, Daily  polyethylene glycol (GLYCOLAX) packet 17 g, 17 g, Oral, Daily  clopidogrel (PLAVIX) tablet 75 mg, 75 mg, Oral, Daily  bisacodyl (DULCOLAX) suppository 10 mg, 10 mg, Rectal, Daily PRN  aspirin chewable tablet 81 mg, 81 mg, Oral, Daily  traMADol (ULTRAM) tablet 50 mg, 50 mg, Oral, Q4H PRN  insulin lispro (HUMALOG) injection vial 0-16 Units, 0-16 Units, SubCUTAneous, TID WC  insulin lispro (HUMALOG) injection vial 0-4 Units, 0-4 Units, SubCUTAneous, Nightly  metoprolol tartrate (LOPRESSOR) tablet 6.25 mg, 6.25 mg, Oral, BID  docusate sodium (COLACE) capsule 100 mg, 100 mg, Oral, BID  senna (SENOKOT) tablet 8.6 mg, 1 tablet, Oral, BID  amiodarone (CORDARONE) tablet 400 mg, 400 mg, Oral, BID **FOLLOWED BY** [START ON 10/28/2022] amiodarone (CORDARONE) tablet 200 mg, 200 mg, Oral, Daily  levalbuterol (XOPENEX) nebulizer solution 1.25 mg, 1.25 mg, Nebulization, BID  levalbuterol (XOPENEX) nebulizer solution 1.25 mg, 1.25 mg, Nebulization, Q4H PRN  traZODone (DESYREL) tablet 50 mg, 50 mg, Oral, Nightly  acetaminophen (TYLENOL) tablet 650 mg, 650 mg, Oral, Q6H  ALPRAZolam (XANAX) tablet 0.25 mg, 0.25 mg, Oral, Q6H PRN  gabapentin (NEURONTIN) capsule 300 mg, 300 mg, Oral, TID  sodium chloride flush 0.9 % injection 5-40 mL, 5-40 mL, IntraVENous, 2 times per day  sodium chloride flush 0.9 % injection 5-40 mL, 5-40 mL, IntraVENous, PRN  0.9 % sodium chloride infusion, , IntraVENous, PRN  ondansetron (ZOFRAN-ODT) disintegrating tablet 4 mg, 4 mg, Oral, Q8H PRN **OR** ondansetron (ZOFRAN) injection 4 mg, 4 mg, IntraVENous, Q6H PRN  potassium chloride (KLOR-CON) extended release tablet 10 mEq, 10 mEq, Oral, TID WC  diphenhydrAMINE (BENADRYL) tablet 25 mg, 25 mg, Oral, Nightly PRN  potassium chloride 20 mEq/50 mL IVPB (Central Line), 20 mEq, IntraVENous, PRN  dextrose bolus 10% 125 mL, 125 mL, IntraVENous, PRN **OR** dextrose bolus 10% 250 mL, 250 mL, IntraVENous, PRN  glucagon (rDNA) injection 1 mg, 1 mg, IntraMUSCular, PRN  dextrose 10 % infusion, , IntraVENous, Continuous PRN  sodium chloride flush 0.9 % injection 5-40 mL, 5-40 mL, IntraVENous, PRN  rosuvastatin (CRESTOR) tablet 20 mg, 20 mg, Oral, Daily  sertraline (ZOLOFT) tablet 100 mg, 100 mg, Oral, Daily  mometasone-formoterol (DULERA) 200-5 MCG/ACT inhaler 2 puff, 2 puff, Inhalation, BID  tiotropium (SPIRIVA RESPIMAT) 2.5 MCG/ACT inhaler 2 puff, 2 puff, Inhalation, Daily  mirtazapine (REMERON) tablet 15 mg, 15 mg, Oral, Nightly    Allergies Allergen Reactions    Dilaudid [Hydromorphone Hcl] Other (See Comments)     Mental status changes    Nubain [Nalbuphine Hcl] Rash       Social History:    TOBACCO:   reports that he quit smoking about 30 years ago. His smoking use included cigarettes. He has a 20.00 pack-year smoking history. He has never used smokeless tobacco.  ETOH:   reports no history of alcohol use. Patient currently lives independently    Family History:   History reviewed. No pertinent family history.     REVIEW OF SYSTEMS:    Constitutional: negative for fatigue, fevers, malaise and weight loss  Ears, nose, mouth, throat: negative for ear drainage, epistaxis, hoarseness, nasal congestion, sore throat and voice change  Respiratory: negative except for cough, dyspnea on exertion, shortness of breath, and wheezing  Cardiovascular: negative for chest pain, chest pressure/discomfort, irregular heart beat, lower extremity edema and palpitations  Gastrointestinal: negative for abdominal pain, constipation, diarrhea, jaundice, melena, odynophagia, reflux symptoms and vomiting  Hematologic/lymphatic: negative for bleeding, easy bruising, lymphadenopathy and petechiae  Musculoskeletal:negative for arthralgias, bone pain, muscle weakness, neck pain and stiff joints  Neurological: negative for dizziness, gait problems, headaches, seizures, speech problems, tremors and weakness  Behavioral/Psych: negative for anxiety, behavior problems, depression, fatigue and sleep disturbance  Endocrine: negative for diabetic symptoms including none, neuropathy, polyphagia, polyuria, polydipsia, vomiting and diarrhea and temperature intolerance  Allergic/Immunologic: negative for anaphylaxis, angioedema, hay fever and urticaria      Objective:   Patient Vitals for the past 8 hrs:   BP Temp Temp src Pulse Resp SpO2   10/26/22 1429 -- -- -- -- 20 --   10/26/22 1400 (!) 159/90 98 °F (36.7 °C) Temporal 75 22 90 %   10/26/22 1350 (!) 159/90 98 °F (36.7 °C) -- 75 22 90 % 10/26/22 1300 139/68 97.8 °F (36.6 °C) Temporal 66 20 92 %   10/26/22 1200 (!) 150/65 98 °F (36.7 °C) Temporal 68 20 92 %   10/26/22 1140 (!) 147/63 98 °F (36.7 °C) Temporal 74 22 90 %   10/26/22 0830 -- -- -- -- 20 --   10/26/22 0803 131/61 -- -- 60 -- --   10/26/22 0800 131/61 97.9 °F (36.6 °C) Temporal 59 20 93 %   10/26/22 0755 -- -- -- 65 -- 96 %     I/O last 3 completed shifts: In: 960 [P.O.:960]  Out: 2501 [Urine:3265; Chest Tube:10]  I/O this shift:   In: 0 [P.O.:240; Blood:350]  Out: 1250 [Urine:1250]    Physical Exam:  General Appearance: alert and oriented to person, place and time, well developed and well- nourished, in no acute distress  Skin: warm and dry, no rash or erythema  Head: normocephalic and atraumatic  Eyes: pupils equal, round, and reactive to light, extraocular eye movements intact, conjunctivae normal  ENT: external ear and ear canal normal bilaterally, nose without deformity, nasal mucosa and turbinates normal  Neck: supple and non-tender without mass, no cervical lymphadenopathy  Pulmonary/Chest: wheezing present-bilateral and rhonchi present-bilateral  Cardiovascular: normal rate, regular rhythm,  no murmurs, rubs, distal pulses intact, no carotid bruits  Abdomen: soft, non-tender, non-distended, normal bowel sounds, no masses or organomegaly  Lymph Nodes: Cervical, supraclavicular normal  Extremities: no cyanosis, clubbing or edema  Musculoskeletal: normal range of motion, no joint swelling, deformity or tenderness  Neurologic: alert, no focal neurologic deficits    Data Review:  CBC:   Lab Results   Component Value Date/Time    WBC 8.3 10/26/2022 05:02 AM    RBC 2.50 10/26/2022 05:02 AM     BMP:   Lab Results   Component Value Date/Time    GLUCOSE 130 10/26/2022 05:20 AM    CO2 27 10/26/2022 05:20 AM    BUN 37 10/26/2022 05:20 AM    CREATININE 1.9 10/26/2022 05:20 AM    CALCIUM 8.3 10/26/2022 05:20 AM     ABG:   Lab Results   Component Value Date/Time    FIR0TDE 24.4 10/21/2022 03:24 PM    BEART -1 10/21/2022 03:24 PM    Q5IDLADX 97 10/21/2022 03:24 PM    PHART 7.380 10/21/2022 03:24 PM    MJC1FUU 41.3 10/21/2022 03:24 PM    PO2ART 89.6 10/21/2022 03:24 PM    NYE5FKW 26 10/21/2022 03:24 PM       Radiology: All pertinent images / reports were reviewed as a part of this visit. Narrative   EXAMINATION:   ONE XRAY VIEW OF THE CHEST       10/26/2022 6:07 am       COMPARISON:   10/24/2022       HISTORY:   ORDERING SYSTEM PROVIDED HISTORY: post chest tube removal   TECHNOLOGIST PROVIDED HISTORY:   Reason for exam:->post chest tube removal   Reason for Exam: post chest tube removal       FINDINGS:   Cardiac leads project over the chest.  2 right internal jugular central   venous catheters are seen extending to expected location of superior vena   cava. Oxygen tubing is also demonstrated. Status post median sternotomy and   left atrial exclusion device. Left chest tube has been removed. Increased   bibasilar pulmonary opacity is seen. Blunting of the costophrenic angles. No gross pneumothorax. Cardiac and mediastinal silhouettes are unchanged. Impression   Bibasilar atelectasis or airspace disease, slightly increased as compared to   prior. Suspected small pleural effusions. No pneumothorax. Problem List:   CAD with MVD, status post CABG x3, 10/21  COPD with acute exacerbation  Moderate left pleural effusion    Assessment/Plan:     CAD with multivessel disease, status post CABG x3.  Chest tubes were removed yesterday. Limited bedside ultrasound was performed which shows a moderate left pleural effusion, which is possibly postsurgical.  Chest x-ray performed today was also reviewed. Infiltrative area on the left side perhaps represents an effusion. Patient currently appears to have COPD with an acute exacerbation-audibly wheezing and has a dry cough. O2 requirements are steadily going up, currently on 10 L.   We will introduce IV Solu-Medrol 40 mg every 8 hourly-already receiving Xopenex, Dulera 200 and Spiriva Respimat. Nonproductive cough, WBC 14.7> 8.3. Empiric doxycycline 100 mg twice daily. Patient is currently on Lasix 20 mg IV twice daily. Creatinine 1.9, urine output 2.1 L. Could consider thoracentesis if necessary, chest tube was only removed yesterday. We will evaluate patient's respiratory status tomorrow and decide. Currently patient has significant wheeze.     Incentive spirometry, PT    Pulmonary will follow    Arlet Bajwa MD No

## 2022-10-26 NOTE — PROGRESS NOTES
Cardiothoracic Surgery Progress Note    CC: coronary artery disease , s/p CABGx3, KELLY clip  POD# 5    Subjective:  Hemodynamically stable, on 8L high flow NC, afebrile. Alert and oriented X 3. Weight 80.3kg this am. Required 15L high flow when showering last night. Becomes very SOB with activity. 10/24 Was atrial paced overnight due to pauses  10/25: TPW, chest tubes, valdovinos removed    WT:80.3kg kg/77.6kg kg   pre-op 77.6 kg    Vital Signs:   /61   Pulse 60   Temp 97.9 °F (36.6 °C) (Temporal)   Resp 20   Ht 6' (1.829 m)   Wt 177 lb 0.5 oz (80.3 kg)   SpO2 93%   BMI 24.01 kg/m²     Vascath  IJ CVC      Physical Exam:   Cardiac: regular, no murmur. Lungs: clear to auscultation, decreased bases bilaterally  Abdomen:  no BM abdomen soft, non-distended  Vascular:  pulses all palpable   Extremities: generalized, non-pitting edema 1+  :  /650/950   Lasix 20mg IV q8h  Chest Tube(s) & Incision(s):    Sternum: stable,C/D/I  Edges approximated, no drainage  Chest tube site: chest tubes removed, site C/D/I Purse string intact   left leg incision: C/D/I  Edges well approximated, no drainage TE      Labs:   CBC:   Recent Labs     10/25/22  0510 10/26/22  0502   WBC 13.1* 8.3   HGB 8.1* 7.6*   HCT 25.9* 24.1*    173     BMP:   Recent Labs     10/25/22  0510 10/26/22  0520   K 5.0 4.8   CREATININE 2.0* 1.9*   CALCIUM 8.3 8.3     CXR:  10/26/22   Bibasilar atelectasis or airspace disease, slightly increased as compared to  prior. Suspected small pleural effusions. No pneumothorax.     Assessment/Plan:  As per CC:      S/P CABG  -chest tubes out  -TPW out  -asa, sub q heparin TID, Plavix  -Pepcid  -continue bowel regimen    COPD  -pulmonary toileting, out of bed, IS, acapella, wean O2, Ezpap  -consult pulmonology    A.fib  -amiodarone protocol  -BB with hold parameters     HTN  -BB has not been given d/t bradycardia     CKD  -nephrology following  -creat 2->1.9  -lasix 20mg TID  -K+ supplementation with hold parameters  -remove vascath if okay with nephrology     DM  - SSI and lantus  -carb control diet     HLD  -continue statin    Electronically signed by ANTHONY Camacho - CNP on 10/26/2022 at 9:53 AM   Note reviewed, events of last 24 hours reviewed along with vital signs and I/Os and patient examined. Assessment and plans discussed and are as outlined above.      Kelsie Cummins MD, FACS, [facility], FACCP, [place]

## 2022-10-26 NOTE — CARE COORDINATION
CM received call back from pt's spouse today and she will not be in to hospital until tomorrow evening. CM emailed hc agency and ratings list to her at : [email or web address] along with CM phone number for choices so referrals can be made.       Karli Smith RN, BSN  [phone]

## 2022-10-27 ENCOUNTER — TELEPHONE (OUTPATIENT)
Dept: INTERNAL MEDICINE CLINIC | Age: 72
End: 2022-10-27

## 2022-10-27 LAB
ANION GAP SERPL CALCULATED.3IONS-SCNC: 9 MMOL/L (ref 3–16)
ANISOCYTOSIS: ABNORMAL
BANDED NEUTROPHILS RELATIVE PERCENT: 3 % (ref 0–7)
BASOPHILS ABSOLUTE: 0.1 K/UL (ref 0–0.2)
BASOPHILS RELATIVE PERCENT: 1 %
BUN BLDV-MCNC: 38 MG/DL (ref 7–20)
CALCIUM SERPL-MCNC: 8.8 MG/DL (ref 8.3–10.6)
CHLORIDE BLD-SCNC: 98 MMOL/L (ref 99–110)
CO2: 30 MMOL/L (ref 21–32)
CREAT SERPL-MCNC: 1.5 MG/DL (ref 0.8–1.3)
EOSINOPHILS ABSOLUTE: 0 K/UL (ref 0–0.6)
EOSINOPHILS RELATIVE PERCENT: 0 %
GFR SERPL CREATININE-BSD FRML MDRD: 50 ML/MIN/{1.73_M2}
GLUCOSE BLD-MCNC: 184 MG/DL (ref 70–99)
GLUCOSE BLD-MCNC: 213 MG/DL (ref 70–99)
GLUCOSE BLD-MCNC: 232 MG/DL (ref 70–99)
GLUCOSE BLD-MCNC: 251 MG/DL (ref 70–99)
GLUCOSE BLD-MCNC: 257 MG/DL (ref 70–99)
HCT VFR BLD CALC: 28.3 % (ref 40.5–52.5)
HEMOGLOBIN: 9 G/DL (ref 13.5–17.5)
LYMPHOCYTES ABSOLUTE: 0.2 K/UL (ref 1–5.1)
LYMPHOCYTES RELATIVE PERCENT: 2 %
MCH RBC QN AUTO: 30.2 PG (ref 26–34)
MCHC RBC AUTO-ENTMCNC: 31.9 G/DL (ref 31–36)
MCV RBC AUTO: 94.6 FL (ref 80–100)
MONOCYTES ABSOLUTE: 0.2 K/UL (ref 0–1.3)
MONOCYTES RELATIVE PERCENT: 2 %
NEUTROPHILS ABSOLUTE: 9.1 K/UL (ref 1.7–7.7)
NEUTROPHILS RELATIVE PERCENT: 92 %
NUCLEATED RED BLOOD CELLS: 3 /100 WBC
PDW BLD-RTO: 19.2 % (ref 12.4–15.4)
PERFORMED ON: ABNORMAL
PLATELET # BLD: 188 K/UL (ref 135–450)
PMV BLD AUTO: 9.1 FL (ref 5–10.5)
POLYCHROMASIA: ABNORMAL
POTASSIUM REFLEX MAGNESIUM: 5.4 MMOL/L (ref 3.5–5.1)
RBC # BLD: 3 M/UL (ref 4.2–5.9)
SODIUM BLD-SCNC: 137 MMOL/L (ref 136–145)
WBC # BLD: 9.6 K/UL (ref 4–11)

## 2022-10-27 PROCEDURE — 6360000002 HC RX W HCPCS: Performed by: INTERNAL MEDICINE

## 2022-10-27 PROCEDURE — 6370000000 HC RX 637 (ALT 250 FOR IP): Performed by: NURSE PRACTITIONER

## 2022-10-27 PROCEDURE — 94640 AIRWAY INHALATION TREATMENT: CPT

## 2022-10-27 PROCEDURE — 85025 COMPLETE CBC W/AUTO DIFF WBC: CPT

## 2022-10-27 PROCEDURE — 6370000000 HC RX 637 (ALT 250 FOR IP): Performed by: INTERNAL MEDICINE

## 2022-10-27 PROCEDURE — 6370000000 HC RX 637 (ALT 250 FOR IP): Performed by: THORACIC SURGERY (CARDIOTHORACIC VASCULAR SURGERY)

## 2022-10-27 PROCEDURE — 92526 ORAL FUNCTION THERAPY: CPT

## 2022-10-27 PROCEDURE — APPNB45 APP NON BILLABLE 31-45 MINUTES

## 2022-10-27 PROCEDURE — 99233 SBSQ HOSP IP/OBS HIGH 50: CPT | Performed by: INTERNAL MEDICINE

## 2022-10-27 PROCEDURE — 2500000003 HC RX 250 WO HCPCS: Performed by: INTERNAL MEDICINE

## 2022-10-27 PROCEDURE — 36592 COLLECT BLOOD FROM PICC: CPT

## 2022-10-27 PROCEDURE — 2580000003 HC RX 258: Performed by: THORACIC SURGERY (CARDIOTHORACIC VASCULAR SURGERY)

## 2022-10-27 PROCEDURE — 2700000000 HC OXYGEN THERAPY PER DAY

## 2022-10-27 PROCEDURE — 94669 MECHANICAL CHEST WALL OSCILL: CPT

## 2022-10-27 PROCEDURE — 6360000002 HC RX W HCPCS: Performed by: HOSPITALIST

## 2022-10-27 PROCEDURE — 2140000000 HC CCU INTERMEDIATE R&B

## 2022-10-27 PROCEDURE — 6370000000 HC RX 637 (ALT 250 FOR IP)

## 2022-10-27 PROCEDURE — 6360000002 HC RX W HCPCS

## 2022-10-27 PROCEDURE — 94761 N-INVAS EAR/PLS OXIMETRY MLT: CPT

## 2022-10-27 PROCEDURE — 2580000003 HC RX 258: Performed by: INTERNAL MEDICINE

## 2022-10-27 PROCEDURE — 80048 BASIC METABOLIC PNL TOTAL CA: CPT

## 2022-10-27 RX ORDER — INSULIN GLARGINE 100 [IU]/ML
20 INJECTION, SOLUTION SUBCUTANEOUS 2 TIMES DAILY
Status: DISCONTINUED | OUTPATIENT
Start: 2022-10-27 | End: 2022-10-28

## 2022-10-27 RX ORDER — INSULIN GLARGINE 100 [IU]/ML
15 INJECTION, SOLUTION SUBCUTANEOUS 2 TIMES DAILY
Status: DISCONTINUED | OUTPATIENT
Start: 2022-10-27 | End: 2022-10-27

## 2022-10-27 RX ORDER — INSULIN LISPRO 100 [IU]/ML
8 INJECTION, SOLUTION INTRAVENOUS; SUBCUTANEOUS
Status: DISCONTINUED | OUTPATIENT
Start: 2022-10-27 | End: 2022-10-31

## 2022-10-27 RX ORDER — INSULIN LISPRO 100 [IU]/ML
8 INJECTION, SOLUTION INTRAVENOUS; SUBCUTANEOUS ONCE
Status: COMPLETED | OUTPATIENT
Start: 2022-10-27 | End: 2022-10-27

## 2022-10-27 RX ADMIN — AMIODARONE HYDROCHLORIDE 400 MG: 200 TABLET ORAL at 07:58

## 2022-10-27 RX ADMIN — GABAPENTIN 300 MG: 300 CAPSULE ORAL at 20:36

## 2022-10-27 RX ADMIN — METHYLPREDNISOLONE SODIUM SUCCINATE 40 MG: 40 INJECTION, POWDER, FOR SOLUTION INTRAMUSCULAR; INTRAVENOUS at 16:28

## 2022-10-27 RX ADMIN — MIRTAZAPINE 15 MG: 15 TABLET, FILM COATED ORAL at 20:36

## 2022-10-27 RX ADMIN — INSULIN LISPRO 8 UNITS: 100 INJECTION, SOLUTION INTRAVENOUS; SUBCUTANEOUS at 11:51

## 2022-10-27 RX ADMIN — FUROSEMIDE 20 MG: 10 INJECTION, SOLUTION INTRAMUSCULAR; INTRAVENOUS at 17:10

## 2022-10-27 RX ADMIN — Medication 2 PUFF: at 08:25

## 2022-10-27 RX ADMIN — LEVALBUTEROL HYDROCHLORIDE 1.25 MG: 1.25 SOLUTION RESPIRATORY (INHALATION) at 02:07

## 2022-10-27 RX ADMIN — INSULIN GLARGINE 20 UNITS: 100 INJECTION, SOLUTION SUBCUTANEOUS at 20:37

## 2022-10-27 RX ADMIN — ACETAMINOPHEN 650 MG: 325 TABLET ORAL at 20:36

## 2022-10-27 RX ADMIN — HEPARIN SODIUM 5000 UNITS: 5000 INJECTION INTRAVENOUS; SUBCUTANEOUS at 06:20

## 2022-10-27 RX ADMIN — CLOPIDOGREL BISULFATE 75 MG: 75 TABLET ORAL at 07:57

## 2022-10-27 RX ADMIN — AMIODARONE HYDROCHLORIDE 400 MG: 200 TABLET ORAL at 20:36

## 2022-10-27 RX ADMIN — Medication 10 ML: at 20:37

## 2022-10-27 RX ADMIN — TIOTROPIUM BROMIDE INHALATION SPRAY 2 PUFF: 3.12 SPRAY, METERED RESPIRATORY (INHALATION) at 08:25

## 2022-10-27 RX ADMIN — SERTRALINE 100 MG: 50 TABLET, FILM COATED ORAL at 07:58

## 2022-10-27 RX ADMIN — ACETAMINOPHEN 650 MG: 325 TABLET ORAL at 07:57

## 2022-10-27 RX ADMIN — INSULIN LISPRO 8 UNITS: 100 INJECTION, SOLUTION INTRAVENOUS; SUBCUTANEOUS at 17:10

## 2022-10-27 RX ADMIN — ACETAMINOPHEN 650 MG: 325 TABLET ORAL at 14:36

## 2022-10-27 RX ADMIN — INSULIN LISPRO 8 UNITS: 100 INJECTION, SOLUTION INTRAVENOUS; SUBCUTANEOUS at 09:09

## 2022-10-27 RX ADMIN — GABAPENTIN 300 MG: 300 CAPSULE ORAL at 07:58

## 2022-10-27 RX ADMIN — SENNOSIDES 8.6 MG: 8.6 TABLET, FILM COATED ORAL at 07:58

## 2022-10-27 RX ADMIN — TRAMADOL HYDROCHLORIDE 50 MG: 50 TABLET, COATED ORAL at 16:38

## 2022-10-27 RX ADMIN — TRAMADOL HYDROCHLORIDE 50 MG: 50 TABLET, COATED ORAL at 01:42

## 2022-10-27 RX ADMIN — DOXYCYCLINE 100 MG: 100 INJECTION, POWDER, LYOPHILIZED, FOR SOLUTION INTRAVENOUS at 03:50

## 2022-10-27 RX ADMIN — GABAPENTIN 300 MG: 300 CAPSULE ORAL at 14:36

## 2022-10-27 RX ADMIN — FUROSEMIDE 20 MG: 10 INJECTION, SOLUTION INTRAMUSCULAR; INTRAVENOUS at 07:59

## 2022-10-27 RX ADMIN — Medication 2 PUFF: at 19:51

## 2022-10-27 RX ADMIN — ASPIRIN 81 MG 81 MG: 81 TABLET ORAL at 07:58

## 2022-10-27 RX ADMIN — METHYLPREDNISOLONE SODIUM SUCCINATE 40 MG: 40 INJECTION, POWDER, FOR SOLUTION INTRAMUSCULAR; INTRAVENOUS at 07:59

## 2022-10-27 RX ADMIN — ROSUVASTATIN CALCIUM 20 MG: 20 TABLET, FILM COATED ORAL at 07:57

## 2022-10-27 RX ADMIN — LEVALBUTEROL HYDROCHLORIDE 1.25 MG: 1.25 SOLUTION RESPIRATORY (INHALATION) at 19:49

## 2022-10-27 RX ADMIN — DOXYCYCLINE 100 MG: 100 INJECTION, POWDER, LYOPHILIZED, FOR SOLUTION INTRAVENOUS at 16:47

## 2022-10-27 RX ADMIN — FAMOTIDINE 20 MG: 20 TABLET ORAL at 07:57

## 2022-10-27 RX ADMIN — LEVALBUTEROL HYDROCHLORIDE 1.25 MG: 1.25 SOLUTION RESPIRATORY (INHALATION) at 08:25

## 2022-10-27 RX ADMIN — INSULIN GLARGINE 15 UNITS: 100 INJECTION, SOLUTION SUBCUTANEOUS at 09:09

## 2022-10-27 RX ADMIN — DOCUSATE SODIUM 100 MG: 100 CAPSULE, LIQUID FILLED ORAL at 07:57

## 2022-10-27 RX ADMIN — METHYLPREDNISOLONE SODIUM SUCCINATE 40 MG: 40 INJECTION, POWDER, FOR SOLUTION INTRAMUSCULAR; INTRAVENOUS at 01:42

## 2022-10-27 ASSESSMENT — PULMONARY FUNCTION TESTS: PEFR_L/MIN: 8

## 2022-10-27 ASSESSMENT — PAIN SCALES - GENERAL
PAINLEVEL_OUTOF10: 7
PAINLEVEL_OUTOF10: 0

## 2022-10-27 ASSESSMENT — PAIN DESCRIPTION - DESCRIPTORS: DESCRIPTORS: ACHING

## 2022-10-27 ASSESSMENT — PAIN DESCRIPTION - LOCATION: LOCATION: CHEST

## 2022-10-27 ASSESSMENT — PAIN DESCRIPTION - ORIENTATION: ORIENTATION: MID

## 2022-10-27 NOTE — PROGRESS NOTES
Hospitalist Progress Note      PCP: Emiliana Scott MD    Date of Admission: 10/19/2022    Chief Complaint: \"I had a bypass surgery\"    Hospital Course: 81 y.o. male on Jr White MD service who was admitted on 10/19/2022 for unstable angina and CAD. Patient with history of DM 2, HTN, CKD 3, COPD and hyperlipidemia. 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 Underwent Coronary bypass grafting surgery x3 with single  reverse saphenous vein graft to the obtuse marginal branch of the  circumflex, separate sequential reverse saphenous vein graft to the  distal right coronary artery, pedicle left internal artery to the LAD,  endoscopic vein harvest of the left greater saphenous vein, left atrial  appendage obliteration with 35-mm AtriCure left atrial clip, total  cardiopulmonary bypass, transesophageal echo, Doppler verification of  graft, epiaortic ultrasound, and vas cath placement on 10/21/22. Subjective:  Patient on 9 L HFNC. Still coughing and still denies SOB. No HA, dizziness, fevers or abdominal pain.        Medications:  Reviewed    Infusion Medications    sodium chloride      sodium chloride 5 mL/hr (10/26/22 1535)    dextrose       Scheduled Medications    insulin lispro  8 Units SubCUTAneous TID WC    insulin glargine  20 Units SubCUTAneous BID    furosemide  20 mg IntraVENous BID    methylPREDNISolone  40 mg IntraVENous Q8H    doxycycline (VIBRAMYCIN) IV  100 mg IntraVENous Q12H    [Held by provider] heparin (porcine)  5,000 Units SubCUTAneous 3 times per day    famotidine  20 mg Oral Daily    polyethylene glycol  17 g Oral Daily    clopidogrel  75 mg Oral Daily    aspirin  81 mg Oral Daily    insulin lispro  0-16 Units SubCUTAneous TID WC    insulin lispro  0-4 Units SubCUTAneous Nightly    docusate sodium  100 mg Oral BID    senna  1 tablet Oral BID    amiodarone  400 mg Oral BID    Followed by    Braeden Backbone ON 10/28/2022] amiodarone  200 mg Oral Daily    levalbuterol  1.25 mg Nebulization BID    traZODone  50 mg Oral Nightly    acetaminophen  650 mg Oral Q6H    gabapentin  300 mg Oral TID    sodium chloride flush  5-40 mL IntraVENous 2 times per day    rosuvastatin  20 mg Oral Daily    sertraline  100 mg Oral Daily    mometasone-formoterol  2 puff Inhalation BID    tiotropium  2 puff Inhalation Daily    mirtazapine  15 mg Oral Nightly     PRN Meds: sodium chloride, bisacodyl, traMADol, levalbuterol, ALPRAZolam, sodium chloride flush, sodium chloride, ondansetron **OR** ondansetron, diphenhydramine, potassium chloride, dextrose bolus **OR** dextrose bolus, glucagon (rDNA), dextrose, sodium chloride flush      Intake/Output Summary (Last 24 hours) at 10/27/2022 1717  Last data filed at 10/27/2022 1224  Gross per 24 hour   Intake 480 ml   Output 2400 ml   Net -1920 ml         Physical Exam Performed:    BP (!) 154/62   Pulse 62   Temp 97 °F (36.1 °C) (Temporal)   Resp 20   Ht 6' (1.829 m)   Wt 171 lb 1.2 oz (77.6 kg)   SpO2 98%   BMI 23.20 kg/m²     General appearance: Appears comfortable. Well nourished  Eyes: Sclera clear, pupils equal  ENT: Moist mucus membranes, no thrush  Neck: Trachea midline, symmetrical  Cardiovascular: Regular rhythm, normal S1, S2. No murmur, gallop, rub. No edema in  lower extremities  Respiratory: Bibasilar rales. BL wheezing present today. No rhonchi. Gastrointestinal: Abdomen soft, not tender, not distended, normal bowel sounds  Musculoskeletal: No cyanosis in digits, warm extremities  Neurologic: Grossly intact, no motor or speech deficits. Psychiatric: Normal affect. Alert and oriented to time, place and person.   Skin: Warm, dry, normal turgor, no rash      Labs:   Recent Labs     10/25/22  0510 10/26/22  0502 10/26/22  1420 10/27/22  0340   WBC 13.1* 8.3  --  9.6   HGB 8.1* 7.6* 9.1* 9.0*   HCT 25.9* 24.1* 29.4* 28.3*    173  --  188       Recent Labs     10/25/22  0510 10/26/22  0520 10/27/22  0341 * 135* 137   K 5.0 4.8 5.4*   CL 98* 98* 98*   CO2 27 27 30   BUN 36* 37* 38*   CREATININE 2.0* 1.9* 1.5*   CALCIUM 8.3 8.3 8.8       No results for input(s): AST, ALT, BILIDIR, BILITOT, ALKPHOS in the last 72 hours. No results for input(s): INR in the last 72 hours. No results for input(s): Tim Ebbs in the last 72 hours. Urinalysis:      Lab Results   Component Value Date/Time    NITRU Negative 10/19/2022 11:10 PM    WBCUA 0 04/25/2019 01:47 PM    RBCUA 4 04/25/2019 01:47 PM    BLOODU Negative 10/19/2022 11:10 PM    SPECGRAV 1.010 10/19/2022 11:10 PM    GLUCOSEU Negative 10/19/2022 11:10 PM       Radiology:  XR CHEST PORTABLE   Final Result   Bibasilar atelectasis or airspace disease, slightly increased as compared to   prior. Suspected small pleural effusions. No pneumothorax. XR CHEST PORTABLE   Final Result   Persistent bibasilar atelectasis or airspace disease and small pleural   effusions, similar to prior. XR CHEST PORTABLE   Final Result   1. Sequela from CABG including median sternotomy and clips about the heart. 2.  Endotracheal tube is in place, tip at a level between that of the   clavicular heads and aortic knob. 3.  NG tube extends below the left hemidiaphragm, into the upper abdomen, tip   overlying the expected level of the stomach, left upper quadrant. 4. Right IJ [place] catheter via introducer sheath has its tip overlying   the right pulmonary artery level. A 2nd right IJ CVC is demonstrated, tip at   the superior cavoatrial junction level. 5. Left side and right side pleural catheters appear unremarkable. 6. No pneumomediastinum or pneumothorax. 7. Minimal atelectasis at the lung bases and possible small volume left   pleural effusion. 8. No fracture evident. VL DUP CAROTID BILATERAL   Final Result      VL PRE OP VEIN MAPPING   Final Result      XR CHEST PORTABLE   Final Result   No acute cardiopulmonary disease. Assessment/Plan:    Active Hospital Problems    Diagnosis     Pleural effusion on left [J90]      Priority: Medium    S/P CABG x 3 [Z95.1]      Priority: Medium    Acute respiratory failure with hypoxia (HCC) [J96.01]      Priority: Medium    Moderate malnutrition (HCC) [E44.0]      Priority: Medium    Dyslipidemia [E78.5]      Priority: Medium    Tobacco abuse disorder [Z72.0]      Priority: Medium    Unstable angina (HCC) [I20.0]      Priority: Medium    Coronary artery disease involving native coronary artery of native heart with unstable angina pectoris ([facility] [address] [I25.110]      Priority: Medium    COPD, moderate ([facility] [address] [J44.9]      Priority: Medium    COPD with acute exacerbation ([facility] [address] [J44.1]     DM2 (diabetes mellitus, type 2) ([facility] [address] [E11.9]     Essential hypertension [I10]     Chronic kidney disease, stage III (moderate) (HCC) [N18.30]      Increase Lantus 20 U bid  Increase Humalog 8 U with meals  Continue Lasix 20 mg IV q12h  Started on Solumedrol 40 mg IV q8h  Continue Amio, ASA, Crestor  Continue Dulera and Xopenex  Wean O2 as tolerated  Arixtra for DVT ppx  On Doxy IV for AECOPD      DVT Prophylaxis: Arixtra  Diet: ADULT ORAL NUTRITION SUPPLEMENT; Breakfast, Lunch; Diabetic Oral Supplement  ADULT DIET; Regular; 5 carb choices (75 gm/meal); Low Fat/Low Chol/High Fiber/2 gm Na; 1800 ml  Code Status: Full Code  PT/OT Eval Status: Following    Dispo - Home with home PT/OT    Discussed with patient, CVU nursing and CM. Hypoxia likely from AECOPD.     Ny Zamarripa MD

## 2022-10-27 NOTE — CARE COORDINATION
CM spoke to Latosha Seth at [facility] [phone] and they can accept patient. CM called and updated pt's spouse.     Bubba Diez RN, BSN  [phone]

## 2022-10-27 NOTE — PROGRESS NOTES
Nutrition Note    RECOMMENDATIONS  PO Diet: Continue current diet  ONS: Continue current ONS    NUTRITION ASSESSMENT   Pt triggered for follow-up. Documented intakes >50% since last RD visit 10/25. Receiving Glucerna BID. Upon visiting this afternoon, pt reported appetite is improving but dislikes the food here. Accepting ONS and would like to keep receiving. RD will continue to monitor for adequate po intake and provide cardiac diet education prior to discharge. Nutrition Related Findings: -10.5L. K+ 5.4. Glucose 184, 257 today; receiving methylprednisolone. LBM 10/26, BS+, distention noted. No edema noted. Wounds: Multiple, Surgical Incision  Nutrition Education:  Education needed (needs cardiac nutrition education prior to discharge)   Nutrition Goals: PO intake 75% or greater, by next RD assessment     MALNUTRITION ASSESSMENT   Acute Illness  Malnutrition Status: Moderate malnutrition (as determined by initial RD assessment 10/25)     NUTRITION DIAGNOSIS   Increased nutrient needs related to increase demand for energy/nutrients as evidenced by other (comment) (s/p OHS)    CURRENT NUTRITION THERAPIES  ADULT ORAL NUTRITION SUPPLEMENT; Breakfast, Lunch; Diabetic Oral Supplement  ADULT DIET; Regular; 5 carb choices (75 gm/meal); Low Fat/Low Chol/High Fiber/2 gm Na; 1800 ml     PO Intake: 51-75%, %   PO Supplement Intake:Unable to assess (but accepting)    ANTHROPOMETRICS  Current Height: 6' (182.9 cm)  Current Weight: 171 lb 1.2 oz (77.6 kg)    Admission weight: 158 lb (71.7 kg) (bed wt)  Ideal Body Weight (IBW): 178 lbs  (81 kg)    Usual Bodyweight 185 lb (83.9 kg)       BMI: 23.2    COMPARATIVE STANDARDS  Energy (kcal):  9777-2858     Protein (g):  108-144       Fluid (mL/day):  1800    The patient will be monitored per nutrition standards of care. Consult dietitian if additional nutrition interventions are needed prior to RD reassessment.      Melissa Kumari MS, RD, LD    Contact: [phone]

## 2022-10-27 NOTE — CARE COORDINATION
CM received a call from pt's spouse and based on ratings [facility]. [facility] also [facility] if [facility] cannot [facility]. JESUS called [facility] with Carlie Tierney [phone] who is covering for [facility] and left referral information and CM number. Jesus also sent referral over epic. Pt currently on 9 liters of oxygen. Possible thoracentesis today.     Edinson Santoro RN, BSN  [phone]

## 2022-10-27 NOTE — DISCHARGE INSTR - COC
Continuity of Care Form    Patient Name: Shemar Nair   :  1950  MRN:  [id number]    Admit date:  10/19/2022  Discharge date:  2022      Code Status Order: Full Code   Advance Directives:     Admitting Physician:  Linnette Sanchez MD  PCP: Rocio Lion MD    Discharging Nurse: Nba Sanders Unit/Room#: AJF-2946/0834-23  Discharging Unit Phone Number: [phone]    Emergency Contact:   Extended Emergency Contact Information  Primary Emergency Contact: Becka Wheatley  [address] [place] [place] [address] [facility] [address] Phone: [phone]  Work Phone: [phone]  Relation: Spouse  Secondary Emergency Contact: Adele Shin  Home Phone: [phone]  Relation: Child    Past Surgical History:  Past Surgical History:   Procedure Laterality Date    BRAIN SURGERY  2007    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  LIGATION KELLY WITH 35 MM ATRICLIP, TIMUR, TCPB, DOPPLER BYPASS GRAFT FLOW VERIFICATION, EPIAORTIC ECHOCARDIOGRAM, INSERTION OF VENTRICULAR AND ATRIAL PACING WIRES performed by Bud Rubio MD at [address] [facility]  2005    Right    LITHOTRIPSY      Bilateral    LITHOTRIPSY      Bilateral with right stent placement    PARTIAL NEPHRECTOMY      Right    REVISION COLOSTOMY  3/08    Colostomy takedown with sigmoid colectomy and coloproctostomy anastomosis    TOTAL KNEE ARTHROPLASTY Left     UMBILICAL HERNIA REPAIR         Immunization History:   Immunization History   Administered Date(s) Administered    COVID-19, PFIZER PURPLE top, DILUTE for use, (age 15 y+), 30mcg/0.3mL 02/22/2021, 03/15/2021, 12/13/2021    Influenza 11/30/2012, 10/25/2013    Influenza Virus Vaccine 11/13/2015    Influenza Whole 09/24/2011    Influenza, FLUAD, (age 72 y+), Adjuvanted, 0.5mL 10/05/2020, 10/05/2021    Influenza, FLUARIX, FLULAVAL, FLUZONE (age 10 mo+) AND AFLURIA, (age 1 y+), PF, 0.5mL 11/09/2016    Influenza, High Dose (Fluzone 65 yrs and older) 10/18/2017, 11/08/2018    Pneumococcal Conjugate 13-valent ([id number]) 06/09/2017    Pneumococcal Polysaccharide ([id number]) 11/13/2015, 10/29/2021    Td vaccine (adult) 08/29/2005    Td, unspecified formulation 08/29/2005    Tdap (Boostrix, Adacel) 11/02/2016, 04/22/2018    Zoster Live (Zostavax) 04/20/2015       Active Problems:  Patient Active Problem List   Diagnosis Code    Essential hypertension I10    Chronic kidney disease, stage III (moderate) ([facility]) N18.30    Major depression ([address] F32.9    Coronary artery disease I25.10    ED (erectile dysfunction) N52.9    DM2 (diabetes mellitus, type 2) ([address] E11.9    Hypertensive retinopathy of both eyes H35.033    Pulmonary nodule R91.1    COPD with acute exacerbation ([address] J44.1    DDD (degenerative disc disease), lumbar M51.36    Mixed hyperlipidemia E78.2    Primary insomnia F51.01    Centrilobular emphysema ([facility]) J43.2    Snoring R06.83    Iron deficiency anemia due to chronic blood loss D50.0    COPD, moderate ([facility]) J44.9    Unstable angina ([facility]) I20.0    Coronary artery disease involving native coronary artery of native heart with unstable angina pectoris ([facility]) I25.110    Dyslipidemia E78.5    Tobacco abuse disorder Z72.0    Moderate malnutrition ([facility]) E44.0    Pleural effusion on left J90    S/P CABG x 3 Z95.1    Acute respiratory failure with hypoxia ([facility]) J96.01       Isolation/Infection:   Isolation            No Isolation          Patient Infection Status       None to display            Nurse Assessment:  Last Vital Signs: /68   Pulse 69   Temp 97 °F (36.1 °C) (Temporal)   Resp 20   Ht 6' (1.829 m)   Wt 171 lb 1.2 oz (77.6 kg)   SpO2 (!) 70%   BMI 23.20 kg/m²     Last documented pain score (0-10 scale): Pain Level: 0  Last Weight:   Wt Readings from Last 1 Encounters:   10/27/22 171 lb 1.2 oz (77.6 kg)     Mental Status:  oriented, alert, coherent, logical, thought processes intact, and able to concentrate and follow conversation    IV Access:  - None    Nursing Mobility/ADLs:  Walking   Independent  Transfer  Independent  [address]  Med Delivery   whole    Wound Care Documentation and Therapy:  Incision Right (Active)   Number of days:        Incision Sternum (Active)   Dressing Status Other (Comment) 10/26/22 1945   Incision Cleansed Soap and water 10/25/22 2339   Dressing/Treatment Open to air 10/27/22 1200   Closure Open to air 10/26/22 1945   Margins Approximated 10/27/22 1200   Incision Assessment Dry 10/27/22 1200   Drainage Amount None 10/27/22 1200   Odor None 10/27/22 1200   Number of days:        Incision Left;Medial;Upper; Lower (Active)   Dressing Status Clean; Intact;Dry 10/25/22 1600   Incision Cleansed Soap and water 10/25/22 2339   Dressing/Treatment Open to air 10/27/22 1200   Incision Assessment Dry 10/27/22 1200   Drainage Amount None 10/27/22 1200   Odor None 10/27/22 1200   Number of days:         Elimination:  Continence: Bowel: Yes  Bladder: Yes  Urinary Catheter: None   Colostomy/Ileostomy/Ileal Conduit: No       Date of Last BM: 11/1/2022    Intake/Output Summary (Last 24 hours) at 10/27/2022 1447  Last data filed at 10/27/2022 1224  Gross per 24 hour   Intake 720 ml   Output 2850 ml   Net -2130 ml     I/O last 3 completed shifts:   In: 0 [P.O.:960; Blood:350]  Out: 4450 [Urine:4450]    Safety Concerns:     None    Impairments/Disabilities:      None    Nutrition Therapy:  Current Nutrition Therapy:   - Oral Diet:  General    Routes of Feeding: Oral  Liquids: Thin Liquids  Daily Fluid Restriction: no  Last Modified Barium Swallow with Video (Video Swallowing Test): not done    Treatments at the Time of Hospital Discharge:   Respiratory Treatments: inhalers  Oxygen Therapy:  is on oxygen at 2 L/min per nasal cannula. Ventilator:    - No ventilator support    Rehab Therapies: Physical Therapy and Occupational Therapy  Weight Bearing Status/Restrictions: No weight bearing restrictions  Other Medical Equipment (for information only, NOT a DME order):  n/a    Other Treatments: n/a    Patient's personal belongings (please select all that are sent with patient):  Hearing Aides right    RN SIGNATURE:  Electronically signed by Sj Javier RN on 11/1/22 at 12:13 PM EDT    CASE MANAGEMENT/SOCIAL WORK SECTION    Inpatient Status Date: 10/19/2022    Readmission Risk Assessment Score:  Readmission Risk              Risk of Unplanned Readmission:  15           Discharging to Facility/ [address]  [address]  Phone: [phone]  Fax: [phone]     Dialysis Facility (if applicable)   Name:  Address:  Dialysis Schedule:  Phone:  Fax:    / signature: Vinnie Powell RN, BSN  [phone]     PHYSICIAN SECTION    Prognosis: Good    Condition at Discharge: Stable    Rehab Potential (if transferring to Rehab): Good    Recommended Labs or Other Treatments After Discharge: Home O2, Wound care, document in Activity Log    Physician Certification: I certify the above information and transfer of Ricky Cr  is necessary for the continuing treatment of the diagnosis listed and that he requires [address] for less 30 days.      Update Admission H&P: No change in H&P    PHYSICIAN SIGNATURE:  Electronically signed by ANTHONY Wells CNP on 10/28/22 at 5:42 PM EDT

## 2022-10-27 NOTE — PROGRESS NOTES
Cardiothoracic Surgery Progress Note    CC:  coronary artery disease , s/p CABGx3, KELLY clip  POD# 6    Subjective:  Hemodynamically stable, on 8L high flow NC, afebrile. Alert and oriented X 3. Weight 77.6kg this am. HR 50 this AM, will hold 6.25mg BB per parameters. Glucose increasing after IV steroids started yesterday. 10/24 Was atrial paced overnight due to pauses  10/25: TPW, chest tubes, valdovinos removed  10/26: O2 requirements between 8-10L, CXR revealed atelectasis and small pleural effusions. Pulmonology consulted, IV steroids and doxy initiated    WT:77.6 kg/80.3 kg   pre-op 77.6 kg    Vital Signs:   /66   Pulse 56   Temp 97 °F (36.1 °C) (Temporal)   Resp 20   Ht 6' (1.829 m)   Wt 171 lb 1.2 oz (77.6 kg)   SpO2 97%   BMI 23.20 kg/m²     IJ CVC  Gtts: None    Physical Exam:   Cardiac: Sinus bradycardia, no murmur.   Lungs: wheezing auscultated, productive cough, decreased bases bilaterally  Abdomen: abdomen soft, non-distended, BM 10/26  Vascular:  pulses all palpable   Extremities: generalized, non-pitting edema 1+  :  UOP 1250/1300/700  Lasix 20mg IV BID  Chest Tube(s) & Incision(s):    Sternum: stable,C/D/I  Edges approximated, no drainage  Chest tube site: chest tubes removed, site C/D/I Purse string intact   left leg incision: C/D/I  Edges well approximated, no drainage TE    Labs:   CBC:   Recent Labs     10/26/22  0502 10/26/22  1420 10/27/22  0340   WBC 8.3  --  9.6   HGB 7.6* 9.1* 9.0*   HCT 24.1* 29.4* 28.3*     --  188     BMP:   Recent Labs     10/26/22  0520 10/27/22  0340   K 4.8 5.4*   CREATININE 1.9* 1.5*   CALCIUM 8.3 8.8         Assessment/Plan:  As per CC:      S/P CABG  -chest tubes out  -TPW out  -81mg asa, sub q heparin TID, 75,g Plavix daily  -Pepcid  -continue bowel regimen     Acute Respiratory Insufficiency     -on 8L  -pulmonary toileting, out of bed, IS, acapella, wean O2, Ezpap  -pulmonology following     A.fib  -now sinus kennedi  -amiodarone protocol  -BB with hold parameters     HTN  -stop beta blocker due to ongoing profound bradycardia     CKD  -nephrology following  -creat 1.9->1.5  -lasix 20mg BID  -K+ supplementation with hold parameters  -remove vascath if okay with nephrology     DM  - glucose increasing after IV steroids  - increase SSI and lantus  -carb control diet     HLD  -continue statin      Electronically signed by ANTHONY Ryan - CNP on 10/27/2022 at 8:07 AM

## 2022-10-27 NOTE — PROGRESS NOTES
Facility/Department: [facility]  Speech Language Pathology   Dysphagia Treatment Note    Patient: Keon Parra   : 1950   MRN: [id number]      Evaluation Date: 10/27/2022      Admitting Dx: Atherosclerotic heart disease of native coronary artery without angina pectoris [I25.10]  Abnormal result of other cardiovascular function study [R94.39]  Chest pain, unspecified [R07.9]  Coronary artery disease involving native coronary artery of native heart with unstable angina pectoris ([facility] [I25.110]  Treatment Diagnosis: Oropharyngeal Dysphagia   Pain: Denies                                              Diet and Treatment Recommendations 10/27/2022:  Diet Solids Recommendation:  Dysphagia III Soft and bite sized  Liquid Consistency Recommendation: Thin liquids  Recommended form of Meds: Meds whole with water or Meds in puree         Compensatory strategies:   Upright as possible with all PO intake , No straws , Small bites/sips , Eat/feed slowly    Assessment of Texture Tolerance:  Diet level prior to treatment: Regular texture diet , Thin liquids   Tolerance of Current Diet Level: Other:  Per RN pt has been sleeping heavily throughout the morning. She held lunch until SLP arrived. Reported pt required increased oxygen support when up with therapy this morning but she has slowly weaned him back to 10-12 L. Impressions: Pt was positioned Upright in bed , awake and alert. Currently on  9L O2 via nasal cannula . Oxygen saturation 90-96% throughout session. Pt with congested weak coughing prior to intake. Trials of thin liquids and regular solids  were provided to assess swallow function. Pt unable to recall prior SLP recommendations however, complained about restrictions on current diet level stating he could not find enough to eat. With regular solids pt demonstrated minimally prolonged but effective mastication with adequate oral clearing. He preferred use of liquid wash to assist with clearing.  With thin liquids in isolation and following solids, pt was noted to take continuous drinks with suspected delayed swallow initiation and decreased breathe swallow coordination (ie., increased WOB, inhalation noted mid swallow and immediately post swallow). Pt appeared to benefit from use of small bites/sips and slow rate of intake. No immediate overt clinical s/s of aspiration/penetration were assessed however, pt demonstrates risk due to current respiratory state, COPD, increased O2 requirements and suspected fatigue. Extensive education provided; respiration and swallowing, increased risk for dysphagia/aspiration with COPD, energy conservation with meals and s/s of aspiration. Pt verbalized understanding however, appeared to become more fatigued as session progressed. RN provided education as well. At this time recommend advance to Regular texture diet  with Thin liquids, No straws , Meds whole with water or Meds in puree . If respiratory status declines, recommend NPO with further SLP assessment. Instrumental assessment may be beneficial pending ongoing follow up. Dysphagia Goals:   Pt will functionally tolerate recommended diet with no overt clinical s/s of aspiration (Ongoing 10/27/22)  Pt will demonstrate understanding of aspiration risk and precautions via education/demonstration with occasional prompting (Ongoing 10/27/22)  Pt will participate in instrumental swallow study (FEES or MBS) to further assess pharyngeal phase of swallow, assist with diet recommendations and to further direct plan of care (Ongoing 10/27/22)    Plan:   3-5 times per week during acute care stay. Patient/Family Education:  Provided education regarding role of SLP, recommendations and general speech pathology plan of care.    [x] Pt verbalized understanding and agreement   [] Pt requires ongoing learning   [] No evidence of comprehension     Discharge Recommendations:    Discharge recommendations to be determined pending ongoing follow-up during acute care stay    Treatment time:  Timed Code Treatment Minutes: 0  Total Treatment time:25    If patient discharges prior to next session this note will serve as a discharge summary.      Signature:  Yana Ceron [address]  Speech Language Pathologist

## 2022-10-27 NOTE — CONSULTS
Kyle Muniz  10/27/2022  [id number]    Rehab Brief Consult:    Patient is an appropriate ARU candidate functionally but unfortunately would not be approved by [facility] given his admission diagnoses. Would suggest SNF vs C pending therapy evaluations when nearing discharge. We will sign off. Thank you for the consultation.     Andrea Vásquez MD 10/27/2022 10:44 AM

## 2022-10-27 NOTE — PROGRESS NOTES
Office : [phone]     Fax :[phone]       Nephrology progress Note      Patient's Name: Marianela Conrad  8:24 AM  10/27/2022    Reason for Consult:  CKD 3a      History of Present Ilness:    Marianela Conrad is a 81 y.o. male with h/o CKD.,  HTN , CAD who has LHC yesterday and had multivessel CAD. Plan for CABG tomorrow     Denies any chest pain at this time   BP is stable   No SOB   No peripheral edema noted     Interval hx    S/p CABG    Making good urine volume   Creatinine level 2.0 ---> 1.9 ----> 1.5     CXR shows atelectasis     Good UOP       I/O last 3 completed shifts: In: [address] [P.O.:960; Blood:350]  Out: 12 [Urine:4450]    Past Medical History:   Diagnosis Date    Chronic renal insufficiency     Due to chronic nephrolithiasis    Colloid cyst of third ventricle (HCC)     Coronary artery disease     DDD (degenerative disc disease), lumbar 2006    Disc bulge and facet arthropathy at L3-L4, L5-S1    Diabetes mellitus, type 2 ([address]     Diverticulitis of colon with perforation 11/07    Emergency colostomy    ED (erectile dysfunction)     Hyperlipidemia     Hypertension     Hypertensive retinopathy of both eyes 1/24/2013    Nephrolithiasis        Past Surgical History:   Procedure Laterality Date    BRAIN SURGERY  12/13/2007    Resection of colloid cyst of 3rd ventricle    CARDIAC CATHETERIZATION  5/02, 12/03,  3/08    COLOSTOMY  11/07    Emergent- due to diverticulitis with perforation    CORONARY ANGIOPLASTY  1995    RCA- unsuccessful    CORONARY ANGIOPLASTY WITH STENT PLACEMENT  10/05    Obtuse marginal branch of circumflex:  drug-eluting stent    CORONARY ARTERY BYPASS GRAFT N/A 10/21/2022    CABG X 3, LIMA  GRAFT TO LAD, VEIN GRAFT TO DISTAL RCA  AND OM, EVH LEFT SAPHENOUS VEIN. LIGATION KELLY WITH 35 MM ATRICLIP, TIMUR, TCPB, DOPPLER BYPASS GRAFT FLOW VERIFICATION, EPIAORTIC ECHOCARDIOGRAM, INSERTION OF VENTRICULAR AND ATRIAL PACING WIRES performed by Dax Claire MD at [address]  2000, 2005    Right    LITHOTRIPSY  1998    Bilateral    LITHOTRIPSY  1994    Bilateral with right stent placement    PARTIAL NEPHRECTOMY  1980    Right    REVISION COLOSTOMY  3/08    Colostomy takedown with sigmoid colectomy and coloproctostomy anastomosis    TOTAL KNEE ARTHROPLASTY Left 6/07/54    UMBILICAL HERNIA REPAIR         Current Medications:    insulin glargine (LANTUS) injection vial 15 Units, BID  insulin lispro (HUMALOG) injection vial 8 Units, TID WC  0.9 % sodium chloride infusion, PRN  furosemide (LASIX) injection 20 mg, BID  methylPREDNISolone sodium (SOLU-MEDROL) injection 40 mg, Q8H  doxycycline (VIBRAMYCIN) 100 mg in dextrose 5 % 100 mL IVPB, Q12H  [Held by provider] heparin (porcine) injection 5,000 Units, 3 times per day  famotidine (PEPCID) tablet 20 mg, Daily  polyethylene glycol (GLYCOLAX) packet 17 g, Daily  clopidogrel (PLAVIX) tablet 75 mg, Daily  bisacodyl (DULCOLAX) suppository 10 mg, Daily PRN  aspirin chewable tablet 81 mg, Daily  traMADol (ULTRAM) tablet 50 mg, Q4H PRN  insulin lispro (HUMALOG) injection vial 0-16 Units, TID WC  insulin lispro (HUMALOG) injection vial 0-4 Units, Nightly  metoprolol tartrate (LOPRESSOR) tablet 6.25 mg, BID  docusate sodium (COLACE) capsule 100 mg, BID  senna (SENOKOT) tablet 8.6 mg, BID  amiodarone (CORDARONE) tablet 400 mg, BID   Followed by  Morgan Arthur ON 10/28/2022] amiodarone (CORDARONE) tablet 200 mg, Daily  levalbuterol (XOPENEX) nebulizer solution 1.25 mg, BID  levalbuterol (XOPENEX) nebulizer solution 1.25 mg, Q4H PRN  traZODone (DESYREL) tablet 50 mg, Nightly  acetaminophen (TYLENOL) tablet 650 mg, Q6H  ALPRAZolam (XANAX) tablet 0.25 mg, Q6H PRN  gabapentin (NEURONTIN) capsule 300 mg, TID  sodium chloride flush 0.9 % injection 5-40 mL, 2 times per day  sodium chloride flush 0.9 % injection 5-40 mL, PRN  0.9 % sodium chloride infusion, PRN  ondansetron (ZOFRAN-ODT) disintegrating tablet 4 mg, Q8H PRN   Or  ondansetron (ZOFRAN) injection 4 mg, Q6H PRN  potassium chloride (KLOR-CON) extended release tablet 10 mEq, TID WC  diphenhydrAMINE (BENADRYL) tablet 25 mg, Nightly PRN  potassium chloride 20 mEq/50 mL IVPB (Central Line), PRN  dextrose bolus 10% 125 mL, PRN   Or  dextrose bolus 10% 250 mL, PRN  glucagon (rDNA) injection 1 mg, PRN  dextrose 10 % infusion, Continuous PRN  sodium chloride flush 0.9 % injection 5-40 mL, PRN  rosuvastatin (CRESTOR) tablet 20 mg, Daily  sertraline (ZOLOFT) tablet 100 mg, Daily  mometasone-formoterol (DULERA) 200-5 MCG/ACT inhaler 2 puff, BID  tiotropium (SPIRIVA RESPIMAT) 2.5 MCG/ACT inhaler 2 puff, Daily  mirtazapine (REMERON) tablet 15 mg, Nightly        Physical exam:     Vitals:  /66   Pulse 56   Temp 97 °F (36.1 °C) (Temporal)   Resp 20   Ht 6' (1.829 m)   Wt 171 lb 1.2 oz (77.6 kg)   SpO2 97%   BMI 23.20 kg/m²   Constitutional:  OAA X3 NAD  Skin: no rash, turgor wnl  Heent:  eomi, mmm  Neck: no bruits or jvd noted  Cardiovascular:  S1, S2 without m/r/g  Respiratory: CTA B without w/r/r  Abdomen:  +bs, soft, nt, nd  Ext: no  lower extremity edema      Labs:  CBC:   Recent Labs     10/25/22  0510 10/26/22  0502 10/26/22  1420 10/27/22  0340   WBC 13.1* 8.3  --  9.6   HGB 8.1* 7.6* 9.1* 9.0*    173  --  188     BMP:    Recent Labs     10/25/22  0510 10/26/22  0520 10/27/22  0340   * 135* 137   K 5.0 4.8 5.4*   CL 98* 98* 98*   CO2 27 27 30   BUN 36* 37* 38*   CREATININE 2.0* 1.9* 1.5*   GLUCOSE 143* 130* 213*     Ca/Mg/Phos:   Recent Labs     10/25/22  0510 10/26/22  0520 10/27/22  0340   CALCIUM 8.3 8.3 8.8     Hepatic:   No results for input(s): AST, ALT, ALB, BILITOT, ALKPHOS in the last 72 hours.     Troponin: No results for input(s): TROPONINI in the last 72 hours.  BNP: No results for input(s): BNP in the last 72 hours. Lipids:   No results for input(s): CHOL, TRIG, HDL, LDLCALC, LABVLDL in the last 72 hours. ABGs:   No results for input(s): PHART, PO2ART, QUH7XIM in the last 72 hours. INR:   No results for input(s): INR in the last 72 hours. IMAGING:  XR CHEST PORTABLE   Final Result   Bibasilar atelectasis or airspace disease, slightly increased as compared to   prior. Suspected small pleural effusions. No pneumothorax. XR CHEST PORTABLE   Final Result   Persistent bibasilar atelectasis or airspace disease and small pleural   effusions, similar to prior. XR CHEST PORTABLE   Final Result   1. Sequela from CABG including median sternotomy and clips about the heart. 2.  Endotracheal tube is in place, tip at a level between that of the   clavicular heads and aortic knob. 3.  NG tube extends below the left hemidiaphragm, into the upper abdomen, tip   overlying the expected level of the stomach, left upper quadrant. 4. Right IJ [place]-Jemima catheter via introducer sheath has its tip overlying   the right pulmonary artery level. A 2nd right IJ CVC is demonstrated, tip at   the superior cavoatrial junction level. 5. Left side and right side pleural catheters appear unremarkable. 6. No pneumomediastinum or pneumothorax. 7. Minimal atelectasis at the lung bases and possible small volume left   pleural effusion. 8. No fracture evident. VL DUP CAROTID BILATERAL   Final Result      VL PRE OP VEIN MAPPING   Final Result      XR CHEST PORTABLE   Final Result   No acute cardiopulmonary disease. Assessment/Plan :      1.  CKD 3 A   Creatinine level increased to 2.0 -->2.1 ---> 1.9 --> 2.0 -->1.9 ---> 1.5   Good uOP   No edema noted   No SOB     Continue lasix iv         2. HTN. Better controlled today     3. Anemia. Monitor   Hb dropped since admission   Transfuse if HB < 7.0     4. MVD .  S/p CABG 5. Hyperkalemia   Will stop potassium supplements     Valsartan has been held   Volume status is good.        Monitor renal function closely   Encourage incentive spirometry   D/w primary team  Recommend to dose adjust all medications  based on renal functions  Maintain SBP> 90 mmHg   Daily weights   AVOID NSAIDs  Avoid Nephrotoxins  Monitor Intake/Output  Call if significant decrease in urine output      Thank you for allowing us to participate in care of Tacos Diez         Electronically signed by: Gamaliel Price MD, 10/27/2022, 8:24 AM      [facility] [address]  Office : [phone]  Fax :[phone]

## 2022-10-27 NOTE — TELEPHONE ENCOUNTER
Typically the surgical team arranges post-op home care, as they know best what services will be needed.

## 2022-10-27 NOTE — TELEPHONE ENCOUNTER
Janeen with [facility] is calling to request verbal confirmation from Dr. Vipin Stewart that she will sign for patient's home health orders. Home health will start once patient is discharged from the hospital. His discharge date is unknown at this time. Please contact her to provide at [phone].

## 2022-10-28 ENCOUNTER — APPOINTMENT (OUTPATIENT)
Dept: GENERAL RADIOLOGY | Age: 72
DRG: 233 | End: 2022-10-28
Attending: INTERNAL MEDICINE
Payer: MEDICARE

## 2022-10-28 LAB
ANION GAP SERPL CALCULATED.3IONS-SCNC: 13 MMOL/L (ref 3–16)
ANISOCYTOSIS: ABNORMAL
BASOPHILS ABSOLUTE: 0 K/UL (ref 0–0.2)
BASOPHILS RELATIVE PERCENT: 0 %
BUN BLDV-MCNC: 41 MG/DL (ref 7–20)
CALCIUM SERPL-MCNC: 9.3 MG/DL (ref 8.3–10.6)
CHLORIDE BLD-SCNC: 98 MMOL/L (ref 99–110)
CO2: 28 MMOL/L (ref 21–32)
CREAT SERPL-MCNC: 1.6 MG/DL (ref 0.8–1.3)
EOSINOPHILS ABSOLUTE: 0 K/UL (ref 0–0.6)
EOSINOPHILS RELATIVE PERCENT: 0 %
GFR SERPL CREATININE-BSD FRML MDRD: 46 ML/MIN/{1.73_M2}
GLUCOSE BLD-MCNC: 178 MG/DL (ref 70–99)
GLUCOSE BLD-MCNC: 265 MG/DL (ref 70–99)
GLUCOSE BLD-MCNC: 301 MG/DL (ref 70–99)
GLUCOSE BLD-MCNC: 325 MG/DL (ref 70–99)
GLUCOSE BLD-MCNC: 331 MG/DL (ref 70–99)
GLUCOSE BLD-MCNC: 88 MG/DL (ref 70–99)
HCT VFR BLD CALC: 31.2 % (ref 40.5–52.5)
HEMOGLOBIN: 9.7 G/DL (ref 13.5–17.5)
LYMPHOCYTES ABSOLUTE: 0.4 K/UL (ref 1–5.1)
LYMPHOCYTES RELATIVE PERCENT: 4 %
MCH RBC QN AUTO: 29.9 PG (ref 26–34)
MCHC RBC AUTO-ENTMCNC: 31.2 G/DL (ref 31–36)
MCV RBC AUTO: 95.9 FL (ref 80–100)
MONOCYTES ABSOLUTE: 0.3 K/UL (ref 0–1.3)
MONOCYTES RELATIVE PERCENT: 3 %
NEUTROPHILS ABSOLUTE: 8.4 K/UL (ref 1.7–7.7)
NEUTROPHILS RELATIVE PERCENT: 93 %
NUCLEATED RED BLOOD CELLS: 3 /100 WBC
OVALOCYTES: ABNORMAL
PDW BLD-RTO: 19.8 % (ref 12.4–15.4)
PERFORMED ON: ABNORMAL
PERFORMED ON: NORMAL
PLATELET # BLD: 243 K/UL (ref 135–450)
PLATELET SLIDE REVIEW: ADEQUATE
PMV BLD AUTO: 8.8 FL (ref 5–10.5)
POLYCHROMASIA: ABNORMAL
POTASSIUM REFLEX MAGNESIUM: 4.9 MMOL/L (ref 3.5–5.1)
RBC # BLD: 3.25 M/UL (ref 4.2–5.9)
SLIDE REVIEW: ABNORMAL
SODIUM BLD-SCNC: 139 MMOL/L (ref 136–145)
WBC # BLD: 9 K/UL (ref 4–11)

## 2022-10-28 PROCEDURE — 94669 MECHANICAL CHEST WALL OSCILL: CPT

## 2022-10-28 PROCEDURE — 6370000000 HC RX 637 (ALT 250 FOR IP)

## 2022-10-28 PROCEDURE — 6370000000 HC RX 637 (ALT 250 FOR IP): Performed by: INTERNAL MEDICINE

## 2022-10-28 PROCEDURE — 6370000000 HC RX 637 (ALT 250 FOR IP): Performed by: NURSE PRACTITIONER

## 2022-10-28 PROCEDURE — 85025 COMPLETE CBC W/AUTO DIFF WBC: CPT

## 2022-10-28 PROCEDURE — 97116 GAIT TRAINING THERAPY: CPT

## 2022-10-28 PROCEDURE — 6360000002 HC RX W HCPCS: Performed by: INTERNAL MEDICINE

## 2022-10-28 PROCEDURE — 6370000000 HC RX 637 (ALT 250 FOR IP): Performed by: THORACIC SURGERY (CARDIOTHORACIC VASCULAR SURGERY)

## 2022-10-28 PROCEDURE — 80048 BASIC METABOLIC PNL TOTAL CA: CPT

## 2022-10-28 PROCEDURE — 2580000003 HC RX 258: Performed by: THORACIC SURGERY (CARDIOTHORACIC VASCULAR SURGERY)

## 2022-10-28 PROCEDURE — 71045 X-RAY EXAM CHEST 1 VIEW: CPT

## 2022-10-28 PROCEDURE — 97535 SELF CARE MNGMENT TRAINING: CPT

## 2022-10-28 PROCEDURE — 2700000000 HC OXYGEN THERAPY PER DAY

## 2022-10-28 PROCEDURE — 36592 COLLECT BLOOD FROM PICC: CPT

## 2022-10-28 PROCEDURE — 2500000003 HC RX 250 WO HCPCS: Performed by: INTERNAL MEDICINE

## 2022-10-28 PROCEDURE — 99233 SBSQ HOSP IP/OBS HIGH 50: CPT | Performed by: INTERNAL MEDICINE

## 2022-10-28 PROCEDURE — 2140000000 HC CCU INTERMEDIATE R&B

## 2022-10-28 PROCEDURE — 94640 AIRWAY INHALATION TREATMENT: CPT

## 2022-10-28 PROCEDURE — 2580000003 HC RX 258: Performed by: INTERNAL MEDICINE

## 2022-10-28 PROCEDURE — 6360000002 HC RX W HCPCS: Performed by: HOSPITALIST

## 2022-10-28 PROCEDURE — 92526 ORAL FUNCTION THERAPY: CPT

## 2022-10-28 RX ORDER — ASPIRIN 81 MG/1
81 TABLET, CHEWABLE ORAL DAILY
Qty: 30 TABLET | Refills: 3 | Status: SHIPPED | OUTPATIENT
Start: 2022-10-29

## 2022-10-28 RX ORDER — ROSUVASTATIN CALCIUM 20 MG/1
40 TABLET, COATED ORAL DAILY
Qty: 90 TABLET | Refills: 1 | Status: SHIPPED | OUTPATIENT
Start: 2022-10-28

## 2022-10-28 RX ORDER — POTASSIUM CHLORIDE 750 MG/1
10 TABLET, EXTENDED RELEASE ORAL 2 TIMES DAILY
Qty: 20 TABLET | Refills: 0 | Status: SHIPPED | OUTPATIENT
Start: 2022-10-28 | End: 2022-11-01 | Stop reason: HOSPADM

## 2022-10-28 RX ORDER — CLOPIDOGREL BISULFATE 75 MG/1
75 TABLET ORAL DAILY
Qty: 30 TABLET | Refills: 3 | Status: SHIPPED | OUTPATIENT
Start: 2022-10-29

## 2022-10-28 RX ORDER — GUAIFENESIN 600 MG/1
600 TABLET, EXTENDED RELEASE ORAL 2 TIMES DAILY
Status: DISCONTINUED | OUTPATIENT
Start: 2022-10-28 | End: 2022-11-01 | Stop reason: HOSPADM

## 2022-10-28 RX ORDER — TRAMADOL HYDROCHLORIDE 50 MG/1
50 TABLET ORAL EVERY 4 HOURS PRN
Qty: 28 TABLET | Refills: 0 | Status: SHIPPED | OUTPATIENT
Start: 2022-10-28 | End: 2022-11-01 | Stop reason: SDUPTHER

## 2022-10-28 RX ORDER — AMIODARONE HYDROCHLORIDE 200 MG/1
200 TABLET ORAL DAILY
Qty: 14 TABLET | Refills: 0 | Status: SHIPPED | OUTPATIENT
Start: 2022-10-29 | End: 2022-11-18 | Stop reason: ALTCHOICE

## 2022-10-28 RX ORDER — INSULIN GLARGINE 100 [IU]/ML
25 INJECTION, SOLUTION SUBCUTANEOUS 2 TIMES DAILY
Status: DISCONTINUED | OUTPATIENT
Start: 2022-10-28 | End: 2022-10-31

## 2022-10-28 RX ORDER — FUROSEMIDE 20 MG/1
20 TABLET ORAL DAILY
Qty: 10 TABLET | Refills: 0 | Status: SHIPPED | OUTPATIENT
Start: 2022-10-28 | End: 2022-11-01 | Stop reason: HOSPADM

## 2022-10-28 RX ORDER — PSEUDOEPHEDRINE HCL 30 MG
100 TABLET ORAL 2 TIMES DAILY
Qty: 60 CAPSULE | Refills: 0 | Status: SHIPPED | OUTPATIENT
Start: 2022-10-28 | End: 2022-11-22

## 2022-10-28 RX ORDER — FUROSEMIDE 10 MG/ML
20 INJECTION INTRAMUSCULAR; INTRAVENOUS 3 TIMES DAILY
Status: DISCONTINUED | OUTPATIENT
Start: 2022-10-28 | End: 2022-10-31

## 2022-10-28 RX ORDER — GABAPENTIN 300 MG/1
300 CAPSULE ORAL 3 TIMES DAILY
Qty: 30 CAPSULE | Refills: 0 | Status: SHIPPED | OUTPATIENT
Start: 2022-10-28 | End: 2022-11-22

## 2022-10-28 RX ADMIN — ROSUVASTATIN CALCIUM 20 MG: 20 TABLET, FILM COATED ORAL at 08:10

## 2022-10-28 RX ADMIN — ACETAMINOPHEN 650 MG: 325 TABLET ORAL at 08:10

## 2022-10-28 RX ADMIN — SODIUM CHLORIDE, PRESERVATIVE FREE 10 ML: 5 INJECTION INTRAVENOUS at 05:34

## 2022-10-28 RX ADMIN — ACETAMINOPHEN 650 MG: 325 TABLET ORAL at 04:02

## 2022-10-28 RX ADMIN — INSULIN LISPRO 8 UNITS: 100 INJECTION, SOLUTION INTRAVENOUS; SUBCUTANEOUS at 17:12

## 2022-10-28 RX ADMIN — ACETAMINOPHEN 650 MG: 325 TABLET ORAL at 20:00

## 2022-10-28 RX ADMIN — DOCUSATE SODIUM 100 MG: 100 CAPSULE, LIQUID FILLED ORAL at 08:10

## 2022-10-28 RX ADMIN — GUAIFENESIN 600 MG: 600 TABLET, EXTENDED RELEASE ORAL at 20:00

## 2022-10-28 RX ADMIN — DOXYCYCLINE 100 MG: 100 INJECTION, POWDER, LYOPHILIZED, FOR SOLUTION INTRAVENOUS at 16:59

## 2022-10-28 RX ADMIN — AMIODARONE HYDROCHLORIDE 200 MG: 200 TABLET ORAL at 08:10

## 2022-10-28 RX ADMIN — SERTRALINE 100 MG: 50 TABLET, FILM COATED ORAL at 08:10

## 2022-10-28 RX ADMIN — TRAMADOL HYDROCHLORIDE 50 MG: 50 TABLET, COATED ORAL at 20:57

## 2022-10-28 RX ADMIN — TRAZODONE HYDROCHLORIDE 50 MG: 50 TABLET ORAL at 20:00

## 2022-10-28 RX ADMIN — ASPIRIN 81 MG 81 MG: 81 TABLET ORAL at 08:10

## 2022-10-28 RX ADMIN — CLOPIDOGREL BISULFATE 75 MG: 75 TABLET ORAL at 08:10

## 2022-10-28 RX ADMIN — INSULIN LISPRO 12 UNITS: 100 INJECTION, SOLUTION INTRAVENOUS; SUBCUTANEOUS at 11:49

## 2022-10-28 RX ADMIN — METHYLPREDNISOLONE SODIUM SUCCINATE 40 MG: 40 INJECTION, POWDER, FOR SOLUTION INTRAMUSCULAR; INTRAVENOUS at 08:10

## 2022-10-28 RX ADMIN — SENNOSIDES 8.6 MG: 8.6 TABLET, FILM COATED ORAL at 20:00

## 2022-10-28 RX ADMIN — LEVALBUTEROL HYDROCHLORIDE 1.25 MG: 1.25 SOLUTION RESPIRATORY (INHALATION) at 21:22

## 2022-10-28 RX ADMIN — Medication 2 PUFF: at 21:23

## 2022-10-28 RX ADMIN — INSULIN GLARGINE 20 UNITS: 100 INJECTION, SOLUTION SUBCUTANEOUS at 08:12

## 2022-10-28 RX ADMIN — ACETAMINOPHEN 650 MG: 325 TABLET ORAL at 14:01

## 2022-10-28 RX ADMIN — LEVALBUTEROL HYDROCHLORIDE 1.25 MG: 1.25 SOLUTION RESPIRATORY (INHALATION) at 08:16

## 2022-10-28 RX ADMIN — FUROSEMIDE 20 MG: 10 INJECTION, SOLUTION INTRAMUSCULAR; INTRAVENOUS at 08:10

## 2022-10-28 RX ADMIN — TRAZODONE HYDROCHLORIDE 50 MG: 50 TABLET ORAL at 00:45

## 2022-10-28 RX ADMIN — FUROSEMIDE 20 MG: 10 INJECTION, SOLUTION INTRAMUSCULAR; INTRAVENOUS at 20:01

## 2022-10-28 RX ADMIN — DOCUSATE SODIUM 100 MG: 100 CAPSULE, LIQUID FILLED ORAL at 20:00

## 2022-10-28 RX ADMIN — METHYLPREDNISOLONE SODIUM SUCCINATE 40 MG: 40 INJECTION, POWDER, FOR SOLUTION INTRAMUSCULAR; INTRAVENOUS at 18:49

## 2022-10-28 RX ADMIN — INSULIN GLARGINE 20 UNITS: 100 INJECTION, SOLUTION SUBCUTANEOUS at 19:58

## 2022-10-28 RX ADMIN — MIRTAZAPINE 15 MG: 15 TABLET, FILM COATED ORAL at 20:00

## 2022-10-28 RX ADMIN — GABAPENTIN 300 MG: 300 CAPSULE ORAL at 14:01

## 2022-10-28 RX ADMIN — Medication 10 ML: at 20:08

## 2022-10-28 RX ADMIN — SENNOSIDES 8.6 MG: 8.6 TABLET, FILM COATED ORAL at 08:10

## 2022-10-28 RX ADMIN — TIOTROPIUM BROMIDE INHALATION SPRAY 2 PUFF: 3.12 SPRAY, METERED RESPIRATORY (INHALATION) at 08:15

## 2022-10-28 RX ADMIN — FAMOTIDINE 20 MG: 20 TABLET ORAL at 08:10

## 2022-10-28 RX ADMIN — FUROSEMIDE 20 MG: 10 INJECTION, SOLUTION INTRAMUSCULAR; INTRAVENOUS at 14:27

## 2022-10-28 RX ADMIN — GABAPENTIN 300 MG: 300 CAPSULE ORAL at 08:10

## 2022-10-28 RX ADMIN — DOXYCYCLINE 100 MG: 100 INJECTION, POWDER, LYOPHILIZED, FOR SOLUTION INTRAVENOUS at 05:36

## 2022-10-28 RX ADMIN — GABAPENTIN 300 MG: 300 CAPSULE ORAL at 20:00

## 2022-10-28 RX ADMIN — INSULIN LISPRO 8 UNITS: 100 INJECTION, SOLUTION INTRAVENOUS; SUBCUTANEOUS at 08:19

## 2022-10-28 RX ADMIN — INSULIN LISPRO 8 UNITS: 100 INJECTION, SOLUTION INTRAVENOUS; SUBCUTANEOUS at 11:49

## 2022-10-28 RX ADMIN — METHYLPREDNISOLONE SODIUM SUCCINATE 40 MG: 40 INJECTION, POWDER, FOR SOLUTION INTRAMUSCULAR; INTRAVENOUS at 00:45

## 2022-10-28 RX ADMIN — Medication 2 PUFF: at 08:15

## 2022-10-28 ASSESSMENT — PAIN SCALES - GENERAL
PAINLEVEL_OUTOF10: 0
PAINLEVEL_OUTOF10: 0
PAINLEVEL_OUTOF10: 3
PAINLEVEL_OUTOF10: 6
PAINLEVEL_OUTOF10: 0
PAINLEVEL_OUTOF10: 8

## 2022-10-28 ASSESSMENT — PAIN DESCRIPTION - DESCRIPTORS: DESCRIPTORS: ACHING;THROBBING

## 2022-10-28 ASSESSMENT — PAIN DESCRIPTION - ORIENTATION
ORIENTATION: MID
ORIENTATION: ANTERIOR

## 2022-10-28 ASSESSMENT — PAIN DESCRIPTION - LOCATION
LOCATION: CHEST
LOCATION: HEAD

## 2022-10-28 NOTE — PROGRESS NOTES
Cardiothoracic Surgery Progress Note    CC: S/P CABGx3, KELLY  POD# 7    Subjective:  Hemodynamically stable, 3L O2 via nasal canula, afebrile. Alert and oriented X 3. Weight down this am. NSR. Vital Signs:   /64   Pulse 63   Temp 97.8 °F (36.6 °C) (Temporal)   Resp 18   Ht 6' (1.829 m)   Wt 128 lb 4.9 oz (58.2 kg)   SpO2 100%   BMI 17.40 kg/m²     Physical Exam:   Cardiac: regular, no murmur  Lungs: wheezing on ausculted, productive cough, decreased bases bilaterally   Abdomen:  + BM  Vascular:  pulses all palpable   Extremities: generalized, non-pitting edema 1+  :  /825/?    Lasix 20mg IV bid  Chest Tube(s) & Incision(s):    Sternum: stable,C/D/I   Edges approximated, no drainage  Chest tube site: C/D/I  Purse string intact   left leg incision: C/D/I  Edges well approximated, no drainage       Labs:   CBC:   Recent Labs     10/27/22  0340 10/28/22  0430   WBC 9.6 9.0   HGB 9.0* 9.7*   HCT 28.3* 31.2*    243     BMP:   Recent Labs     10/27/22  0340 10/28/22  0430   K 5.4* 4.9   CREATININE 1.5* 1.6*   CALCIUM 8.8 9.3       Assessment/Plan:  As per CC:     Plan:     S/P CABGx3, KELLY  - Aggressive IS, acapella, EZPAP  - Diuresis - strict I/O, fluid restriction   - Wean oxygen; pulmonology onboard  - Mucinex given bid to aid w/ productive cough  - Continue bowel regimen  - Ambulate    Post-op A-fib  - NSR  - Amiodarone protocol  - BB with hold parameters    CKD  - Nephrology onboard:  - Cr 1.5  - Lasix 20mg IV bid  - At time of discharge will d/c with Lasix 20mg PO bid x 7 days    DM  - SSI and lantus    HLD  - Continue statin    Disp:  Plan for home with home care this weekend      Electronically signed by Juan Antonio Rodrigues PA-C on 10/28/2022 at 9:34 AM

## 2022-10-28 NOTE — PROGRESS NOTES
[facility] Department   Phone: [phone]    Occupational Therapy    [] Initial Evaluation            [x] Daily Treatment Note         [] Discharge Summary      Patient: Julianne Ramirez   : 1950   MRN: [id number]   Date of Service:  10/28/2022    Admitting Diagnosis:  Coronary artery disease involving native coronary artery of native heart with unstable angina pectoris [facility])  Current Admission Summary: Pt at [facility] due to abnormal stress test. Pt had angiogram, followed by CABG on 10/20/22. Past Medical History:  has a past medical history of Chronic renal insufficiency, Colloid cyst of third ventricle (HCC), Coronary artery disease, DDD (degenerative disc disease), lumbar, Diabetes mellitus, type 2 ([address] Diverticulitis of colon with perforation, ED (erectile dysfunction), Hyperlipidemia, Hypertension, Hypertensive retinopathy of both eyes, and Nephrolithiasis. Past Surgical History:  has a past surgical history that includes Cardiac catheterization (, ,  3/08); Revision Colostomy (3/08); Lithotripsy (); Lithotripsy (); partial nephrectomy (); Coronary angioplasty with stent (10/05); Coronary angioplasty (); Umbilical hernia repair; Knee arthroscopy (, ); colostomy (); Total knee arthroplasty (Left, 14); brain surgery (2007); and Coronary artery bypass graft (N/A, 10/21/2022). Discharge Recommendations: Julianne Ramirez scored a 16/24 on the AM-PAC ADL Inpatient form. Current research shows that an AM-PAC score of 17 or less is typically not associated with a discharge to the patient's home setting. Based on the patient's AM-PAC score and their current ADL deficits, it is recommended that the patient have 5-7 sessions per week of Occupational Therapy at d/c to increase the patient's independence.   At this time, this patient demonstrates complex nursing, medical, and rehabilitative needs, and would benefit from intensive rehabilitation services upon discharge from the Inpatient setting. This patient demonstrates the ability to participate in and benefit from an intensive therapy program with a coordinated interdisciplinary team approach to foster frequent, structured, and documented communication among disciplines, who will work together to establish, prioritize, and achieve treatment goals. Please see assessment section for further patient specific details. If patient discharges prior to next session this note will serve as a discharge summary. Please see below for the latest assessment towards goals. DME Required For Discharge: DME to be determined pending patient progress    Precautions/Restrictions: high fall risk, cardiac  Weight Bearing Restrictions: no restrictions  [] Right Upper Extremity  [] Left Upper Extremity [] Right Lower Extremity  [] Left Lower Extremity     Required Braces/Orthotics: no braces required   [] Right  [] Left  Positional Restrictions:Sternal Precautions - No Pushing, Pulling, Lifting > 5 lbs    Pre-Admission Information   Lives With: spouse    Type of Home: house  Home Layout: one level, laundry in basement, doesn't have to go the basement  Home Access:  1 step to enter without rails   Bathroom Layout: walker accessible, tub/shower unit  Bathroom Equipment: grab bars in shower, bedside commode  Toilet Height: standard height  Home Equipment: rollator - 4 wheeled walker, reacher  Transfer Assistance: Independent without use of device  Ambulation Assistance:Independent without use of device  ADL Assistance: independent with all ADL's  IADL Assistance: requires assistance with cleaning, Wife does all IADLs; cleaning lady every 2 wks. Active :        [x] Yes  [] No  Hand Dominance: [] Left  [x] Right  Current Employment: retired.   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 Functional Mobility    Bed Mobility  Bed mobility not completed on this date. Comments: Pt in chair upon arrival and wanted to return to chair at end of session  Transfers  Sit to stand transfer:2 person assistance with min A and cues for use of pillow to follow sternal precautions when standing   Stand to sit transfer: minimal assistance with cues to use pillow to follow sternal precautions  Comments:   Functional Mobility:  Sitting Balance: stand by assistance. Standing Balance: minimal assistance.     Standing Balance Comment: CGA for static, min A for dynamic when pulling up pants  Functional Mobility Device Use: rollator (5FGB)  Functional Mobility Comment: CGA-Min A with rollator, pt with several coughing episodes during ambulation and cues needed to use pillow for sternal precautions when coughing, intermittent cues to breathe in through his nose rather than his mouth,  pt on 2L of O2 during ambulation, put back on 6L once in chair in room due to low O2 status    Other Therapeutic Interventions    Functional Outcomes  AM-PAC Inpatient Daily Activity Raw Score: 16    Cognition  WFL  Orientation:    alert and oriented x 4  Command Following:   [facility]     Education  Barriers To Learning: none  Patient Education: patient educated on goals, OT role and benefits, plan of care, precautions, transfer training, discharge recommendations  Learning Assessment:  patient verbalizes and demonstrates understanding    Assessment  Activity Tolerance: Tolerated well, but limited by fatigue  Impairments Requiring Therapeutic Intervention: decreased functional mobility, decreased ADL status, decreased strength, decreased endurance, decreased balance  Prognosis: good  Clinical Assessment: Pt is currently functioning below occupational baseline and demo the deficits listed above. Pt is currently requiring min A x2 to stand from recliner, mod(A) for LB dressing, and is limited in ambulation decreased endurance and fatigue requiring CGA-MIN(A) to complete this date. Pt would benefit from continued skilled OT services to address these deficits and increase independence and safety. Cont POC. Safety Interventions: patient left in chair, chair alarm in place, call light within reach, and nurse notified    Plan  Frequency: 3-5 x/per week  Current Treatment Recommendations: strengthening, balance training, functional mobility training, transfer training, endurance training, ADL/self-care training, and safety education    Goals  Patient Goals:  To return home to his prior level of independence  Short Term Goals:  Time Frame: Discharge, goals ongoing 10/26/22  Patient will complete upper body ADL at Independent   Patient will complete lower body ADL at modified independent, with AE   Patient will complete toileting at modified independent   Patient will complete grooming at modified independent, in stance at sink   Patient will complete functional transfers at modified independent, with LRAD   Patient will complete functional mobility at modified independent, with LRAD     Therapy Session Time     Individual Group Co-treatment   Time In    0911   Time Out    0944   Minutes    33        Timed Code Treatment Minutes:   33  Total Treatment Minutes:  33 minutes       Electronically Signed By: Leslee Dimas OT, Moraima De La Vega, S/OT    Occupational Therapist was present, directed the patient's care, made skilled judgement, and was responsible for assessment and treatment of the patient.

## 2022-10-28 NOTE — PROGRESS NOTES
[facility]  Speech Language Pathology   Dysphagia Treatment Note    Patient: Ryder Martin   : 1950   MRN: [id number]      Evaluation Date: 10/28/2022      Admitting Dx: Atherosclerotic heart disease of native coronary artery without angina pectoris [I25.10]  Abnormal result of other cardiovascular function study [R94.39]  Chest pain, unspecified [R07.9]  Coronary artery disease involving native coronary artery of native heart with unstable angina pectoris ([facility] [address] [facility] [I25.110]  Treatment Diagnosis: Oropharyngeal Dysphagia   Pain: Denies                                              Diet and Treatment Recommendations 10/28/2022:  Diet Solids Recommendation:  Regular texture diet  Liquid Consistency Recommendation: Thin liquids  Recommended form of Meds: Meds whole with water or Meds in puree      Compensatory strategies:   Upright as possible with all PO intake , No straws , Small bites/sips , Eat/feed slowly    Assessment of Texture Tolerance:  Diet level prior to treatment: Regular texture diet , Thin liquids   Tolerance of Current Diet Level:Per chart, no noted difficulty with current diet level  Other:      Impressions: Pt was positioned Upright in chair, awake and alert. Currently on  3L O2 via nasal cannula . Oxygen saturation 88-92% throughout session. Pt with breakfast tray upon arrival, consisted of thin liquids, soft solids, and regular solids  used to assess swallow function. Pt independently fed himself. With soft solid consistencies (potatoes and eggs), pt presented with adequate mastication, A-P transit, and oral clearance. With regular solid (sausage), pt demonstrated minimally prolonged but effective mastication with adequate oral clearing. Suspect delayed swallow initiation with thin liquids. Pt presented with x 2 coughs throughout meal, but did not occur directly with PO intake.  Pt was educated on taking small bites/sips and slowing rate of intake for improved breathing coordination. Pt demonstrates increased dysphagia/aspiration risk due to current respiratory state, COPD, O2 requirements, and suspected fatigue. At this time recommend continuation of current diet with safe swallowing strategies as above. If respiratory status declines, recommend NPO with further SLP assessment. Instrumental assessment may be beneficial pending ongoing follow up. Dysphagia Goals:   Pt will functionally tolerate recommended diet with no overt clinical s/s of aspiration (Ongoing 10/28/22)  Pt will demonstrate understanding of aspiration risk and precautions via education/demonstration with occasional prompting (Ongoing 10/28/22)  Pt will participate in instrumental swallow study (FEES or MBS) to further assess pharyngeal phase of swallow, assist with diet recommendations and to further direct plan of care (Ongoing 10/28/22)    Plan:   3-5 times per week during acute care stay. Patient/Family Education:  Provided education regarding role of SLP, recommendations and general speech pathology plan of care. [x] Pt verbalized understanding and agreement   [] Pt requires ongoing learning   [] No evidence of comprehension     Discharge Recommendations:    Discharge recommendations to be determined pending ongoing follow-up during acute care stay    Treatment time:  Timed Code Treatment Minutes: 0  Total Treatment time: 16    If patient discharges prior to next session this note will serve as a discharge summary. Trenton Rosales, [facility]  Speech-Language Pathology Graduate Clinician    The speech-language pathologist was present, directed the patient's care, made skilled judgment and was responsible for assessment and treatment.     Genia Davis, [id number] [facility]  Speech-Language Pathologist  [phone]

## 2022-10-28 NOTE — PROGRESS NOTES
Physical [facility]   Phone: [phone]    Physical Therapy    [] Initial Evaluation            [x] Daily Treatment Note         [] Discharge Summary      Patient: Kena Crain   : 1950   MRN: [id number]   Date of Service:  10/28/2022  Admitting Diagnosis: Coronary artery disease involving native coronary artery of native heart with unstable angina pectoris [facility])  Current Admission Summary: The pt was admitted for a CABG on 10/21. Past Medical History:  has a past medical history of Chronic renal insufficiency, Colloid cyst of third ventricle (HCC), Coronary artery disease, DDD (degenerative disc disease), lumbar, Diabetes mellitus, type 2 ([address] Diverticulitis of colon with perforation, ED (erectile dysfunction), Hyperlipidemia, Hypertension, Hypertensive retinopathy of both eyes, and Nephrolithiasis. Past Surgical History:  has a past surgical history that includes Cardiac catheterization (, ,  3/08); Revision Colostomy (3/08); Lithotripsy (); Lithotripsy (); partial nephrectomy (); Coronary angioplasty with stent (10/05); Coronary angioplasty (); Umbilical hernia repair; Knee arthroscopy (, ); colostomy (); Total knee arthroplasty (Left, 14); brain surgery (2007); and Coronary artery bypass graft (N/A, 10/21/2022). Discharge Recommendations:   Kena Crain scored a 12/24 on the AM-PAC short mobility form. Current research shows that an AM-PAC score of 17 or less is typically not associated with a discharge to the patient's home setting. Based on the patient's AM-PAC score and their current functional mobility deficits, it is recommended that the patient have 5-7 sessions per week of Physical Therapy at d/c to increase the patient's independence.   At this time, this patient demonstrates complex nursing, medical, and rehabilitative needs, and would benefit from intensive rehabilitation services upon discharge from the Inpatient setting. This patient demonstrates the ability to participate in and benefit from an intensive therapy program with a coordinated interdisciplinary team approach to foster frequent, structured, and documented communication among disciplines, who will work together to establish, prioritize, and achieve treatment goals. Please see assessment section for further patient specific details. If patient discharges prior to next session this note will serve as a discharge summary. Please see below for the latest assessment towards goals. DME Required For Discharge: DME to be determined pending patient progress  Precautions/Restrictions: high fall risk  Weight Bearing Restrictions: no restrictions  [] Right Upper Extremity        [] Left Upper Extremity         [] Right Lower Extremity         [] Left Lower Extremity             Required Braces/Orthotics: no braces required                   [] Right            [] Left  Positional Restrictions:Sternal Precautions - No Pushing, Pulling, Lifting > 5 lbs     Pre-Admission Information   Lives With: spouse                  Type of Home: house  Home Layout: one level, laundry in basement, doesn't have to go the basement  Home Access:  1 step to enter without rails   Bathroom Layout: walker accessible, tub/shower unit  Bathroom Equipment: grab bars in shower, bedside commode  Toilet Height: standard height  Home Equipment: rollator - 4 wheeled walker, reacher  Transfer Assistance: Independent without use of device  Ambulation Assistance:Independent without use of device  ADL Assistance: independent with all ADL's  IADL Assistance: requires assistance with cleaning, Wife does all IADLs; cleaning lady every 2 wks. Active :        [x] Yes                 [] No  Hand Dominance: [] Left                 [x] Right  Current Employment: retired.   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 Pt agreeable to PT/OT treatment. Pt on 2L O2 resting at Spo2 90%, cues to breath through nose with Spo2 improving to 95%. Pt reports he is a chronic mouth breather. Pt reports new onset of coughing, education provided to brace heart pillow with coughing    Pain: 1/10. Location: chest   Pain Interventions: not applicable       Functional Mobility  Bed Mobility  Bed mobility not completed on this date. Comments: Pt seated in recliner at beginning and EOS   Transfers  Sit to stand transfer: moderate assistance  Stand to sit transfer: moderate assistance  Comments: cues for sequencing~requires cues to scoot forward in chair while avoiding breaking sternal precautions and forward weight shifting   Ambulation  Surface:level surface  Assistive Device: rollator (9IRI)  Assistance: contact guard assistance  Distance: 300ft   Gait Mechanics: narrow VALENTE, decreased step length, decreased step height, decreased tami   Comments:  See activity tolerance for vitals post ambulation   Stair Mobility  Stair mobility not completed on this date. Comments:  Wheelchair Mobility:  No w/c mobility completed on this date.   Comments:  Balance  Static Sitting Balance: fair (+): maintains balance at SBA/supervision without use of UE support  Dynamic Sitting Balance: fair (+): maintains balance at SBA/supervision without use of UE support  Static Standing Balance: fair (-): maintains balance at CGA with use of UE support  Dynamic Standing Balance: fair (-): maintains balance at CGA with use of UE support  Comments: Standing balance with UE on 4WW     Other Therapeutic Interventions    Functional Outcomes  AM-PAC Inpatient Mobility Raw Score : 12              Cognition  Safety Judgement: decreased awareness of need for assistance, decreased awareness of need for safety  Problem Solving: assistance required to generate solutions, assistance required to identify errors made  Sequencing: requires cues for some  Orientation:    alert and oriented x 4  Command Following:   [facility]    Education  Barriers To Learning: none  Patient Education: patient educated on goals, PT role and benefits, plan of care, precautions, general safety, transfer training, discharge recommendations  Learning Assessment:  patient verbalizes and demonstrates understanding    Assessment  Activity Tolerance: Limited by fatigue and endurance. Pt on 2L O2 however post ambulation pts Spo2  dropped to upper 70s requiring O2 to be increased to 6L with cues to breath through nose. Nursing was notified and pt was left on 6L at EOS resting at Spo2 93%. Pt requires frequent cueing to maintain sternal precautions throughout session    Impairments Requiring Therapeutic Intervention: decreased functional mobility, decreased ROM, decreased strength, decreased endurance, increased pain, decreased posture  Prognosis: good  Clinical Assessment: Patient continues to demonstrate decreased activity tolerance and fatigue during session. Patient able to ambulate 300ft however Spo2 continues to drop to 70s depsite being on supplemental O2. . Patient's ability to perform sit to stand transfers is greatly affected by L knee weakness and lack of ROM. Patient would benefit from more intensive skilled therapy before returning home to ensure patient is back to baseline function.    Safety Interventions: patient left in chair, chair alarm on, call light within reach, gait belt, patient at risk for falls, and nurse notified    Plan  Frequency: 3-5 x/per week  Current Treatment Recommendations: strengthening, ROM, balance training, functional mobility training, transfer training, gait training, stair training, endurance training, neuromuscular re-education, and safety education     Goals  Patient Goals: return home at [address] [place] [address] Goals: Time Frame:  To be met prior to DC  Patient will complete bed mobility at minimal assistance   Patient will complete transfers at contact guard assistance   Patient will ambulate 300 ft with use of LRAD at supervision  Patient will ascend/descend 3 stairs with (R) ascending handrail at contact guard assistance  **No goals met in full this date, 10/28    Therapy Session Time      Individual Group Co-treatment   Time In     0911   Time Out     0944   Minutes     33       Total Treatment Minutes: 33        Electronically Signed By: Bobby Odonnell Do [address], [place] [address]

## 2022-10-28 NOTE — PROGRESS NOTES
Office : [phone]     Fax :[phone]       Nephrology progress Note      Patient's Name: Soha Perez  8:50 AM  10/28/2022    Reason for Consult:  CKD 3a      History of Present Ilness:    Soha Perez is a 81 y.o. male with h/o CKD.,  HTN , CAD who has LHC yesterday and had multivessel CAD. Plan for CABG tomorrow     Denies any chest pain at this time   BP is stable   No SOB   No peripheral edema noted     Interval hx    S/p CABG    Making good urine volume   Creatinine level 2.0 ---> 1.9 ----> 1.5 ---> 1.6    CXR shows atelectasis     Good UOP       I/O last 3 completed shifts: In: 5 [P.O.:720]  Out: 3225 [Urine:3225]    Past Medical History:   Diagnosis Date    Chronic renal insufficiency     Due to chronic nephrolithiasis    Colloid cyst of third ventricle (HCC)     Coronary artery disease     DDD (degenerative disc disease), lumbar 2006    Disc bulge and facet arthropathy at L3-L4, L5-S1    Diabetes mellitus, type 2 ([facility]     Diverticulitis of colon with perforation 11/07    Emergency colostomy    ED (erectile dysfunction)     Hyperlipidemia     Hypertension     Hypertensive retinopathy of both eyes 1/24/2013    Nephrolithiasis        Past Surgical History:   Procedure Laterality Date    BRAIN SURGERY  12/13/2007    Resection of colloid cyst of 3rd ventricle    CARDIAC CATHETERIZATION  5/02, 12/03,  3/08    COLOSTOMY  11/07    Emergent- due to diverticulitis with perforation    CORONARY ANGIOPLASTY  1995    RCA- unsuccessful    CORONARY ANGIOPLASTY WITH STENT PLACEMENT  10/05    Obtuse marginal branch of circumflex:  drug-eluting stent    CORONARY ARTERY BYPASS GRAFT N/A 10/21/2022    CABG X 3, LIMA  GRAFT TO LAD, VEIN GRAFT TO DISTAL RCA  AND OM, EVH LEFT SAPHENOUS VEIN. LIGATION KELLY WITH 35 MM ATRICLIP, TIMUR, TCPB, DOPPLER BYPASS GRAFT FLOW VERIFICATION, EPIAORTIC ECHOCARDIOGRAM, INSERTION OF VENTRICULAR AND ATRIAL PACING WIRES performed by Sofia Olvera MD at [facility]  2000, 2005    Right    LITHOTRIPSY  1998    Bilateral    LITHOTRIPSY  1994    Bilateral with right stent placement    PARTIAL NEPHRECTOMY  1980    Right    REVISION COLOSTOMY  3/08    Colostomy takedown with sigmoid colectomy and coloproctostomy anastomosis    TOTAL KNEE ARTHROPLASTY Left 6/60/47    UMBILICAL HERNIA REPAIR         Current Medications:    insulin lispro (HUMALOG) injection vial 8 Units, TID WC  insulin glargine (LANTUS) injection vial 20 Units, BID  0.9 % sodium chloride infusion, PRN  furosemide (LASIX) injection 20 mg, BID  methylPREDNISolone sodium (SOLU-MEDROL) injection 40 mg, Q8H  doxycycline (VIBRAMYCIN) 100 mg in dextrose 5 % 100 mL IVPB, Q12H  [Held by provider] heparin (porcine) injection 5,000 Units, 3 times per day  famotidine (PEPCID) tablet 20 mg, Daily  polyethylene glycol (GLYCOLAX) packet 17 g, Daily  clopidogrel (PLAVIX) tablet 75 mg, Daily  bisacodyl (DULCOLAX) suppository 10 mg, Daily PRN  aspirin chewable tablet 81 mg, Daily  traMADol (ULTRAM) tablet 50 mg, Q4H PRN  insulin lispro (HUMALOG) injection vial 0-16 Units, TID WC  insulin lispro (HUMALOG) injection vial 0-4 Units, Nightly  docusate sodium (COLACE) capsule 100 mg, BID  senna (SENOKOT) tablet 8.6 mg, BID  amiodarone (CORDARONE) tablet 200 mg, Daily  levalbuterol (XOPENEX) nebulizer solution 1.25 mg, BID  levalbuterol (XOPENEX) nebulizer solution 1.25 mg, Q4H PRN  traZODone (DESYREL) tablet 50 mg, Nightly  acetaminophen (TYLENOL) tablet 650 mg, Q6H  ALPRAZolam (XANAX) tablet 0.25 mg, Q6H PRN  gabapentin (NEURONTIN) capsule 300 mg, TID  sodium chloride flush 0.9 % injection 5-40 mL, 2 times per day  sodium chloride flush 0.9 % injection 5-40 mL, PRN  0.9 % sodium chloride infusion, PRN  ondansetron (ZOFRAN-ODT) disintegrating tablet 4 mg, Q8H PRN   Or  ondansetron (ZOFRAN) injection 4 mg, Q6H PRN  diphenhydrAMINE (BENADRYL) tablet 25 mg, Nightly PRN  potassium chloride 20 mEq/50 mL IVPB (Central Line), PRN  dextrose bolus 10% 125 mL, PRN   Or  dextrose bolus 10% 250 mL, PRN  glucagon (rDNA) injection 1 mg, PRN  dextrose 10 % infusion, Continuous PRN  sodium chloride flush 0.9 % injection 5-40 mL, PRN  rosuvastatin (CRESTOR) tablet 20 mg, Daily  sertraline (ZOLOFT) tablet 100 mg, Daily  mometasone-formoterol (DULERA) 200-5 MCG/ACT inhaler 2 puff, BID  tiotropium (SPIRIVA RESPIMAT) 2.5 MCG/ACT inhaler 2 puff, Daily  mirtazapine (REMERON) tablet 15 mg, Nightly        Physical exam:     Vitals:  /64   Pulse 63   Temp 97.8 °F (36.6 °C) (Temporal)   Resp 18   Ht 6' (1.829 m)   Wt 128 lb 4.9 oz (58.2 kg)   SpO2 100%   BMI 17.40 kg/m²   Constitutional:  OAA X3 NAD  Skin: no rash, turgor wnl  Heent:  eomi, mmm  Neck: no bruits or jvd noted  Cardiovascular:  S1, S2 without m/r/g  Respiratory: CTA B without w/r/r  Abdomen:  +bs, soft, nt, nd  Ext: no  lower extremity edema      Labs:  CBC:   Recent Labs     10/26/22  0502 10/26/22  1420 10/27/22  0340 10/28/22  0430   WBC 8.3  --  9.6 9.0   HGB 7.6* 9.1* 9.0* 9.7*     --  188 243     BMP:    Recent Labs     10/26/22  0520 10/27/22  0340 10/28/22  0430   * 137 139   K 4.8 5.4* 4.9   CL 98* 98* 98*   CO2 27 30 28   BUN 37* 38* 41*   CREATININE 1.9* 1.5* 1.6*   GLUCOSE 130* 213* 325*     Ca/Mg/Phos:   Recent Labs     10/26/22  0520 10/27/22  0340 10/28/22  0430   CALCIUM 8.3 8.8 9.3         IMAGING:  XR CHEST PORTABLE   Final Result   Persistent bibasilar atelectasis or airspace disease, decreased on the right   as compared to prior. Small pleural effusions. XR CHEST PORTABLE   Final Result   Bibasilar atelectasis or airspace disease, slightly increased as compared to   prior. Suspected small pleural effusions. No pneumothorax. XR CHEST PORTABLE   Final Result   Persistent bibasilar atelectasis or airspace disease and small pleural   effusions, similar to prior. XR CHEST PORTABLE   Final Result   1. Sequela from CABG including median sternotomy and clips about the heart. 2.  Endotracheal tube is in place, tip at a level between that of the   clavicular heads and aortic knob. 3.  NG tube extends below the left hemidiaphragm, into the upper abdomen, tip   overlying the expected level of the stomach, left upper quadrant. 4. Right IJ [place]-Jemima catheter via introducer sheath has its tip overlying   the right pulmonary artery level. A 2nd right IJ CVC is demonstrated, tip at   the superior cavoatrial junction level. 5. Left side and right side pleural catheters appear unremarkable. 6. No pneumomediastinum or pneumothorax. 7. Minimal atelectasis at the lung bases and possible small volume left   pleural effusion. 8. No fracture evident. VL DUP CAROTID BILATERAL   Final Result      VL PRE OP VEIN MAPPING   Final Result      XR CHEST PORTABLE   Final Result   No acute cardiopulmonary disease. Assessment/Plan :      1.  CKD 3 A   Creatinine level increased to 2.0 -->2.1 ---> 1.9 --> 2.0 -->1.9 ---> 1.5 --> 1.6   Good uOP   No edema noted    SOB   Has increased base crackles   Continue lasix iv . Will increase to TID         2. HTN. Better controlled today     3. Anemia. Monitor   Hb dropped since admission   Transfuse if HB < 7.0     4. MVD . S/p CABG     5. Hyperkalemia   Will stop potassium supplements     Valsartan has been held   Volume status is good.        Monitor renal function closely   Encourage incentive spirometry   D/w primary team  Recommend to dose adjust all medications  based on renal functions  Maintain SBP> 90 mmHg   Daily weights   AVOID NSAIDs  Avoid Nephrotoxins  Monitor Intake/Output  Call if significant decrease in urine output      Thank you for allowing us to participate in care of An Comer         Electronically signed by: Matias Rodriguez MD, 10/28/2022, 8:50 AM      [facility] [address]  Office : [phone]  Fax :[phone]

## 2022-10-28 NOTE — CARE COORDINATION
Oxygen being weaned and may d/c this weekend. Angus paulson has accepted at discharge. Will need orders and donnie completed and notified of d/c.     Conor Randolph RN, BSN  [phone]

## 2022-10-28 NOTE — PROGRESS NOTES
Hospitalist Progress Note      PCP: Mansoor Che MD    Date of Admission: 10/19/2022    Chief Complaint: \"I had a bypass surgery\"    Hospital Course: 81 y.o. male on Monserrat Conrad MD service who was admitted on 10/19/2022 for unstable angina and CAD. Patient with history of DM 2, HTN, CKD 3, COPD and hyperlipidemia. Underwent LHC on 10/19 with Dr Natanael Rodriguez on 10/19/22 for abnormal stress, found to have MVCAD. Underwent Coronary bypass grafting surgery x3 with single  reverse saphenous vein graft to the obtuse marginal branch of the  circumflex, separate sequential reverse saphenous vein graft to the  distal right coronary artery, pedicle left internal artery to the LAD,  endoscopic vein harvest of the left greater saphenous vein, left atrial  appendage obliteration with 35-mm AtriCure left atrial clip, total  cardiopulmonary bypass, transesophageal echo, Doppler verification of  graft, epiaortic ultrasound, and vas cath placement on 10/21/22. Subjective:  Patient on 4 L NC. Continues coughing. No SOB, but hypoxic to 70s with ambulation. No HA, dizziness, fevers or abdominal pain.        Medications:  Reviewed    Infusion Medications    sodium chloride      sodium chloride 5 mL/hr (10/26/22 1535)    dextrose       Scheduled Medications    guaiFENesin  600 mg Oral BID    furosemide  20 mg IntraVENous TID    insulin lispro  8 Units SubCUTAneous TID WC    insulin glargine  20 Units SubCUTAneous BID    methylPREDNISolone  40 mg IntraVENous Q8H    doxycycline (VIBRAMYCIN) IV  100 mg IntraVENous Q12H    [Held by provider] heparin (porcine)  5,000 Units SubCUTAneous 3 times per day    famotidine  20 mg Oral Daily    polyethylene glycol  17 g Oral Daily    clopidogrel  75 mg Oral Daily    aspirin  81 mg Oral Daily    insulin lispro  0-16 Units SubCUTAneous TID WC    insulin lispro  0-4 Units SubCUTAneous Nightly    docusate sodium  100 mg Oral BID    senna  1 tablet Oral BID    amiodarone  200 mg Oral Daily    levalbuterol  1.25 mg Nebulization BID    traZODone  50 mg Oral Nightly    acetaminophen  650 mg Oral Q6H    gabapentin  300 mg Oral TID    sodium chloride flush  5-40 mL IntraVENous 2 times per day    rosuvastatin  20 mg Oral Daily    sertraline  100 mg Oral Daily    mometasone-formoterol  2 puff Inhalation BID    tiotropium  2 puff Inhalation Daily    mirtazapine  15 mg Oral Nightly     PRN Meds: sodium chloride, bisacodyl, traMADol, levalbuterol, ALPRAZolam, sodium chloride flush, sodium chloride, ondansetron **OR** ondansetron, diphenhydramine, potassium chloride, dextrose bolus **OR** dextrose bolus, glucagon (rDNA), dextrose, sodium chloride flush      Intake/Output Summary (Last 24 hours) at 10/28/2022 1946  Last data filed at 10/28/2022 1825  Gross per 24 hour   Intake 1320 ml   Output 2475 ml   Net -1155 ml         Physical Exam Performed:    BP (!) 141/68   Pulse 61   Temp 98 °F (36.7 °C) (Temporal)   Resp 16   Ht 6' (1.829 m)   Wt 128 lb 4.9 oz (58.2 kg)   SpO2 95%   BMI 17.40 kg/m²     General appearance: Appears comfortable. Well nourished  Eyes: Sclera clear, pupils equal  ENT: Moist mucus membranes, no thrush  Neck: Trachea midline, symmetrical  Cardiovascular: Regular rhythm, normal S1, S2. No murmur, gallop, rub. No edema in  lower extremities  Respiratory: Bibasilar rales. BL wheezing improving. No rhonchi. Gastrointestinal: Abdomen soft, not tender, not distended, normal bowel sounds  Musculoskeletal: No cyanosis in digits, warm extremities  Neurologic: Grossly intact, no motor or speech deficits. Psychiatric: Normal affect. Alert and oriented to time, place and person.   Skin: Warm, dry, normal turgor, no rash      Labs:   Recent Labs     10/26/22  0502 10/26/22  1420 10/27/22  0340 10/28/22  0430   WBC 8.3  --  9.6 9.0   HGB 7.6* 9.1* 9.0* 9.7*   HCT 24.1* 29.4* 28.3* 31.2*     --  188 243       Recent Labs     10/26/22  0520 10/27/22  0340 10/28/22  0430   * 137 139   K 4.8 5.4* 4.9   CL 98* 98* 98*   CO2 27 30 28   BUN 37* 38* 41*   CREATININE 1.9* 1.5* 1.6*   CALCIUM 8.3 8.8 9.3       No results for input(s): AST, ALT, BILIDIR, BILITOT, ALKPHOS in the last 72 hours. No results for input(s): INR in the last 72 hours. No results for input(s): Normajean Mcadoo in the last 72 hours. Urinalysis:      Lab Results   Component Value Date/Time    NITRU Negative 10/19/2022 11:10 PM    WBCUA 0 04/25/2019 01:47 PM    RBCUA 4 04/25/2019 01:47 PM    BLOODU Negative 10/19/2022 11:10 PM    SPECGRAV 1.010 10/19/2022 11:10 PM    GLUCOSEU Negative 10/19/2022 11:10 PM       Radiology:  XR CHEST PORTABLE   Final Result   Persistent bibasilar atelectasis or airspace disease, decreased on the right   as compared to prior. Small pleural effusions. XR CHEST PORTABLE   Final Result   Bibasilar atelectasis or airspace disease, slightly increased as compared to   prior. Suspected small pleural effusions. No pneumothorax. XR CHEST PORTABLE   Final Result   Persistent bibasilar atelectasis or airspace disease and small pleural   effusions, similar to prior. XR CHEST PORTABLE   Final Result   1. Sequela from CABG including median sternotomy and clips about the heart. 2.  Endotracheal tube is in place, tip at a level between that of the   clavicular heads and aortic knob. 3.  NG tube extends below the left hemidiaphragm, into the upper abdomen, tip   overlying the expected level of the stomach, left upper quadrant. 4. Right IJ [place]-Jemima catheter via introducer sheath has its tip overlying   the right pulmonary artery level. A 2nd right IJ CVC is demonstrated, tip at   the superior cavoatrial junction level. 5. Left side and right side pleural catheters appear unremarkable. 6. No pneumomediastinum or pneumothorax. 7. Minimal atelectasis at the lung bases and possible small volume left   pleural effusion. 8. No fracture evident.          VL DUP CAROTID BILATERAL   Final Result      VL PRE OP VEIN MAPPING   Final Result      XR CHEST PORTABLE   Final Result   No acute cardiopulmonary disease. Assessment/Plan:    Active Hospital Problems    Diagnosis     Pleural effusion on left [J90]      Priority: Medium    S/P CABG x 3 [Z95.1]      Priority: Medium    Acute respiratory failure with hypoxia (HCC) [J96.01]      Priority: Medium    Moderate malnutrition ([facility]) [E44.0]      Priority: Medium    Dyslipidemia [E78.5]      Priority: Medium    Tobacco abuse disorder [Z72.0]      Priority: Medium    Unstable angina ([facility]) [I20.0]      Priority: Medium    Coronary artery disease involving native coronary artery of native heart with unstable angina pectoris ([facility] [address] [I25.110]      Priority: Medium    COPD, moderate ([facility] [J44.9]      Priority: Medium    COPD with acute exacerbation ([facility] [J44.1]     DM2 (diabetes mellitus, type 2) ([facility] [E11.9]     Essential hypertension [I10]     Chronic kidney disease, stage III (moderate) ([facility]) [N18.30]      Increase Lantus 25 U bid  Continue Humalog 8 U with meals  Continue Lasix 20 mg IV q8h  Continue on Solumedrol 40 mg IV q8h  Continue Amio, ASA, Crestor  Continue Dulera and Xopenex  Wean O2 as tolerated  Arixtra for DVT ppx  On Doxy IV for AECOPD      DVT Prophylaxis: Arixtra  Diet: ADULT ORAL NUTRITION SUPPLEMENT; Breakfast, Lunch; Diabetic Oral Supplement  ADULT DIET; Regular; 5 carb choices (75 gm/meal); Low Fat/Low Chol/High Fiber/2 gm Na; 1800 ml  Code Status: Full Code  PT/OT Eval Status: Following    Dispo - Home with home PT/OT    Discussed with patient, CVU nursing and CM. Home when hypoxia allows.     Edwin Mckeon MD

## 2022-10-28 NOTE — PROGRESS NOTES
[facility] Pulmonary/CCM Progress note      Admit Date: 10/19/2022    Chief Complaint: Shortness of breath and wheezing    Subjective: Interval History: Patient still has shortness of breath, O2 requirements has steadily improved-now requiring 4 L O2. Still has significant exertional dyspnea and hypoxia. No wheezing today. Still has a dry cough which is annoying him. Denies any chest pain.     Scheduled Meds:   guaiFENesin  600 mg Oral BID    furosemide  20 mg IntraVENous TID    insulin lispro  8 Units SubCUTAneous TID WC    insulin glargine  20 Units SubCUTAneous BID    methylPREDNISolone  40 mg IntraVENous Q8H    doxycycline (VIBRAMYCIN) IV  100 mg IntraVENous Q12H    [Held by provider] heparin (porcine)  5,000 Units SubCUTAneous 3 times per day    famotidine  20 mg Oral Daily    polyethylene glycol  17 g Oral Daily    clopidogrel  75 mg Oral Daily    aspirin  81 mg Oral Daily    insulin lispro  0-16 Units SubCUTAneous TID WC    insulin lispro  0-4 Units SubCUTAneous Nightly    docusate sodium  100 mg Oral BID    senna  1 tablet Oral BID    amiodarone  200 mg Oral Daily    levalbuterol  1.25 mg Nebulization BID    traZODone  50 mg Oral Nightly    acetaminophen  650 mg Oral Q6H    gabapentin  300 mg Oral TID    sodium chloride flush  5-40 mL IntraVENous 2 times per day    rosuvastatin  20 mg Oral Daily    sertraline  100 mg Oral Daily    mometasone-formoterol  2 puff Inhalation BID    tiotropium  2 puff Inhalation Daily    mirtazapine  15 mg Oral Nightly     Continuous Infusions:   sodium chloride      sodium chloride 5 mL/hr (10/26/22 1535)    dextrose       PRN Meds:sodium chloride, bisacodyl, traMADol, levalbuterol, ALPRAZolam, sodium chloride flush, sodium chloride, ondansetron **OR** ondansetron, diphenhydramine, potassium chloride, dextrose bolus **OR** dextrose bolus, glucagon (rDNA), dextrose, sodium chloride flush    Review of Systems  Constitutional: negative for fatigue, fevers, malaise and weight loss  Ears, nose, mouth, throat: negative for ear drainage, epistaxis, hoarseness, nasal congestion, sore throat and voice change  Respiratory: negative except for cough and shortness of breath  Cardiovascular: negative for chest pain, chest pressure/discomfort, irregular heart beat, lower extremity edema and palpitations  Gastrointestinal: negative for abdominal pain, constipation, diarrhea, jaundice, melena, odynophagia, reflux symptoms and vomiting  Hematologic/lymphatic: negative for bleeding, easy bruising, lymphadenopathy and petechiae  Musculoskeletal:negative for arthralgias, bone pain, muscle weakness, neck pain and stiff joints  Neurological: negative for dizziness, gait problems, headaches, seizures, speech problems, tremors and weakness  Behavioral/Psych: negative for anxiety, behavior problems, depression, fatigue and sleep disturbance  Endocrine: negative for diabetic symptoms including none, neuropathy, polyphagia, polyuria, polydipsia, vomiting and diarrhea and temperature intolerance  Allergic/Immunologic: negative for anaphylaxis, angioedema, hay fever and urticaria    Objective:     Patient Vitals for the past 8 hrs:   BP Temp Temp src Pulse Resp SpO2   10/28/22 1359 -- -- -- -- -- 97 %   10/28/22 1200 -- -- -- 69 -- --   10/28/22 1132 -- -- -- -- -- 94 %   10/28/22 1131 (!) 130/56 -- -- 60 -- --   10/28/22 0816 -- -- -- 63 18 100 %   10/28/22 0756 120/64 97.8 °F (36.6 °C) Temporal 62 15 96 %       I/O last 3 completed shifts:   In: 720 [P.O.:720]  Out: 3225 [Urine:3225]  I/O this shift:  In: 600 [P.O.:600]  Out: 1550 [Urine:1550]    General Appearance: alert and oriented to person, place and time, well developed and well- nourished, in no acute distress  Skin: warm and dry, no rash or erythema  Head: normocephalic and atraumatic  Eyes: pupils equal, round, and reactive to light, extraocular eye movements intact, conjunctivae normal  ENT: external ear and ear canal normal bilaterally, nose without deformity, nasal mucosa and turbinates normal  Neck: supple and non-tender without mass, no cervical lymphadenopathy  Pulmonary/Chest: wheezing present-scattered  Cardiovascular: normal rate, regular rhythm,  no murmurs, rubs, distal pulses intact, no carotid bruits  Abdomen: soft, non-tender, non-distended, normal bowel sounds, no masses or organomegaly  Lymph Nodes: Cervical, supraclavicular normal  Extremities: no cyanosis, clubbing or edema  Musculoskeletal: normal range of motion, no joint swelling, deformity or tenderness  Neurologic: alert, no focal neurologic deficits    Data Review:  CBC:   Lab Results   Component Value Date/Time    WBC 9.0 10/28/2022 04:30 AM    RBC 3.25 10/28/2022 04:30 AM     BMP:   Lab Results   Component Value Date/Time    GLUCOSE 325 10/28/2022 04:30 AM    CO2 28 10/28/2022 04:30 AM    BUN 41 10/28/2022 04:30 AM    CREATININE 1.6 10/28/2022 04:30 AM    CALCIUM 9.3 10/28/2022 04:30 AM     ABG:   Lab Results   Component Value Date/Time    KCZ4VJK 24.4 10/21/2022 03:24 PM    BEART -1 10/21/2022 03:24 PM    [id number] 97 10/21/2022 03:24 PM    PHART 7.380 10/21/2022 03:24 PM    QCG2POI 41.3 10/21/2022 03:24 PM    PO2ART 89.6 10/21/2022 03:24 PM    [id number] 26 10/21/2022 03:24 PM       Radiology: All pertinent images / reports were reviewed as a part of this visit. Narrative   EXAMINATION:   ONE XRAY VIEW OF THE CHEST       10/26/2022 6:07 am       COMPARISON:   10/24/2022       HISTORY:   ORDERING SYSTEM PROVIDED HISTORY: post chest tube removal   TECHNOLOGIST PROVIDED HISTORY:   Reason for exam:->post chest tube removal   Reason for Exam: post chest tube removal       FINDINGS:   Cardiac leads project over the chest.  2 right internal jugular central   venous catheters are seen extending to expected location of superior vena   cava. Oxygen tubing is also demonstrated. Status post median sternotomy and   left atrial exclusion device. Left chest tube has been removed.   Increased bibasilar pulmonary opacity is seen. Blunting of the costophrenic angles. No gross pneumothorax. Cardiac and mediastinal silhouettes are unchanged. Impression   Bibasilar atelectasis or airspace disease, slightly increased as compared to   prior. Suspected small pleural effusions. No pneumothorax. Problem List:   CAD with MVD, status post CABG x3, 10/21  COPD with acute exacerbation  Moderate left pleural effusion    Assessment/Plan:     CAD with multivessel disease, status post CABG x3. Moderate left pleural effusion, possibly postsurgical.  Chest tubes were removed on 10/25. Repeat bedside ultrasound was performed today as planned-small left pleural effusion only and would not benefit from thoracentesis. Still has a persistent dry cough-does not have chest congestion. Consider CT chest over the weekend if it becomes necessary to rule out mucous plugging, since chest x-ray still reveals left lower lobe opacity. Doubt patient has pneumonia. COPD with acute exacerbation, wheezing has resolved-O2 requirements are now down to 4L, but still has significant exertional dyspnea and hypoxia. Continue with bronchodilators, Solu-Medrol and doxycycline. Volume overload-great urine output with Lasix , dose will be increased to 20 mg IV 3 times daily.     Pulmonary will follow    Eunice Guzman MD

## 2022-10-29 ENCOUNTER — APPOINTMENT (OUTPATIENT)
Dept: GENERAL RADIOLOGY | Age: 72
DRG: 233 | End: 2022-10-29
Attending: INTERNAL MEDICINE
Payer: MEDICARE

## 2022-10-29 LAB
ANION GAP SERPL CALCULATED.3IONS-SCNC: 12 MMOL/L (ref 3–16)
ANISOCYTOSIS: ABNORMAL
BASOPHILS ABSOLUTE: 0 K/UL (ref 0–0.2)
BASOPHILS RELATIVE PERCENT: 0 %
BUN BLDV-MCNC: 46 MG/DL (ref 7–20)
CALCIUM SERPL-MCNC: 9.8 MG/DL (ref 8.3–10.6)
CHLORIDE BLD-SCNC: 99 MMOL/L (ref 99–110)
CO2: 29 MMOL/L (ref 21–32)
CREAT SERPL-MCNC: 1.6 MG/DL (ref 0.8–1.3)
EOSINOPHILS ABSOLUTE: 0 K/UL (ref 0–0.6)
EOSINOPHILS RELATIVE PERCENT: 0 %
GFR SERPL CREATININE-BSD FRML MDRD: 46 ML/MIN/{1.73_M2}
GLUCOSE BLD-MCNC: 201 MG/DL (ref 70–99)
GLUCOSE BLD-MCNC: 201 MG/DL (ref 70–99)
GLUCOSE BLD-MCNC: 237 MG/DL (ref 70–99)
GLUCOSE BLD-MCNC: 303 MG/DL (ref 70–99)
GLUCOSE BLD-MCNC: 387 MG/DL (ref 70–99)
GLUCOSE BLD-MCNC: 399 MG/DL (ref 70–99)
HCT VFR BLD CALC: 29.6 % (ref 40.5–52.5)
HEMOGLOBIN: 9.4 G/DL (ref 13.5–17.5)
LYMPHOCYTES ABSOLUTE: 1.1 K/UL (ref 1–5.1)
LYMPHOCYTES RELATIVE PERCENT: 9 %
MCH RBC QN AUTO: 30.1 PG (ref 26–34)
MCHC RBC AUTO-ENTMCNC: 31.7 G/DL (ref 31–36)
MCV RBC AUTO: 95 FL (ref 80–100)
MONOCYTES ABSOLUTE: 0.9 K/UL (ref 0–1.3)
MONOCYTES RELATIVE PERCENT: 7 %
NEUTROPHILS ABSOLUTE: 10.6 K/UL (ref 1.7–7.7)
NEUTROPHILS RELATIVE PERCENT: 84 %
NUCLEATED RED BLOOD CELLS: 1 /100 WBC
OVALOCYTES: ABNORMAL
PDW BLD-RTO: 18.9 % (ref 12.4–15.4)
PERFORMED ON: ABNORMAL
PLATELET # BLD: 315 K/UL (ref 135–450)
PMV BLD AUTO: 8.7 FL (ref 5–10.5)
POLYCHROMASIA: ABNORMAL
POTASSIUM REFLEX MAGNESIUM: 4.6 MMOL/L (ref 3.5–5.1)
RBC # BLD: 3.11 M/UL (ref 4.2–5.9)
SODIUM BLD-SCNC: 140 MMOL/L (ref 136–145)
WBC # BLD: 12.6 K/UL (ref 4–11)

## 2022-10-29 PROCEDURE — 6360000002 HC RX W HCPCS: Performed by: INTERNAL MEDICINE

## 2022-10-29 PROCEDURE — 94761 N-INVAS EAR/PLS OXIMETRY MLT: CPT

## 2022-10-29 PROCEDURE — 6360000002 HC RX W HCPCS

## 2022-10-29 PROCEDURE — 6370000000 HC RX 637 (ALT 250 FOR IP): Performed by: INTERNAL MEDICINE

## 2022-10-29 PROCEDURE — 6360000002 HC RX W HCPCS: Performed by: HOSPITALIST

## 2022-10-29 PROCEDURE — 85025 COMPLETE CBC W/AUTO DIFF WBC: CPT

## 2022-10-29 PROCEDURE — 71045 X-RAY EXAM CHEST 1 VIEW: CPT

## 2022-10-29 PROCEDURE — 6370000000 HC RX 637 (ALT 250 FOR IP): Performed by: THORACIC SURGERY (CARDIOTHORACIC VASCULAR SURGERY)

## 2022-10-29 PROCEDURE — 94669 MECHANICAL CHEST WALL OSCILL: CPT

## 2022-10-29 PROCEDURE — 6370000000 HC RX 637 (ALT 250 FOR IP): Performed by: NURSE PRACTITIONER

## 2022-10-29 PROCEDURE — 2580000003 HC RX 258: Performed by: THORACIC SURGERY (CARDIOTHORACIC VASCULAR SURGERY)

## 2022-10-29 PROCEDURE — 80048 BASIC METABOLIC PNL TOTAL CA: CPT

## 2022-10-29 PROCEDURE — 2500000003 HC RX 250 WO HCPCS: Performed by: INTERNAL MEDICINE

## 2022-10-29 PROCEDURE — 94640 AIRWAY INHALATION TREATMENT: CPT

## 2022-10-29 PROCEDURE — 2140000000 HC CCU INTERMEDIATE R&B

## 2022-10-29 PROCEDURE — 99233 SBSQ HOSP IP/OBS HIGH 50: CPT | Performed by: HOSPITALIST

## 2022-10-29 PROCEDURE — 6370000000 HC RX 637 (ALT 250 FOR IP)

## 2022-10-29 PROCEDURE — 6370000000 HC RX 637 (ALT 250 FOR IP): Performed by: STUDENT IN AN ORGANIZED HEALTH CARE EDUCATION/TRAINING PROGRAM

## 2022-10-29 PROCEDURE — 2580000003 HC RX 258: Performed by: INTERNAL MEDICINE

## 2022-10-29 PROCEDURE — 2700000000 HC OXYGEN THERAPY PER DAY

## 2022-10-29 PROCEDURE — 99233 SBSQ HOSP IP/OBS HIGH 50: CPT | Performed by: INTERNAL MEDICINE

## 2022-10-29 RX ORDER — LACTOBACILLUS RHAMNOSUS GG 10B CELL
1 CAPSULE ORAL 2 TIMES DAILY WITH MEALS
Status: DISCONTINUED | OUTPATIENT
Start: 2022-10-29 | End: 2022-11-01 | Stop reason: HOSPADM

## 2022-10-29 RX ORDER — PREDNISONE 20 MG/1
40 TABLET ORAL DAILY
Status: DISCONTINUED | OUTPATIENT
Start: 2022-10-30 | End: 2022-11-01 | Stop reason: HOSPADM

## 2022-10-29 RX ORDER — INSULIN LISPRO 100 [IU]/ML
15 INJECTION, SOLUTION INTRAVENOUS; SUBCUTANEOUS ONCE
Status: COMPLETED | OUTPATIENT
Start: 2022-10-29 | End: 2022-10-29

## 2022-10-29 RX ADMIN — DOCUSATE SODIUM 100 MG: 100 CAPSULE, LIQUID FILLED ORAL at 08:43

## 2022-10-29 RX ADMIN — DOXYCYCLINE 100 MG: 100 INJECTION, POWDER, LYOPHILIZED, FOR SOLUTION INTRAVENOUS at 16:45

## 2022-10-29 RX ADMIN — METHYLPREDNISOLONE SODIUM SUCCINATE 40 MG: 40 INJECTION, POWDER, FOR SOLUTION INTRAMUSCULAR; INTRAVENOUS at 08:43

## 2022-10-29 RX ADMIN — SENNOSIDES 8.6 MG: 8.6 TABLET, FILM COATED ORAL at 08:40

## 2022-10-29 RX ADMIN — ACETAMINOPHEN 650 MG: 325 TABLET ORAL at 05:05

## 2022-10-29 RX ADMIN — GUAIFENESIN 600 MG: 600 TABLET, EXTENDED RELEASE ORAL at 23:04

## 2022-10-29 RX ADMIN — LEVALBUTEROL HYDROCHLORIDE 1.25 MG: 1.25 SOLUTION RESPIRATORY (INHALATION) at 20:08

## 2022-10-29 RX ADMIN — Medication 10 ML: at 00:54

## 2022-10-29 RX ADMIN — INSULIN GLARGINE 25 UNITS: 100 INJECTION, SOLUTION SUBCUTANEOUS at 09:00

## 2022-10-29 RX ADMIN — Medication 2 PUFF: at 20:09

## 2022-10-29 RX ADMIN — GABAPENTIN 300 MG: 300 CAPSULE ORAL at 08:52

## 2022-10-29 RX ADMIN — SODIUM CHLORIDE 100 ML: 9 INJECTION, SOLUTION INTRAVENOUS at 05:07

## 2022-10-29 RX ADMIN — ROSUVASTATIN CALCIUM 20 MG: 20 TABLET, FILM COATED ORAL at 08:40

## 2022-10-29 RX ADMIN — GUAIFENESIN 600 MG: 600 TABLET, EXTENDED RELEASE ORAL at 08:43

## 2022-10-29 RX ADMIN — HEPARIN SODIUM 5000 UNITS: 5000 INJECTION INTRAVENOUS; SUBCUTANEOUS at 14:45

## 2022-10-29 RX ADMIN — FAMOTIDINE 20 MG: 20 TABLET ORAL at 08:43

## 2022-10-29 RX ADMIN — CLOPIDOGREL BISULFATE 75 MG: 75 TABLET ORAL at 08:52

## 2022-10-29 RX ADMIN — ASPIRIN 81 MG 81 MG: 81 TABLET ORAL at 08:40

## 2022-10-29 RX ADMIN — FUROSEMIDE 20 MG: 10 INJECTION, SOLUTION INTRAMUSCULAR; INTRAVENOUS at 19:59

## 2022-10-29 RX ADMIN — ACETAMINOPHEN 650 MG: 325 TABLET ORAL at 08:40

## 2022-10-29 RX ADMIN — FUROSEMIDE 20 MG: 10 INJECTION, SOLUTION INTRAMUSCULAR; INTRAVENOUS at 14:48

## 2022-10-29 RX ADMIN — Medication 10 ML: at 09:04

## 2022-10-29 RX ADMIN — HEPARIN SODIUM 5000 UNITS: 5000 INJECTION INTRAVENOUS; SUBCUTANEOUS at 23:07

## 2022-10-29 RX ADMIN — ACETAMINOPHEN 650 MG: 325 TABLET ORAL at 23:04

## 2022-10-29 RX ADMIN — TRAZODONE HYDROCHLORIDE 50 MG: 50 TABLET ORAL at 23:04

## 2022-10-29 RX ADMIN — INSULIN LISPRO 15 UNITS: 100 INJECTION, SOLUTION INTRAVENOUS; SUBCUTANEOUS at 23:13

## 2022-10-29 RX ADMIN — LEVALBUTEROL HYDROCHLORIDE 1.25 MG: 1.25 SOLUTION RESPIRATORY (INHALATION) at 08:11

## 2022-10-29 RX ADMIN — Medication 1 CAPSULE: at 23:04

## 2022-10-29 RX ADMIN — GABAPENTIN 300 MG: 300 CAPSULE ORAL at 23:04

## 2022-10-29 RX ADMIN — INSULIN GLARGINE 25 UNITS: 100 INJECTION, SOLUTION SUBCUTANEOUS at 20:24

## 2022-10-29 RX ADMIN — INSULIN LISPRO 4 UNITS: 100 INJECTION, SOLUTION INTRAVENOUS; SUBCUTANEOUS at 20:23

## 2022-10-29 RX ADMIN — DOXYCYCLINE 100 MG: 100 INJECTION, POWDER, LYOPHILIZED, FOR SOLUTION INTRAVENOUS at 05:08

## 2022-10-29 RX ADMIN — TIOTROPIUM BROMIDE INHALATION SPRAY 2 PUFF: 3.12 SPRAY, METERED RESPIRATORY (INHALATION) at 08:11

## 2022-10-29 RX ADMIN — MIRTAZAPINE 15 MG: 15 TABLET, FILM COATED ORAL at 23:04

## 2022-10-29 RX ADMIN — INSULIN LISPRO 8 UNITS: 100 INJECTION, SOLUTION INTRAVENOUS; SUBCUTANEOUS at 12:00

## 2022-10-29 RX ADMIN — INSULIN LISPRO 8 UNITS: 100 INJECTION, SOLUTION INTRAVENOUS; SUBCUTANEOUS at 09:00

## 2022-10-29 RX ADMIN — ACETAMINOPHEN 650 MG: 325 TABLET ORAL at 14:46

## 2022-10-29 RX ADMIN — GABAPENTIN 300 MG: 300 CAPSULE ORAL at 14:46

## 2022-10-29 RX ADMIN — Medication 2 PUFF: at 08:11

## 2022-10-29 RX ADMIN — Medication 10 ML: at 19:59

## 2022-10-29 RX ADMIN — METHYLPREDNISOLONE SODIUM SUCCINATE 40 MG: 40 INJECTION, POWDER, FOR SOLUTION INTRAMUSCULAR; INTRAVENOUS at 00:54

## 2022-10-29 RX ADMIN — SERTRALINE 100 MG: 50 TABLET, FILM COATED ORAL at 08:43

## 2022-10-29 RX ADMIN — FUROSEMIDE 20 MG: 10 INJECTION, SOLUTION INTRAMUSCULAR; INTRAVENOUS at 08:52

## 2022-10-29 RX ADMIN — AMIODARONE HYDROCHLORIDE 200 MG: 200 TABLET ORAL at 08:40

## 2022-10-29 ASSESSMENT — PAIN SCALES - GENERAL
PAINLEVEL_OUTOF10: 0
PAINLEVEL_OUTOF10: 4
PAINLEVEL_OUTOF10: 0

## 2022-10-29 ASSESSMENT — PULMONARY FUNCTION TESTS: PEFR_L/MIN: 8

## 2022-10-29 ASSESSMENT — PAIN DESCRIPTION - ORIENTATION: ORIENTATION: ANTERIOR

## 2022-10-29 ASSESSMENT — PAIN DESCRIPTION - LOCATION: LOCATION: CHEST

## 2022-10-29 NOTE — PROGRESS NOTES
Cardiothoracic Surgery Progress Note    CC: S/P CABGx3, KELLY  POD# 8    Subjective:  Hemodynamically stable, 4L O2 via nasal canula, afebrile. Alert and oriented X 3. Weight down this am. NSR.  10/29 continues to improve although oxygen requirement still at 4 L. Required significant amount of oxygen with walking. Vital Signs:   BP (!) 148/77   Pulse 55   Temp 98 °F (36.7 °C) (Temporal)   Resp 22   Ht 6' (1.829 m)   Wt 162 lb 11.2 oz (73.8 kg)   SpO2 95%   BMI 22.07 kg/m²     Physical Exam:   Cardiac: regular, no murmur  Lungs: wheezing on ausculted, productive cough, decreased bases bilaterally   Abdomen:  + BM  Vascular:  pulses all palpable   Extremities: generalized, non-pitting edema 1+  :  /825/?    Lasix 20mg IV bid  Chest Tube(s) & Incision(s):    Sternum: stable,C/D/I   Edges approximated, no drainage  Chest tube site: C/D/I  Purse string intact   left leg incision: C/D/I  Edges well approximated, no drainage       Labs:   CBC:   Recent Labs     10/28/22  0430 10/29/22  0502   WBC 9.0 12.6*   HGB 9.7* 9.4*   HCT 31.2* 29.6*    315       BMP:   Recent Labs     10/28/22  0430 10/29/22  0502   K 4.9 4.6   CREATININE 1.6* 1.6*   CALCIUM 9.3 9.8         Assessment/Plan:  As per CC:     Plan:   Continue with steroid as per pulmonary  Wean oxygen as tolerated  If patient reached 2 L at rest we could discharge home      Electronically signed by Marie Elmore PA-C on 10/29/2022 at 8:46 AM

## 2022-10-29 NOTE — PROGRESS NOTES
[facility] Pulmonary/CCM Progress note      Admit Date: 10/19/2022    Chief Complaint: Shortness of breath and wheezing    Subjective: Interval History: Requiring 4 L O2. Cough is improved, no wheezing noted today.     Scheduled Meds:   [START ON 10/30/2022] predniSONE  40 mg Oral Daily    guaiFENesin  600 mg Oral BID    furosemide  20 mg IntraVENous TID    insulin glargine  25 Units SubCUTAneous BID    insulin lispro  8 Units SubCUTAneous TID WC    doxycycline (VIBRAMYCIN) IV  100 mg IntraVENous Q12H    heparin (porcine)  5,000 Units SubCUTAneous 3 times per day    famotidine  20 mg Oral Daily    polyethylene glycol  17 g Oral Daily    clopidogrel  75 mg Oral Daily    aspirin  81 mg Oral Daily    insulin lispro  0-16 Units SubCUTAneous TID WC    insulin lispro  0-4 Units SubCUTAneous Nightly    docusate sodium  100 mg Oral BID    senna  1 tablet Oral BID    amiodarone  200 mg Oral Daily    levalbuterol  1.25 mg Nebulization BID    traZODone  50 mg Oral Nightly    acetaminophen  650 mg Oral Q6H    gabapentin  300 mg Oral TID    sodium chloride flush  5-40 mL IntraVENous 2 times per day    rosuvastatin  20 mg Oral Daily    sertraline  100 mg Oral Daily    mometasone-formoterol  2 puff Inhalation BID    tiotropium  2 puff Inhalation Daily    mirtazapine  15 mg Oral Nightly     Continuous Infusions:   sodium chloride      sodium chloride 100 mL (10/29/22 0507)    dextrose       PRN Meds:sodium chloride, bisacodyl, traMADol, levalbuterol, ALPRAZolam, sodium chloride flush, sodium chloride, ondansetron **OR** ondansetron, diphenhydramine, potassium chloride, dextrose bolus **OR** dextrose bolus, glucagon (rDNA), dextrose, sodium chloride flush    Review of Systems  Constitutional: negative for fatigue, fevers, malaise and weight loss  Ears, nose, mouth, throat: negative for ear drainage, epistaxis, hoarseness, nasal congestion, sore throat and voice change  Respiratory: negative except for cough and shortness of breath  Cardiovascular: negative for chest pain, chest pressure/discomfort, irregular heart beat, lower extremity edema and palpitations  Gastrointestinal: negative for abdominal pain, constipation, diarrhea, jaundice, melena, odynophagia, reflux symptoms and vomiting  Hematologic/lymphatic: negative for bleeding, easy bruising, lymphadenopathy and petechiae  Musculoskeletal:negative for arthralgias, bone pain, muscle weakness, neck pain and stiff joints  Neurological: negative for dizziness, gait problems, headaches, seizures, speech problems, tremors and weakness  Behavioral/Psych: negative for anxiety, behavior problems, depression, fatigue and sleep disturbance  Endocrine: negative for diabetic symptoms including none, neuropathy, polyphagia, polyuria, polydipsia, vomiting and diarrhea and temperature intolerance  Allergic/Immunologic: negative for anaphylaxis, angioedema, hay fever and urticaria    Objective:     Patient Vitals for the past 8 hrs:   Pulse SpO2   10/29/22 0812 55 95 %       I/O last 3 completed shifts: In: 2370.8 [P.O.:1800; I.V.:18.2; IV Piggyback:552.6]  Out: 4175 [Urine:4175]  No intake/output data recorded.     General Appearance: alert and oriented to person, place and time, well developed and well- nourished, in no acute distress  Skin: warm and dry, no rash or erythema  Head: normocephalic and atraumatic  Eyes: pupils equal, round, and reactive to light, extraocular eye movements intact, conjunctivae normal  ENT: external ear and ear canal normal bilaterally, nose without deformity, nasal mucosa and turbinates normal  Neck: supple and non-tender without mass, no cervical lymphadenopathy  Pulmonary/Chest: wheezing present-scattered  Cardiovascular: normal rate, regular rhythm,  no murmurs, rubs, distal pulses intact, no carotid bruits  Abdomen: soft, non-tender, non-distended, normal bowel sounds, no masses or organomegaly  Lymph Nodes: Cervical, supraclavicular normal  Extremities: no cyanosis, clubbing or edema  Musculoskeletal: normal range of motion, no joint swelling, deformity or tenderness  Neurologic: alert, no focal neurologic deficits    Data Review:  CBC:   Lab Results   Component Value Date/Time    WBC 12.6 10/29/2022 05:02 AM    RBC 3.11 10/29/2022 05:02 AM     BMP:   Lab Results   Component Value Date/Time    GLUCOSE 201 10/29/2022 05:02 AM    CO2 29 10/29/2022 05:02 AM    BUN 46 10/29/2022 05:02 AM    CREATININE 1.6 10/29/2022 05:02 AM    CALCIUM 9.8 10/29/2022 05:02 AM     ABG:   Lab Results   Component Value Date/Time    ALB6QWR 24.4 10/21/2022 03:24 PM    BEART -1 10/21/2022 03:24 PM    [id number] 97 10/21/2022 03:24 PM    PHART 7.380 10/21/2022 03:24 PM    ZZP1XVV 41.3 10/21/2022 03:24 PM    PO2ART 89.6 10/21/2022 03:24 PM    JLP9MHW 26 10/21/2022 03:24 PM       Radiology: All pertinent images / reports were reviewed as a part of this visit. Narrative   EXAMINATION:   ONE XRAY VIEW OF THE CHEST       10/26/2022 6:07 am       COMPARISON:   10/24/2022       HISTORY:   ORDERING SYSTEM PROVIDED HISTORY: post chest tube removal   TECHNOLOGIST PROVIDED HISTORY:   Reason for exam:->post chest tube removal   Reason for Exam: post chest tube removal       FINDINGS:   Cardiac leads project over the chest.  2 right internal jugular central   venous catheters are seen extending to expected location of superior vena   cava. Oxygen tubing is also demonstrated. Status post median sternotomy and   left atrial exclusion device. Left chest tube has been removed. Increased   bibasilar pulmonary opacity is seen. Blunting of the costophrenic angles. No gross pneumothorax. Cardiac and mediastinal silhouettes are unchanged. Impression   Bibasilar atelectasis or airspace disease, slightly increased as compared to   prior. Suspected small pleural effusions. No pneumothorax.        Problem List:   CAD with MVD, status post CABG x3, 10/21  COPD with acute exacerbation  Moderate left pleural effusion  CKD stage III  Assessment/Plan:     CAD with multivessel disease, status post CABG x3. Moderate left pleural effusion, possibly postsurgical.  Chest tubes were removed on 10/25. Significant pleural effusion noted on bedside ultrasound yesterday. Chest x-ray shows bibasal infiltrates-but improved at the left base. Still requiring 4 L O2    COPD with acute exacerbation, wheezing has resolved-O2 requirements are now down to 4L. Switch IV Solu-Medrol to oral prednisone.     Volume overload-great urine output of 3.6 L with Lasix , continues on IV Lasix creatinine 1.6    Pulmonary will follow    Rosa Maria Santacruz MD

## 2022-10-29 NOTE — PROGRESS NOTES
Office : [phone]     Fax :[phone]       Nephrology progress Note      Patient's Name: Julianne Ramirez  9:51 AM  10/29/2022    Reason for Consult:  CKD 3a      History of Present Ilness:    Julianne Ramirez is a 81 y.o. male with h/o CKD.,  HTN , CAD who has LHC yesterday and had multivessel CAD. Plan for CABG tomorrow     Denies any chest pain at this time   BP is stable   No SOB   No peripheral edema noted     Interval hx    S/p CABG    Making good urine volume   Creatinine level 2.0 ---> 1.9 ----> 1.5 ---> 1.6    CXR shows atelectasis     Good UOP       I/O last 3 completed shifts: In: 2370.8 [P.O.:1800;  I.V.:18.2; IV Piggyback:552.6]  Out: 4856 [Urine:4175]    Past Medical History:   Diagnosis Date    Chronic renal insufficiency     Due to chronic nephrolithiasis    Colloid cyst of third ventricle (HCC)     Coronary artery disease     DDD (degenerative disc disease), lumbar 2006    Disc bulge and facet arthropathy at L3-L4, L5-S1    Diabetes mellitus, type 2 ([address]     Diverticulitis of colon with perforation 11/07    Emergency colostomy    ED (erectile dysfunction)     Hyperlipidemia     Hypertension     Hypertensive retinopathy of both eyes 1/24/2013    Nephrolithiasis        Past Surgical History:   Procedure Laterality Date    BRAIN SURGERY  12/13/2007    Resection of colloid cyst of 3rd ventricle    CARDIAC CATHETERIZATION  5/02, 12/03,  3/08    COLOSTOMY  11/07    Emergent- due to diverticulitis with perforation    CORONARY ANGIOPLASTY  1995    RCA- unsuccessful    CORONARY ANGIOPLASTY WITH STENT PLACEMENT  10/05    Obtuse marginal branch of circumflex:  drug-eluting stent    CORONARY ARTERY BYPASS GRAFT N/A 10/21/2022    CABG X 3, LIMA  GRAFT TO LAD, VEIN GRAFT TO DISTAL RCA  AND OM, EVH LEFT SAPHENOUS VEIN.  LIGATION KELLY WITH 35 MM ATRICLIP, TIMUR, TCPB, DOPPLER BYPASS GRAFT FLOW VERIFICATION, EPIAORTIC ECHOCARDIOGRAM, INSERTION OF VENTRICULAR AND ATRIAL PACING WIRES performed by Trey White MD at [facility]  2000, 2005    Right    LITHOTRIPSY  1998    Bilateral    LITHOTRIPSY  1994    Bilateral with right stent placement    PARTIAL NEPHRECTOMY  1980    Right    REVISION COLOSTOMY  3/08    Colostomy takedown with sigmoid colectomy and coloproctostomy anastomosis    TOTAL KNEE ARTHROPLASTY Left 7/96/88    UMBILICAL HERNIA REPAIR         Current Medications:    [START ON 10/30/2022] predniSONE (DELTASONE) tablet 40 mg, Daily  guaiFENesin (MUCINEX) extended release tablet 600 mg, BID  furosemide (LASIX) injection 20 mg, TID  insulin glargine (LANTUS) injection vial 25 Units, BID  insulin lispro (HUMALOG) injection vial 8 Units, TID WC  0.9 % sodium chloride infusion, PRN  doxycycline (VIBRAMYCIN) 100 mg in dextrose 5 % 100 mL IVPB, Q12H  heparin (porcine) injection 5,000 Units, 3 times per day  famotidine (PEPCID) tablet 20 mg, Daily  polyethylene glycol (GLYCOLAX) packet 17 g, Daily  clopidogrel (PLAVIX) tablet 75 mg, Daily  bisacodyl (DULCOLAX) suppository 10 mg, Daily PRN  aspirin chewable tablet 81 mg, Daily  traMADol (ULTRAM) tablet 50 mg, Q4H PRN  insulin lispro (HUMALOG) injection vial 0-16 Units, TID WC  insulin lispro (HUMALOG) injection vial 0-4 Units, Nightly  docusate sodium (COLACE) capsule 100 mg, BID  senna (SENOKOT) tablet 8.6 mg, BID  amiodarone (CORDARONE) tablet 200 mg, Daily  levalbuterol (XOPENEX) nebulizer solution 1.25 mg, BID  levalbuterol (XOPENEX) nebulizer solution 1.25 mg, Q4H PRN  traZODone (DESYREL) tablet 50 mg, Nightly  acetaminophen (TYLENOL) tablet 650 mg, Q6H  ALPRAZolam (XANAX) tablet 0.25 mg, Q6H PRN  gabapentin (NEURONTIN) capsule 300 mg, TID  sodium chloride flush 0.9 % injection 5-40 mL, 2 times per day  sodium chloride flush 0.9 % injection 5-40 mL, PRN  0.9 % sodium chloride infusion, PRN  ondansetron (ZOFRAN-ODT) disintegrating tablet 4 mg, Q8H PRN   Or  ondansetron (ZOFRAN) injection 4 mg, Q6H PRN  diphenhydrAMINE (BENADRYL) tablet 25 mg, Nightly PRN  potassium chloride 20 mEq/50 mL IVPB (Central Line), PRN  dextrose bolus 10% 125 mL, PRN   Or  dextrose bolus 10% 250 mL, PRN  glucagon (rDNA) injection 1 mg, PRN  dextrose 10 % infusion, Continuous PRN  sodium chloride flush 0.9 % injection 5-40 mL, PRN  rosuvastatin (CRESTOR) tablet 20 mg, Daily  sertraline (ZOLOFT) tablet 100 mg, Daily  mometasone-formoterol (DULERA) 200-5 MCG/ACT inhaler 2 puff, BID  tiotropium (SPIRIVA RESPIMAT) 2.5 MCG/ACT inhaler 2 puff, Daily  mirtazapine (REMERON) tablet 15 mg, Nightly        Physical exam:     Vitals:  BP (!) 148/77   Pulse 55   Temp 98 °F (36.7 °C) (Temporal)   Resp 22   Ht 6' (1.829 m)   Wt 162 lb 11.2 oz (73.8 kg)   SpO2 95%   BMI 22.07 kg/m²   Constitutional:  OAA X3 NAD  Skin: no rash, turgor wnl  Heent:  eomi, mmm  Neck: no bruits or jvd noted  Cardiovascular:  S1, S2 without m/r/g  Respiratory: CTA B without w/r/r  Abdomen:  +bs, soft, nt, nd  Ext: no  lower extremity edema      Labs:  CBC:   Recent Labs     10/27/22  0340 10/28/22  0430 10/29/22  0502   WBC 9.6 9.0 12.6*   HGB 9.0* 9.7* 9.4*    243 315       BMP:    Recent Labs     10/27/22  0340 10/28/22  0430 10/29/22  0502    139 140   K 5.4* 4.9 4.6   CL 98* 98* 99   CO2 30 28 29   BUN 38* 41* 46*   CREATININE 1.5* 1.6* 1.6*   GLUCOSE 213* 325* 201*       Ca/Mg/Phos:   Recent Labs     10/27/22  0340 10/28/22  0430 10/29/22  0502   CALCIUM 8.8 9.3 9.8           IMAGING:  XR CHEST PORTABLE   Final Result   No interval change in bilateral airspace and interstitial opacities. XR CHEST PORTABLE   Final Result   Persistent bibasilar atelectasis or airspace disease, decreased on the right   as compared to prior. Small pleural effusions.          XR CHEST PORTABLE Final Result   Bibasilar atelectasis or airspace disease, slightly increased as compared to   prior. Suspected small pleural effusions. No pneumothorax. XR CHEST PORTABLE   Final Result   Persistent bibasilar atelectasis or airspace disease and small pleural   effusions, similar to prior. XR CHEST PORTABLE   Final Result   1. Sequela from CABG including median sternotomy and clips about the heart. 2.  Endotracheal tube is in place, tip at a level between that of the   clavicular heads and aortic knob. 3.  NG tube extends below the left hemidiaphragm, into the upper abdomen, tip   overlying the expected level of the stomach, left upper quadrant. 4. Right IJ Manly-Jemima catheter via introducer sheath has its tip overlying   the right pulmonary artery level. A 2nd right IJ CVC is demonstrated, tip at   the superior cavoatrial junction level. 5. Left side and right side pleural catheters appear unremarkable. 6. No pneumomediastinum or pneumothorax. 7. Minimal atelectasis at the lung bases and possible small volume left   pleural effusion. 8. No fracture evident. VL DUP CAROTID BILATERAL   Final Result      VL PRE OP VEIN MAPPING   Final Result      XR CHEST PORTABLE   Final Result   No acute cardiopulmonary disease. Assessment/Plan :      1.  CKD 3 A   Creatinine level increased to 2.0 -->2.1 ---> 1.9 --> 2.0 -->1.9 ---> 1.5 --> 1.6   Good uOP   No edema noted    SOB   Has increased base crackles   Continue lasix iv TID        2. HTN. Better controlled today     3. Anemia. Monitor   Hb dropped since admission   Transfuse if HB < 7.0     4. MVD . S/p CABG     5. Hyperkalemia   Will stop potassium supplements     Valsartan has been held   Volume status is good.        Monitor renal function closely   Encourage incentive spirometry   D/w primary team  Recommend to dose adjust all medications  based on renal functions  Maintain SBP> 90 mmHg   Daily weights AVOID NSAIDs  Avoid Nephrotoxins  Monitor Intake/Output  Call if significant decrease in urine output      Thank you for allowing us to participate in care of Elly Goyal         Electronically signed by: Cody Graves MD, 10/29/2022, 9:51 AM      [facility] [address]  Office : [phone]  Fax :[phone]

## 2022-10-29 NOTE — PROGRESS NOTES
Hospitalist Progress Note      PCP: Missy Carter MD    Date of Admission: 10/19/2022    Chief Complaint: \"I had a bypass surgery\"    Hospital Course: 81 y.o. male on Sofia Olvera MD service who was admitted on 10/19/2022 for unstable angina and CAD. Patient with history of DM 2, HTN, CKD 3, COPD and hyperlipidemia. 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 Underwent Coronary bypass grafting surgery x3 with single  reverse saphenous vein graft to the obtuse marginal branch of the  circumflex, separate sequential reverse saphenous vein graft to the  distal right coronary artery, pedicle left internal artery to the LAD,  endoscopic vein harvest of the left greater saphenous vein, left atrial  appendage obliteration with 35-mm AtriCure left atrial clip, total  cardiopulmonary bypass, transesophageal echo, Doppler verification of  graft, epiaortic ultrasound, and vas cath placement on 10/21/22. Subjective:  Patient still on 4 L NC. Had BM. Denies SOB. No HA, dizziness, fevers or abdominal pain.        Medications:  Reviewed    Infusion Medications    sodium chloride      sodium chloride 100 mL (10/29/22 0507)    dextrose       Scheduled Medications    [START ON 10/30/2022] predniSONE  40 mg Oral Daily    lactobacillus  1 capsule Oral BID     guaiFENesin  600 mg Oral BID    furosemide  20 mg IntraVENous TID    insulin glargine  25 Units SubCUTAneous BID    insulin lispro  8 Units SubCUTAneous TID     doxycycline (VIBRAMYCIN) IV  100 mg IntraVENous Q12H    heparin (porcine)  5,000 Units SubCUTAneous 3 times per day    famotidine  20 mg Oral Daily    polyethylene glycol  17 g Oral Daily    clopidogrel  75 mg Oral Daily    aspirin  81 mg Oral Daily    insulin lispro  0-16 Units SubCUTAneous TID     insulin lispro  0-4 Units SubCUTAneous Nightly    docusate sodium  100 mg Oral BID    senna  1 tablet Oral BID    amiodarone  200 mg Oral Daily levalbuterol  1.25 mg Nebulization BID    traZODone  50 mg Oral Nightly    acetaminophen  650 mg Oral Q6H    gabapentin  300 mg Oral TID    sodium chloride flush  5-40 mL IntraVENous 2 times per day    rosuvastatin  20 mg Oral Daily    sertraline  100 mg Oral Daily    mometasone-formoterol  2 puff Inhalation BID    tiotropium  2 puff Inhalation Daily    mirtazapine  15 mg Oral Nightly     PRN Meds: sodium chloride, bisacodyl, traMADol, levalbuterol, ALPRAZolam, sodium chloride flush, sodium chloride, ondansetron **OR** ondansetron, diphenhydramine, potassium chloride, dextrose bolus **OR** dextrose bolus, glucagon (rDNA), dextrose, sodium chloride flush      Intake/Output Summary (Last 24 hours) at 10/29/2022 1847  Last data filed at 10/29/2022 0504  Gross per 24 hour   Intake 1050.77 ml   Output 1700 ml   Net -649.23 ml         Physical Exam Performed:    BP (!) 148/77   Pulse 55   Temp 98 °F (36.7 °C) (Temporal)   Resp 22   Ht 6' (1.829 m)   Wt 162 lb 11.2 oz (73.8 kg)   SpO2 95%   BMI 22.07 kg/m²     General appearance: Appears comfortable. Well nourished  Eyes: Sclera clear, pupils equal  ENT: Moist mucus membranes, no thrush  Neck: Trachea midline, symmetrical  Cardiovascular: Regular rhythm, normal S1, S2. No murmur, gallop, rub. No edema in  lower extremities  Respiratory: Bibasilar rales. BL wheezing improving. No rhonchi. Gastrointestinal: Abdomen soft, not tender, not distended, normal bowel sounds  Musculoskeletal: No cyanosis in digits, warm extremities  Neurologic: Grossly intact, no motor or speech deficits. Psychiatric: Normal affect. Alert and oriented to time, place and person.   Skin: Warm, dry, normal turgor, no rash      Labs:   Recent Labs     10/27/22  0340 10/28/22  0430 10/29/22  0502   WBC 9.6 9.0 12.6*   HGB 9.0* 9.7* 9.4*   HCT 28.3* 31.2* 29.6*    243 315       Recent Labs     10/27/22  0340 10/28/22  0430 10/29/22  0502    139 140   K 5.4* 4.9 4.6   CL 98* 98* 99 CO2 30 28 29   BUN 38* 41* 46*   CREATININE 1.5* 1.6* 1.6*   CALCIUM 8.8 9.3 9.8       No results for input(s): AST, ALT, BILIDIR, BILITOT, ALKPHOS in the last 72 hours. No results for input(s): INR in the last 72 hours. No results for input(s): Brett Gubler in the last 72 hours. Urinalysis:      Lab Results   Component Value Date/Time    NITRU Negative 10/19/2022 11:10 PM    WBCUA 0 04/25/2019 01:47 PM    RBCUA 4 04/25/2019 01:47 PM    BLOODU Negative 10/19/2022 11:10 PM    SPECGRAV 1.010 10/19/2022 11:10 PM    GLUCOSEU Negative 10/19/2022 11:10 PM       Radiology:  XR CHEST PORTABLE   Final Result   No interval change in bilateral airspace and interstitial opacities. XR CHEST PORTABLE   Final Result   Persistent bibasilar atelectasis or airspace disease, decreased on the right   as compared to prior. Small pleural effusions. XR CHEST PORTABLE   Final Result   Bibasilar atelectasis or airspace disease, slightly increased as compared to   prior. Suspected small pleural effusions. No pneumothorax. XR CHEST PORTABLE   Final Result   Persistent bibasilar atelectasis or airspace disease and small pleural   effusions, similar to prior. XR CHEST PORTABLE   Final Result   1. Sequela from CABG including median sternotomy and clips about the heart. 2.  Endotracheal tube is in place, tip at a level between that of the   clavicular heads and aortic knob. 3.  NG tube extends below the left hemidiaphragm, into the upper abdomen, tip   overlying the expected level of the stomach, left upper quadrant. 4. Right IJ [place]-Jemima catheter via introducer sheath has its tip overlying   the right pulmonary artery level. A 2nd right IJ CVC is demonstrated, tip at   the superior cavoatrial junction level. 5. Left side and right side pleural catheters appear unremarkable. 6. No pneumomediastinum or pneumothorax.    7. Minimal atelectasis at the lung bases and possible small volume left pleural effusion. 8. No fracture evident. VL DUP CAROTID BILATERAL   Final Result      VL PRE OP VEIN MAPPING   Final Result      XR CHEST PORTABLE   Final Result   No acute cardiopulmonary disease. Assessment/Plan:    Active Hospital Problems    Diagnosis     Pleural effusion on left [J90]      Priority: Medium    S/P CABG x 3 [Z95.1]      Priority: Medium    Acute respiratory failure with hypoxia (HCC) [J96.01]      Priority: Medium    Moderate malnutrition (HCC) [E44.0]      Priority: Medium    Dyslipidemia [E78.5]      Priority: Medium    Tobacco abuse disorder [Z72.0]      Priority: Medium    Unstable angina (HCC) [I20.0]      Priority: Medium    Coronary artery disease involving native coronary artery of native heart with unstable angina pectoris ([facility] [address] [I25.110]      Priority: Medium    COPD, moderate ([facility] [address] [J44.9]      Priority: Medium    COPD with acute exacerbation ([facility] [address] [J44.1]     DM2 (diabetes mellitus, type 2) ([facility] [address] [E11.9]     Essential hypertension [I10]     Chronic kidney disease, stage III (moderate) (HCC) [N18.30]      Continue Lantus 25 U bid  Continue Humalog 8 U with meals  Continue Lasix 20 mg IV q8h  Changed Solumedrol 40 mg IV q8h to Prednisone  Continue Amio, ASA, Crestor  Continue Dulera and Xopenex  Wean O2 as tolerated  Arixtra for DVT ppx  On Doxy IV for AECOPD      DVT Prophylaxis: Arixtra  Diet: ADULT ORAL NUTRITION SUPPLEMENT; Breakfast, Lunch; Diabetic Oral Supplement  ADULT DIET; Regular; 5 carb choices (75 gm/meal); Low Fat/Low Chol/High Fiber/2 gm Na; 1800 ml  Code Status: Full Code  PT/OT Eval Status: Following    Dispo - Home with home PT/OT    Discussed with patient and CVU RN. Home when ok with all, with home O2.     Christian Charles MD

## 2022-10-30 LAB
ACANTHOCYTES: ABNORMAL
ANION GAP SERPL CALCULATED.3IONS-SCNC: 7 MMOL/L (ref 3–16)
ANISOCYTOSIS: ABNORMAL
BANDED NEUTROPHILS RELATIVE PERCENT: 7 % (ref 0–7)
BASOPHILS ABSOLUTE: 0 K/UL (ref 0–0.2)
BASOPHILS RELATIVE PERCENT: 0 %
BUN BLDV-MCNC: 53 MG/DL (ref 7–20)
CALCIUM SERPL-MCNC: 9.9 MG/DL (ref 8.3–10.6)
CHLORIDE BLD-SCNC: 99 MMOL/L (ref 99–110)
CO2: 35 MMOL/L (ref 21–32)
CREAT SERPL-MCNC: 1.7 MG/DL (ref 0.8–1.3)
EOSINOPHILS ABSOLUTE: 0 K/UL (ref 0–0.6)
EOSINOPHILS RELATIVE PERCENT: 0 %
GFR SERPL CREATININE-BSD FRML MDRD: 43 ML/MIN/{1.73_M2}
GLUCOSE BLD-MCNC: 129 MG/DL (ref 70–99)
GLUCOSE BLD-MCNC: 130 MG/DL (ref 70–99)
GLUCOSE BLD-MCNC: 219 MG/DL (ref 70–99)
GLUCOSE BLD-MCNC: 75 MG/DL (ref 70–99)
GLUCOSE BLD-MCNC: 78 MG/DL (ref 70–99)
HCT VFR BLD CALC: 28.4 % (ref 40.5–52.5)
HEMOGLOBIN: 8.9 G/DL (ref 13.5–17.5)
LYMPHOCYTES ABSOLUTE: 0.8 K/UL (ref 1–5.1)
LYMPHOCYTES RELATIVE PERCENT: 8 %
MAGNESIUM: 1.9 MG/DL (ref 1.8–2.4)
MCH RBC QN AUTO: 29.5 PG (ref 26–34)
MCHC RBC AUTO-ENTMCNC: 31.3 G/DL (ref 31–36)
MCV RBC AUTO: 94.3 FL (ref 80–100)
MICROCYTES: ABNORMAL
MONOCYTES ABSOLUTE: 0.4 K/UL (ref 0–1.3)
MONOCYTES RELATIVE PERCENT: 4 %
NEUTROPHILS ABSOLUTE: 8.4 K/UL (ref 1.7–7.7)
NEUTROPHILS RELATIVE PERCENT: 81 %
NUCLEATED RED BLOOD CELLS: 2 /100 WBC
OVALOCYTES: ABNORMAL
PDW BLD-RTO: 19.2 % (ref 12.4–15.4)
PERFORMED ON: ABNORMAL
PERFORMED ON: NORMAL
PLATELET # BLD: 338 K/UL (ref 135–450)
PLATELET SLIDE REVIEW: ADEQUATE
PMV BLD AUTO: 8.3 FL (ref 5–10.5)
POLYCHROMASIA: ABNORMAL
POTASSIUM REFLEX MAGNESIUM: 3.3 MMOL/L (ref 3.5–5.1)
POTASSIUM SERPL-SCNC: 4.7 MMOL/L (ref 3.5–5.1)
RBC # BLD: 3.01 M/UL (ref 4.2–5.9)
SLIDE REVIEW: ABNORMAL
SODIUM BLD-SCNC: 141 MMOL/L (ref 136–145)
TEAR DROP CELLS: ABNORMAL
WBC # BLD: 9.5 K/UL (ref 4–11)

## 2022-10-30 PROCEDURE — 6370000000 HC RX 637 (ALT 250 FOR IP): Performed by: THORACIC SURGERY (CARDIOTHORACIC VASCULAR SURGERY)

## 2022-10-30 PROCEDURE — 80048 BASIC METABOLIC PNL TOTAL CA: CPT

## 2022-10-30 PROCEDURE — 6370000000 HC RX 637 (ALT 250 FOR IP): Performed by: INTERNAL MEDICINE

## 2022-10-30 PROCEDURE — 6370000000 HC RX 637 (ALT 250 FOR IP): Performed by: NURSE PRACTITIONER

## 2022-10-30 PROCEDURE — 2580000003 HC RX 258: Performed by: INTERNAL MEDICINE

## 2022-10-30 PROCEDURE — 6370000000 HC RX 637 (ALT 250 FOR IP)

## 2022-10-30 PROCEDURE — 2140000000 HC CCU INTERMEDIATE R&B

## 2022-10-30 PROCEDURE — 2500000003 HC RX 250 WO HCPCS: Performed by: INTERNAL MEDICINE

## 2022-10-30 PROCEDURE — 84132 ASSAY OF SERUM POTASSIUM: CPT

## 2022-10-30 PROCEDURE — 94669 MECHANICAL CHEST WALL OSCILL: CPT

## 2022-10-30 PROCEDURE — 6360000002 HC RX W HCPCS

## 2022-10-30 PROCEDURE — 83735 ASSAY OF MAGNESIUM: CPT

## 2022-10-30 PROCEDURE — 99233 SBSQ HOSP IP/OBS HIGH 50: CPT | Performed by: HOSPITALIST

## 2022-10-30 PROCEDURE — 94640 AIRWAY INHALATION TREATMENT: CPT

## 2022-10-30 PROCEDURE — 6360000002 HC RX W HCPCS: Performed by: HOSPITALIST

## 2022-10-30 PROCEDURE — 94761 N-INVAS EAR/PLS OXIMETRY MLT: CPT

## 2022-10-30 PROCEDURE — 6360000002 HC RX W HCPCS: Performed by: INTERNAL MEDICINE

## 2022-10-30 PROCEDURE — 2700000000 HC OXYGEN THERAPY PER DAY

## 2022-10-30 PROCEDURE — 99232 SBSQ HOSP IP/OBS MODERATE 35: CPT | Performed by: INTERNAL MEDICINE

## 2022-10-30 PROCEDURE — 36592 COLLECT BLOOD FROM PICC: CPT

## 2022-10-30 PROCEDURE — 2580000003 HC RX 258: Performed by: THORACIC SURGERY (CARDIOTHORACIC VASCULAR SURGERY)

## 2022-10-30 PROCEDURE — 6360000002 HC RX W HCPCS: Performed by: THORACIC SURGERY (CARDIOTHORACIC VASCULAR SURGERY)

## 2022-10-30 PROCEDURE — 85025 COMPLETE CBC W/AUTO DIFF WBC: CPT

## 2022-10-30 RX ADMIN — LEVALBUTEROL HYDROCHLORIDE 1.25 MG: 1.25 SOLUTION RESPIRATORY (INHALATION) at 08:29

## 2022-10-30 RX ADMIN — HEPARIN SODIUM 5000 UNITS: 5000 INJECTION INTRAVENOUS; SUBCUTANEOUS at 23:14

## 2022-10-30 RX ADMIN — LEVALBUTEROL HYDROCHLORIDE 1.25 MG: 1.25 SOLUTION RESPIRATORY (INHALATION) at 20:22

## 2022-10-30 RX ADMIN — POTASSIUM CHLORIDE 20 MEQ: 29.8 INJECTION, SOLUTION INTRAVENOUS at 08:37

## 2022-10-30 RX ADMIN — Medication 1 CAPSULE: at 09:49

## 2022-10-30 RX ADMIN — ASPIRIN 81 MG 81 MG: 81 TABLET ORAL at 09:46

## 2022-10-30 RX ADMIN — Medication 10 ML: at 09:48

## 2022-10-30 RX ADMIN — GUAIFENESIN 600 MG: 600 TABLET, EXTENDED RELEASE ORAL at 09:46

## 2022-10-30 RX ADMIN — TIOTROPIUM BROMIDE INHALATION SPRAY 2 PUFF: 3.12 SPRAY, METERED RESPIRATORY (INHALATION) at 08:30

## 2022-10-30 RX ADMIN — GABAPENTIN 300 MG: 300 CAPSULE ORAL at 23:14

## 2022-10-30 RX ADMIN — ACETAMINOPHEN 650 MG: 325 TABLET ORAL at 21:57

## 2022-10-30 RX ADMIN — INSULIN GLARGINE 25 UNITS: 100 INJECTION, SOLUTION SUBCUTANEOUS at 20:28

## 2022-10-30 RX ADMIN — ACETAMINOPHEN 650 MG: 325 TABLET ORAL at 09:45

## 2022-10-30 RX ADMIN — ROSUVASTATIN CALCIUM 20 MG: 20 TABLET, FILM COATED ORAL at 09:46

## 2022-10-30 RX ADMIN — FUROSEMIDE 20 MG: 10 INJECTION, SOLUTION INTRAMUSCULAR; INTRAVENOUS at 19:41

## 2022-10-30 RX ADMIN — INSULIN LISPRO 8 UNITS: 100 INJECTION, SOLUTION INTRAVENOUS; SUBCUTANEOUS at 09:42

## 2022-10-30 RX ADMIN — Medication 2 PUFF: at 08:30

## 2022-10-30 RX ADMIN — INSULIN LISPRO 8 UNITS: 100 INJECTION, SOLUTION INTRAVENOUS; SUBCUTANEOUS at 17:14

## 2022-10-30 RX ADMIN — INSULIN LISPRO 8 UNITS: 100 INJECTION, SOLUTION INTRAVENOUS; SUBCUTANEOUS at 14:41

## 2022-10-30 RX ADMIN — Medication 10 ML: at 23:15

## 2022-10-30 RX ADMIN — POTASSIUM CHLORIDE 20 MEQ: 29.8 INJECTION, SOLUTION INTRAVENOUS at 10:44

## 2022-10-30 RX ADMIN — CLOPIDOGREL BISULFATE 75 MG: 75 TABLET ORAL at 09:46

## 2022-10-30 RX ADMIN — AMIODARONE HYDROCHLORIDE 200 MG: 200 TABLET ORAL at 09:46

## 2022-10-30 RX ADMIN — TRAZODONE HYDROCHLORIDE 50 MG: 50 TABLET ORAL at 23:14

## 2022-10-30 RX ADMIN — GABAPENTIN 300 MG: 300 CAPSULE ORAL at 09:47

## 2022-10-30 RX ADMIN — DIPHENHYDRAMINE HYDROCHLORIDE 25 MG: 25 TABLET ORAL at 02:02

## 2022-10-30 RX ADMIN — HEPARIN SODIUM 5000 UNITS: 5000 INJECTION INTRAVENOUS; SUBCUTANEOUS at 06:39

## 2022-10-30 RX ADMIN — Medication 2 PUFF: at 20:25

## 2022-10-30 RX ADMIN — SERTRALINE 100 MG: 50 TABLET, FILM COATED ORAL at 09:46

## 2022-10-30 RX ADMIN — FUROSEMIDE 20 MG: 10 INJECTION, SOLUTION INTRAMUSCULAR; INTRAVENOUS at 14:39

## 2022-10-30 RX ADMIN — INSULIN LISPRO 2 UNITS: 100 INJECTION, SOLUTION INTRAVENOUS; SUBCUTANEOUS at 17:15

## 2022-10-30 RX ADMIN — ACETAMINOPHEN 650 MG: 325 TABLET ORAL at 14:39

## 2022-10-30 RX ADMIN — ACETAMINOPHEN 650 MG: 325 TABLET ORAL at 06:39

## 2022-10-30 RX ADMIN — MIRTAZAPINE 15 MG: 15 TABLET, FILM COATED ORAL at 23:14

## 2022-10-30 RX ADMIN — FUROSEMIDE 20 MG: 10 INJECTION, SOLUTION INTRAMUSCULAR; INTRAVENOUS at 09:45

## 2022-10-30 RX ADMIN — GABAPENTIN 300 MG: 300 CAPSULE ORAL at 14:38

## 2022-10-30 RX ADMIN — FAMOTIDINE 20 MG: 20 TABLET ORAL at 09:45

## 2022-10-30 RX ADMIN — PREDNISONE 40 MG: 20 TABLET ORAL at 09:46

## 2022-10-30 RX ADMIN — DOXYCYCLINE 100 MG: 100 INJECTION, POWDER, LYOPHILIZED, FOR SOLUTION INTRAVENOUS at 18:06

## 2022-10-30 RX ADMIN — DIPHENHYDRAMINE HYDROCHLORIDE 25 MG: 25 TABLET ORAL at 23:13

## 2022-10-30 RX ADMIN — GUAIFENESIN 600 MG: 600 TABLET, EXTENDED RELEASE ORAL at 21:58

## 2022-10-30 RX ADMIN — HEPARIN SODIUM 5000 UNITS: 5000 INJECTION INTRAVENOUS; SUBCUTANEOUS at 14:39

## 2022-10-30 RX ADMIN — DOXYCYCLINE 100 MG: 100 INJECTION, POWDER, LYOPHILIZED, FOR SOLUTION INTRAVENOUS at 06:39

## 2022-10-30 RX ADMIN — Medication 1 CAPSULE: at 17:14

## 2022-10-30 RX ADMIN — INSULIN GLARGINE 25 UNITS: 100 INJECTION, SOLUTION SUBCUTANEOUS at 09:42

## 2022-10-30 ASSESSMENT — PAIN SCALES - GENERAL
PAINLEVEL_OUTOF10: 3
PAINLEVEL_OUTOF10: 0
PAINLEVEL_OUTOF10: 0
PAINLEVEL_OUTOF10: 5
PAINLEVEL_OUTOF10: 0

## 2022-10-30 ASSESSMENT — PAIN DESCRIPTION - LOCATION: LOCATION: HEAD

## 2022-10-30 NOTE — PROGRESS NOTES
[facility] Pulmonary/CCM Progress note      Admit Date: 10/19/2022    Chief Complaint: Shortness of breath and wheezing    Subjective: Interval History: Appears the same, on 4 L O2. Still has some cough and wheezing, but significantly improved.     Scheduled Meds:   predniSONE  40 mg Oral Daily    lactobacillus  1 capsule Oral BID WC    guaiFENesin  600 mg Oral BID    furosemide  20 mg IntraVENous TID    insulin glargine  25 Units SubCUTAneous BID    insulin lispro  8 Units SubCUTAneous TID     doxycycline (VIBRAMYCIN) IV  100 mg IntraVENous Q12H    heparin (porcine)  5,000 Units SubCUTAneous 3 times per day    famotidine  20 mg Oral Daily    polyethylene glycol  17 g Oral Daily    clopidogrel  75 mg Oral Daily    aspirin  81 mg Oral Daily    insulin lispro  0-16 Units SubCUTAneous TID WC    insulin lispro  0-4 Units SubCUTAneous Nightly    docusate sodium  100 mg Oral BID    senna  1 tablet Oral BID    amiodarone  200 mg Oral Daily    levalbuterol  1.25 mg Nebulization BID    traZODone  50 mg Oral Nightly    acetaminophen  650 mg Oral Q6H    gabapentin  300 mg Oral TID    sodium chloride flush  5-40 mL IntraVENous 2 times per day    rosuvastatin  20 mg Oral Daily    sertraline  100 mg Oral Daily    mometasone-formoterol  2 puff Inhalation BID    tiotropium  2 puff Inhalation Daily    mirtazapine  15 mg Oral Nightly     Continuous Infusions:   sodium chloride      sodium chloride 100 mL (10/29/22 0507)    dextrose       PRN Meds:sodium chloride, bisacodyl, traMADol, levalbuterol, ALPRAZolam, sodium chloride flush, sodium chloride, ondansetron **OR** ondansetron, diphenhydramine, potassium chloride, dextrose bolus **OR** dextrose bolus, glucagon (rDNA), dextrose, sodium chloride flush    Review of Systems  Constitutional: negative for fatigue, fevers, malaise and weight loss  Ears, nose, mouth, throat: negative for ear drainage, epistaxis, hoarseness, nasal congestion, sore throat and voice change  Respiratory: negative except for cough and shortness of breath  Cardiovascular: negative for chest pain, chest pressure/discomfort, irregular heart beat, lower extremity edema and palpitations  Gastrointestinal: negative for abdominal pain, constipation, diarrhea, jaundice, melena, odynophagia, reflux symptoms and vomiting  Hematologic/lymphatic: negative for bleeding, easy bruising, lymphadenopathy and petechiae  Musculoskeletal:negative for arthralgias, bone pain, muscle weakness, neck pain and stiff joints  Neurological: negative for dizziness, gait problems, headaches, seizures, speech problems, tremors and weakness  Behavioral/Psych: negative for anxiety, behavior problems, depression, fatigue and sleep disturbance  Endocrine: negative for diabetic symptoms including none, neuropathy, polyphagia, polyuria, polydipsia, vomiting and diarrhea and temperature intolerance  Allergic/Immunologic: negative for anaphylaxis, angioedema, hay fever and urticaria    Objective:     Patient Vitals for the past 8 hrs:   Pulse SpO2   10/30/22 0830 55 100 %       I/O last 3 completed shifts: In: 1530.8 [P.O.:960; I.V.:18.2; IV Piggyback:552.6]  Out: 3250 [Urine:3250]  No intake/output data recorded.     General Appearance: alert and oriented to person, place and time, well developed and well- nourished, in no acute distress  Skin: warm and dry, no rash or erythema  Head: normocephalic and atraumatic  Eyes: pupils equal, round, and reactive to light, extraocular eye movements intact, conjunctivae normal  ENT: external ear and ear canal normal bilaterally, nose without deformity, nasal mucosa and turbinates normal  Neck: supple and non-tender without mass, no cervical lymphadenopathy  Pulmonary/Chest: wheezing present-scattered  Cardiovascular: normal rate, regular rhythm,  no murmurs, rubs, distal pulses intact, no carotid bruits  Abdomen: soft, non-tender, non-distended, normal bowel sounds, no masses or organomegaly  Lymph Nodes: Cervical, with acute exacerbation  Moderate left pleural effusion  CKD stage III  Assessment/Plan:     CAD with multivessel disease, status post CABG x3. No significant pleural effusion noted on bedside ultrasound. Chest x-ray shows persistent bibasal infiltrates, which possibly represents atelectatic changes. COPD with acute exacerbation, some wheezing noted today-O2 requirements are now down to 4L. Continue prednisone-1 week taper, will need evaluation for home O2 prior to discharge. Volume overload-improved, creatinine 1.7. Urine output 1.55 L. Pulmonary will sign off. Patient will follow-up with Dr. Melissa Carrion in 2 weeks.     Petr Bronson MD

## 2022-10-30 NOTE — PROGRESS NOTES
Cardiothoracic Surgery Progress Note    CC: S/P CABGx3, KELLY  POD# 9    Subjective:  Hemodynamically stable, 4L O2 via nasal canula, afebrile. Alert and oriented X 3. Weight down this am. NSR.  10/29 continues to improve although oxygen requirement still at 4 L. Required significant amount of oxygen with walking. 10/30 high requirement of oxygen not able to wean oxygen down. Vital Signs:   BP (!) 127/50   Pulse 55   Temp 97.4 °F (36.3 °C) (Temporal)   Resp 22   Ht 6' (1.829 m)   Wt 159 lb 13.3 oz (72.5 kg)   SpO2 100%   BMI 21.68 kg/m²     Physical Exam:   Cardiac: regular, no murmur  Lungs: wheezing on ausculted, productive cough, decreased bases bilaterally   Abdomen:  + BM  Vascular:  pulses all palpable   Extremities: generalized, non-pitting edema 1+  :  /825/?    Lasix 20mg IV bid  Chest Tube(s) & Incision(s):    Sternum: stable,C/D/I   Edges approximated, no drainage  Chest tube site: C/D/I  Purse string intact   left leg incision: C/D/I  Edges well approximated, no drainage       Labs:   CBC:   Recent Labs     10/29/22  0502 10/30/22  0210   WBC 12.6* 9.5   HGB 9.4* 8.9*   HCT 29.6* 28.4*    338       BMP:   Recent Labs     10/29/22  0502 10/30/22  0210   K 4.6 3.3*   CREATININE 1.6* 1.7*   CALCIUM 9.8 9.9   MG  --  1.90         Assessment/Plan:  As per CC:     Plan:   Continue with steroid as per pulmonary  Wean oxygen as tolerated  If patient reached 2 L at rest we could discharge home      Electronically signed by Pool Zamarripa PA-C on 10/30/2022 at 11:50 AM

## 2022-10-30 NOTE — PROGRESS NOTES
Overnight pulse oximetry ordered erroneously by barry Oleary, RRT, verified with Gabriela Duarte RN.

## 2022-10-30 NOTE — PROGRESS NOTES
Office : [phone]     Fax :[phone]       Nephrology progress Note      Patient's Name: Ana Lilia Pelletier  9:42 AM  10/30/2022    Reason for Consult:  CKD 3a      History of Present Ilness:    Ana Lilia Pelletier is a 81 y.o. male with h/o CKD.,  HTN , CAD who has LHC yesterday and had multivessel CAD. Plan for CABG tomorrow     Denies any chest pain at this time   BP is stable   No SOB   No peripheral edema noted     Interval hx    S/p CABG    Making good urine volume   Creatinine level 2.0 ---> 1.9 ----> 1.5 ---> 1.6 ---> 1.7    CXR shows atelectasis     Good UOP       I/O last 3 completed shifts:   In: 1530.8 [P.O.:960; I.V.:18.2; IV Piggyback:552.6]  Out: 3250 [Urine:3250]    Past Medical History:   Diagnosis Date    Chronic renal insufficiency     Due to chronic nephrolithiasis    Colloid cyst of third ventricle (HCC)     Coronary artery disease     DDD (degenerative disc disease), lumbar 2006    Disc bulge and facet arthropathy at L3-L4, L5-S1    Diabetes mellitus, type 2 ([facility] [address]     Diverticulitis of colon with perforation 11/07    Emergency colostomy    ED (erectile dysfunction)     Hyperlipidemia     Hypertension     Hypertensive retinopathy of both eyes 1/24/2013    Nephrolithiasis        Past Surgical History:   Procedure Laterality Date    BRAIN SURGERY  12/13/2007    Resection of colloid cyst of 3rd ventricle    CARDIAC CATHETERIZATION  5/02, 12/03,  3/08    COLOSTOMY  11/07    Emergent- due to diverticulitis with perforation    CORONARY ANGIOPLASTY  1995    RCA- unsuccessful    CORONARY ANGIOPLASTY WITH STENT PLACEMENT  10/05    Obtuse marginal branch of circumflex:  drug-eluting stent    CORONARY ARTERY BYPASS GRAFT N/A 10/21/2022    CABG X 3, LIMA  GRAFT TO LAD, VEIN GRAFT TO DISTAL RCA  AND OM, EVH LEFT SAPHENOUS VEIN.  LIGATION KELLY WITH 35 MM ATRICLIP, TIMUR, TCPB, DOPPLER BYPASS GRAFT FLOW VERIFICATION, EPIAORTIC ECHOCARDIOGRAM, INSERTION OF VENTRICULAR AND ATRIAL PACING WIRES performed by Caryle Code, MD at [address]  2000, 2005    Right    LITHOTRIPSY  1998    Bilateral    LITHOTRIPSY  1994    Bilateral with right stent placement    PARTIAL NEPHRECTOMY  1980    Right    REVISION COLOSTOMY  3/08    Colostomy takedown with sigmoid colectomy and coloproctostomy anastomosis    TOTAL KNEE ARTHROPLASTY Left 1/47/06    UMBILICAL HERNIA REPAIR         Current Medications:    predniSONE (DELTASONE) tablet 40 mg, Daily  lactobacillus (CULTURELLE) capsule 1 capsule, BID WC  guaiFENesin (MUCINEX) extended release tablet 600 mg, BID  furosemide (LASIX) injection 20 mg, TID  insulin glargine (LANTUS) injection vial 25 Units, BID  insulin lispro (HUMALOG) injection vial 8 Units, TID WC  0.9 % sodium chloride infusion, PRN  doxycycline (VIBRAMYCIN) 100 mg in dextrose 5 % 100 mL IVPB, Q12H  heparin (porcine) injection 5,000 Units, 3 times per day  famotidine (PEPCID) tablet 20 mg, Daily  polyethylene glycol (GLYCOLAX) packet 17 g, Daily  clopidogrel (PLAVIX) tablet 75 mg, Daily  bisacodyl (DULCOLAX) suppository 10 mg, Daily PRN  aspirin chewable tablet 81 mg, Daily  traMADol (ULTRAM) tablet 50 mg, Q4H PRN  insulin lispro (HUMALOG) injection vial 0-16 Units, TID WC  insulin lispro (HUMALOG) injection vial 0-4 Units, Nightly  docusate sodium (COLACE) capsule 100 mg, BID  senna (SENOKOT) tablet 8.6 mg, BID  amiodarone (CORDARONE) tablet 200 mg, Daily  levalbuterol (XOPENEX) nebulizer solution 1.25 mg, BID  levalbuterol (XOPENEX) nebulizer solution 1.25 mg, Q4H PRN  traZODone (DESYREL) tablet 50 mg, Nightly  acetaminophen (TYLENOL) tablet 650 mg, Q6H  ALPRAZolam (XANAX) tablet 0.25 mg, Q6H PRN  gabapentin (NEURONTIN) capsule 300 mg, TID  sodium chloride flush 0.9 % injection 5-40 mL, 2 times per day  sodium chloride flush 0.9 % injection 5-40 mL, PRN  0.9 % sodium chloride infusion, PRN  ondansetron (ZOFRAN-ODT) disintegrating tablet 4 mg, Q8H PRN   Or  ondansetron (ZOFRAN) injection 4 mg, Q6H PRN  diphenhydrAMINE (BENADRYL) tablet 25 mg, Nightly PRN  potassium chloride 20 mEq/50 mL IVPB (Central Line), PRN  dextrose bolus 10% 125 mL, PRN   Or  dextrose bolus 10% 250 mL, PRN  glucagon (rDNA) injection 1 mg, PRN  dextrose 10 % infusion, Continuous PRN  sodium chloride flush 0.9 % injection 5-40 mL, PRN  rosuvastatin (CRESTOR) tablet 20 mg, Daily  sertraline (ZOLOFT) tablet 100 mg, Daily  mometasone-formoterol (DULERA) 200-5 MCG/ACT inhaler 2 puff, BID  tiotropium (SPIRIVA RESPIMAT) 2.5 MCG/ACT inhaler 2 puff, Daily  mirtazapine (REMERON) tablet 15 mg, Nightly        Physical exam:     Vitals:  BP (!) 127/50   Pulse 55   Temp 97.4 °F (36.3 °C) (Temporal)   Resp 22   Ht 6' (1.829 m)   Wt 159 lb 13.3 oz (72.5 kg)   SpO2 100%   BMI 21.68 kg/m²   Constitutional:  OAA X3 NAD  Skin: no rash, turgor wnl  Heent:  eomi, mmm  Neck: no bruits or jvd noted  Cardiovascular:  S1, S2 without m/r/g  Respiratory: CTA B without w/r/r  Abdomen:  +bs, soft, nt, nd  Ext: no  lower extremity edema      Labs:  CBC:   Recent Labs     10/28/22  0430 10/29/22  0502 10/30/22  0210   WBC 9.0 12.6* 9.5   HGB 9.7* 9.4* 8.9*    315 338       BMP:    Recent Labs     10/28/22  0430 10/29/22  0502 10/30/22  0210    140 141   K 4.9 4.6 3.3*   CL 98* 99 99   CO2 28 29 35*   BUN 41* 46* 53*   CREATININE 1.6* 1.6* 1.7*   GLUCOSE 325* 201* 78       Ca/Mg/Phos:   Recent Labs     10/28/22  0430 10/29/22  0502 10/30/22  0210   CALCIUM 9.3 9.8 9.9   MG  --   --  1.90           IMAGING:  XR CHEST PORTABLE   Final Result   No interval change in bilateral airspace and interstitial opacities.          XR CHEST PORTABLE   Final Result   Persistent bibasilar atelectasis or airspace disease, decreased on the right   as compared to prior. Small pleural effusions. XR CHEST PORTABLE   Final Result   Bibasilar atelectasis or airspace disease, slightly increased as compared to   prior. Suspected small pleural effusions. No pneumothorax. XR CHEST PORTABLE   Final Result   Persistent bibasilar atelectasis or airspace disease and small pleural   effusions, similar to prior. XR CHEST PORTABLE   Final Result   1. Sequela from CABG including median sternotomy and clips about the heart. 2.  Endotracheal tube is in place, tip at a level between that of the   clavicular heads and aortic knob. 3.  NG tube extends below the left hemidiaphragm, into the upper abdomen, tip   overlying the expected level of the stomach, left upper quadrant. 4. Right IJ [place]emima catheter via introducer sheath has its tip overlying   the right pulmonary artery level. A 2nd right IJ CVC is demonstrated, tip at   the superior cavoatrial junction level. 5. Left side and right side pleural catheters appear unremarkable. 6. No pneumomediastinum or pneumothorax. 7. Minimal atelectasis at the lung bases and possible small volume left   pleural effusion. 8. No fracture evident. VL DUP CAROTID BILATERAL   Final Result      VL PRE OP VEIN MAPPING   Final Result      XR CHEST PORTABLE   Final Result   No acute cardiopulmonary disease. Assessment/Plan :      1.  CKD 3 A   Creatinine level increased to 2.0 -->2.1 ---> 1.9 --> 2.0 -->1.9 ---> 1.5 --> 1.6   Good uOP   No edema noted    SOB   Has increased base crackles   Continue lasix iv TID        2. HTN. Better controlled today     3. Anemia. Monitor   Hb dropped since admission   Transfuse if HB < 7.0     4. MVD . S/p CABG     5. Hyperkalemia   Will stop potassium supplements     Valsartan has been held   Volume status is good.        Monitor renal function closely   Encourage incentive spirometry   D/w primary team  Recommend to dose adjust all medications based on renal functions  Maintain SBP> 90 mmHg   Daily weights   AVOID NSAIDs  Avoid Nephrotoxins  Monitor Intake/Output  Call if significant decrease in urine output      Thank you for allowing us to participate in care of Keon Parra         Electronically signed by: Buddy Muir MD, 10/30/2022, 9:42 AM      [facility] [address]  Office : [phone]  Fax :[phone]

## 2022-10-30 NOTE — PROGRESS NOTES
Hospitalist Progress Note      PCP: Haleigh Jacobs MD    Date of Admission: 10/19/2022    Chief Complaint: \"I had a bypass surgery\"    Hospital Course: 81 y.o. male on [employer] MD service who was admitted on 10/19/2022 for unstable angina and CAD. Patient with history of DM 2, HTN, CKD 3, COPD and hyperlipidemia. 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 Underwent Coronary bypass grafting surgery x3 with single  reverse saphenous vein graft to the obtuse marginal branch of the  circumflex, separate sequential reverse saphenous vein graft to the  distal right coronary artery, pedicle left internal artery to the LAD,  endoscopic vein harvest of the left greater saphenous vein, left atrial  appendage obliteration with 35-mm AtriCure left atrial clip, total  cardiopulmonary bypass, transesophageal echo, Doppler verification of  graft, epiaortic ultrasound, and vas cath placement on 10/21/22. Subjective:  Patient remains on 4 L NC. Now with many loose stool with laxative. Denies SOB. No HA, dizziness, fevers or abdominal pain.        Medications:  Reviewed    Infusion Medications    sodium chloride      sodium chloride 100 mL (10/29/22 0507)    dextrose       Scheduled Medications    predniSONE  40 mg Oral Daily    lactobacillus  1 capsule Oral BID WC    guaiFENesin  600 mg Oral BID    furosemide  20 mg IntraVENous TID    insulin glargine  25 Units SubCUTAneous BID    insulin lispro  8 Units SubCUTAneous TID     doxycycline (VIBRAMYCIN) IV  100 mg IntraVENous Q12H    heparin (porcine)  5,000 Units SubCUTAneous 3 times per day    famotidine  20 mg Oral Daily    polyethylene glycol  17 g Oral Daily    clopidogrel  75 mg Oral Daily    aspirin  81 mg Oral Daily    insulin lispro  0-16 Units SubCUTAneous TID     insulin lispro  0-4 Units SubCUTAneous Nightly    docusate sodium  100 mg Oral BID    senna  1 tablet Oral BID    amiodarone  200 mg Oral Daily    levalbuterol  1.25 mg Nebulization BID    traZODone  50 mg Oral Nightly    acetaminophen  650 mg Oral Q6H    gabapentin  300 mg Oral TID    sodium chloride flush  5-40 mL IntraVENous 2 times per day    rosuvastatin  20 mg Oral Daily    sertraline  100 mg Oral Daily    mometasone-formoterol  2 puff Inhalation BID    tiotropium  2 puff Inhalation Daily    mirtazapine  15 mg Oral Nightly     PRN Meds: sodium chloride, bisacodyl, traMADol, levalbuterol, ALPRAZolam, sodium chloride flush, sodium chloride, ondansetron **OR** ondansetron, diphenhydramine, potassium chloride, dextrose bolus **OR** dextrose bolus, glucagon (rDNA), dextrose, sodium chloride flush      Intake/Output Summary (Last 24 hours) at 10/30/2022 1801  Last data filed at 10/29/2022 2313  Gross per 24 hour   Intake 480 ml   Output 1550 ml   Net -1070 ml         Physical Exam Performed:    BP (!) 145/74   Pulse 58   Temp 97.7 °F (36.5 °C) (Temporal)   Resp 24   Ht 6' (1.829 m)   Wt 159 lb 13.3 oz (72.5 kg)   SpO2 93%   BMI 21.68 kg/m²     General appearance: Appears comfortable. Well nourished  Eyes: Sclera clear, pupils equal  ENT: Moist mucus membranes, no thrush  Neck: Trachea midline, symmetrical  Cardiovascular: Regular rhythm, normal S1, S2. No murmur, gallop, rub. No edema in  lower extremities  Respiratory: Bibasilar rales. BL wheezing improving. No rhonchi. Gastrointestinal: Abdomen soft, not tender, not distended, normal bowel sounds  Musculoskeletal: No cyanosis in digits, warm extremities  Neurologic: Grossly intact, no motor or speech deficits. Psychiatric: Normal affect. Alert and oriented to time, place and person.   Skin: Warm, dry, normal turgor, no rash      Labs:   Recent Labs     10/28/22  0430 10/29/22  0502 10/30/22  0210   WBC 9.0 12.6* 9.5   HGB 9.7* 9.4* 8.9*   HCT 31.2* 29.6* 28.4*    315 338       Recent Labs     10/28/22  0430 10/29/22  0502 10/30/22  0210 10/30/22  1435    140 141  --    K 4.9 4.6 3.3* 4.7   CL 98* 99 99  --    CO2 28 29 35*  --    BUN 41* 46* 53*  --    CREATININE 1.6* 1.6* 1.7*  --    CALCIUM 9.3 9.8 9.9  --        No results for input(s): AST, ALT, BILIDIR, BILITOT, ALKPHOS in the last 72 hours. No results for input(s): INR in the last 72 hours. No results for input(s): [place] Bounds in the last 72 hours. Urinalysis:      Lab Results   Component Value Date/Time    NITRU Negative 10/19/2022 11:10 PM    WBCUA 0 04/25/2019 01:47 PM    RBCUA 4 04/25/2019 01:47 PM    BLOODU Negative 10/19/2022 11:10 PM    SPECGRAV 1.010 10/19/2022 11:10 PM    GLUCOSEU Negative 10/19/2022 11:10 PM       Radiology:  XR CHEST PORTABLE   Final Result   No interval change in bilateral airspace and interstitial opacities. XR CHEST PORTABLE   Final Result   Persistent bibasilar atelectasis or airspace disease, decreased on the right   as compared to prior. Small pleural effusions. XR CHEST PORTABLE   Final Result   Bibasilar atelectasis or airspace disease, slightly increased as compared to   prior. Suspected small pleural effusions. No pneumothorax. XR CHEST PORTABLE   Final Result   Persistent bibasilar atelectasis or airspace disease and small pleural   effusions, similar to prior. XR CHEST PORTABLE   Final Result   1. Sequela from CABG including median sternotomy and clips about the heart. 2.  Endotracheal tube is in place, tip at a level between that of the   clavicular heads and aortic knob. 3.  NG tube extends below the left hemidiaphragm, into the upper abdomen, tip   overlying the expected level of the stomach, left upper quadrant. 4. Right IJ [place]-Jemima catheter via introducer sheath has its tip overlying   the right pulmonary artery level. A 2nd right IJ CVC is demonstrated, tip at   the superior cavoatrial junction level. 5. Left side and right side pleural catheters appear unremarkable. 6. No pneumomediastinum or pneumothorax.    7. Minimal atelectasis at the lung bases and possible small volume left   pleural effusion. 8. No fracture evident. VL DUP CAROTID BILATERAL   Final Result      VL PRE OP VEIN MAPPING   Final Result      XR CHEST PORTABLE   Final Result   No acute cardiopulmonary disease. Assessment/Plan:    Active Hospital Problems    Diagnosis     Pleural effusion on left [J90]      Priority: Medium    S/P CABG x 3 [Z95.1]      Priority: Medium    Acute respiratory failure with hypoxia (HCC) [J96.01]      Priority: Medium    Moderate malnutrition (HCC) [E44.0]      Priority: Medium    Dyslipidemia [E78.5]      Priority: Medium    Tobacco abuse disorder [Z72.0]      Priority: Medium    Unstable angina ([facility]) [I20.0]      Priority: Medium    Coronary artery disease involving native coronary artery of native heart with unstable angina pectoris ([address] [I25.110]      Priority: Medium    COPD, moderate ([address] [J44.9]      Priority: Medium    COPD with acute exacerbation ([address] [J44.1]     DM2 (diabetes mellitus, type 2) ([address] [E11.9]     Essential hypertension [I10]     Chronic kidney disease, stage III (moderate) ([facility]) [N18.30]      Continue Lantus 25 U bid  Continue Humalog 8 U with meals  Continue Lasix 20 mg IV q8h  Wean Prednisone over next 7 days  Continue Amio, ASA, Crestor  Continue Dulera and Xopenex  Wean O2 as tolerated  Arixtra for DVT ppx  On Doxy IV for AECOPD Day 5/7      DVT Prophylaxis: Arixtra  Diet: ADULT ORAL NUTRITION SUPPLEMENT; Breakfast, Lunch; Diabetic Oral Supplement  ADULT DIET; Regular; 5 carb choices (75 gm/meal); Low Fat/Low Chol/High Fiber/2 gm Na; 1800 ml  Code Status: Full Code  PT/OT Eval Status: Following    Dispo - Home with home PT/OT    Discussed with patient and CVU RN. Home when ok with all, with home O2.     Mt Diehl MD

## 2022-10-31 ENCOUNTER — APPOINTMENT (OUTPATIENT)
Dept: GENERAL RADIOLOGY | Age: 72
DRG: 233 | End: 2022-10-31
Attending: INTERNAL MEDICINE
Payer: MEDICARE

## 2022-10-31 LAB
ACANTHOCYTES: ABNORMAL
ANION GAP SERPL CALCULATED.3IONS-SCNC: 7 MMOL/L (ref 3–16)
ANISOCYTOSIS: ABNORMAL
BANDED NEUTROPHILS RELATIVE PERCENT: 1 % (ref 0–7)
BASOPHILS ABSOLUTE: 0 K/UL (ref 0–0.2)
BASOPHILS RELATIVE PERCENT: 0 %
BUN BLDV-MCNC: 47 MG/DL (ref 7–20)
CALCIUM SERPL-MCNC: 9.6 MG/DL (ref 8.3–10.6)
CHLORIDE BLD-SCNC: 104 MMOL/L (ref 99–110)
CO2: 32 MMOL/L (ref 21–32)
CREAT SERPL-MCNC: 1.6 MG/DL (ref 0.8–1.3)
EOSINOPHILS ABSOLUTE: 0 K/UL (ref 0–0.6)
EOSINOPHILS RELATIVE PERCENT: 0 %
GFR SERPL CREATININE-BSD FRML MDRD: 46 ML/MIN/{1.73_M2}
GLUCOSE BLD-MCNC: 217 MG/DL (ref 70–99)
GLUCOSE BLD-MCNC: 255 MG/DL (ref 70–99)
GLUCOSE BLD-MCNC: 281 MG/DL (ref 70–99)
GLUCOSE BLD-MCNC: 71 MG/DL (ref 70–99)
GLUCOSE BLD-MCNC: 79 MG/DL (ref 70–99)
HCT VFR BLD CALC: 29.5 % (ref 40.5–52.5)
HEMOGLOBIN: 9.3 G/DL (ref 13.5–17.5)
LYMPHOCYTES ABSOLUTE: 1.5 K/UL (ref 1–5.1)
LYMPHOCYTES RELATIVE PERCENT: 16 %
MCH RBC QN AUTO: 29.6 PG (ref 26–34)
MCHC RBC AUTO-ENTMCNC: 31.4 G/DL (ref 31–36)
MCV RBC AUTO: 94.2 FL (ref 80–100)
METAMYELOCYTES RELATIVE PERCENT: 1 %
MONOCYTES ABSOLUTE: 0.5 K/UL (ref 0–1.3)
MONOCYTES RELATIVE PERCENT: 6 %
NEUTROPHILS ABSOLUTE: 7.1 K/UL (ref 1.7–7.7)
NEUTROPHILS RELATIVE PERCENT: 76 %
NUCLEATED RED BLOOD CELLS: 2 /100 WBC
OVALOCYTES: ABNORMAL
PDW BLD-RTO: 19.4 % (ref 12.4–15.4)
PERFORMED ON: ABNORMAL
PERFORMED ON: NORMAL
PLATELET # BLD: 382 K/UL (ref 135–450)
PMV BLD AUTO: 7.9 FL (ref 5–10.5)
POLYCHROMASIA: ABNORMAL
POTASSIUM REFLEX MAGNESIUM: 3.9 MMOL/L (ref 3.5–5.1)
RBC # BLD: 3.13 M/UL (ref 4.2–5.9)
SODIUM BLD-SCNC: 143 MMOL/L (ref 136–145)
TEAR DROP CELLS: ABNORMAL
WBC # BLD: 9.1 K/UL (ref 4–11)

## 2022-10-31 PROCEDURE — 2580000003 HC RX 258: Performed by: THORACIC SURGERY (CARDIOTHORACIC VASCULAR SURGERY)

## 2022-10-31 PROCEDURE — APPNB45 APP NON BILLABLE 31-45 MINUTES

## 2022-10-31 PROCEDURE — 71045 X-RAY EXAM CHEST 1 VIEW: CPT

## 2022-10-31 PROCEDURE — 6370000000 HC RX 637 (ALT 250 FOR IP): Performed by: NURSE PRACTITIONER

## 2022-10-31 PROCEDURE — 6360000002 HC RX W HCPCS

## 2022-10-31 PROCEDURE — 2580000003 HC RX 258: Performed by: INTERNAL MEDICINE

## 2022-10-31 PROCEDURE — 2140000000 HC CCU INTERMEDIATE R&B

## 2022-10-31 PROCEDURE — 97530 THERAPEUTIC ACTIVITIES: CPT

## 2022-10-31 PROCEDURE — 6370000000 HC RX 637 (ALT 250 FOR IP): Performed by: INTERNAL MEDICINE

## 2022-10-31 PROCEDURE — 97110 THERAPEUTIC EXERCISES: CPT

## 2022-10-31 PROCEDURE — 6370000000 HC RX 637 (ALT 250 FOR IP)

## 2022-10-31 PROCEDURE — 6370000000 HC RX 637 (ALT 250 FOR IP): Performed by: THORACIC SURGERY (CARDIOTHORACIC VASCULAR SURGERY)

## 2022-10-31 PROCEDURE — 99232 SBSQ HOSP IP/OBS MODERATE 35: CPT | Performed by: INTERNAL MEDICINE

## 2022-10-31 PROCEDURE — 6360000002 HC RX W HCPCS: Performed by: THORACIC SURGERY (CARDIOTHORACIC VASCULAR SURGERY)

## 2022-10-31 PROCEDURE — 36592 COLLECT BLOOD FROM PICC: CPT

## 2022-10-31 PROCEDURE — 6360000002 HC RX W HCPCS: Performed by: INTERNAL MEDICINE

## 2022-10-31 PROCEDURE — 6360000002 HC RX W HCPCS: Performed by: HOSPITALIST

## 2022-10-31 PROCEDURE — 85025 COMPLETE CBC W/AUTO DIFF WBC: CPT

## 2022-10-31 PROCEDURE — 2700000000 HC OXYGEN THERAPY PER DAY

## 2022-10-31 PROCEDURE — 94640 AIRWAY INHALATION TREATMENT: CPT

## 2022-10-31 PROCEDURE — 94669 MECHANICAL CHEST WALL OSCILL: CPT

## 2022-10-31 PROCEDURE — 94761 N-INVAS EAR/PLS OXIMETRY MLT: CPT

## 2022-10-31 PROCEDURE — 92526 ORAL FUNCTION THERAPY: CPT

## 2022-10-31 PROCEDURE — 97116 GAIT TRAINING THERAPY: CPT

## 2022-10-31 PROCEDURE — 80048 BASIC METABOLIC PNL TOTAL CA: CPT

## 2022-10-31 PROCEDURE — 2500000003 HC RX 250 WO HCPCS: Performed by: INTERNAL MEDICINE

## 2022-10-31 RX ORDER — FUROSEMIDE 40 MG/1
40 TABLET ORAL DAILY
Status: DISCONTINUED | OUTPATIENT
Start: 2022-10-31 | End: 2022-11-01 | Stop reason: HOSPADM

## 2022-10-31 RX ORDER — INSULIN GLARGINE 100 [IU]/ML
15 INJECTION, SOLUTION SUBCUTANEOUS 2 TIMES DAILY
Status: DISCONTINUED | OUTPATIENT
Start: 2022-10-31 | End: 2022-11-01

## 2022-10-31 RX ADMIN — Medication 2 PUFF: at 12:24

## 2022-10-31 RX ADMIN — INSULIN LISPRO 4 UNITS: 100 INJECTION, SOLUTION INTRAVENOUS; SUBCUTANEOUS at 12:07

## 2022-10-31 RX ADMIN — ACETAMINOPHEN 650 MG: 325 TABLET ORAL at 14:40

## 2022-10-31 RX ADMIN — DOXYCYCLINE 100 MG: 100 INJECTION, POWDER, LYOPHILIZED, FOR SOLUTION INTRAVENOUS at 06:31

## 2022-10-31 RX ADMIN — PREDNISONE 40 MG: 20 TABLET ORAL at 08:43

## 2022-10-31 RX ADMIN — ROSUVASTATIN CALCIUM 20 MG: 20 TABLET, FILM COATED ORAL at 08:48

## 2022-10-31 RX ADMIN — POTASSIUM CHLORIDE 20 MEQ: 29.8 INJECTION, SOLUTION INTRAVENOUS at 12:37

## 2022-10-31 RX ADMIN — ACETAMINOPHEN 650 MG: 325 TABLET ORAL at 20:11

## 2022-10-31 RX ADMIN — FUROSEMIDE 20 MG: 10 INJECTION, SOLUTION INTRAMUSCULAR; INTRAVENOUS at 08:48

## 2022-10-31 RX ADMIN — Medication 1 CAPSULE: at 16:55

## 2022-10-31 RX ADMIN — GABAPENTIN 300 MG: 300 CAPSULE ORAL at 08:45

## 2022-10-31 RX ADMIN — LEVALBUTEROL HYDROCHLORIDE 1.25 MG: 1.25 SOLUTION RESPIRATORY (INHALATION) at 12:24

## 2022-10-31 RX ADMIN — TIOTROPIUM BROMIDE INHALATION SPRAY 2 PUFF: 3.12 SPRAY, METERED RESPIRATORY (INHALATION) at 12:24

## 2022-10-31 RX ADMIN — INSULIN GLARGINE 15 UNITS: 100 INJECTION, SOLUTION SUBCUTANEOUS at 20:12

## 2022-10-31 RX ADMIN — Medication 2 PUFF: at 21:00

## 2022-10-31 RX ADMIN — LEVALBUTEROL HYDROCHLORIDE 1.25 MG: 1.25 SOLUTION RESPIRATORY (INHALATION) at 20:52

## 2022-10-31 RX ADMIN — HEPARIN SODIUM 5000 UNITS: 5000 INJECTION INTRAVENOUS; SUBCUTANEOUS at 14:41

## 2022-10-31 RX ADMIN — ACETAMINOPHEN 650 MG: 325 TABLET ORAL at 06:32

## 2022-10-31 RX ADMIN — CLOPIDOGREL BISULFATE 75 MG: 75 TABLET ORAL at 08:44

## 2022-10-31 RX ADMIN — HEPARIN SODIUM 5000 UNITS: 5000 INJECTION INTRAVENOUS; SUBCUTANEOUS at 06:32

## 2022-10-31 RX ADMIN — FAMOTIDINE 20 MG: 20 TABLET ORAL at 08:44

## 2022-10-31 RX ADMIN — GABAPENTIN 300 MG: 300 CAPSULE ORAL at 14:40

## 2022-10-31 RX ADMIN — FUROSEMIDE 40 MG: 40 TABLET ORAL at 14:40

## 2022-10-31 RX ADMIN — MIRTAZAPINE 15 MG: 15 TABLET, FILM COATED ORAL at 20:12

## 2022-10-31 RX ADMIN — Medication 1 CAPSULE: at 08:44

## 2022-10-31 RX ADMIN — INSULIN LISPRO 8 UNITS: 100 INJECTION, SOLUTION INTRAVENOUS; SUBCUTANEOUS at 16:55

## 2022-10-31 RX ADMIN — ASPIRIN 81 MG 81 MG: 81 TABLET ORAL at 08:44

## 2022-10-31 RX ADMIN — SERTRALINE 100 MG: 50 TABLET, FILM COATED ORAL at 08:44

## 2022-10-31 RX ADMIN — GABAPENTIN 300 MG: 300 CAPSULE ORAL at 20:11

## 2022-10-31 RX ADMIN — INSULIN GLARGINE 15 UNITS: 100 INJECTION, SOLUTION SUBCUTANEOUS at 09:55

## 2022-10-31 RX ADMIN — AMIODARONE HYDROCHLORIDE 200 MG: 200 TABLET ORAL at 08:44

## 2022-10-31 RX ADMIN — GUAIFENESIN 600 MG: 600 TABLET, EXTENDED RELEASE ORAL at 20:11

## 2022-10-31 RX ADMIN — POTASSIUM CHLORIDE 20 MEQ: 29.8 INJECTION, SOLUTION INTRAVENOUS at 11:25

## 2022-10-31 RX ADMIN — HEPARIN SODIUM 5000 UNITS: 5000 INJECTION INTRAVENOUS; SUBCUTANEOUS at 20:13

## 2022-10-31 RX ADMIN — GUAIFENESIN 600 MG: 600 TABLET, EXTENDED RELEASE ORAL at 08:45

## 2022-10-31 RX ADMIN — ACETAMINOPHEN 650 MG: 325 TABLET ORAL at 08:44

## 2022-10-31 RX ADMIN — DOXYCYCLINE 100 MG: 100 INJECTION, POWDER, LYOPHILIZED, FOR SOLUTION INTRAVENOUS at 16:59

## 2022-10-31 RX ADMIN — Medication 10 ML: at 08:51

## 2022-10-31 ASSESSMENT — PAIN SCALES - GENERAL
PAINLEVEL_OUTOF10: 0
PAINLEVEL_OUTOF10: 2
PAINLEVEL_OUTOF10: 0
PAINLEVEL_OUTOF10: 0

## 2022-10-31 NOTE — PROGRESS NOTES
Hospitalist Progress Note      PCP: Alfredo Tapia MD    Date of Admission: 10/19/2022    Chief Complaint: \"I had a bypass surgery\"    Hospital Course: 81 y.o. male on Jan [employer] MD service who was admitted on 10/19/2022 for unstable angina and CAD. Patient with history of DM 2, HTN, CKD 3, COPD and hyperlipidemia. 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 Underwent Coronary bypass grafting surgery x3 with single  reverse saphenous vein graft to the obtuse marginal branch of the  circumflex, separate sequential reverse saphenous vein graft to the  distal right coronary artery, pedicle left internal artery to the LAD,  endoscopic vein harvest of the left greater saphenous vein, left atrial  appendage obliteration with 35-mm AtriCure left atrial clip, total  cardiopulmonary bypass, transesophageal echo, Doppler verification of  graft, epiaortic ultrasound, and vas cath placement on 10/21/22. Subjective:  Patient remains on 5 L NC this morning. Coughing. Denies SOB. No HA, dizziness, fevers or abdominal pain.        Medications:  Reviewed    Infusion Medications    sodium chloride      sodium chloride 100 mL (10/29/22 0507)    dextrose       Scheduled Medications    insulin glargine  15 Units SubCUTAneous BID    furosemide  40 mg Oral Daily    predniSONE  40 mg Oral Daily    lactobacillus  1 capsule Oral BID WC    guaiFENesin  600 mg Oral BID    doxycycline (VIBRAMYCIN) IV  100 mg IntraVENous Q12H    heparin (porcine)  5,000 Units SubCUTAneous 3 times per day    famotidine  20 mg Oral Daily    polyethylene glycol  17 g Oral Daily    clopidogrel  75 mg Oral Daily    aspirin  81 mg Oral Daily    insulin lispro  0-16 Units SubCUTAneous TID     insulin lispro  0-4 Units SubCUTAneous Nightly    docusate sodium  100 mg Oral BID    senna  1 tablet Oral BID    amiodarone  200 mg Oral Daily    levalbuterol  1.25 mg Nebulization BID    traZODone  50 mg Oral Nightly    acetaminophen  650 mg Oral Q6H    gabapentin  300 mg Oral TID    sodium chloride flush  5-40 mL IntraVENous 2 times per day    rosuvastatin  20 mg Oral Daily    sertraline  100 mg Oral Daily    mometasone-formoterol  2 puff Inhalation BID    tiotropium  2 puff Inhalation Daily    mirtazapine  15 mg Oral Nightly     PRN Meds: sodium chloride, bisacodyl, traMADol, levalbuterol, ALPRAZolam, sodium chloride flush, sodium chloride, ondansetron **OR** ondansetron, diphenhydramine, potassium chloride, dextrose bolus **OR** dextrose bolus, glucagon (rDNA), dextrose, sodium chloride flush      Intake/Output Summary (Last 24 hours) at 10/31/2022 1122  Last data filed at 10/31/2022 1014  Gross per 24 hour   Intake 720 ml   Output 1600 ml   Net -880 ml         Physical Exam Performed:    /60   Pulse 60   Temp 97 °F (36.1 °C) (Temporal)   Resp 20   Ht 6' (1.829 m)   Wt 160 lb 0.9 oz (72.6 kg)   SpO2 92%   BMI 21.71 kg/m²     General appearance: Appears comfortable. Well nourished  Eyes: Sclera clear, pupils equal  ENT: Moist mucus membranes, no thrush  Neck: Trachea midline, symmetrical  Cardiovascular: Regular rhythm, normal S1, S2. No murmur, gallop, rub. No edema in  lower extremities  Respiratory: Bibasilar rales. BL wheezing resolved. No rhonchi. Gastrointestinal: Abdomen soft, not tender, not distended, normal bowel sounds  Musculoskeletal: No cyanosis in digits, warm extremities  Neurologic: Grossly intact, no motor or speech deficits. Psychiatric: Normal affect. Alert and oriented to time, place and person.   Skin: Warm, dry, normal turgor, no rash      Labs:   Recent Labs     10/29/22  0502 10/30/22  0210 10/31/22  0618   WBC 12.6* 9.5 9.1   HGB 9.4* 8.9* 9.3*   HCT 29.6* 28.4* 29.5*    338 382       Recent Labs     10/29/22  0502 10/30/22  0210 10/30/22  1435 10/31/22  0618    141  --  143   K 4.6 3.3* 4.7 3.9   CL 99 99  --  104   CO2 29 35*  --  32   BUN 46* 53*  --  47* CREATININE 1.6* 1.7*  --  1.6*   CALCIUM 9.8 9.9  --  9.6       No results for input(s): AST, ALT, BILIDIR, BILITOT, ALKPHOS in the last 72 hours. No results for input(s): INR in the last 72 hours. No results for input(s): [place] in the last 72 hours. Urinalysis:      Lab Results   Component Value Date/Time    NITRU Negative 10/19/2022 11:10 PM    WBCUA 0 04/25/2019 01:47 PM    RBCUA 4 04/25/2019 01:47 PM    BLOODU Negative 10/19/2022 11:10 PM    SPECGRAV 1.010 10/19/2022 11:10 PM    GLUCOSEU Negative 10/19/2022 11:10 PM       Radiology:  XR CHEST PORTABLE   Final Result   No interval change in bilateral airspace and interstitial opacities. XR CHEST PORTABLE   Final Result   Persistent bibasilar atelectasis or airspace disease, decreased on the right   as compared to prior. Small pleural effusions. XR CHEST PORTABLE   Final Result   Bibasilar atelectasis or airspace disease, slightly increased as compared to   prior. Suspected small pleural effusions. No pneumothorax. XR CHEST PORTABLE   Final Result   Persistent bibasilar atelectasis or airspace disease and small pleural   effusions, similar to prior. XR CHEST PORTABLE   Final Result   1. Sequela from CABG including median sternotomy and clips about the heart. 2.  Endotracheal tube is in place, tip at a level between that of the   clavicular heads and aortic knob. 3.  NG tube extends below the left hemidiaphragm, into the upper abdomen, tip   overlying the expected level of the stomach, left upper quadrant. 4. Right IJ [place]-Jemima catheter via introducer sheath has its tip overlying   the right pulmonary artery level. A 2nd right IJ CVC is demonstrated, tip at   the superior cavoatrial junction level. 5. Left side and right side pleural catheters appear unremarkable. 6. No pneumomediastinum or pneumothorax. 7. Minimal atelectasis at the lung bases and possible small volume left   pleural effusion. 8. No fracture evident. VL DUP CAROTID BILATERAL   Final Result      VL PRE OP VEIN MAPPING   Final Result      XR CHEST PORTABLE   Final Result   No acute cardiopulmonary disease. XR CHEST PORTABLE    (Results Pending)           Assessment/Plan:    Active Hospital Problems    Diagnosis     Pleural effusion on left [J90]      Priority: Medium    S/P CABG x 3 [Z95.1]      Priority: Medium    Acute respiratory failure with hypoxia (HCC) [J96.01]      Priority: Medium    Moderate malnutrition (HCC) [E44.0]      Priority: Medium    Dyslipidemia [E78.5]      Priority: Medium    Tobacco abuse disorder [Z72.0]      Priority: Medium    Unstable angina (HCC) [I20.0]      Priority: Medium    Coronary artery disease involving native coronary artery of native heart with unstable angina pectoris ([facility] [I25.110]      Priority: Medium    COPD, moderate ([facility] [J44.9]      Priority: Medium    COPD with acute exacerbation (HCC) [J44.1]     DM2 (diabetes mellitus, type 2) ([facility] [E11.9]     Essential hypertension [I10]     Chronic kidney disease, stage III (moderate) ([facility]) [N18.30]      Continue Lantus 15 U bid  Off Humalog 8 U with meals  Changed from Lasix 20 mg IV q8h to PO  Wean Prednisone over next 6 days  Continue Amio, ASA, Crestor  Continue Dulera and Xopenex  Wean O2 as tolerated  Arixtra for DVT ppx  On Doxy IV for AECOPD Day 6/7      DVT Prophylaxis: Arixtra  Diet: ADULT ORAL NUTRITION SUPPLEMENT; Breakfast, Lunch; Diabetic Oral Supplement  ADULT DIET; Regular; 5 carb choices (75 gm/meal); Low Fat/Low Chol/High Fiber/2 gm Na; 1800 ml  Code Status: Full Code  PT/OT Eval Status: Following    Dispo - Home with home PT/OT    Discussed with patient and CVU RN. Will need home O2 upon DC.     Daryl Vitale MD

## 2022-10-31 NOTE — PROGRESS NOTES
Cardiothoracic Surgery Progress Note    CC: coronary artery disease, s/p CABGx3 KELLY clip  POD# 10    Subjective:  Hemodynamically stable, on 4L at rest, afebrile. Alert and oriented X 3. Weight 72.6kg this am. Sinus rhythm       10/24 Was atrial paced overnight due to pauses  10/25: TPW, chest tubes, valdovinos removed  10/26: O2 requirements between 8-10L, CXR revealed atelectasis and small pleural effusions. Pulmonology consulted, IV steroids and doxy initiated    WT:72.6 kg  pre-op 77.6 kg    Vital Signs:   /67   Pulse 59   Temp 97.6 °F (36.4 °C) (Temporal)   Resp 22   Ht 6' (1.829 m)   Wt 160 lb 0.9 oz (72.6 kg)   SpO2 (!) 89%   BMI 21.71 kg/m²       Physical Exam:   Cardiac: regular, no murmur  Lungs: clear to auscultation, decreased bases bilaterally  Abdomen:  abdomen soft, non-tender BM 10/26  Vascular:  pulses all palpable   Extremities: generalized, non-pitting edema 1+  :  /240   Lasix 20mg TID  Chest Tube(s) & Incision(s):    Sternum: stable,C/D/I  Edges approximated, no drainage  Chest tube site: chest tubes removed, site C/D/I Purse string intact   left leg incision: C/D/I  Edges well approximated, no drainage TE      Labs:   CBC:   Recent Labs     10/30/22  0210 10/31/22  0618   WBC 9.5 9.1   HGB 8.9* 9.3*   HCT 28.4* 29.5*    382     BMP:   Recent Labs     10/30/22  0210 10/30/22  1435 10/31/22  0618   K 3.3* 4.7 3.9   CREATININE 1.7*  --  1.6*   CALCIUM 9.9  --  9.6   MG 1.90  --   --      PT/INR: No results for input(s): PROTIME, INR in the last 72 hours. Assessment/Plan:  As per CC:     S/P CABG  -81mg asa, sub q heparin TID, 75mg Plavix daily  -Pepcid  -statin  -continue bowel regimen     Acute Respiratory Insufficiency, COPD     -on 4L at rest  -pulmonary toileting, out of bed, IS, acapella, wean O2, Ezpap  -po steroids.  Antibiotics  -check saturations on room air     A.fib  -now sinus kennedi  -amiodarone protocol  -BB held due to profound bradycardia CKD  -nephrology following  -creat 1.7-> 1.6  -lasix 20mg TID  -holding ACE/ARB     DM  -carb control diet  -insulin    Disp:  Plan for home with home care with oxygen      Electronically signed by ANTHONY Green CNP on 10/31/2022 at 8:39 AM       Attending Supervising Enoch Joya  The patient met the criteria for direct supervision. I saw and examined the patient and discussed the findings and plans with the nurse practitioner and agree as documented in her note . Patient expressed his frustration about  the fact he will not be able to go home today due to high oxygen requirement. I expressed understanding about his situation. Hoping his O2 demands to diminish over the the next 24hr so he can possibly go home tomorrow with O2. Dr Kristen Hunt to discuss discharge plans with the patient upon his return tomorrow should his O2 requirement remain high.  Appreciate pulmonology input

## 2022-10-31 NOTE — PROGRESS NOTES
Patient's daughter, Connie Eastman called per request. Updates provided regarding oxygen requirements and barrier to discharge. Understanding expressed.

## 2022-10-31 NOTE — PROGRESS NOTES
Office : [phone]     Fax :[phone]       Nephrology progress Note      Patient's Name: Lv Quigley  8:17 AM  10/31/2022    Reason for Consult:  CKD 3a      History of Present Ilness:    Lv Quigley is a 81 y.o. male with h/o CKD.,  HTN , CAD who has LHC yesterday and had multivessel CAD. Plan for CABG tomorrow     Denies any chest pain at this time   BP is stable   No SOB   No peripheral edema noted     Interval hx    S/p CABG    Making good urine volume   Creatinine level 2.0 ---> 1.9 ----> 1.5 ---> 1.6 ---> 1.7 ---. 1.6     No SOB     Good UOP       I/O last 3 completed shifts: In: 12 [P.O.:960]  Out: 2850 [Urine:2850]    Past Medical History:   Diagnosis Date    Chronic renal insufficiency     Due to chronic nephrolithiasis    Colloid cyst of third ventricle (HCC)     Coronary artery disease     DDD (degenerative disc disease), lumbar 2006    Disc bulge and facet arthropathy at L3-L4, L5-S1    Diabetes mellitus, type 2 ([facility] [address]     Diverticulitis of colon with perforation 11/07    Emergency colostomy    ED (erectile dysfunction)     Hyperlipidemia     Hypertension     Hypertensive retinopathy of both eyes 1/24/2013    Nephrolithiasis        Past Surgical History:   Procedure Laterality Date    BRAIN SURGERY  12/13/2007    Resection of colloid cyst of 3rd ventricle    CARDIAC CATHETERIZATION  5/02, 12/03,  3/08    COLOSTOMY  11/07    Emergent- due to diverticulitis with perforation    CORONARY ANGIOPLASTY  1995    RCA- unsuccessful    CORONARY ANGIOPLASTY WITH STENT PLACEMENT  10/05    Obtuse marginal branch of circumflex:  drug-eluting stent    CORONARY ARTERY BYPASS GRAFT N/A 10/21/2022    CABG X 3, LIMA  GRAFT TO LAD, VEIN GRAFT TO DISTAL RCA  AND OM, EVH LEFT SAPHENOUS VEIN. LIGATION KELLY WITH 35 MM ATRICLIP, TIMUR, TCPB, DOPPLER BYPASS GRAFT FLOW VERIFICATION, EPIAORTIC ECHOCARDIOGRAM, INSERTION OF VENTRICULAR AND ATRIAL PACING WIRES performed by Lavern Swan MD at [facility]  2000, 2005    Right    LITHOTRIPSY  1998    Bilateral    LITHOTRIPSY  1994    Bilateral with right stent placement    PARTIAL NEPHRECTOMY  1980    Right    REVISION COLOSTOMY  3/08    Colostomy takedown with sigmoid colectomy and coloproctostomy anastomosis    TOTAL KNEE ARTHROPLASTY Left 4/16/36    UMBILICAL HERNIA REPAIR         Current Medications:    predniSONE (DELTASONE) tablet 40 mg, Daily  lactobacillus (CULTURELLE) capsule 1 capsule, BID WC  guaiFENesin (MUCINEX) extended release tablet 600 mg, BID  furosemide (LASIX) injection 20 mg, TID  insulin glargine (LANTUS) injection vial 25 Units, BID  insulin lispro (HUMALOG) injection vial 8 Units, TID WC  0.9 % sodium chloride infusion, PRN  doxycycline (VIBRAMYCIN) 100 mg in dextrose 5 % 100 mL IVPB, Q12H  heparin (porcine) injection 5,000 Units, 3 times per day  famotidine (PEPCID) tablet 20 mg, Daily  polyethylene glycol (GLYCOLAX) packet 17 g, Daily  clopidogrel (PLAVIX) tablet 75 mg, Daily  bisacodyl (DULCOLAX) suppository 10 mg, Daily PRN  aspirin chewable tablet 81 mg, Daily  traMADol (ULTRAM) tablet 50 mg, Q4H PRN  insulin lispro (HUMALOG) injection vial 0-16 Units, TID WC  insulin lispro (HUMALOG) injection vial 0-4 Units, Nightly  docusate sodium (COLACE) capsule 100 mg, BID  senna (SENOKOT) tablet 8.6 mg, BID  amiodarone (CORDARONE) tablet 200 mg, Daily  levalbuterol (XOPENEX) nebulizer solution 1.25 mg, BID  levalbuterol (XOPENEX) nebulizer solution 1.25 mg, Q4H PRN  traZODone (DESYREL) tablet 50 mg, Nightly  acetaminophen (TYLENOL) tablet 650 mg, Q6H  ALPRAZolam (XANAX) tablet 0.25 mg, Q6H PRN  gabapentin (NEURONTIN) capsule 300 mg, TID  sodium chloride flush 0.9 % injection 5-40 mL, 2 times per day  sodium chloride flush 0.9 % injection 5-40 mL, PRN  0.9 % sodium chloride infusion, PRN  ondansetron (ZOFRAN-ODT) disintegrating tablet 4 mg, Q8H PRN   Or  ondansetron (ZOFRAN) injection 4 mg, Q6H PRN  diphenhydrAMINE (BENADRYL) tablet 25 mg, Nightly PRN  potassium chloride 20 mEq/50 mL IVPB (Central Line), PRN  dextrose bolus 10% 125 mL, PRN   Or  dextrose bolus 10% 250 mL, PRN  glucagon (rDNA) injection 1 mg, PRN  dextrose 10 % infusion, Continuous PRN  sodium chloride flush 0.9 % injection 5-40 mL, PRN  rosuvastatin (CRESTOR) tablet 20 mg, Daily  sertraline (ZOLOFT) tablet 100 mg, Daily  mometasone-formoterol (DULERA) 200-5 MCG/ACT inhaler 2 puff, BID  tiotropium (SPIRIVA RESPIMAT) 2.5 MCG/ACT inhaler 2 puff, Daily  mirtazapine (REMERON) tablet 15 mg, Nightly        Physical exam:     Vitals:  /67   Pulse 59   Temp 97.6 °F (36.4 °C) (Temporal)   Resp 22   Ht 6' (1.829 m)   Wt 160 lb 0.9 oz (72.6 kg)   SpO2 (!) 89%   BMI 21.71 kg/m²   Constitutional:  OAA X3 NAD  Skin: no rash, turgor wnl  Heent:  eomi, mmm  Neck: no bruits or jvd noted  Cardiovascular:  S1, S2 without m/r/g  Respiratory: CTA B without w/r/r  Abdomen:  +bs, soft, nt, nd  Ext: no  lower extremity edema      Labs:  CBC:   Recent Labs     10/29/22  0502 10/30/22  0210 10/31/22  0618   WBC 12.6* 9.5 9.1   HGB 9.4* 8.9* 9.3*    338 382     BMP:    Recent Labs     10/29/22  0502 10/30/22  0210 10/30/22  1435 10/31/22  0618    141  --  143   K 4.6 3.3* 4.7 3.9   CL 99 99  --  104   CO2 29 35*  --  32   BUN 46* 53*  --  47*   CREATININE 1.6* 1.7*  --  1.6*   GLUCOSE 201* 78  --  71     Ca/Mg/Phos:   Recent Labs     10/29/22  0502 10/30/22  0210 10/31/22  0618   CALCIUM 9.8 9.9 9.6   MG  --  1.90  --          IMAGING:  XR CHEST PORTABLE   Final Result   No interval change in bilateral airspace and interstitial opacities.          XR CHEST PORTABLE   Final Result   Persistent bibasilar atelectasis or airspace disease, decreased on the right   as compared to prior.  Small pleural effusions. XR CHEST PORTABLE   Final Result   Bibasilar atelectasis or airspace disease, slightly increased as compared to   prior. Suspected small pleural effusions. No pneumothorax. XR CHEST PORTABLE   Final Result   Persistent bibasilar atelectasis or airspace disease and small pleural   effusions, similar to prior. XR CHEST PORTABLE   Final Result   1. Sequela from CABG including median sternotomy and clips about the heart. 2.  Endotracheal tube is in place, tip at a level between that of the   clavicular heads and aortic knob. 3.  NG tube extends below the left hemidiaphragm, into the upper abdomen, tip   overlying the expected level of the stomach, left upper quadrant. 4. Right IJ [place] catheter via introducer sheath has its tip overlying   the right pulmonary artery level. A 2nd right IJ CVC is demonstrated, tip at   the superior cavoatrial junction level. 5. Left side and right side pleural catheters appear unremarkable. 6. No pneumomediastinum or pneumothorax. 7. Minimal atelectasis at the lung bases and possible small volume left   pleural effusion. 8. No fracture evident. VL DUP CAROTID BILATERAL   Final Result      VL PRE OP VEIN MAPPING   Final Result      XR CHEST PORTABLE   Final Result   No acute cardiopulmonary disease. Assessment/Plan :      1.  CKD 3 A   Creatinine level increased to 2.0 -->2.1 ---> 1.9 --> 2.0 -->1.9 ---> 1.5 --> 1.6   Good UOP   No edema noted . Change lasix to 40 mg po daily. 2. HTN. Better controlled today     3. Anemia. Monitor   Hb dropped since admission   Transfuse if HB < 7.0     4. MVD . S/p CABG     5. Hyperkalemia   Resolved    Valsartan has been held . Will resume outpt   Volume status is good.            Outpt follow up     Thank you for allowing us to participate in care of Yasmany Eden         Electronically signed by: Jered Nj MD, 10/31/2022, 8:17 AM      [facility] [address]  Office : [phone]  Fax :[phone]

## 2022-10-31 NOTE — PROGRESS NOTES
Patient unable to do Acapella and Ezpap; he can't get his breath due to coughing, RR 32.  Gave HHN and Dulera

## 2022-10-31 NOTE — PROGRESS NOTES
Facility/Department: [facility]  Speech Language Pathology   Dysphagia Treatment Note    Patient: Raheem Finch   : 1950   MRN: [id number]      Evaluation Date: 10/31/2022      Admitting Dx: Atherosclerotic heart disease of native coronary artery without angina pectoris [I25.10]  Abnormal result of other cardiovascular function study [R94.39]  Chest pain, unspecified [R07.9]  Coronary artery disease involving native coronary artery of native heart with unstable angina pectoris ([facility] [address] [I25.110]  Treatment Diagnosis: Oropharyngeal Dysphagia   Pain: Denies                                              Diet and Treatment Recommendations 10/31/2022:  Diet Solids Recommendation:  Regular texture diet  Liquid Consistency Recommendation: Thin liquids  Recommended form of Meds: Meds whole with water or Meds in puree      Compensatory strategies:   Upright as possible with all PO intake , Small bites/sips , Eat/feed slowly    Assessment of Texture Tolerance:  Diet level prior to treatment: Regular texture diet , Thin liquids   Tolerance of Current Diet Level:Per chart, no noted difficulty with current diet level      Impressions: Pt was positioned Upright in chair, awake and alert. Currently on  4L O2 via nasal cannula . Oxygen saturation 90-92% throughout session. Pt agreeable to trials of thin liquids and regular solids  to assess swallow function. Thin liquids via cup/straw revealed suspected premature bolus loss to pharynx with a mildly delayed swallow initiation and no overt clinical s/s of aspiration. Pt demonstrated prolonged mastication of regular solids but was able to achieve good oral clearance. Thin liquids via straw in combination with solids revealed one episode of a delayed cough in 1/3 trials. Pt remains at increased dysphagia/aspiration risk due to current respiratory state, COPD, O2 requirements, and deconditioning.  At this time recommend continuation of current diet with safe swallowing strategies as above. Will continue to monitor for need for instrumental assessment. If respiratory status declines, recommend NPO with further SLP assessment. Dysphagia Goals:   Pt will functionally tolerate recommended diet with no overt clinical s/s of aspiration (Ongoing 10/31/22)  Pt will demonstrate understanding of aspiration risk and precautions via education/demonstration with occasional prompting (Ongoing 10/31/22)  Pt will participate in instrumental swallow study (FEES or MBS) to further assess pharyngeal phase of swallow, assist with diet recommendations and to further direct plan of care (Ongoing 10/31/22)    Plan:   3-5 times per week during acute care stay. Patient/Family Education:  Provided education regarding role of SLP, recommendations and general speech pathology plan of care. [x] Pt verbalized understanding and agreement   [] Pt requires ongoing learning   [] No evidence of comprehension     Discharge Recommendations:    Discharge recommendations to be determined pending ongoing follow-up during acute care stay    Treatment time:  Timed Code Treatment Minutes: 0  Total Treatment time: 10 min    If patient discharges prior to next session this note will serve as a discharge summary.      Signature:   Renee Powers M.A., [address] [facility]  Speech-Language Pathologist

## 2022-10-31 NOTE — DISCHARGE SUMMARY
Discharge Summary    Patient:  Ana Lilia Pelletier 1950 [id number]   Admission Date:  10/19/2022  7:23 AM  Discharge Date:  11/1/22    Principle Diagnosis:  Coronary artery disease involving native coronary artery of native heart with unstable angina pectoris [facility])    Secondary Diagnosis:  Principal Problem:    Coronary artery disease involving native coronary artery of native heart with unstable angina pectoris (HCC)  Active Problems:    COPD, moderate (HCC)    Unstable angina (HCC)    Dyslipidemia    Tobacco abuse disorder    Moderate malnutrition ([facility])    Pleural effusion on left    S/P CABG x 3    Acute respiratory failure with hypoxia ([facility])    Essential hypertension    Chronic kidney disease, stage III (moderate) ([facility])    DM2 (diabetes mellitus, type 2) ([facility] [address]    COPD with acute exacerbation ([facility] [address]  Resolved Problems:    * No resolved hospital problems. *      Consults:  IP CONSULT TO HOSPITALIST  IP CONSULT TO NEPHROLOGY  IP CONSULT TO CARDIAC REHAB  IP CONSULT TO CASE MANAGEMENT  IP CONSULT TO SOCIAL WORK  IP CONSULT TO PULMONOLOGY  IP CONSULT TO PHYSICAL MEDICINE REHAB  IP CONSULT TO HOME CARE NEEDS    Angiogram:   LM 70% distal, 20% ostial   LAD 20% mid   Cx 70% mid, patent mid stent   RI N/A   % prox with R-R and L-R collaterals   LVEDP 6   LVG 55%       Procedure:    OPERATION PERFORMED:  Coronary bypass grafting surgery x3 with single  reverse saphenous vein graft to the obtuse marginal branch of the circumflex, separate sequential reverse saphenous vein graft to the distal right coronary artery, pedicle left internal artery to the LAD, endoscopic vein harvest of the left greater saphenous vein, left atrial  appendage obliteration with 35-mm AtriCure left atrial clip, total cardiopulmonary bypass, transesophageal echo, Doppler verification of graft, epiaortic ultrasound, and vas cath placement.     History:  The patient is a 81 y.o. male with significant past medical history of  of CAD with a stent in the past [place], CKD3, DM2, HLD, HTN, past smoker, depression, COPD,  who presents to the cath lab as an OP  with several months of SOB, fatigue, twitches left upper chest.  Left heart angiogram found  multivessel coronary artery disease with significant Left Main disease. CVTS was consulted for surgical evaluation for coronary artery bypass grafting. Hospital Course: The patient underwent Coronary bypass grafting surgery x3 with single  reverse saphenous vein graft to the obtuse marginal branch of the circumflex, separate sequential reverse saphenous vein graft to the distal right coronary artery, pedicle left internal artery to the LAD, endoscopic vein harvest of the left greater saphenous vein, left atrial  appendage obliteration with 35-mm AtriCure left atrial clip, total cardiopulmonary bypass, transesophageal echo, Doppler verification of graft, epiaortic ultrasound, and vas cath placement on 10/21/22 . The patient's post-operative course was uneventful with the exception(s) of: atrial fibrillation and high oxygen requirements. Pain was controlled with combination of oral and IV medications. Patient was able to ambulate without difficulty and tolerate a regular diet. The patient was discharged on  11/1/22    Pathology:  none    Disposition:  home    Condition:  good    Medications:  ACE:held d/t creatinine  Aspirin: 81 mg daily   Betablocker:held d/t profound bradycardia  Statin:20mg crestor nightly   Diuretic: Lasix 40 mg daily  Plavix 75mg daily, once d/c'd will start 325mg asa      Discharge Medications:  Current Discharge Medication List             Details   traMADol (ULTRAM) 50 MG tablet Take 1 tablet by mouth every 4 hours as needed for Pain (incisional) for up to 7 days.   Qty: 28 tablet, Refills: 0    Comments: Reduce doses taken as pain becomes manageable  Associated Diagnoses: S/P CABG x 3      aspirin 81 MG chewable tablet Take 1 tablet by mouth daily  Qty: 30 tablet, Refills: 3 clopidogrel (PLAVIX) 75 MG tablet Take 1 tablet by mouth daily  Qty: 30 tablet, Refills: 3      docusate sodium (COLACE, DULCOLAX) 100 MG CAPS Take 100 mg by mouth 2 times daily  Qty: 60 capsule, Refills: 0      gabapentin (NEURONTIN) 300 MG capsule Take 1 capsule by mouth 3 times daily for 10 days. Qty: 30 capsule, Refills: 0      mometasone-formoterol (DULERA) 200-5 MCG/ACT inhaler Inhale 2 puffs into the lungs in the morning and 2 puffs in the evening. Qty: 1 each, Refills: 3      amiodarone (CORDARONE) 200 MG tablet Take 1 tablet by mouth daily for 14 days  Qty: 14 tablet, Refills: 0      furosemide (LASIX) 20 MG tablet Take 1 tablet by mouth daily for 10 days  Qty: 10 tablet, Refills: 0      potassium chloride (KLOR-CON M) 10 MEQ extended release tablet Take 1 tablet by mouth 2 times daily for 10 days  Qty: 20 tablet, Refills: 0                Details   rosuvastatin (CRESTOR) 20 MG tablet Take 2 tablets by mouth daily  Qty: 90 tablet, Refills: 1                Details   glimepiride (AMARYL) 4 MG tablet TAKE 2 TABLETS BY MOUTH EVERY MORNING  Qty: 180 tablet, Refills: 1      meloxicam (MOBIC) 15 MG tablet TAKE 1 TABLET BY MOUTH EVERY DAY      blood glucose test strips (TRUE METRIX BLOOD GLUCOSE TEST) strip USE 1 STRIP TO TEST BLOOD SUGAR TWICE DAILY  Qty: 100 strip, Refills: 11      mirtazapine (REMERON) 15 MG tablet Take 1.5 tablets by mouth nightly  Qty: 135 tablet, Refills: 1    Comments: ZERO refills remain on this prescription. Your patient is requesting advance approval of refills for this medication to PREVENT ANY MISSED DOSES      sertraline (ZOLOFT) 100 MG tablet Take 1 tablet by mouth in the morning. Qty: 90 tablet, Refills: 1      traZODone (DESYREL) 50 MG tablet Take 1 tablet by mouth nightly  Qty: 90 tablet, Refills: 1      pantoprazole (PROTONIX) 40 MG tablet TAKE 1 TABLET BY MOUTH EVERY MORNING BEFORE BREAKFAST  Qty: 90 tablet, Refills: 1    Comments: ZERO refills remain on this prescription. Your patient is requesting advance approval of refills for this medication to [address]      insulin glargine (LANTUS SOLOSTAR) 100 UNIT/ML injection pen Inject 15 Units into the skin nightly  Qty: 5 pen, Refills: 5      doxazosin (CARDURA) 4 MG tablet TAKE 1 TABLET BY MOUTH EVERY NIGHT  Qty: 90 tablet, Refills: 1    Comments: ZERO refills remain on this prescription. Your patient is requesting advance approval of refills for this medication to [address] [facility] Diagnoses: CKD (chronic kidney disease), stage III (HCC)      fenofibrate (TRIGLIDE) 160 MG tablet Take 1 tablet by mouth daily  Qty: 90 tablet, Refills: 1      Insulin Pen Needle (B-D UF III MINI PEN NEEDLES) 31G X 5 MM MISC USE AS DIRECTED TO INJECT ONCE DAILY  Qty: 100 each, Refills: 11      TRELEGY ELLIPTA 100-62.5-25 MCG/INH AEPB INHALE 1 PUFF INTO THE LUNGS DAILY  Qty: 180 each, Refills: [facility] Patient test twice daily  Qty: 150 each, Refills: 5      albuterol sulfate HFA (VENTOLIN HFA) 108 (90 Base) MCG/ACT inhaler 2 Puff every 4-6 hours as needed for wheezing or shortness of breath  Qty: 1 Inhaler, Refills: 3    Associated Diagnoses: COPD with acute bronchitis ([facility] [address]      ! ! Blood Glucose Monitoring Suppl (PRECISION XTRA) w/Device KIT As directed  Qty: 1 kit, Refills: 0    Comments: Please provide what insurance covers. Pt states precision      !! Blood Glucose Monitoring Suppl (ONE TOUCH ULTRA 2) W/DEVICE KIT 1 kit by Does not apply route daily as needed. Qty: 1 kit, Refills: 0      fish oil-omega-3 fatty acids 1000 MG capsule Take 2 g by mouth daily. !! - Potential duplicate medications found. Please discuss with provider.            Labs:  Renal:   Lab Results   Component Value Date/Time     10/31/2022 06:18 AM    K 3.9 10/31/2022 06:18 AM     10/31/2022 06:18 AM    CO2 32 10/31/2022 06:18 AM    PHOS 2.6 09/08/2020 08:45 AM    BUN 47 10/31/2022 06:18 AM    CREATININE 1.6 10/31/2022 06:18 AM     Heme:   Lab Results   Component Value Date/Time    WBC 9.1 10/31/2022 06:18 AM    HGB 9.3 10/31/2022 06:18 AM    HCT 29.5 10/31/2022 06:18 AM    MCV 94.2 10/31/2022 06:18 AM     10/31/2022 06:18 AM     HgA1C:   Lab Results   Component Value Date/Time    LABA1C 7.2 10/20/2022 06:25 AM     PT/INR: No results for input(s): PROTIME, INR in the last 72 hours. ECHO/TIMUR: 11/12/19  Summary   Normal left ventricle size, wall thickness, and systolic function with an   estimated ejection fraction of 60%. No regional wall motion abnormalities are seen. Normal diastolic function. Thickened mitral valve without evidence of stenosis. There is moderate   mitral regurgitation noted. There is mild tricuspid regurgitation with a PASP estimation of 45 mmHg. Mild pulmonary hypertension. Findings      Left Ventricle   Normal left ventricle size, wall thickness, and systolic function with an   estimated ejection fraction of 60%. No regional wall motion abnormalities   are seen. Normal diastolic function. Avg. E/e'=10.5      Mitral Valve   Thickened mitral valve without evidence of stenosis. There is moderate mitral regurgitation noted. Left Atrium   The left atrium is normal in size. Aortic Valve   The aortic valve is structurally normal. There is no significant aortic   valve regurgitation or stenosis. Aorta   The aortic root is normal in size. Right Ventricle   The right ventricle is normal in size and function. TAPSE is estimated at   2.22 cm. Tricuspid Valve   Tricuspid valve is structurally normal.   There is mild tricuspid regurgitation with a PASP estimation of 45 mmHg. Mild pulmonary hypertension. Right Atrium   The right atrial size is normal.      Pulmonic Valve   The pulmonic valve is structurally normal.   No evidence of pulmonic valve stenosis. Trivial pulmonic regurgitation present.       Pericardial Effusion   No pericardial effusion noted. Pleural Effusion   No pleural effusion. Miscellaneous   The inferior vena cava appears normal in size with normal respiratory   variation. M-Mode/2D Measurements (cm)      LV Diastolic Dimension: 4.9 cm LV Systolic Dimension: 0.17 cm   LV Septum Diastolic: 7.63 cm   LV PW Diastolic: 0.8 cm        AO Root Dimension: 2.8 cm                                  LA Dimension: 3.7 cm                                  LA Area: 15.4 cm2                                  LA volume/Index: 41.1 ml /20 ml/m2     Doppler Measurements      AV Peak Velocity: 177 cm/s   MV Peak E-Wave: 89.2 cm/s   AV Peak Gradient: 12.53 mmHg MV Peak A-Wave: 102 cm/s   AV Mean Gradient: 6 mmHg     MV E/A Ratio: 0.87   LVOT Peak Velocity: 119 cm/s                                MV Max P mmHg   TR Velocity:290 cm/s         MV Vmax:635 cm/s   TR Gradient:33.64 mmHg   Estimated RAP:10 mmHg   Estimated RVSP: 43 mmHg   E' Septal Velocity: 7.4 cm/s   E' Lateral Velocity: 10 cm/s      Aortic Valve      Peak Velocity: 177 cm/s   Mean Velocity: 112 cm/s   Peak Gradient: 12.53 mmHg Mean Gradient: 6 mmHg   AV VTI: 40 cm     Aorta      Aortic Root: 2.8 cm     CT CHEST W/O CONTRAST: 21  1. No acute intrapulmonary findings. 2. Stable chronic emphysema. 3. Stable mild-to-moderate interstitial pulmonary fibrosis, unchanged from 10/16/2020, though progressed from more remote studies of . 4. Multiple stable scattered pulmonary nodules throughout both lungs are unchanged dating back to the study of 2019, and likely reflect benign granulomatous disease. In the absence of a known personal history of cancer,  these are consistent with benign granulomatous disease and require no further follow-up. However, if there is a history of known cancer, continued chest  CT surveillance is suggested as per oncologic protocol. 5. Stable trace bilateral pleural effusions.         [id number] Score for Atrial Fibrillation Stroke Risk    Risk   Factors   Component Value   C CHF No 0   H HTN Yes 1   A2 Age >= 76 No,  (71 y.o.) 0   D DM Yes 1   S2 Prior Stroke/TIA No 0   V Vascular Disease No 0   A Age 74-69 Yes,  (71 y.o.) 1   Sc Sex male 0     [id number]  Score   3   Score last updated 10/19/22 5:07 PM EDT     Click here for a link to the UpToDate guideline \"Atrial Fibrillation: Anticoagulation therapy to prevent embolization     Disclaimer: Risk Score calculation is dependent on accuracy of patient problem list and past encounter diagnosis. CTS Adult Cardiac Risk: CABG  Mortality Risk: 1.366%  Renal Failure: 1.702%  Permanent Stroke: 1.231%  Prolonged Ventilation: 4.844%  DSW Infection: 0.162%  Reoperation: 2.447%  Morbidity and Mortality:8.403 %  Short Length of Stay: 43.589%  Long Length of Stay: 3.838%       Admission WT: 78.9 kg  Pre-Op WT: 77.6 kg  Discharge WT: 70.1 kg      Patient Instructions: See AVS for full list of discharge instructions given to patient  Activity: DO NOT LIFT, PUSH, OR PULL ANYTHING OVER 5 POUNDS FOR 8 WEEK from the day of surgery  Diet:  cardiac diet  Wound Care:  [address] YOUR INCISIONS DAILY WITH A CLEAN WASHCLOTH AND ANTIBACTERIAL SOAP. Do not wash your incisions after you have cleansed other parts of your body. Follow-up Appointments:   Cardiothoracic Surgeon, Dr. Jamilah Page  Cardiology CNP  Dr. Steven Rivas in 2 weeks  Dr. Kevin Atwood 11/22/22      Electronically signed by ANTHONY Quintero - CNP on 11/1/2022 at 9:03 AM  Agree with note.       Ramin Pro MD, FACS, [address], [place], [place]

## 2022-10-31 NOTE — PROGRESS NOTES
Physical [facility] Department   Phone: [phone]    Physical Therapy    [] Initial Evaluation            [x] Daily Treatment Note         [] Discharge Summary      Patient: Luma Moyer   : 1950   MRN: [id number]   Date of Service:  10/31/2022  Admitting Diagnosis: Coronary artery disease involving native coronary artery of native heart with unstable angina pectoris [facility])  Current Admission Summary: The pt was admitted for a CABG on 10/21. Past Medical History:  has a past medical history of Chronic renal insufficiency, Colloid cyst of third ventricle (HCC), Coronary artery disease, DDD (degenerative disc disease), lumbar, Diabetes mellitus, type 2 ([address] Diverticulitis of colon with perforation, ED (erectile dysfunction), Hyperlipidemia, Hypertension, Hypertensive retinopathy of both eyes, and Nephrolithiasis. Past Surgical History:  has a past surgical history that includes Cardiac catheterization (, ,  3/08); Revision Colostomy (3/08); Lithotripsy (); Lithotripsy (); partial nephrectomy (); Coronary angioplasty with stent (10/05); Coronary angioplasty (); Umbilical hernia repair; Knee arthroscopy (, ); colostomy (); Total knee arthroplasty (Left, 14); brain surgery (2007); and Coronary artery bypass graft (N/A, 10/21/2022). Discharge Recommendations: Luma Moyer scored a 14/24 on the AM-PAC short mobility form. Current research shows that an AM-PAC score of 18 or greater is typically associated with a discharge to the patient's home setting. Based on the patient's AM-PAC score and their current functional mobility deficits, it is recommended that the patient have 2-3 sessions per week of Physical Therapy at d/c to increase the patient's independence.   At this time, this patient demonstrates the endurance and safety to discharge home with home services and a follow up treatment frequency of Assistance: independent with all ADL's  IADL Assistance: requires assistance with cleaning, Wife does all IADLs; cleaning lady every 2 wks. Active :        [x] Yes                 [] No  Hand Dominance: [] Left                 [x] Right  Current Employment: retired. 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 Agreeable to PT tx. MD in during session and reported patient will not go home today due to increased O2 needs. Pt very frustrated and upset, yelling at times. Emotional support and education provided as able at end of session. Pain: 0/10  Pain Interventions: not applicable       Functional Mobility  Bed Mobility  Supine to Sit: minimal assistance  Scooting: stand by assistance  Comments: Cues for adherence to sternal precautions with Fair+ compliance. Pt holds onto heart pillow. Transfers  Sit to stand transfer: stand by assistance  Stand to sit transfer: stand by assistance  Comments: Intermittent cues for adherence to sternal precautions. Multiple trials from EOB, Rollator, and recliner throughout session. Ambulation  Surface:level surface  Assistive Device: rollator (2SRM)  Assistance: contact guard assistance  Distance: 150 + 170 ft   Gait Mechanics: narrow VALENTE, decreased step length, decreased step height, decreased tami, mild forward flexed posture (improved for periods with cuing)  Comments:  1 LOB with ambulation but good stepping reaction to recover, no physical assist needed from PT. See activity tolerance for vitals post ambulation   Stair Mobility  Stair mobility not completed on this date. Comments: Pt declined stairs saying he only has a 2\" threshold step to enter his home.  Will discharge stair goal.   Wheelchair Mobility:  No w/c mobility completed on this date. Comments:  Balance  Static Sitting Balance: fair (+): maintains balance at SBA/supervision without use of UE support  Dynamic Sitting Balance: fair (+): maintains balance at SBA/supervision without use of UE support  Static Standing Balance: fair (-): maintains balance at SBA with use of UE support  Dynamic Standing Balance: fair (-): maintains balance at [facility] with use of UE support  Comments: Standing balance with UE on 4WW     Other Therapeutic Interventions  Seated exercises x10 reps (B): ankle pumps, LAQ  Standing exercises x10 reps (B): mini squats, standing HS curls  Educated on need for home O2 and need to maintain SpO2 >90% for proper O2 supply to organs. Functional Outcomes  AM-PAC Inpatient Mobility Raw Score : 14              Cognition  Safety Judgement: decreased awareness of need for assistance, decreased awareness of need for safety  Problem Solving: assistance required to generate solutions, assistance required to identify errors made  Sequencing: requires cues for some  Comments: Patient's learning at end of session somewhat limited by emotional state (frustration)  Orientation:    alert and oriented x 4  Command Following:   [facility]    Education  Barriers To Learning: none  Patient Education: patient educated on goals, PT role and benefits, plan of care, precautions, HEP, general safety, functional mobility training, disease specific education, transfer training, discharge recommendations  Learning Assessment:  patient verbalizes understanding, would benefit from continued reinforcement    Assessment  Activity Tolerance: SpO2 83% on 4L after supine>sit and coughing fit, requiring ~2 minutes to recover fully. Ambulation break taken after 150 ft to assess pt's O2, difficulty getting reading for several minutes - 91% on 4L when reading appeared. After 2nd ambulation trial SpO2 81% on 4L with 3 minutes to recover to 90%.  Difficulty maintaining SpO2 at rest and with standing exercises so SpO2 increased to 6L for rest of session, back on 4L at end of session. RN in room and aware. Impairments Requiring Therapeutic Intervention: decreased functional mobility, decreased ROM, decreased strength, decreased endurance, increased pain, decreased posture  Prognosis: good  Clinical Assessment: Patient continues to be primarily limited by decreased SpO2 with ambulation and need for increased supplemental O2 up to 6L with activity. He drops as low as 81% with ambulation. The patient is frustrated by his continued need for increased O2 and prolonged stay. Patient would benefit from more intensive skilled therapy before returning home to ensure patient is back to baseline function. Safety Interventions: patient left in chair, call light within reach, gait belt, patient at risk for falls, and nurse notified    Plan  Frequency: 3-5 x/per week  Current Treatment Recommendations: strengthening, ROM, balance training, functional mobility training, transfer training, gait training, stair training, endurance training, neuromuscular re-education, and safety education     Goals  Patient Goals: return home at [address]:  Time Frame: To be met prior to DC  Patient will complete bed mobility at minimal assistance - Goal partially met (for supine>sit) 10/31, continue goal for consistency across bed mobility.   Patient will complete transfers at contact guard assistance - Goal met 10/31  Patient will ambulate 300 ft with use of LRAD at supervision  Patient will ascend/descend 3 stairs with (R) ascending handrail at contact guard assistance - discontinue goal 10/31, pt does not have stairs at home upon further clarification  New goal 10/31 - Patient will complete sit<>stand transfers at Mod I  **Goals updated 10/31    Therapy Session Time      Individual Group Co-treatment   Time In 0928       Time Out 1024       Minutes 56           Total Treatment Minutes: 56 minutes       Electronically Signed By: Nicola Oh Brittany Flax, DPT #[id number]

## 2022-11-01 VITALS
DIASTOLIC BLOOD PRESSURE: 64 MMHG | SYSTOLIC BLOOD PRESSURE: 136 MMHG | BODY MASS INDEX: 20.93 KG/M2 | HEIGHT: 72 IN | OXYGEN SATURATION: 96 % | HEART RATE: 66 BPM | TEMPERATURE: 98.2 F | RESPIRATION RATE: 18 BRPM | WEIGHT: 154.54 LBS

## 2022-11-01 LAB
GLUCOSE BLD-MCNC: 214 MG/DL (ref 70–99)
GLUCOSE BLD-MCNC: 94 MG/DL (ref 70–99)
PERFORMED ON: ABNORMAL
PERFORMED ON: NORMAL

## 2022-11-01 PROCEDURE — 94680 O2 UPTK RST&XERS DIR SIMPLE: CPT

## 2022-11-01 PROCEDURE — 6360000002 HC RX W HCPCS

## 2022-11-01 PROCEDURE — 99232 SBSQ HOSP IP/OBS MODERATE 35: CPT | Performed by: INTERNAL MEDICINE

## 2022-11-01 PROCEDURE — 6370000000 HC RX 637 (ALT 250 FOR IP): Performed by: NURSE PRACTITIONER

## 2022-11-01 PROCEDURE — 6370000000 HC RX 637 (ALT 250 FOR IP): Performed by: INTERNAL MEDICINE

## 2022-11-01 PROCEDURE — 6370000000 HC RX 637 (ALT 250 FOR IP)

## 2022-11-01 PROCEDURE — 94640 AIRWAY INHALATION TREATMENT: CPT

## 2022-11-01 PROCEDURE — 2500000003 HC RX 250 WO HCPCS: Performed by: INTERNAL MEDICINE

## 2022-11-01 PROCEDURE — 6370000000 HC RX 637 (ALT 250 FOR IP): Performed by: THORACIC SURGERY (CARDIOTHORACIC VASCULAR SURGERY)

## 2022-11-01 PROCEDURE — APPNB45 APP NON BILLABLE 31-45 MINUTES

## 2022-11-01 PROCEDURE — 6360000002 HC RX W HCPCS: Performed by: HOSPITALIST

## 2022-11-01 PROCEDURE — 99024 POSTOP FOLLOW-UP VISIT: CPT | Performed by: THORACIC SURGERY (CARDIOTHORACIC VASCULAR SURGERY)

## 2022-11-01 PROCEDURE — 2580000003 HC RX 258: Performed by: INTERNAL MEDICINE

## 2022-11-01 PROCEDURE — 2700000000 HC OXYGEN THERAPY PER DAY

## 2022-11-01 PROCEDURE — 94761 N-INVAS EAR/PLS OXIMETRY MLT: CPT

## 2022-11-01 PROCEDURE — 92526 ORAL FUNCTION THERAPY: CPT

## 2022-11-01 PROCEDURE — 2580000003 HC RX 258: Performed by: THORACIC SURGERY (CARDIOTHORACIC VASCULAR SURGERY)

## 2022-11-01 RX ORDER — TRAMADOL HYDROCHLORIDE 50 MG/1
50 TABLET ORAL EVERY 4 HOURS PRN
Qty: 28 TABLET | Refills: 0 | Status: SHIPPED | OUTPATIENT
Start: 2022-11-01 | End: 2022-11-08

## 2022-11-01 RX ORDER — FUROSEMIDE 40 MG/1
40 TABLET ORAL DAILY
Qty: 30 TABLET | Refills: 0 | Status: SHIPPED | OUTPATIENT
Start: 2022-11-02 | End: 2022-11-22

## 2022-11-01 RX ORDER — INSULIN GLARGINE 100 [IU]/ML
20 INJECTION, SOLUTION SUBCUTANEOUS 2 TIMES DAILY
Status: DISCONTINUED | OUTPATIENT
Start: 2022-11-01 | End: 2022-11-01 | Stop reason: HOSPADM

## 2022-11-01 RX ORDER — INSULIN LISPRO 100 [IU]/ML
5 INJECTION, SOLUTION INTRAVENOUS; SUBCUTANEOUS
Status: DISCONTINUED | OUTPATIENT
Start: 2022-11-01 | End: 2022-11-01 | Stop reason: HOSPADM

## 2022-11-01 RX ORDER — PREDNISONE 20 MG/1
TABLET ORAL
Qty: 5 TABLET | Refills: 0 | Status: SHIPPED | OUTPATIENT
Start: 2022-11-01 | End: 2022-11-10 | Stop reason: ALTCHOICE

## 2022-11-01 RX ORDER — ROSUVASTATIN CALCIUM 20 MG/1
40 TABLET, COATED ORAL DAILY
Qty: 180 TABLET | OUTPATIENT
Start: 2022-11-01

## 2022-11-01 RX ORDER — CLOPIDOGREL BISULFATE 75 MG/1
75 TABLET ORAL DAILY
Qty: 90 TABLET | OUTPATIENT
Start: 2022-11-01

## 2022-11-01 RX ADMIN — FAMOTIDINE 20 MG: 20 TABLET ORAL at 08:40

## 2022-11-01 RX ADMIN — ROSUVASTATIN CALCIUM 20 MG: 20 TABLET, FILM COATED ORAL at 08:40

## 2022-11-01 RX ADMIN — Medication 10 ML: at 00:20

## 2022-11-01 RX ADMIN — GABAPENTIN 300 MG: 300 CAPSULE ORAL at 14:43

## 2022-11-01 RX ADMIN — DOXYCYCLINE 100 MG: 100 INJECTION, POWDER, LYOPHILIZED, FOR SOLUTION INTRAVENOUS at 05:13

## 2022-11-01 RX ADMIN — ACETAMINOPHEN 650 MG: 325 TABLET ORAL at 08:40

## 2022-11-01 RX ADMIN — ASPIRIN 81 MG 81 MG: 81 TABLET ORAL at 08:40

## 2022-11-01 RX ADMIN — INSULIN GLARGINE 20 UNITS: 100 INJECTION, SOLUTION SUBCUTANEOUS at 08:29

## 2022-11-01 RX ADMIN — CLOPIDOGREL BISULFATE 75 MG: 75 TABLET ORAL at 08:40

## 2022-11-01 RX ADMIN — INSULIN LISPRO 4 UNITS: 100 INJECTION, SOLUTION INTRAVENOUS; SUBCUTANEOUS at 12:18

## 2022-11-01 RX ADMIN — PREDNISONE 40 MG: 20 TABLET ORAL at 08:40

## 2022-11-01 RX ADMIN — ACETAMINOPHEN 650 MG: 325 TABLET ORAL at 03:00

## 2022-11-01 RX ADMIN — Medication 1 CAPSULE: at 08:40

## 2022-11-01 RX ADMIN — LEVALBUTEROL HYDROCHLORIDE 1.25 MG: 1.25 SOLUTION RESPIRATORY (INHALATION) at 07:47

## 2022-11-01 RX ADMIN — GUAIFENESIN 600 MG: 600 TABLET, EXTENDED RELEASE ORAL at 08:40

## 2022-11-01 RX ADMIN — HEPARIN SODIUM 5000 UNITS: 5000 INJECTION INTRAVENOUS; SUBCUTANEOUS at 05:14

## 2022-11-01 RX ADMIN — ACETAMINOPHEN 650 MG: 325 TABLET ORAL at 14:43

## 2022-11-01 RX ADMIN — TRAZODONE HYDROCHLORIDE 50 MG: 50 TABLET ORAL at 00:20

## 2022-11-01 RX ADMIN — TIOTROPIUM BROMIDE INHALATION SPRAY 2 PUFF: 3.12 SPRAY, METERED RESPIRATORY (INHALATION) at 07:48

## 2022-11-01 RX ADMIN — SERTRALINE 100 MG: 50 TABLET, FILM COATED ORAL at 08:40

## 2022-11-01 RX ADMIN — FUROSEMIDE 40 MG: 40 TABLET ORAL at 08:40

## 2022-11-01 RX ADMIN — AMIODARONE HYDROCHLORIDE 200 MG: 200 TABLET ORAL at 08:40

## 2022-11-01 RX ADMIN — INSULIN LISPRO 5 UNITS: 100 INJECTION, SOLUTION INTRAVENOUS; SUBCUTANEOUS at 08:39

## 2022-11-01 RX ADMIN — Medication 2 PUFF: at 07:48

## 2022-11-01 RX ADMIN — GABAPENTIN 300 MG: 300 CAPSULE ORAL at 08:40

## 2022-11-01 RX ADMIN — INSULIN LISPRO 5 UNITS: 100 INJECTION, SOLUTION INTRAVENOUS; SUBCUTANEOUS at 12:17

## 2022-11-01 ASSESSMENT — PAIN SCALES - GENERAL
PAINLEVEL_OUTOF10: 0

## 2022-11-01 NOTE — PROGRESS NOTES
11/01/22 1344   Resting (Room Air)   SpO2 89   HR 70   Resting (On O2)   SpO2 95   HR 70   O2 Device Nasal cannula   O2 Flow Rate (l/min) 2 l/min   During Walk (Room Air)   SpO2 83   HR 70   Walk/Assistance Device Walker   Rate of Dyspnea 2   During Walk (On O2)   SpO2 92   HR 70   O2 Device Nasal cannula   O2 Flow Rate (l/min) 2 l/min   Walk/Assistance Device Walker   Rate of Dyspnea 2   After Walk   SpO2 93   HR 70   O2 Flow Rate (l/min) 0 l/min   Rate of Dyspnea 1   Does the Patient Qualify for Home O2 Yes   Liter Flow at Rest 0   Liter Flow on Exertion 2   Does the Patient Need Portable Oxygen Tanks Yes     Electronically signed by Halie Vicente RCP on 11/1/2022 at 1:45 PM

## 2022-11-01 NOTE — PLAN OF CARE
PT d/c = hospice
Post procedure site check completed. Surgical site is within normal limits and appears to be free of complications. All concerns were reviewed and questions answered. Patient verbalized understanding.
Problem: ABCDS Injury Assessment  Goal: Absence of physical injury  Recent Flowsheet Documentation  Taken 10/24/2022 0729 by Toshia Ridley RN  Absence of Physical Injury: Implement safety measures based on patient assessment     Problem: Safety - Adult  Goal: Free from fall injury  Recent Flowsheet Documentation  Taken 10/24/2022 0729 by Toshia Ridley RN  Free From Fall Injury: Instruct family/caregiver on patient safety
Problem: Neurosensory - Adult  Goal: Achieves stable or improved neurological status  10/25/2022 2020 by Everette Grubbs RN  Outcome: Progressing     Problem: Respiratory - Adult  Goal: Achieves optimal ventilation and oxygenation  10/25/2022 2020 by Everette Grubbs RN  Outcome: Progressing     Problem: Cardiovascular - Adult  Goal: Maintains optimal cardiac output and hemodynamic stability  10/25/2022 2020 by Everette Grubbs RN  Outcome: Progressing     Problem: Skin/Tissue Integrity - Adult  Goal: Skin integrity remains intact  10/25/2022 2020 by Everette Grubbs RN  Outcome: Progressing     Problem: Musculoskeletal - Adult  Goal: Return mobility to safest level of function  10/25/2022 2020 by Everette Grubbs RN  Outcome: Progressing
Problem: Nutrition Deficit:  Goal: Optimize nutritional status  Outcome: Progressing     Problem: Neurosensory - Adult  Goal: Achieves stable or improved neurological status  Outcome: Progressing     Problem: Respiratory - Adult  Goal: Achieves optimal ventilation and oxygenation  Outcome: Progressing     Problem: Cardiovascular - Adult  Goal: Maintains optimal cardiac output and hemodynamic stability  Outcome: Progressing     Problem: Skin/Tissue Integrity - Adult  Goal: Skin integrity remains intact  Outcome: Progressing  Goal: Incisions, wounds, or drain sites healing without S/S of infection  Outcome: Progressing  Goal: Oral mucous membranes remain intact  Outcome: Progressing     Problem: Musculoskeletal - Adult  Goal: Return mobility to safest level of function  Outcome: Progressing  Goal: Maintain proper alignment of affected body part  Outcome: Progressing  Goal: Return ADL status to a safe level of function  Outcome: Progressing     Problem: Gastrointestinal - Adult  Goal: Minimal or absence of nausea and vomiting  Outcome: Progressing  Goal: Maintains or returns to baseline bowel function  Outcome: Progressing     Problem: Infection - Adult  Goal: Absence of infection at discharge  Outcome: Progressing  Goal: Absence of infection during hospitalization  Outcome: Progressing     Problem: Metabolic/Fluid and Electrolytes - Adult  Goal: Electrolytes maintained within normal limits  Outcome: Progressing  Goal: Hemodynamic stability and optimal renal function maintained  Outcome: Progressing  Goal: Glucose maintained within prescribed range  Outcome: Progressing     Problem: Hematologic - Adult  Goal: Maintains hematologic stability  Outcome: Progressing
Problem: Nutrition Deficit:  Goal: Optimize nutritional status  Outcome: Progressing     Problem: Neurosensory - Adult  Goal: Achieves stable or improved neurological status  Outcome: Progressing     Problem: Respiratory - Adult  Goal: Achieves optimal ventilation and oxygenation  Outcome: Progressing     Problem: Cardiovascular - Adult  Goal: Maintains optimal cardiac output and hemodynamic stability  Outcome: Progressing     Problem: Skin/Tissue Integrity - Adult  Goal: Skin integrity remains intact  Outcome: Progressing  Goal: Incisions, wounds, or drain sites healing without S/S of infection  Outcome: Progressing  Goal: Oral mucous membranes remain intact  Outcome: Progressing     Problem: Musculoskeletal - Adult  Goal: Return mobility to safest level of function  Outcome: Progressing  Goal: Maintain proper alignment of affected body part  Outcome: Progressing  Goal: Return ADL status to a safe level of function  Outcome: Progressing     Problem: Gastrointestinal - Adult  Goal: Minimal or absence of nausea and vomiting  Outcome: Progressing  Goal: Maintains or returns to baseline bowel function  Outcome: Progressing     Problem: Infection - Adult  Goal: Absence of infection during hospitalization  Outcome: Progressing     Problem: Metabolic/Fluid and Electrolytes - Adult  Goal: Electrolytes maintained within normal limits  Outcome: Progressing  Goal: Hemodynamic stability and optimal renal function maintained  Outcome: Progressing  Goal: Glucose maintained within prescribed range  Outcome: Progressing     Problem: Hematologic - Adult  Goal: Maintains hematologic stability  Outcome: Progressing
Hematologic - Adult  Goal: Maintains hematologic stability  Outcome: Progressing

## 2022-11-01 NOTE — PROGRESS NOTES
Office : [phone]     Fax :[phone]       Nephrology progress Note      Patient's Name: Mekhi Forbes  10:47 AM  11/1/2022    Reason for Consult:  CKD 3a      History of Present Ilness:    Mekhi Forbes is a 81 y.o. male with h/o CKD.,  HTN , CAD who has LHC yesterday and had multivessel CAD. Plan for CABG tomorrow     Denies any chest pain at this time   BP is stable   No SOB   No peripheral edema noted     Interval hx    S/p CABG    Making good urine volume   Creatinine level 2.0 ---> 1.9 ----> 1.5 ---> 1.6 ---> 1.7 ---. 1.6     No SOB     Good UOP       I/O last 3 completed shifts: In: 2051.8 [P.O.:1200;  I.V.:151; IV Piggyback:700.8]  Out: 2825 [Urine:2825]    Past Medical History:   Diagnosis Date    Chronic renal insufficiency     Due to chronic nephrolithiasis    Colloid cyst of third ventricle (HCC)     Coronary artery disease     DDD (degenerative disc disease), lumbar 2006    Disc bulge and facet arthropathy at L3-L4, L5-S1    Diabetes mellitus, type 2 ([facility] [address]     Diverticulitis of colon with perforation 11/07    Emergency colostomy    ED (erectile dysfunction)     Hyperlipidemia     Hypertension     Hypertensive retinopathy of both eyes 1/24/2013    Nephrolithiasis        Past Surgical History:   Procedure Laterality Date    BRAIN SURGERY  12/13/2007    Resection of colloid cyst of 3rd ventricle    CARDIAC CATHETERIZATION  5/02, 12/03,  3/08    COLOSTOMY  11/07    Emergent- due to diverticulitis with perforation    CORONARY ANGIOPLASTY  1995    RCA- unsuccessful    CORONARY ANGIOPLASTY WITH STENT PLACEMENT  10/05    Obtuse marginal branch of circumflex:  drug-eluting stent    CORONARY ARTERY BYPASS GRAFT N/A 10/21/2022    CABG X 3, LIMA  GRAFT TO LAD, VEIN GRAFT TO DISTAL RCA AND OM, EVH LEFT SAPHENOUS VEIN.  LIGATION KELLY WITH 35 MM ATRICLIP, TIMUR, TCPB, DOPPLER BYPASS GRAFT FLOW VERIFICATION, EPIAORTIC ECHOCARDIOGRAM, INSERTION OF VENTRICULAR AND ATRIAL PACING WIRES performed by Angelika Lopez MD at [address] [facility]  2000, 2005    Right    LITHOTRIPSY  1998    Bilateral    LITHOTRIPSY  1994    Bilateral with right stent placement    PARTIAL NEPHRECTOMY  1980    Right    REVISION COLOSTOMY  3/08    Colostomy takedown with sigmoid colectomy and coloproctostomy anastomosis    TOTAL KNEE ARTHROPLASTY Left 2/98/12    UMBILICAL HERNIA REPAIR         Current Medications:    insulin glargine (LANTUS) injection vial 20 Units, BID  insulin lispro (HUMALOG) injection vial 5 Units, TID WC  furosemide (LASIX) tablet 40 mg, Daily  predniSONE (DELTASONE) tablet 40 mg, Daily  lactobacillus (CULTURELLE) capsule 1 capsule, BID WC  guaiFENesin (MUCINEX) extended release tablet 600 mg, BID  0.9 % sodium chloride infusion, PRN  doxycycline (VIBRAMYCIN) 100 mg in dextrose 5 % 100 mL IVPB, Q12H  heparin (porcine) injection 5,000 Units, 3 times per day  famotidine (PEPCID) tablet 20 mg, Daily  polyethylene glycol (GLYCOLAX) packet 17 g, Daily  clopidogrel (PLAVIX) tablet 75 mg, Daily  bisacodyl (DULCOLAX) suppository 10 mg, Daily PRN  aspirin chewable tablet 81 mg, Daily  traMADol (ULTRAM) tablet 50 mg, Q4H PRN  insulin lispro (HUMALOG) injection vial 0-16 Units, TID WC  insulin lispro (HUMALOG) injection vial 0-4 Units, Nightly  docusate sodium (COLACE) capsule 100 mg, BID  senna (SENOKOT) tablet 8.6 mg, BID  amiodarone (CORDARONE) tablet 200 mg, Daily  levalbuterol (XOPENEX) nebulizer solution 1.25 mg, BID  levalbuterol (XOPENEX) nebulizer solution 1.25 mg, Q4H PRN  traZODone (DESYREL) tablet 50 mg, Nightly  acetaminophen (TYLENOL) tablet 650 mg, Q6H  ALPRAZolam (XANAX) tablet 0.25 mg, Q6H PRN  gabapentin (NEURONTIN) capsule 300 mg, TID  sodium chloride flush 0.9 % injection 5-40 mL, 2 times per day  sodium chloride flush 0.9 % injection 5-40 mL, PRN  0.9 % sodium chloride infusion, PRN  ondansetron (ZOFRAN-ODT) disintegrating tablet 4 mg, Q8H PRN   Or  ondansetron (ZOFRAN) injection 4 mg, Q6H PRN  diphenhydrAMINE (BENADRYL) tablet 25 mg, Nightly PRN  potassium chloride 20 mEq/50 mL IVPB (Central Line), PRN  dextrose bolus 10% 125 mL, PRN   Or  dextrose bolus 10% 250 mL, PRN  glucagon (rDNA) injection 1 mg, PRN  dextrose 10 % infusion, Continuous PRN  sodium chloride flush 0.9 % injection 5-40 mL, PRN  rosuvastatin (CRESTOR) tablet 20 mg, Daily  sertraline (ZOLOFT) tablet 100 mg, Daily  mometasone-formoterol (DULERA) 200-5 MCG/ACT inhaler 2 puff, BID  tiotropium (SPIRIVA RESPIMAT) 2.5 MCG/ACT inhaler 2 puff, Daily  mirtazapine (REMERON) tablet 15 mg, Nightly        Physical exam:     Vitals:  /64   Pulse 66   Temp 98.2 °F (36.8 °C) (Temporal)   Resp 18   Ht 6' (1.829 m)   Wt 154 lb 8.7 oz (70.1 kg)   SpO2 96%   BMI 20.96 kg/m²   Constitutional:  OAA X3 NAD  Skin: no rash, turgor wnl  Heent:  eomi, mmm  Neck: no bruits or jvd noted  Cardiovascular:  S1, S2 without m/r/g  Respiratory: CTA B without w/r/r  Abdomen:  +bs, soft, nt, nd  Ext: no  lower extremity edema      Labs:  CBC:   Recent Labs     10/30/22  0210 10/31/22  0618   WBC 9.5 9.1   HGB 8.9* 9.3*    382     BMP:    Recent Labs     10/30/22  0210 10/30/22  1435 10/31/22  0618     --  143   K 3.3* 4.7 3.9   CL 99  --  104   CO2 35*  --  32   BUN 53*  --  47*   CREATININE 1.7*  --  1.6*   GLUCOSE 78  --  71     Ca/Mg/Phos:   Recent Labs     10/30/22  0210 10/31/22  0618   CALCIUM 9.9 9.6   MG 1.90  --          IMAGING:  XR CHEST PORTABLE   Final Result   Stable examination status post CABG. XR CHEST PORTABLE   Final Result   No interval change in bilateral airspace and interstitial opacities.          XR CHEST PORTABLE   Final Result   Persistent bibasilar atelectasis or airspace disease, decreased on the right as compared to prior. Small pleural effusions. XR CHEST PORTABLE   Final Result   Bibasilar atelectasis or airspace disease, slightly increased as compared to   prior. Suspected small pleural effusions. No pneumothorax. XR CHEST PORTABLE   Final Result   Persistent bibasilar atelectasis or airspace disease and small pleural   effusions, similar to prior. XR CHEST PORTABLE   Final Result   1. Sequela from CABG including median sternotomy and clips about the heart. 2.  Endotracheal tube is in place, tip at a level between that of the   clavicular heads and aortic knob. 3.  NG tube extends below the left hemidiaphragm, into the upper abdomen, tip   overlying the expected level of the stomach, left upper quadrant. 4. Right IJ Sulphur-Jemima catheter via introducer sheath has its tip overlying   the right pulmonary artery level. A 2nd right IJ CVC is demonstrated, tip at   the superior cavoatrial junction level. 5. Left side and right side pleural catheters appear unremarkable. 6. No pneumomediastinum or pneumothorax. 7. Minimal atelectasis at the lung bases and possible small volume left   pleural effusion. 8. No fracture evident. VL DUP CAROTID BILATERAL   Final Result      VL PRE OP VEIN MAPPING   Final Result      XR CHEST PORTABLE   Final Result   No acute cardiopulmonary disease. Assessment/Plan :      1.  CKD 3 A   Creatinine level increased to 2.0 -->2.1 ---> 1.9 --> 2.0 -->1.9 ---> 1.5 --> 1.6   Good UOP   No edema noted . Change lasix to 40 mg po daily. 2. HTN. Better controlled today     3. Anemia. Monitor   Hb dropped since admission   Transfuse if HB < 7.0     4. MVD . S/p CABG     5. Hyperkalemia   Resolved    Valsartan has been held . Will resume outpt   Volume status is good.            Outpt follow up     Thank you for allowing us to participate in care of Amanda Kaufman         Electronically signed by: Kathy Sahu MD, 11/1/2022, 10:47 AM      [facility] [address]  Office : [phone]  Fax :[phone]

## 2022-11-01 NOTE — PROGRESS NOTES
Facility/Department[facility]  Speech Language Pathology   Dysphagia Treatment Note    Patient: Ryder Martin   : 1950   MRN: [id number]      Evaluation Date: 2022      Admitting Dx: Atherosclerotic heart disease of native coronary artery without angina pectoris [I25.10]  Abnormal result of other cardiovascular function study [R94.39]  Chest pain, unspecified [R07.9]  Coronary artery disease involving native coronary artery of native heart with unstable angina pectoris ([facility] [address] [I25.110]  Treatment Diagnosis: Oropharyngeal Dysphagia   Pain: Denies                                              Diet and Treatment Recommendations 2022:  Diet Solids Recommendation:  Regular texture diet  Liquid Consistency Recommendation: Thin liquids  Recommended form of Meds: Meds whole with water or Meds in puree      Compensatory strategies:   Upright as possible with all PO intake , Small bites/sips , Eat/feed slowly    Assessment of Texture Tolerance:  Diet level prior to treatment: Regular texture diet , Thin liquids   Tolerance of Current Diet Level:Per chart, no noted difficulty with current diet level      Impressions: Pt was positioned Upright in bed , awake and alert. Currently on  4L O2 via nasal cannula . Oxygen saturation 90-92% throughout session. Pt agreeable to trials of thin liquids and regular solids  to assess swallow function. Mildly prolonged mastication was noted with regular solids with effective oral clearing achieved. Thin liquids via cup revealed no overt clinical s/s of aspiration in isolation or in combination with solids. Overall, Pt remains at increased dysphagia/aspiration risk due to current respiratory state, COPD, O2 requirements, and deconditioning. At this time recommend continuation of current diet with safe swallowing strategies as above.     Dysphagia Goals:   Pt will functionally tolerate recommended diet with no overt clinical s/s of aspiration (Ongoing 22)  Pt will demonstrate understanding of aspiration risk and precautions via education/demonstration with occasional prompting (Ongoing 11/01/22)  Pt will participate in instrumental swallow study (FEES or MBS) to further assess pharyngeal phase of swallow, assist with diet recommendations and to further direct plan of care (Ongoing 11/01/22)    Plan:   3-5 times per week during acute care stay. Patient/Family Education:  Provided education regarding role of SLP, recommendations and general speech pathology plan of care. [x] Pt verbalized understanding and agreement   [] Pt requires ongoing learning   [] No evidence of comprehension     Discharge Recommendations:    Discharge recommendations to be determined pending ongoing follow-up during acute care stay    Treatment time:  Timed Code Treatment Minutes: 0 min  Total Treatment time: 12 min    If patient discharges prior to next session this note will serve as a discharge summary.      Signature:   Nancy Hunt M.A., [address] [place] [address]  Speech-Language Pathologist

## 2022-11-01 NOTE — PROGRESS NOTES
Hospitalist Progress Note      PCP: Shanon Liriano MD    Date of Admission: 10/19/2022    Chief Complaint: \"I had a bypass surgery\"    Hospital Course: 81 y.o. male on Angelika Lopez MD service who was admitted on 10/19/2022 for unstable angina and CAD. Patient with history of DM 2, HTN, CKD 3, COPD and hyperlipidemia. 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 Underwent Coronary bypass grafting surgery x3 with single  reverse saphenous vein graft to the obtuse marginal branch of the  circumflex, separate sequential reverse saphenous vein graft to the  distal right coronary artery, pedicle left internal artery to the LAD,  endoscopic vein harvest of the left greater saphenous vein, left atrial  appendage obliteration with 35-mm AtriCure left atrial clip, total  cardiopulmonary bypass, transesophageal echo, Doppler verification of  graft, epiaortic ultrasound, and vas cath placement on 10/21/22. Subjective:  Patient needs 2 L NC for home. Less coughing, no SOB. Denies SOB. No HA, dizziness, fevers or abdominal pain.        Medications:  Reviewed    Infusion Medications    sodium chloride      sodium chloride Stopped (10/31/22 1340)    dextrose       Scheduled Medications    insulin glargine  20 Units SubCUTAneous BID    insulin lispro  5 Units SubCUTAneous TID     furosemide  40 mg Oral Daily    predniSONE  40 mg Oral Daily    lactobacillus  1 capsule Oral BID WC    guaiFENesin  600 mg Oral BID    doxycycline (VIBRAMYCIN) IV  100 mg IntraVENous Q12H    heparin (porcine)  5,000 Units SubCUTAneous 3 times per day    famotidine  20 mg Oral Daily    polyethylene glycol  17 g Oral Daily    clopidogrel  75 mg Oral Daily    aspirin  81 mg Oral Daily    insulin lispro  0-16 Units SubCUTAneous TID     insulin lispro  0-4 Units SubCUTAneous Nightly    docusate sodium  100 mg Oral BID    senna  1 tablet Oral BID    amiodarone  200 mg Oral Daily levalbuterol  1.25 mg Nebulization BID    traZODone  50 mg Oral Nightly    acetaminophen  650 mg Oral Q6H    gabapentin  300 mg Oral TID    sodium chloride flush  5-40 mL IntraVENous 2 times per day    rosuvastatin  20 mg Oral Daily    sertraline  100 mg Oral Daily    mometasone-formoterol  2 puff Inhalation BID    tiotropium  2 puff Inhalation Daily    mirtazapine  15 mg Oral Nightly     PRN Meds: sodium chloride, bisacodyl, traMADol, levalbuterol, ALPRAZolam, sodium chloride flush, sodium chloride, ondansetron **OR** ondansetron, diphenhydramine, potassium chloride, dextrose bolus **OR** dextrose bolus, glucagon (rDNA), dextrose, sodium chloride flush      Intake/Output Summary (Last 24 hours) at 11/1/2022 1458  Last data filed at 11/1/2022 1009  Gross per 24 hour   Intake 510 ml   Output 1200 ml   Net -690 ml         Physical Exam Performed:    /64   Pulse 66   Temp 98.2 °F (36.8 °C) (Temporal)   Resp 18   Ht 6' (1.829 m)   Wt 154 lb 8.7 oz (70.1 kg)   SpO2 96%   BMI 20.96 kg/m²     General appearance: Appears comfortable. Well nourished  Eyes: Sclera clear, pupils equal  ENT: Moist mucus membranes, no thrush  Neck: Trachea midline, symmetrical  Cardiovascular: Regular rhythm, normal S1, S2. No murmur, gallop, rub. No edema in  lower extremities  Respiratory: Trace rales. BL wheezing resolved. No rhonchi. Gastrointestinal: Abdomen soft, not tender, not distended, normal bowel sounds  Musculoskeletal: No cyanosis in digits, warm extremities  Neurologic: Grossly intact, no motor or speech deficits. Psychiatric: Normal affect. Alert and oriented to time, place and person.   Skin: Warm, dry, normal turgor, no rash      Labs:   Recent Labs     10/30/22  0210 10/31/22  0618   WBC 9.5 9.1   HGB 8.9* 9.3*   HCT 28.4* 29.5*    382       Recent Labs     10/30/22  0210 10/30/22  1435 10/31/22  0618     --  143   K 3.3* 4.7 3.9   CL 99  --  104   CO2 35*  --  32   BUN 53*  --  47*   CREATININE 1.7*  --  1.6*   CALCIUM 9.9  --  9.6       No results for input(s): AST, ALT, BILIDIR, BILITOT, ALKPHOS in the last 72 hours. No results for input(s): INR in the last 72 hours. No results for input(s): Caleb Beets in the last 72 hours. Urinalysis:      Lab Results   Component Value Date/Time    NITRU Negative 10/19/2022 11:10 PM    WBCUA 0 04/25/2019 01:47 PM    RBCUA 4 04/25/2019 01:47 PM    BLOODU Negative 10/19/2022 11:10 PM    SPECGRAV 1.010 10/19/2022 11:10 PM    GLUCOSEU Negative 10/19/2022 11:10 PM       Radiology:  XR CHEST PORTABLE   Final Result   Stable examination status post CABG. XR CHEST PORTABLE   Final Result   No interval change in bilateral airspace and interstitial opacities. XR CHEST PORTABLE   Final Result   Persistent bibasilar atelectasis or airspace disease, decreased on the right   as compared to prior. Small pleural effusions. XR CHEST PORTABLE   Final Result   Bibasilar atelectasis or airspace disease, slightly increased as compared to   prior. Suspected small pleural effusions. No pneumothorax. XR CHEST PORTABLE   Final Result   Persistent bibasilar atelectasis or airspace disease and small pleural   effusions, similar to prior. XR CHEST PORTABLE   Final Result   1. Sequela from CABG including median sternotomy and clips about the heart. 2.  Endotracheal tube is in place, tip at a level between that of the   clavicular heads and aortic knob. 3.  NG tube extends below the left hemidiaphragm, into the upper abdomen, tip   overlying the expected level of the stomach, left upper quadrant. 4. Right IJ Boutte-Jemima catheter via introducer sheath has its tip overlying   the right pulmonary artery level. A 2nd right IJ CVC is demonstrated, tip at   the superior cavoatrial junction level. 5. Left side and right side pleural catheters appear unremarkable. 6. No pneumomediastinum or pneumothorax.    7. Minimal atelectasis at the lung bases and possible small volume left   pleural effusion. 8. No fracture evident. VL DUP CAROTID BILATERAL   Final Result      VL PRE OP VEIN MAPPING   Final Result      XR CHEST PORTABLE   Final Result   No acute cardiopulmonary disease. Assessment/Plan:    Active Hospital Problems    Diagnosis     Pleural effusion on left [J90]      Priority: Medium    S/P CABG x 3 [Z95.1]      Priority: Medium    Acute respiratory failure with hypoxia (HCC) [J96.01]      Priority: Medium    Moderate malnutrition (HCC) [E44.0]      Priority: Medium    Dyslipidemia [E78.5]      Priority: Medium    Tobacco abuse disorder [Z72.0]      Priority: Medium    Unstable angina (HCC) [I20.0]      Priority: Medium    Coronary artery disease involving native coronary artery of native heart with unstable angina pectoris ([facility] [address] [I25.110]      Priority: Medium    COPD, moderate ([facility] [address] [J44.9]      Priority: Medium    COPD with acute exacerbation (HCC) [J44.1]     DM2 (diabetes mellitus, type 2) ([facility] [address] [E11.9]     Essential hypertension [I10]     Chronic kidney disease, stage III (moderate) ([facility] [address] [N18.30]      Discharge to home on Lantus 20 U bid  Back on Humalog 5 U with meals  PO Lasix per CTS  Wean Prednisone over next 5 days  Continue Amio, ASA, Plavix, Crestor  Continue Dulera and Xopenex  Wean O2 as tolerated  Arixtra for DVT ppx  On Doxy IV for AECOPD Day 7/7      DVT Prophylaxis: Arixtra  Diet: ADULT ORAL NUTRITION SUPPLEMENT; Breakfast, Lunch; Diabetic Oral Supplement  ADULT DIET; Regular; 5 carb choices (75 gm/meal); Low Fat/Low Chol/High Fiber/2 gm Na; 1800 ml  Code Status: Full Code  PT/OT Eval Status: Following    Dispo - Home with home PT/OT    Discussed with patient, Judy Kohler (CTS NP), nursing and CM. Medically stable for DC to home. F/U with PCP.     Grace Lacy MD

## 2022-11-01 NOTE — CARE COORDINATION
Lexis with [facility] care [phone] informed pt discharging today. She will get orders and d/c paperwork from [facility]. Cm spoke to [facility] with [employer] [phone] and she needs updated home oxygen eval and updated DME oxygen orders. AUSTYN Reina aware and placing. Message sent to [facility] oxygen eval needed. Pt updated.      Bubba Diez RN, BSN  [phone]

## 2022-11-01 NOTE — PROGRESS NOTES
Open heart discharge instructions reviewed thoroughly with patient and their family. All medications reviewed and paperwork signed. Prescriptions given to patient. All questions regarding discharge instructions and medications answered. Home health care set up with Analy. All paperwork faxed to home health care agency and the surgeon's office. Open heart binder given to patient.

## 2022-11-02 ENCOUNTER — TELEPHONE (OUTPATIENT)
Dept: CARDIOTHORACIC SURGERY | Age: 72
End: 2022-11-02

## 2022-11-02 ENCOUNTER — CARE COORDINATION (OUTPATIENT)
Dept: CASE MANAGEMENT | Age: 72
End: 2022-11-02

## 2022-11-02 NOTE — TELEPHONE ENCOUNTER
Renea Toney RN for [facility], called to report an update on pt. Stated his O2 sat was 77% on 4 L/min per NC after walking up the cristobal. After 10 mins it increased to 88%. She increased the oxygen to 5 L/min per NC at which time it came up to 96%. Orders are to call MD if O2 sat < 90%. Pt has fine crackles bibase. Using IS several times daily with 10-12 reps each time. IS volume is 1500. Cough is productive of dark green sputum. Afebrile. Reviewed pt chart. O2 sats averaged low 90's on O2 and dropped on activity. Recommended that pt reduce oxygen to 2 L/min per NC but may increase temporarily if grossly desats with activity. He is to stop and rest when he desats then resume the activity which is walking only at this point. Pt voiced understanding and will call the office if he falls below given parameters or is significantly distressed.

## 2022-11-02 NOTE — CARE COORDINATION
[address] Care Transitions Initial Follow Up Call    Call within 2 business days of discharge: Yes    First attempt at 24 hour discharge call, no answer, CTN left  with contact information and request for return call. CTN will continue with outreach call attempts. CTN contacted [facility], spoke with Daryl Childers and confirmed that orders for home care were received.           Follow Up  Future Appointments   Date Time Provider Padmini Almaguer   11/10/2022  2:45 PM MD David Macias CT S [facility]   11/17/2022  2:00 PM Karlee Maier APRN - CNP  Cardio [facility]   11/22/2022 10:00 AM MD GWEN Portillo [facility] [place] [facility]   11/22/2022  2:00 PM Domo Billingsley MD [facility]   3/16/2023  1:30 PM Moses Dhaliwal MD  Cardio [facility]   10/16/2023 10:15 AM Chiara Llamas MD PULM & CC [facility]     Rita Sweet, RN

## 2022-11-03 ENCOUNTER — CARE COORDINATION (OUTPATIENT)
Dept: CASE MANAGEMENT | Age: 72
End: 2022-11-03

## 2022-11-03 ENCOUNTER — TELEPHONE (OUTPATIENT)
Dept: CARDIOTHORACIC SURGERY | Age: 72
End: 2022-11-03

## 2022-11-03 NOTE — CARE COORDINATION
[facility] Care Transitions Initial Follow Up Call    Call within 2 business days of discharge: No    Second & final attempt at 24 hour discharge call, no answer, CTN left  with contact information and request for return call. CTN will send message to patient via My Chart. He has a follow up scheduled with Dr. Jamilah Page 11/10/22.     Follow Up  Future Appointments   Date Time Provider Padmini Almaguer   11/10/2022  2:45 PM MD David Macias CT S [facility]   11/17/2022  2:00 PM Karlee Maier APRN - CNP FF Cardio [facility]   11/22/2022 10:00 AM MD GWEN Portillo [facility] [place] [facility]   11/22/2022  2:00 PM Domo Billingsley MD [facility] AFL [facility]   3/16/2023  1:30 PM Moses Dhaliwal MD  Cardio [facility]   10/16/2023 10:15 AM Chiara Llamas MD PULM & CC [facility]       Rita Sweet, RN

## 2022-11-03 NOTE — TELEPHONE ENCOUNTER
Message received from pharmacy stating that insurance does not cover Deatra Pals. MD garcia last pm, Dr. Dennis Brian, suggested that he resume taking the Trelegy instead that he has been on long term. The Deatra Pals is discontinued.

## 2022-11-04 ENCOUNTER — TELEPHONE (OUTPATIENT)
Dept: PULMONOLOGY | Age: 72
End: 2022-11-04

## 2022-11-04 NOTE — TELEPHONE ENCOUNTER
----- Message from Katya Houser MD sent at 10/30/2022  3:03 PM EDT -----  This patient will need follow up with Dr Rebecca Zepeda in 2 weeks.

## 2022-11-10 ENCOUNTER — OFFICE VISIT (OUTPATIENT)
Dept: CARDIOTHORACIC SURGERY | Age: 72
End: 2022-11-10

## 2022-11-10 VITALS
TEMPERATURE: 98.1 F | HEART RATE: 72 BPM | OXYGEN SATURATION: 98 % | SYSTOLIC BLOOD PRESSURE: 110 MMHG | HEIGHT: 72 IN | WEIGHT: 132 LBS | DIASTOLIC BLOOD PRESSURE: 70 MMHG | BODY MASS INDEX: 17.88 KG/M2

## 2022-11-10 DIAGNOSIS — Z48.812 AFTERCARE FOLLOWING SURGERY OF THE CIRCULATORY SYSTEM: Primary | ICD-10-CM

## 2022-11-10 DIAGNOSIS — I25.10 CORONARY ARTERY DISEASE INVOLVING NATIVE CORONARY ARTERY OF NATIVE HEART WITHOUT ANGINA PECTORIS: ICD-10-CM

## 2022-11-10 DIAGNOSIS — E78.5 DYSLIPIDEMIA: ICD-10-CM

## 2022-11-10 DIAGNOSIS — I10 ESSENTIAL HYPERTENSION: ICD-10-CM

## 2022-11-10 DIAGNOSIS — Z95.1 S/P CABG X 3: ICD-10-CM

## 2022-11-10 PROCEDURE — 99024 POSTOP FOLLOW-UP VISIT: CPT | Performed by: THORACIC SURGERY (CARDIOTHORACIC VASCULAR SURGERY)

## 2022-11-10 NOTE — PROGRESS NOTES
Progress Note    CC:  S/P CABGx3 KELLY on 10/21/22 D/C'd 11/1/22    Subjective: Patient reports that he is feeling better since leaving the hospital.  During his post-op course he experienced some post-op atrial fibrillation which was treated with an amiodarone taper and returned to NSR. He is still currently finishing his amio taper with 200mg daily. He was discharged on 2L O2 due to high oxygen requirements in the hospital.  He is still on 2L and reports that with walking he saturations drop down to the 80's, after rest it returns to the 90's. Patients BP has been in the 140/90's. He was not discharged on a BB or ACEi due to hypotension and severe COPD. Patient reports coughing up some green sputum at home but this has continued to improve. He is doing his IS multiple times a day. Reports a rash that developed on both sides of his torso 2-3 days ago. Does not itch. Denies any chest pain, shortness of breath or palpitations, fevers, or drainage from his incisions. Vital Signs:                                                 /70 (Site: Right Upper Arm, Position: Sitting, Cuff Size: Medium Adult)   Pulse 72   Temp 98.1 °F (36.7 °C) (Temporal)   Ht 6' (1.829 m)   Wt 132 lb (59.9 kg)   SpO2 98% Comment: on 2 L/min per NC  BMI 17.90 kg/m²        CV:   Regular rate and rhythm. Pulm: Clear to auscultation with symmetric bases. Incisions:    Sternotomy and chest tube incisions clean dry and intact. Edges well approximated. Sternum stable to deep palpation. Ext: No edema. Left EVH site C/D/I  Skin: Dark red papular rash on bilateral sides of torso. Assessment/Plan:    Progressing well. Continue Plavix and 81mg ASA for 3 months. Continue amiodarone taper. Hold BB due to severe COPD on home O2. Continue to monitor rash. I went over risk factor reduction, scar care and recovery expectations with the patient and answered all his questions.   From a surgical standpoint, the patient is ready for cardiac rehab at the 6 week post-op date with 5 pound weight limit and may start using arms and lift without restriction on 12/21/22. We will see the patient back in the office in 4 weeks for his second post-op visit. I encouraged them to call with any questions, or new concerns. Thank you,    Electronically signed by Tylor Griffin PA-C on 11/10/2022 at 3:46 PM  Note reviewed, post op course reviewed along with vital signs and I/Os and patient examined. Assessment and plans discussed and are as outlined above.      Nelly Barragan MD, FACS, [facility], FACCP, Veronica

## 2022-11-15 ENCOUNTER — TELEPHONE (OUTPATIENT)
Dept: INTERNAL MEDICINE CLINIC | Age: 72
End: 2022-11-15

## 2022-11-15 RX ORDER — GABAPENTIN 300 MG/1
CAPSULE ORAL
Qty: 30 CAPSULE | Refills: 0 | OUTPATIENT
Start: 2022-11-15

## 2022-11-15 NOTE — TELEPHONE ENCOUNTER
Patient's spouse, Santhosh Tejada is calling to speak to Dr. Randal Cramer regarding medication sertraline (ZOLOFT) 100 MG tablet    Her concern is he just had triple bypass. Patient is in a deep depression, he is not eating, he just wants to sleep and lay in bed. He has lost weight. She is wanting to know what Dr. Randal Cramer suggests to alleviate this depression so he can get up and about and start recovering from the surgery. Should his dosage be increased? Is there something else that can be suggested?

## 2022-11-15 NOTE — TELEPHONE ENCOUNTER
Please offer them a phone visit for Thursday at noon to discuss treatment options. Please also convey that depression is extremely common after cardiac surgery.

## 2022-11-16 ENCOUNTER — HOSPITAL ENCOUNTER (OUTPATIENT)
Dept: GENERAL RADIOLOGY | Age: 72
Discharge: HOME | End: 2022-11-16
Payer: MEDICARE

## 2022-11-16 ENCOUNTER — HOSPITAL ENCOUNTER (OUTPATIENT)
Age: 72
Discharge: HOME | End: 2022-11-16
Payer: MEDICARE

## 2022-11-16 ENCOUNTER — OFFICE VISIT (OUTPATIENT)
Dept: PULMONOLOGY | Age: 72
End: 2022-11-16
Payer: MEDICARE

## 2022-11-16 VITALS — HEART RATE: 71 BPM | OXYGEN SATURATION: 94 %

## 2022-11-16 DIAGNOSIS — J90 PLEURAL EFFUSION: ICD-10-CM

## 2022-11-16 DIAGNOSIS — J96.11 CHRONIC HYPOXEMIC RESPIRATORY FAILURE (HCC): ICD-10-CM

## 2022-11-16 DIAGNOSIS — J44.9 COPD, MODERATE (HCC): Primary | ICD-10-CM

## 2022-11-16 DIAGNOSIS — J43.2 CENTRILOBULAR EMPHYSEMA (HCC): ICD-10-CM

## 2022-11-16 DIAGNOSIS — R91.1 PULMONARY NODULE: ICD-10-CM

## 2022-11-16 PROBLEM — I20.0 UNSTABLE ANGINA (HCC): Status: RESOLVED | Noted: 2022-10-19 | Resolved: 2022-11-16

## 2022-11-16 PROBLEM — I25.10 CORONARY ARTERY DISEASE INVOLVING NATIVE CORONARY ARTERY OF NATIVE HEART: Status: ACTIVE | Noted: 2022-10-19

## 2022-11-16 PROBLEM — Z72.0 TOBACCO ABUSE DISORDER: Status: RESOLVED | Noted: 2022-10-21 | Resolved: 2022-11-16

## 2022-11-16 PROCEDURE — 3023F SPIROM DOC REV: CPT | Performed by: INTERNAL MEDICINE

## 2022-11-16 PROCEDURE — G8427 DOCREV CUR MEDS BY ELIG CLIN: HCPCS | Performed by: INTERNAL MEDICINE

## 2022-11-16 PROCEDURE — G8484 FLU IMMUNIZE NO ADMIN: HCPCS | Performed by: INTERNAL MEDICINE

## 2022-11-16 PROCEDURE — 71046 X-RAY EXAM CHEST 2 VIEWS: CPT

## 2022-11-16 PROCEDURE — G8419 CALC BMI OUT NRM PARAM NOF/U: HCPCS | Performed by: INTERNAL MEDICINE

## 2022-11-16 PROCEDURE — 3017F COLORECTAL CA SCREEN DOC REV: CPT | Performed by: INTERNAL MEDICINE

## 2022-11-16 PROCEDURE — 1111F DSCHRG MED/CURRENT MED MERGE: CPT | Performed by: INTERNAL MEDICINE

## 2022-11-16 PROCEDURE — 1123F ACP DISCUSS/DSCN MKR DOCD: CPT | Performed by: INTERNAL MEDICINE

## 2022-11-16 PROCEDURE — 99214 OFFICE O/P EST MOD 30 MIN: CPT | Performed by: INTERNAL MEDICINE

## 2022-11-16 PROCEDURE — 1036F TOBACCO NON-USER: CPT | Performed by: INTERNAL MEDICINE

## 2022-11-16 NOTE — PROGRESS NOTES
Gena Ceron is a 81 y.o. male evaluated via telephone on 11/18/2022. Documentation:  I communicated with the patient and/or health care decision maker about the following issues:  Mood Disorder:  Patient presents for follow-up of depression, insomnia. Current complaints include: anhedonia, depressed mood, feelings of hopelessness, decreased appetite and fatigue. He denies suicidal thoughts or behavior. Symptoms/signs of marlo: none. External stressors: CABG 10/21/22- now requiring home O2. Current treatment includes: Zoloft- 100 mg qd and Remeron 22.5 mg qhs, trazodone 50 mg qhs. Medication side effects: none. Has metallic taste since surgery- drinking 1 Glucerna/day, but not eating much else. Has lost about 20 pounds since his surgery. Cardiologist encouraging him to get out of bed several times/day and walk the halls, but patient hasn't felt like doing this. Has home health, but no home PT or cardiac rehab. Details of this discussion including any medical advice provided:  Current moderate episode of major depressive disorder without prior episode [facility])  Assessment & Plan:  Exacerbation secondary to recent CABG and pulmonary setback. On maximal serotonergic medication currently and adding Wellbutrin would likely worsen appetite further. Continue current medications. If no improvement at his appointment 12/22, consider consulting [facility] and/or psychiatry for medication evaluation. Moderate malnutrition ([address]  Assessment & Plan:  Suspect due to a combination of post-op changes to taste and worsening depression. Strategies for increasing his intake of calories and protein discussed. Acute respiratory failure with hypoxia [facility])  Assessment & Plan:  Likely due to combination of COPD and pulmonary edema. Continue home O2, but will need to be closely monitored.      Follow up 11/22/22, as scheduled    Patient-Reported Vitals 11/18/2022   Patient-Reported Weight 132   Patient-Reported Height 6 Patient-Reported Systolic 930   Patient-Reported Diastolic 82   Patient-Reported Pulse 77        Wt Readings from Last 3 Encounters:   11/10/22 132 lb (59.9 kg)   11/01/22 154 lb 8.7 oz (70.1 kg)   09/14/22 174 lb (78.9 kg)     BP Readings from Last 3 Encounters:   11/10/22 110/70   11/01/22 136/64   09/14/22 124/64     Estimated body mass index is 17.9 kg/m² as calculated from the following:    Height as of 11/10/22: 6' (1.829 m). Weight as of 11/10/22: 132 lb (59.9 kg). On this date 11/18/2022 I have spent 30 minutes reviewing previous notes, test results and face to face with the patient discussing the diagnosis and importance of compliance with the treatment plan as well as documenting on the day of the visit. Andrei Herr was evaluated through a synchronous (real-time) audio encounter. Patient identification was verified at the start of the visit. He (or guardian if applicable) is aware that this is a billable service, which includes applicable co-pays. This visit was conducted with the patient's (and/or legal guardian's) verbal consent. He has not had a related appointment within my department in the past 7 days or scheduled within the next 24 hours. The patient was located at Home: [address] [place] [address]. The provider was located at [facility] (Appt Dept): [address] [facility],  [address] [place] [address].     Note: not billable if this call serves to triage the patient into an appointment for the relevant concern    Yareli Carcamo MD

## 2022-11-16 NOTE — PROGRESS NOTES
[facility] [address]  Progress Note  MD Shemar Moeller Se   YOB: 1950    Date of Visit:  11/16/2022    Assessment/Plan:  1. SOB/S/P CABG/Pleural effusion  Stable  Had CABG in October '22  Was a rough stay  Now on O2 at home  Still having SOB  I reviewed CXR from 10/21/22 which shows pleural effusion and elevation of the right diaphragm. Have him get CXR on the way out    2. COPD, moderate ([address]  Stable  PFT 8/19:  Spirometry:  Flow volume loops were normal. The FEV-1/FVC ratio was normal.   The FEV-1 was 2.79 liters (77% of predicted), which was   moderately decreased. The FVC was 3.72 liters (76% of predicted),   which was decreased. Response to inhaled bronchodilators   (albuterol) was not significant. Lung volumes:  Lung volumes were tested by plethysmography. The total lung   capacity was 5.82 liters (83% of predicted), which was normal.   The residual volume was 1.86 liters (71% of predicted), which was   decreased. The ratio of residual volume to total lung capacity   (RV/TLC) was 32, which was normal.     Diffusion capacity was found to be mildly decreased. Interpretation:  Overall normal PFT with only mild decrease in DLCO. Clinical   correlation is recommended. Pulmonary function testing actually is improved. Modified barium swallow was also pretty good. Discussed techniques to protect against future aspiration    Medication Management:  Trelegy  Albuterol    3. Centrilobular emphysema (HCC)/PN  Stable  CT imaging does reveal evidence of mild centrilobular emphysema. Last CT showed \"inflammatory nodules\"  Repeat CT chest    4. Chronic Hypoxemic Respiratory Failure  Now on O2 at 2 LPM  His RA resting sat today is only 87%  He needs O2 24 hours per day  He is mobile within the home and would benefit from a POC.       FOLLOW UP: 12 month      Chief Complaint   Patient presents with    Shortness of Breath     HFU CABG x3 10-21-22 PFT done while In Patient Also sent home on O2        HPI  The patient presents with a chief complaint of moderate shortness of breath related to Moderate COPD of many years duration. He has mild associated cough. Exertion is a modifying factor. Cold weather is a factor. He has \"blocked artery\" and is going for a cath. He has a stent already. His breathing has been worse. He failed his GXT. Review of Systems  As documented in HPI      Physical Exam:  Well developed, well nourished  Alert and oriented  Sclera is clear  No cervical adenopathy  No JVD. Chest examination is clear. Cardiac examination reveals regular rate and rhythm without murmur, gallop or rub. The abdomen is soft, nontender and nondistended. There is no clubbing, cyanosis or edema of the extremities. There is no obvious skin rash. No focal neuro deficicts  Normal mood and affect    Allergies   Allergen Reactions    Dilaudid [Hydromorphone Hcl] Other (See Comments)     Mental status changes    Nubain [Nalbuphine Hcl] Rash     Prior to Visit Medications    Medication Sig Taking? Authorizing Provider   furosemide (LASIX) 40 MG tablet Take 1 tablet by mouth daily  ANTHONY Allen CNP   aspirin 81 MG chewable tablet Take 1 tablet by mouth daily  ANTHONY Hernandez CNP   clopidogrel (PLAVIX) 75 MG tablet Take 1 tablet by mouth daily  ANTHONY Hernandez CNP   docusate sodium (COLACE, DULCOLAX) 100 MG CAPS Take 100 mg by mouth 2 times daily  Patient not taking: Reported on 11/10/2022  ANTHONY Hernandez CNP   gabapentin (NEURONTIN) 300 MG capsule Take 1 capsule by mouth 3 times daily for 10 days.   ANTHONY Hernandez CNP   amiodarone (CORDARONE) 200 MG tablet Take 1 tablet by mouth daily for 14 days  ANTHONY Hernandez CNP   rosuvastatin (CRESTOR) 20 MG tablet Take 2 tablets by mouth daily  ANTHONY Hernandez CNP   glimepiride (AMARYL) 4 MG tablet TAKE 2 TABLETS BY MOUTH EVERY MORNING  Dina Sharif MD   meloxicchanda [facility]) 15 MG tablet TAKE 1 TABLET BY MOUTH EVERY DAY  Patient not taking: Reported on 11/10/2022  Historical Provider, MD   blood glucose test strips (TRUE METRIX BLOOD GLUCOSE TEST) strip USE 1 STRIP TO TEST BLOOD SUGAR TWICE DAILY  Armando Yarbrough MD   mirtazapine (REMERON) 15 MG tablet Take 1.5 tablets by mouth nightly  Armando Yarbrough MD   sertraline (ZOLOFT) 100 MG tablet Take 1 tablet by mouth in the morning. Armando Yarbrough MD   traZODone (DESYREL) 50 MG tablet Take 1 tablet by mouth nightly  Armando Yarbrough MD   pantoprazole (PROTONIX) 40 MG tablet TAKE 1 TABLET BY MOUTH EVERY MORNING BEFORE BREAKFAST  Armando Yarbrough MD   insulin glargine (LANTUS SOLOSTAR) 100 UNIT/ML injection pen Inject 15 Units into the skin nightly  Armando Yarbrough MD   doxazosin (CARDURA) 4 MG tablet TAKE 1 TABLET BY MOUTH EVERY NIGHT  Armando Yabrrough MD   fenofibrate (TRIGLIDE) 160 MG tablet Take 1 tablet by mouth daily  Armando Yarbrough MD   Insulin Pen Needle (B-D UF III MINI PEN NEEDLES) 31G X 5 MM MISC USE AS DIRECTED TO INJECT ONCE DAILY  Armando Yarbrough MD   Daun Hunger 100-62.5-25 MCG/INH AEPB INHALE 1 PUFF INTO THE LUNGS DAILY  Patient taking differently: Inhale 1 puff into the lungs daily Was told to take Trelegy 2 puffs twice daily as Edith Locker was not accepted by insurance. Mohini West MD   [facility] Patient test twice daily  Armando Yarbrough MD   albuterol sulfate HFA (VENTOLIN HFA) 108 (90 Base) MCG/ACT inhaler 2 Puff every 4-6 hours as needed for wheezing or shortness of breath  Patient not taking: Reported on 11/10/2022  Armando Yarbrough MD   Blood Glucose Monitoring Suppl (PRECISION XTRA) w/Device KIT As directed  Armando Yarbrough MD   Blood Glucose Monitoring Suppl (ONE TOUCH ULTRA 2) W/DEVICE KIT 1 kit by Does not apply route daily as needed. Armando Yarbrough MD   fish oil-omega-3 fatty acids 1000 MG capsule Take 2 g by mouth daily.   Historical Provider, MD       Vitals:    11/16/22 1009 Pulse: 71   SpO2: 94%     There is no height or weight on file to calculate BMI.      Wt Readings from Last 3 Encounters:   11/10/22 132 lb (59.9 kg)   22 154 lb 8.7 oz (70.1 kg)   22 174 lb (78.9 kg)     BP Readings from Last 3 Encounters:   11/10/22 110/70   22 136/64   22 124/64        Social History     Tobacco Use   Smoking Status Former    Packs/day: 1.00    Years: 20.00    Pack years: 20.00    Types: Cigarettes    Quit date: 3/3/1992    Years since quittin.7   Smokeless Tobacco Never

## 2022-11-18 ENCOUNTER — SCHEDULED TELEPHONE ENCOUNTER (OUTPATIENT)
Dept: INTERNAL MEDICINE CLINIC | Age: 72
End: 2022-11-18
Payer: MEDICARE

## 2022-11-18 ENCOUNTER — TELEPHONE (OUTPATIENT)
Dept: CARDIOLOGY CLINIC | Age: 72
End: 2022-11-18

## 2022-11-18 DIAGNOSIS — E44.0 MODERATE MALNUTRITION (HCC): Chronic | ICD-10-CM

## 2022-11-18 DIAGNOSIS — J96.01 ACUTE RESPIRATORY FAILURE WITH HYPOXIA (HCC): ICD-10-CM

## 2022-11-18 DIAGNOSIS — F32.1 CURRENT MODERATE EPISODE OF MAJOR DEPRESSIVE DISORDER WITHOUT PRIOR EPISODE (HCC): Primary | ICD-10-CM

## 2022-11-18 DIAGNOSIS — I25.10 CORONARY ARTERY DISEASE INVOLVING NATIVE CORONARY ARTERY OF NATIVE HEART WITHOUT ANGINA PECTORIS: Primary | ICD-10-CM

## 2022-11-18 PROCEDURE — 99443 PR PHYS/QHP TELEPHONE EVALUATION 21-30 MIN: CPT | Performed by: INTERNAL MEDICINE

## 2022-11-18 NOTE — ASSESSMENT & PLAN NOTE
LDL at goal, but triglycerides still sub-optimal.  Continue current doses of Crestor and fenofibrate. Additional lifestyle changes will be necessary for significant improvement in triglycerides.

## 2022-11-18 NOTE — TELEPHONE ENCOUNTER
Pt's wife called in stating that pt had triple Bypass surgery on October 21st, and he recently saw his PCP and was told that he should contact his cardiologist an ask that a order for [address] be put in. Mrs. Tomeka Mora can be reached at [phone]

## 2022-11-18 NOTE — ASSESSMENT & PLAN NOTE
Likely due to combination of COPD and pulmonary edema. Continue home O2, but will need to be closely monitored.

## 2022-11-18 NOTE — ASSESSMENT & PLAN NOTE
Exacerbation secondary to recent CABG and pulmonary setback. On maximal serotonergic medication currently and adding Wellbutrin would likely worsen appetite further. Continue current medications. If no improvement at his appointment 12/22, consider consulting [facility] and/or psychiatry for medication evaluation.

## 2022-11-18 NOTE — ASSESSMENT & PLAN NOTE
Suspect due to a combination of post-op changes to taste and worsening depression. Strategies for increasing his intake of calories and protein discussed.

## 2022-11-18 NOTE — TELEPHONE ENCOUNTER
Cardiac rehab ordered. Called [facility] and left voicemail with update and phone number of cardiac rehab. CR should call within about a week.

## 2022-11-18 NOTE — PROGRESS NOTES
Date of Visit:  2022    CC: Sang Almendarez (: 1950) is a 81 y.o. male, established patient, here for evaluation/re-evaluation of the following medical concerns:    ASSESSMENT/PLAN:  Type 2 diabetes mellitus without complication, with long-term current use of insulin ([facility]  Assessment & Plan:  Somewhat volatile due 30 lb weight loss and erratic diet s/p CABG, but no significant hypoglycemia or extreme hyperglycemia. Expect improvement as diet normalizes. Continue current diabetic medications. Essential hypertension  Assessment & Plan:  Well-controlled. Continue current antihypertensive regimen. Stage 3a chronic kidney disease ([facility] [address] [facility]  Assessment & Plan:  Worse during perioperative period, but slowly recovering. Has appointment with nephrology this afternoon, so will send STAT BMP. Orders:  -     Basic Metabolic Panel; Future  Mixed hyperlipidemia  Assessment & Plan:  LDL at goal, but triglycerides still sub-optimal.  Continue current doses of Crestor and fenofibrate. Additional lifestyle changes will be necessary for significant improvement in triglycerides. Current moderate episode of major depressive disorder without prior episode [facility])  Assessment & Plan:  Exacerbation secondary to recent CABG and pulmonary setback- improved since last week with increased activity. On maximal serotonergic medication currently and adding Wellbutrin would likely worsen appetite further. Continue current medications. Considering [facility] and/or psychiatry referral if symptoms worsen. Primary insomnia  Assessment & Plan:  Doing well on current doses of Remeron and trazodone- continue. Moderate malnutrition (HCC)  Assessment & Plan:  Appetite and intake improved over the past week. Dietary strategies to increase calorie and protein intake without spiking blood sugar reinforced. Will continue to monitor. Acute respiratory failure with hypoxia (HCC)  Assessment & Plan:  Stable on 2 L O2- continue.   Will need follow-up of depression, insomnia. Current complaints include: anhedonia, depressed mood, feelings of hopelessness, decreased appetite and fatigue- improved. He denies suicidal thoughts or behavior. Symptoms/signs of marlo: none. External stressors: CABG 10/21/22- now requiring home O2. Current treatment includes: Zoloft- 100 mg qd and Remeron 22.5 mg qhs, trazodone 50 mg qhs. Medication side effects: none. Cardiac/pulmonary:  S/p CABG 10/21/22. Still requiring 2L O2 24/7. Home health care- home nursing 1-2x/week, home PT ordered by cardiologist.  Getting out of bed more now and eating a little better. Last CXR 11/17/22 showed unchanged interstitial prominence of both lungs, consistent with edema, but pleural effusion resolved. Continues on lasix 40 mg qd. ROS  As documented above    Physical Exam  Constitutional:       General: He is not in acute distress. Appearance: He is well-developed. He is cachectic. He is ill-appearing. Eyes:      Conjunctiva/sclera: Conjunctivae normal.   Neck:      Thyroid: No thyroid mass or thyromegaly. Cardiovascular:      Rate and Rhythm: Normal rate and regular rhythm. Heart sounds: Normal heart sounds. No murmur heard. No friction rub. No gallop. Pulmonary:      Effort: Pulmonary effort is normal. No respiratory distress. Breath sounds: Normal breath sounds. No wheezing, rhonchi or rales. Musculoskeletal:      Right lower leg: No edema. Left lower leg: No edema. Lymphadenopathy:      Cervical: No cervical adenopathy. Skin:     General: Skin is warm and dry. Findings: No erythema or rash. Neurological:      Mental Status: He is alert and oriented to person, place, and time. Psychiatric:         Speech: Speech normal.         Behavior: Behavior normal.         Thought Content:  Thought content normal.         Judgment: Judgment normal.      On this date 11/22/2022 I have spent 55 minutes reviewing previous notes, test results and face to face with the patient discussing the diagnosis and importance of compliance with the treatment plan as well as documenting on the day of the visit. --Haleigh Jacobs MD on 11/22/2022 at 7:31 PM    An electronic signature was used to authenticate this note.

## 2022-11-22 ENCOUNTER — TELEPHONE (OUTPATIENT)
Dept: PULMONOLOGY | Age: 72
End: 2022-11-22

## 2022-11-22 ENCOUNTER — TELEPHONE (OUTPATIENT)
Dept: INTERNAL MEDICINE CLINIC | Age: 72
End: 2022-11-22

## 2022-11-22 ENCOUNTER — OFFICE VISIT (OUTPATIENT)
Dept: INTERNAL MEDICINE CLINIC | Age: 72
End: 2022-11-22
Payer: MEDICARE

## 2022-11-22 VITALS
HEART RATE: 85 BPM | DIASTOLIC BLOOD PRESSURE: 72 MMHG | OXYGEN SATURATION: 98 % | BODY MASS INDEX: 21.29 KG/M2 | WEIGHT: 157 LBS | SYSTOLIC BLOOD PRESSURE: 108 MMHG | RESPIRATION RATE: 16 BRPM

## 2022-11-22 DIAGNOSIS — F51.01 PRIMARY INSOMNIA: ICD-10-CM

## 2022-11-22 DIAGNOSIS — Z79.4 TYPE 2 DIABETES MELLITUS WITHOUT COMPLICATION, WITH LONG-TERM CURRENT USE OF INSULIN (HCC): Primary | ICD-10-CM

## 2022-11-22 DIAGNOSIS — J96.01 ACUTE RESPIRATORY FAILURE WITH HYPOXIA (HCC): ICD-10-CM

## 2022-11-22 DIAGNOSIS — I10 ESSENTIAL HYPERTENSION: Chronic | ICD-10-CM

## 2022-11-22 DIAGNOSIS — N18.31 STAGE 3A CHRONIC KIDNEY DISEASE (HCC): ICD-10-CM

## 2022-11-22 DIAGNOSIS — E44.0 MODERATE MALNUTRITION (HCC): Chronic | ICD-10-CM

## 2022-11-22 DIAGNOSIS — F32.1 CURRENT MODERATE EPISODE OF MAJOR DEPRESSIVE DISORDER WITHOUT PRIOR EPISODE (HCC): ICD-10-CM

## 2022-11-22 DIAGNOSIS — E11.9 TYPE 2 DIABETES MELLITUS WITHOUT COMPLICATION, WITH LONG-TERM CURRENT USE OF INSULIN (HCC): Primary | ICD-10-CM

## 2022-11-22 DIAGNOSIS — E78.2 MIXED HYPERLIPIDEMIA: ICD-10-CM

## 2022-11-22 PROCEDURE — 1111F DSCHRG MED/CURRENT MED MERGE: CPT | Performed by: INTERNAL MEDICINE

## 2022-11-22 PROCEDURE — G8420 CALC BMI NORM PARAMETERS: HCPCS | Performed by: INTERNAL MEDICINE

## 2022-11-22 PROCEDURE — 3017F COLORECTAL CA SCREEN DOC REV: CPT | Performed by: INTERNAL MEDICINE

## 2022-11-22 PROCEDURE — 1036F TOBACCO NON-USER: CPT | Performed by: INTERNAL MEDICINE

## 2022-11-22 PROCEDURE — 2022F DILAT RTA XM EVC RTNOPTHY: CPT | Performed by: INTERNAL MEDICINE

## 2022-11-22 PROCEDURE — 90694 VACC AIIV4 NO PRSRV 0.5ML IM: CPT | Performed by: INTERNAL MEDICINE

## 2022-11-22 PROCEDURE — 3074F SYST BP LT 130 MM HG: CPT | Performed by: INTERNAL MEDICINE

## 2022-11-22 PROCEDURE — 99215 OFFICE O/P EST HI 40 MIN: CPT | Performed by: INTERNAL MEDICINE

## 2022-11-22 PROCEDURE — 3051F HG A1C>EQUAL 7.0%<8.0%: CPT | Performed by: INTERNAL MEDICINE

## 2022-11-22 PROCEDURE — G8427 DOCREV CUR MEDS BY ELIG CLIN: HCPCS | Performed by: INTERNAL MEDICINE

## 2022-11-22 PROCEDURE — 1123F ACP DISCUSS/DSCN MKR DOCD: CPT | Performed by: INTERNAL MEDICINE

## 2022-11-22 PROCEDURE — 3078F DIAST BP <80 MM HG: CPT | Performed by: INTERNAL MEDICINE

## 2022-11-22 PROCEDURE — G8484 FLU IMMUNIZE NO ADMIN: HCPCS | Performed by: INTERNAL MEDICINE

## 2022-11-22 PROCEDURE — G0008 ADMIN INFLUENZA VIRUS VAC: HCPCS | Performed by: INTERNAL MEDICINE

## 2022-11-22 NOTE — TELEPHONE ENCOUNTER
Patient's wife returning MA call for patient's lab results. Gave the following message from Dr. Sheba Ovalle:  \"STAT BMP is back- electrolytes are normal and kidney function is stable. Please call patient to notify. \"    Patient's wife understood and has no additional questions at this time.

## 2022-11-22 NOTE — TELEPHONE ENCOUNTER
11-16-22 order was given to Lisandro Rachel with [facility] and asked if pt is going to be set up with smaller tanks with stefan This was overlooked just put him on POC waiting list. Lisandro Rachel will get order pushed thru and have someone call pt to set this up for pt Ines Stephenson informed via voicemail.

## 2022-11-23 NOTE — ASSESSMENT & PLAN NOTE
Somewhat volatile due 30 lb weight loss and erratic diet s/p CABG, but no significant hypoglycemia or extreme hyperglycemia. Expect improvement as diet normalizes. Continue current diabetic medications.

## 2022-11-23 NOTE — ASSESSMENT & PLAN NOTE
Exacerbation secondary to recent CABG and pulmonary setback- improved since last week with increased activity. On maximal serotonergic medication currently and adding Wellbutrin would likely worsen appetite further. Continue current medications. Considering [employer] [facility] and/or psychiatry referral if symptoms worsen.

## 2022-11-23 NOTE — ASSESSMENT & PLAN NOTE
Worse during perioperative period, but slowly recovering. Has appointment with nephrology this afternoon, so will send STAT BMP.

## 2022-11-23 NOTE — ASSESSMENT & PLAN NOTE
Appetite and intake improved over the past week. Dietary strategies to increase calorie and protein intake without spiking blood sugar reinforced. Will continue to monitor.

## 2022-11-29 ENCOUNTER — TELEPHONE (OUTPATIENT)
Dept: PULMONOLOGY | Age: 72
End: 2022-11-29

## 2022-11-29 NOTE — TELEPHONE ENCOUNTER
Patients wife called and said that she has not heard anything from [address] [place] [address] as far as getting a POC for him and he has a tank that has about 30 minutes left in the tank.        Jemal 30: [phone]

## 2022-11-30 NOTE — TELEPHONE ENCOUNTER
Sylvester Davis called back and they have not been able to reach pt or his wife Nick Albarado at [facility] left message 11-22-22 and Sylvester Davis tried yesterday. Called [facility] [place] and was to talk with her.  Gave her [facility] # and told her to ask for Crystal.

## 2022-12-02 ENCOUNTER — OFFICE VISIT (OUTPATIENT)
Dept: CARDIOLOGY CLINIC | Age: 72
End: 2022-12-02
Payer: MEDICARE

## 2022-12-02 VITALS
OXYGEN SATURATION: 91 % | BODY MASS INDEX: 22.66 KG/M2 | WEIGHT: 167.3 LBS | HEIGHT: 72 IN | SYSTOLIC BLOOD PRESSURE: 128 MMHG | HEART RATE: 100 BPM | DIASTOLIC BLOOD PRESSURE: 82 MMHG

## 2022-12-02 DIAGNOSIS — I10 PRIMARY HYPERTENSION: ICD-10-CM

## 2022-12-02 DIAGNOSIS — E78.2 MIXED HYPERLIPIDEMIA: ICD-10-CM

## 2022-12-02 DIAGNOSIS — I25.10 CORONARY ARTERY DISEASE INVOLVING NATIVE CORONARY ARTERY OF NATIVE HEART WITHOUT ANGINA PECTORIS: Primary | ICD-10-CM

## 2022-12-02 PROCEDURE — 3017F COLORECTAL CA SCREEN DOC REV: CPT | Performed by: NURSE PRACTITIONER

## 2022-12-02 PROCEDURE — 3078F DIAST BP <80 MM HG: CPT | Performed by: NURSE PRACTITIONER

## 2022-12-02 PROCEDURE — 99214 OFFICE O/P EST MOD 30 MIN: CPT | Performed by: NURSE PRACTITIONER

## 2022-12-02 PROCEDURE — G8427 DOCREV CUR MEDS BY ELIG CLIN: HCPCS | Performed by: NURSE PRACTITIONER

## 2022-12-02 PROCEDURE — 1036F TOBACCO NON-USER: CPT | Performed by: NURSE PRACTITIONER

## 2022-12-02 PROCEDURE — G8484 FLU IMMUNIZE NO ADMIN: HCPCS | Performed by: NURSE PRACTITIONER

## 2022-12-02 PROCEDURE — 3074F SYST BP LT 130 MM HG: CPT | Performed by: NURSE PRACTITIONER

## 2022-12-02 PROCEDURE — 1123F ACP DISCUSS/DSCN MKR DOCD: CPT | Performed by: NURSE PRACTITIONER

## 2022-12-02 PROCEDURE — G8420 CALC BMI NORM PARAMETERS: HCPCS | Performed by: NURSE PRACTITIONER

## 2022-12-02 PROCEDURE — 93000 ELECTROCARDIOGRAM COMPLETE: CPT | Performed by: NURSE PRACTITIONER

## 2022-12-02 NOTE — PROGRESS NOTES
[address]     Outpatient Follow Up Note    CHIEF COMPLAINT / HPI: Hospital Follow Up secondary to coronary artery disease    Hospital record has been reviewed  Hospital Course progressed as follows per discharge summary:     81 y.o. male with significant past medical history of  of CAD with a stent in the past [place], CKD3, DM2, HLD, HTN, past smoker, depression, COPD  ~ presents to the cath lab as an OP  with several months of SOB, fatigue, twitches left upper chest.  Left heart angiogram found  multivessel coronary artery disease with significant Left Main disease. CVTS was consulted for surgical evaluation for coronary artery bypass grafting. Hospital Course: The patient underwent Coronary bypass grafting surgery x3 with single  reverse saphenous vein graft to the obtuse marginal branch of the circumflex, separate sequential reverse saphenous vein graft to the distal right coronary artery, pedicle left internal artery to the LAD, endoscopic vein harvest of the left greater saphenous vein, left atrial  appendage obliteration with 35-mm AtriCure left atrial clip, total cardiopulmonary bypass, transesophageal echo, on 10/21/22 . The patient's post-operative course was uneventful with the exception(s) of: atrial fibrillation and high oxygen requirements. Pain was controlled with combination of oral and IV medications. Patient was able to ambulate without difficulty and tolerate a regular diet. The patient was discharged on  11/1/22     Elly Goyal is 81 y.o. male who presents today for a routine follow up after a recent hospitalization related to the above mentioned issues. Subjective:   Since the time of discharge, the patient admits their symptoms have improved. He hasn't felt too bad. He has surgical discomfort/soreness   There is a little SOB, not much with various activities / days. He tolerated walking in from the lobby today. He denies orthopnea/PND. His SaPO2 92-93% on 2 L.  He has no swelling. His wt went from 160 to 166.5#. The patient is not experiencing palpitations. His home BP runs ~ 120//80 (70-80s)    These symptoms are improving over the last many days. With regard to medication therapy the patient has been compliant with prescribed regimen. They have tolerated therapy to date. Past Medical History:   Diagnosis Date    Chronic renal insufficiency     Due to chronic nephrolithiasis    Colloid cyst of third ventricle (HCC)     Coronary artery disease     DDD (degenerative disc disease), lumbar 2006    Disc bulge and facet arthropathy at L3-L4, L5-S1    Diabetes mellitus, type 2 (HCC)     Diverticulitis of colon with perforation     Emergency colostomy    ED (erectile dysfunction)     Hyperlipidemia     Hypertension     Hypertensive retinopathy of both eyes 2013    Nephrolithiasis      Social History:    Social History     Tobacco Use   Smoking Status Former    Packs/day: 1.00    Years: 20.00    Pack years: 20.00    Types: Cigarettes    Quit date: 3/3/1992    Years since quittin.7   Smokeless Tobacco Never     Current Medications:  Current Outpatient Medications   Medication Sig Dispense Refill    furosemide (LASIX) 20 MG tablet Take 1 tablet by mouth daily 90 tablet 3    aspirin 81 MG chewable tablet Take 1 tablet by mouth daily 30 tablet 3    clopidogrel (PLAVIX) 75 MG tablet Take 1 tablet by mouth daily 30 tablet 3    rosuvastatin (CRESTOR) 20 MG tablet Take 2 tablets by mouth daily (Patient taking differently: Take 20 mg by mouth daily) 90 tablet 1    glimepiride (AMARYL) 4 MG tablet TAKE 2 TABLETS BY MOUTH EVERY MORNING 180 tablet 1    sertraline (ZOLOFT) 100 MG tablet Take 1 tablet by mouth in the morning.  90 tablet 1    traZODone (DESYREL) 50 MG tablet Take 1 tablet by mouth nightly 90 tablet 1    insulin glargine (LANTUS SOLOSTAR) 100 UNIT/ML injection pen Inject 15 Units into the skin nightly (Patient taking differently: Inject 15 Units into the skin nightly 8-12 units) 5 pen 5    doxazosin (CARDURA) 4 MG tablet TAKE 1 TABLET BY MOUTH EVERY NIGHT 90 tablet 1    fenofibrate (TRIGLIDE) 160 MG tablet Take 1 tablet by mouth daily 90 tablet 1    TRELEGY ELLIPTA 100-62.5-25 MCG/INH AEPB INHALE 1 PUFF INTO THE LUNGS DAILY (Patient taking differently: Inhale 1 puff into the lungs daily Was told to take Trelegy 2 puffs  daily as Mando Trejo was not accepted by insurance.) 180 each 3    fish oil-omega-3 fatty acids 1000 MG capsule Take 2 g by mouth daily. blood glucose test strips (TRUE METRIX BLOOD GLUCOSE TEST) strip USE 1 STRIP TO TEST BLOOD SUGAR TWICE DAILY 100 strip 11    mirtazapine (REMERON) 15 MG tablet Take 1.5 tablets by mouth nightly 135 tablet 1    Insulin Pen Needle (B-D UF III MINI PEN NEEDLES) 31G X 5 MM MISC USE AS DIRECTED TO INJECT ONCE DAILY 100 each [id number] Patient test twice daily 150 each 5    albuterol sulfate HFA (VENTOLIN HFA) 108 (90 Base) MCG/ACT inhaler 2 Puff every 4-6 hours as needed for wheezing or shortness of breath (Patient not taking: Reported on 12/2/2022) 1 Inhaler 3    Blood Glucose Monitoring Suppl (PRECISION XTRA) w/Device KIT As directed 1 kit 0    Blood Glucose Monitoring Suppl (ONE TOUCH ULTRA 2) W/DEVICE KIT 1 kit by Does not apply route daily as needed. 1 kit 0     No current facility-administered medications for this visit. REVIEW OF SYSTEMS:   CONSTITUTIONAL: No major weight gain or loss, fatigue, weakness, night sweats or fever. There's been no change in energy level, sleep pattern, or activity level. HEENT: No new vision difficulties or ringing in the ears. RESPIRATORY: No new SOB, PND, orthopnea or cough. CARDIOVASCULAR: See HPI  GI: No nausea, vomiting, diarrhea, constipation, abdominal pain or changes in bowel habits. : No urinary frequency, urgency, incontinence hematuria or dysuria. SKIN: No cyanosis or skin lesions. MUSCULOSKELETAL: No new muscle or joint pain.   NEUROLOGICAL: No syncope or TIA-like symptoms. PSYCHIATRIC: No anxiety, pain, insomnia or depression    Objective:   PHYSICAL EXAM:       Vitals:    12/02/22 1029 12/02/22 1054   BP: 130/80 128/82   Site: Right Upper Arm Left Upper Arm   Position: Sitting    Cuff Size: Large Adult Medium Adult   Pulse: 100    SpO2: 91%    Weight: 167 lb 4.8 oz (75.9 kg)    Height: 6' (1.829 m)         VITALS:  /80 (Site: Right Upper Arm, Position: Sitting, Cuff Size: Large Adult)   Pulse 100   Ht 6' (1.829 m)   Wt 167 lb 4.8 oz (75.9 kg)   SpO2 91%   BMI 22.69 kg/m²     CONSTITUTIONAL: Cooperative, no apparent distress, and appears well nourished / developed  NEUROLOGIC:  Awake and orientated to person, place and time. PSYCH: Calm affect. SKIN: Warm and dry. HEENT: Sclera non-icteric, normocephalic, neck supple, no elevation of JVP, normal carotid pulses with no bruits and thyroid normal size. LUNGS:  No increased work of breathing and ? Crackles LLL. CARDIOVASCULAR:  Regular rate 88 and rhythm with no murmurs, gallops, rubs, or abnormal heart sounds, normal PMI. The apical impulses not displaced. Heart tones are crisp and normal                                                                                            Cervical veins are not engorged                 JVP less than 8 cm H2O                                                                              The carotid upstroke is normal in amplitude and contour without delay or bruit    ABDOMEN:  Normal bowel sounds, non-distended and non-tender to palpation   EXT: No edema, no calf tenderness. Pulses are present bilaterally.     DATA:    Lab Results   Component Value Date    ALT 8 (L) 10/19/2022    AST 13 (L) 10/19/2022    ALKPHOS 48 10/19/2022    BILITOT 0.3 10/19/2022     Lab Results   Component Value Date    CREATININE 1.7 (H) 11/22/2022    BUN 21 (H) 11/22/2022     11/22/2022    K 3.7 11/22/2022     11/22/2022    CO2 27 11/22/2022     Lab Results   Component Value Date    WBC 9.1 10/31/2022    HGB 9.3 (L) 10/31/2022    HCT 29.5 (L) 10/31/2022    MCV 94.2 10/31/2022     10/31/2022     No components found for: CHLPL  Lab Results   Component Value Date    TRIG 231 (H) 10/20/2022    TRIG 264 (H) 03/30/2017    TRIG 244 (H) 09/02/2016     Lab Results   Component Value Date    HDL 25 (L) 10/20/2022    HDL 28 (L) 07/28/2022    HDL 30 (L) 03/01/2022     Lab Results   Component Value Date    LDLCALC 50 10/20/2022    LDLCALC 60 07/28/2022    [address] 03/01/2022     Lab Results   Component Value Date    LABVLDL 46 10/20/2022    LABVLDL 40 07/28/2022    LABVLDL 34 03/01/2022     Radiology Review:  Pertinent images / reports were reviewed as a part of this visit and reveals the following:    Last Echo:Nov '19  Summary   Normal left ventricle size, wall thickness, and systolic function with an   estimated ejection fraction of 60%. No regional wall motion abnormalities are seen. Normal diastolic function. Thickened mitral valve without evidence of stenosis. There is moderate   mitral regurgitation noted. There is mild tricuspid regurgitation with a PASP estimation of 45 mmHg. Mild pulmonary hypertension. Last Stress Test: Sept '22      Impression    *Abnormal baseline EKG with inferolateral ST depression, consider ischemia    *Normal LV function with EF 61%    *Abnormal myocardial perfusion imaging with apical inferolateral mixed    ischemia and scar. Last Angiogram: Oct '22  LM 70% distal, 20% ostial   LAD 20% mid   Cx 70% mid, patent mid stent   RI N/A   % prox with R-R and L-R collaterals   LVEDP 6   LVG 55%        Carotid US: Oct '22       -The right internal carotid artery appears to have a <50% diameter reducing    stenosis based on velocity criteria. -The left internal carotid artery appears to have a <50% diameter reducing    stenosis based on velocity criteria.          OR: 10/21/22  Coronary bypass grafting surgery x3 with single  reverse saphenous vein graft to the obtuse marginal branch of the circumflex, separate sequential reverse saphenous vein graft to the distal right coronary artery, pedicle left internal artery to the LAD, endoscopic vein harvest of the left greater saphenous vein, left atrial  appendage obliteration with 35-mm AtriCure left atrial clip    Assessment:      Diagnosis Orders   1. Coronary artery disease involving native coronary artery of native heart without angina pectoris   ~s/p CABG with KELLY clip 10/21/22  ~normal LVF  ~no BB d/t bradycardia  EKG 12 lead      2. Mixed hyperlipidemia   ~HDL low , trig elevated, A1c 7.2%  ~fenofibrate / fish oil / crestor  ~DM managed by PCP       3. Primary hypertension   ~controlled   ~taking lasix and doxazosin   ~valsartan planned as outpt with [facility]           Patient  is stable since hospital discharge. Plan:  EKG : sinus rhythm 60   F/U in six weeks / plan for cardiac rehab       I have addressed the patient's cardiac risk factors and adjusted pharmacologic treatment as needed. In addition, I have reinforced the need for patient directed risk factor modification. Further evaluation will be based upon the patient's clinical course and testing results. All questions and concerns were addressed to the patient/family. Alternatives to  treatment were discussed. The patient  currently  is not smoking. The risks related to smoking were reviewed with the patient. Recommend maintaining a smoke-free lifestyle. Dual Antiplatelet therapy / anti-coagulation has been recommended / prescribed for this patient. Education conducted on adverse reactions including bleeding was discussed. The patient verbalizes understanding.     Pt is on a BB : held d/t profound bradycardia  Pt is not on an ace-i/ARB : CKD followed by [facility]  Pt is on a statin      Saturated fat diet discussed : SMBG 130 last evening  Exercise program discussed : not following CR I ; has been walking but has not increased. He was discharged with an oxygen tank; just had gotten a portable tank today    Thank you for allowing to us to participate in the care of Manjinder Villeda.       [facility]  Documentation of today's visit sent to PCP

## 2022-12-08 ENCOUNTER — OFFICE VISIT (OUTPATIENT)
Dept: CARDIOTHORACIC SURGERY | Age: 72
End: 2022-12-08

## 2022-12-08 VITALS
HEIGHT: 72 IN | SYSTOLIC BLOOD PRESSURE: 136 MMHG | TEMPERATURE: 98.1 F | DIASTOLIC BLOOD PRESSURE: 78 MMHG | BODY MASS INDEX: 22.75 KG/M2 | WEIGHT: 168 LBS | HEART RATE: 78 BPM | OXYGEN SATURATION: 93 %

## 2022-12-08 DIAGNOSIS — I10 ESSENTIAL HYPERTENSION: ICD-10-CM

## 2022-12-08 DIAGNOSIS — I25.10 CORONARY ARTERY DISEASE INVOLVING NATIVE CORONARY ARTERY OF NATIVE HEART WITHOUT ANGINA PECTORIS: ICD-10-CM

## 2022-12-08 DIAGNOSIS — Z48.812 AFTERCARE FOLLOWING SURGERY OF THE CIRCULATORY SYSTEM: Primary | ICD-10-CM

## 2022-12-08 DIAGNOSIS — Z95.1 S/P CABG X 3: ICD-10-CM

## 2022-12-08 PROCEDURE — 99024 POSTOP FOLLOW-UP VISIT: CPT | Performed by: THORACIC SURGERY (CARDIOTHORACIC VASCULAR SURGERY)

## 2022-12-08 NOTE — PROGRESS NOTES
Progress Note    CC:  Postoperative follow-up    S/P    CABG surgery. Subjective:    Tonya on oxygen at 2 L per nasal cannula. For the most part he feels like he is doing quite well. He is returning towards some semblance of normal activities. He has no chest pain. His eating and sleeping habits are normalizing. Vital Signs:                                                 /78 (Site: Left Upper Arm, Position: Sitting, Cuff Size: Medium Adult)   Pulse 78   Temp 98.1 °F (36.7 °C) (Temporal)   Ht 6' (1.829 m)   Wt 168 lb (76.2 kg)   SpO2 (!) 88% Comment: on Room Air  BMI 22.78 kg/m²        CV:   Regular rate and rhythm with no rubs or murmurs. Pulm: Clear lung fields with no rales or rhonchi. Incisions:    Sternal incision is clean, dry and intact. Sternum is stable. Abd:  Soft  Ext: Leg incision is clean, dry and intact. No peripheral edema. Assessment/Plan:  Overall doing very well post CABG surgery. I discussed secondary risk prevention for cardiovascular disease with the patient. The patient has received a copy of my protocol for the secondary risk prevention of cardiovascular disease. The patient may increase activities as he feels comfortable doing so. Follow-up with his PCP, Dr. Christopher Gay and Dr. Chilo Rossi as prescribed. Follow-up with me as needed.     Eduardo Mace MD  12/8/2022  10:58 AM

## 2022-12-08 NOTE — LETTER
12/08/22      Sofia Olvera M.D., [facility], [facility], [facility] Cardiovascular and Thoracic Surgeons  [facility] [place] [address]        RE:    Mekhi Forbes,   1950    Dear Juana Medina MD  [address] [place] [address]. [address] [facility],  [address] [facility],    Mekhi Forbes was seen today for routine follow-up after his recent CABG surgery. Attached is the postop note. I have asked him to go back to Dr. Debbie Jean to see if he can get him off of his nasal cannula oxygen.         Sincerely,     Kelsie Cummins MD    CC: Missy Carter MD

## 2022-12-09 DIAGNOSIS — J20.9 COPD WITH ACUTE BRONCHITIS (HCC): ICD-10-CM

## 2022-12-09 DIAGNOSIS — J44.0 COPD WITH ACUTE BRONCHITIS (HCC): ICD-10-CM

## 2022-12-09 RX ORDER — ALBUTEROL SULFATE 90 UG/1
AEROSOL, METERED RESPIRATORY (INHALATION)
Qty: 1 EACH | Refills: 3 | Status: SHIPPED | OUTPATIENT
Start: 2022-12-09

## 2022-12-09 NOTE — TELEPHONE ENCOUNTER
Patient is requesting a refill  the following medication:    albuterol sulfate HFA (VENTOLIN HFA) 108 (90 Base) MCG/ACT inhaler  Last appointment: 11/22/2022  Next appointment: 2/24/2023  Last refill: 02/07/2019      Please call in to:  [address] [phone] - F [phone]   [address] [address]   Phone:  [phone]  Fax:  [phone]    Please contact patient with any additional questions.

## 2022-12-21 ENCOUNTER — HOSPITAL ENCOUNTER (OUTPATIENT)
Dept: CARDIAC REHAB | Age: 72
Setting detail: THERAPIES SERIES
Discharge: HOME | End: 2022-12-21
Payer: MEDICARE

## 2022-12-21 VITALS
RESPIRATION RATE: 28 BRPM | WEIGHT: 165.5 LBS | OXYGEN SATURATION: 92 % | HEART RATE: 96 BPM | BODY MASS INDEX: 22.42 KG/M2 | HEIGHT: 72 IN

## 2022-12-21 PROCEDURE — 93798 PHYS/QHP OP CAR RHAB W/ECG: CPT

## 2022-12-21 ASSESSMENT — PATIENT HEALTH QUESTIONNAIRE - PHQ9
6. FEELING BAD ABOUT YOURSELF - OR THAT YOU ARE A FAILURE OR HAVE LET YOURSELF OR YOUR FAMILY DOWN: 0
SUM OF ALL RESPONSES TO PHQ9 QUESTIONS 1 & 2: 1
5. POOR APPETITE OR OVEREATING: 3
7. TROUBLE CONCENTRATING ON THINGS, SUCH AS READING THE NEWSPAPER OR WATCHING TELEVISION: 0
4. FEELING TIRED OR HAVING LITTLE ENERGY: 1
SUM OF ALL RESPONSES TO PHQ QUESTIONS 1-9: 5
9. THOUGHTS THAT YOU WOULD BE BETTER OFF DEAD, OR OF HURTING YOURSELF: 0
1. LITTLE INTEREST OR PLEASURE IN DOING THINGS: 0
8. MOVING OR SPEAKING SO SLOWLY THAT OTHER PEOPLE COULD HAVE NOTICED. OR THE OPPOSITE, BEING SO FIGETY OR RESTLESS THAT YOU HAVE BEEN MOVING AROUND A LOT MORE THAN USUAL: 0
SUM OF ALL RESPONSES TO PHQ QUESTIONS 1-9: 5
3. TROUBLE FALLING OR STAYING ASLEEP: 0
2. FEELING DOWN, DEPRESSED OR HOPELESS: 1

## 2022-12-21 ASSESSMENT — EJECTION FRACTION: EF_VALUE: 60

## 2022-12-21 NOTE — CARDIO/PULMONARY
[facility] Cardiac Rehabilitation Initial Evaluation    An Comer       1950     [id number]    Share medical information:  Yes wife Jc Retana [phone]    Cardiac History     CABG 10- EF 60%    Physical Assessment     General Appearance   Height:  6' (182.9 cm)  Weight:  165 lb 8 oz (75.1 kg)   BMI:  22.5  Skin color:      natural    Cardiovascular Assessment  BP Sittin/70    Sitting:     Standin/70     Heart rate:   96     Heart sounds:     S1, S2, No adventitious heart sounds    Respiratory Assessment  Resp rate: 28                  SpO2:  92 % (oxygen 2 liters intermittent)  Quality/Effort:     regular denies sob  Sleep Apnea:  No  CPAP  No  Oxygen  Yes   2 liters intermittent   Sleeping Habits:  sleeps good at night      Edema:  No      Orthopedic/Exercise Limitations:  Yes has a bad left knee make it hard to walk on treadmill had a past surgery on that knee    Pain:   Do you have pain?:  no       Fall Risk Assessment     History of falling with or without injury: No  Use of ambulatory aid: No  Difficulty walking/impaired gait: No  Numbness in feet: No  Vision changes: No  Dizziness: No  Shortness of breath: Yes  Current medications include but not limited to: Anticoagulant  Other fall risk : No  Outpatient fall risk intervention strategies: Fall risk education provided    Abuse / Neglect  Physical/behavioral signs of abuse/neglect   No    Do you feel safe at home   Yes    Advanced Directives  Patient has Advanced Directives:  Yes  Patient given Advanced Directive pack:  No    Vaccinations  Influenza (annual):  Yes  Pneumonia:  Yes    Pt here for first session of Cardiac Rehab. Reviewed and discussed insurance benefits, pt v/u. Reviewed Cardiac Rehab Routine and RPE scale, pt v/u. Developed individual treatment plan and goals set with patient; pt in agreement with plan and no further questions at this time.  Performed six minute walk test.Next visit scheduled for Wednesday December 28th 1 [facility] class    JONATHAN Morris RN  12/21/2022

## 2022-12-28 ENCOUNTER — HOSPITAL ENCOUNTER (OUTPATIENT)
Dept: CARDIAC REHAB | Age: 72
Setting detail: THERAPIES SERIES
Discharge: HOME | End: 2022-12-28
Payer: MEDICARE

## 2022-12-28 ENCOUNTER — TELEPHONE (OUTPATIENT)
Dept: CARDIAC REHAB | Age: 72
End: 2022-12-28

## 2022-12-30 ENCOUNTER — HOSPITAL ENCOUNTER (OUTPATIENT)
Dept: CARDIAC REHAB | Age: 72
Setting detail: THERAPIES SERIES
Discharge: HOME | End: 2022-12-30
Payer: MEDICARE

## 2022-12-30 ENCOUNTER — TELEPHONE (OUTPATIENT)
Dept: CARDIAC REHAB | Age: 72
End: 2022-12-30

## 2023-01-04 ENCOUNTER — HOSPITAL ENCOUNTER (OUTPATIENT)
Dept: CARDIAC REHAB | Age: 73
Setting detail: THERAPIES SERIES
Discharge: HOME | End: 2023-01-04
Payer: MEDICARE

## 2023-01-04 PROCEDURE — 93798 PHYS/QHP OP CAR RHAB W/ECG: CPT

## 2023-01-05 LAB
GLUCOSE BLD-MCNC: 131 MG/DL (ref 70–99)
GLUCOSE BLD-MCNC: 169 MG/DL (ref 70–99)
PERFORMED ON: ABNORMAL
PERFORMED ON: ABNORMAL

## 2023-01-06 ENCOUNTER — HOSPITAL ENCOUNTER (OUTPATIENT)
Dept: CARDIAC REHAB | Age: 73
Setting detail: THERAPIES SERIES
Discharge: HOME | End: 2023-01-06
Payer: MEDICARE

## 2023-01-06 PROCEDURE — 93798 PHYS/QHP OP CAR RHAB W/ECG: CPT

## 2023-01-08 LAB
GLUCOSE BLD-MCNC: 221 MG/DL (ref 70–99)
GLUCOSE BLD-MCNC: 261 MG/DL (ref 70–99)
PERFORMED ON: ABNORMAL
PERFORMED ON: ABNORMAL

## 2023-01-09 ENCOUNTER — HOSPITAL ENCOUNTER (OUTPATIENT)
Dept: CARDIAC REHAB | Age: 73
Setting detail: THERAPIES SERIES
Discharge: HOME | End: 2023-01-09
Payer: MEDICARE

## 2023-01-09 PROCEDURE — 93798 PHYS/QHP OP CAR RHAB W/ECG: CPT

## 2023-01-11 ENCOUNTER — HOSPITAL ENCOUNTER (OUTPATIENT)
Dept: CARDIAC REHAB | Age: 73
Setting detail: THERAPIES SERIES
Discharge: HOME | End: 2023-01-11
Payer: MEDICARE

## 2023-01-11 PROCEDURE — 93798 PHYS/QHP OP CAR RHAB W/ECG: CPT

## 2023-01-13 ENCOUNTER — OFFICE VISIT (OUTPATIENT)
Dept: CARDIOLOGY CLINIC | Age: 73
End: 2023-01-13
Payer: MEDICARE

## 2023-01-13 ENCOUNTER — HOSPITAL ENCOUNTER (OUTPATIENT)
Dept: CARDIAC REHAB | Age: 73
Setting detail: THERAPIES SERIES
Discharge: HOME | End: 2023-01-13
Payer: MEDICARE

## 2023-01-13 VITALS
HEIGHT: 72 IN | BODY MASS INDEX: 23.65 KG/M2 | HEART RATE: 109 BPM | WEIGHT: 174.6 LBS | SYSTOLIC BLOOD PRESSURE: 130 MMHG | DIASTOLIC BLOOD PRESSURE: 64 MMHG | OXYGEN SATURATION: 94 %

## 2023-01-13 DIAGNOSIS — I10 PRIMARY HYPERTENSION: ICD-10-CM

## 2023-01-13 DIAGNOSIS — E78.2 MIXED HYPERLIPIDEMIA: ICD-10-CM

## 2023-01-13 DIAGNOSIS — I25.10 CORONARY ARTERY DISEASE INVOLVING NATIVE CORONARY ARTERY OF NATIVE HEART WITHOUT ANGINA PECTORIS: Primary | ICD-10-CM

## 2023-01-13 PROCEDURE — G8427 DOCREV CUR MEDS BY ELIG CLIN: HCPCS | Performed by: NURSE PRACTITIONER

## 2023-01-13 PROCEDURE — 1123F ACP DISCUSS/DSCN MKR DOCD: CPT | Performed by: NURSE PRACTITIONER

## 2023-01-13 PROCEDURE — 1036F TOBACCO NON-USER: CPT | Performed by: NURSE PRACTITIONER

## 2023-01-13 PROCEDURE — 3017F COLORECTAL CA SCREEN DOC REV: CPT | Performed by: NURSE PRACTITIONER

## 2023-01-13 PROCEDURE — 3078F DIAST BP <80 MM HG: CPT | Performed by: NURSE PRACTITIONER

## 2023-01-13 PROCEDURE — 3075F SYST BP GE 130 - 139MM HG: CPT | Performed by: NURSE PRACTITIONER

## 2023-01-13 PROCEDURE — G8484 FLU IMMUNIZE NO ADMIN: HCPCS | Performed by: NURSE PRACTITIONER

## 2023-01-13 PROCEDURE — 93798 PHYS/QHP OP CAR RHAB W/ECG: CPT

## 2023-01-13 PROCEDURE — 99214 OFFICE O/P EST MOD 30 MIN: CPT | Performed by: NURSE PRACTITIONER

## 2023-01-13 PROCEDURE — G8420 CALC BMI NORM PARAMETERS: HCPCS | Performed by: NURSE PRACTITIONER

## 2023-01-13 NOTE — PROGRESS NOTES
[facility]     Outpatient Follow Up Note    Ha Chacon is 81 y.o. male who presents today with a history of CAD s/p PTCA , IWMI ',  s/p PTCA CX , s/p CABG with KELLY clip with post-op AF Oct '22; HTN and hyperlipidemia. CHIEF COMPLAINT / HPI:  Follow Up secondary to status post CABG    Subjective:   He's been going to [facility] and enjoying it. It also gets him out of the house. He denies significant chest pain. There is SOB/AGARWAL. He does ok showering without wearing his O2. He'll walk no more than 6 ft without it. His SaPO2 remains > 90% (on RA too long, it'll go down to the high 80s). The patient denies orthopnea/PND. He's on 2 L oxygen and hoping to get off of it (follows with Dr. Marie Padgett). The patient does not have swelling. The patients weight is stable at rehab ~ 170#. Abram Ogren The patient is not experiencing palpitations or dizziness. His BPs are pretty normal ~ 140/80    These symptoms are improving since the last OV. With regard to medication therapy the patient has been compliant with prescribed regimen. They have tolerated therapy to date.      Past Medical History:   Diagnosis Date    Chronic renal insufficiency     Due to chronic nephrolithiasis    Colloid cyst of third ventricle (HCC)     Coronary artery disease     DDD (degenerative disc disease), lumbar 2006    Disc bulge and facet arthropathy at L3-L4, L5-S1    Diabetes mellitus, type 2 (HCC)     Diverticulitis of colon with perforation     Emergency colostomy    ED (erectile dysfunction)     Hyperlipidemia     Hypertension     Hypertensive retinopathy of both eyes 2013    Nephrolithiasis      Social History:    Social History     Tobacco Use   Smoking Status Former    Packs/day: 1.00    Years: 20.00    Pack years: 20.00    Types: Cigarettes    Quit date: 3/3/1992    Years since quittin.8   Smokeless Tobacco Never     Current Medications:  Current Outpatient Medications   Medication Sig Dispense Refill    furosemide (LASIX) 20 MG tablet Take 1 tablet by mouth daily 90 tablet 3    aspirin 81 MG chewable tablet Take 1 tablet by mouth daily 30 tablet 3    clopidogrel (PLAVIX) 75 MG tablet Take 1 tablet by mouth daily 30 tablet 3    rosuvastatin (CRESTOR) 20 MG tablet Take 2 tablets by mouth daily (Patient taking differently: Take 20 mg by mouth daily) 90 tablet 1    glimepiride (AMARYL) 4 MG tablet TAKE 2 TABLETS BY MOUTH EVERY MORNING 180 tablet 1    blood glucose test strips (TRUE METRIX BLOOD GLUCOSE TEST) strip USE 1 STRIP TO TEST BLOOD SUGAR TWICE DAILY 100 strip 11    mirtazapine (REMERON) 15 MG tablet Take 1.5 tablets by mouth nightly 135 tablet 1    sertraline (ZOLOFT) 100 MG tablet Take 1 tablet by mouth in the morning. 90 tablet 1    traZODone (DESYREL) 50 MG tablet Take 1 tablet by mouth nightly 90 tablet 1    insulin glargine (LANTUS SOLOSTAR) 100 UNIT/ML injection pen Inject 15 Units into the skin nightly (Patient taking differently: Inject 15 Units into the skin nightly 8-12 units) 5 pen 5    doxazosin (CARDURA) 4 MG tablet TAKE 1 TABLET BY MOUTH EVERY NIGHT 90 tablet 1    fenofibrate (TRIGLIDE) 160 MG tablet Take 1 tablet by mouth daily 90 tablet 1    Insulin Pen Needle (B-D UF III MINI PEN NEEDLES) 31G X 5 MM MISC USE AS DIRECTED TO INJECT ONCE DAILY 100 each 11    TRELEGY ELLIPTA 100-62.5-25 MCG/INH AEPB INHALE 1 PUFF INTO THE LUNGS DAILY (Patient taking differently: Inhale 1 puff into the lungs daily Was told to take Trelegy 2 puffs  daily as Sunday Aw was not accepted by insurance.) 180 each [place] 262 Patient test twice daily 150 each 5    Blood Glucose Monitoring Suppl (PRECISION XTRA) w/Device KIT As directed 1 kit 0    Blood Glucose Monitoring Suppl (ONE TOUCH ULTRA 2) W/DEVICE KIT 1 kit by Does not apply route daily as needed. 1 kit 0    fish oil-omega-3 fatty acids 1000 MG capsule Take 2 g by mouth daily.       albuterol sulfate HFA (VENTOLIN HFA) 108 (90 Base) MCG/ACT inhaler 2 Puff every 4-6 hours as needed for wheezing or shortness of breath (Patient not taking: Reported on 1/13/2023) 1 each 3     No current facility-administered medications for this visit. REVIEW OF SYSTEMS:    CONSTITUTIONAL: No major weight gain or loss, fatigue, weakness, night sweats or fever. HEENT: No new vision difficulties or ringing in the ears. RESPIRATORY: No new SOB, PND, orthopnea or cough. CARDIOVASCULAR: See HPI  GI: No nausea, vomiting, diarrhea, constipation, abdominal pain or changes in bowel habits. : No urinary frequency, urgency, incontinence hematuria or dysuria. SKIN: No cyanosis or skin lesions. MUSCULOSKELETAL: No new muscle or joint pain. NEUROLOGICAL: No syncope or TIA-like symptoms. PSYCHIATRIC: No anxiety, pain, insomnia or depression    Objective:   PHYSICAL EXAM:    Vitals:    01/13/23 1113 01/13/23 1152   BP: 130/78 130/64   Site: Right Upper Arm Right Upper Arm   Position: Sitting    Cuff Size: Medium Adult Medium Adult   Pulse: (!) 109    SpO2: 94%    Weight: 174 lb 9.6 oz (79.2 kg)    Height: 6' (1.829 m)          VITALS:  /78 (Site: Right Upper Arm, Position: Sitting, Cuff Size: Medium Adult)   Pulse (!) 109   Ht 6' (1.829 m)   Wt 174 lb 9.6 oz (79.2 kg)   SpO2 94%   BMI 23.68 kg/m²   CONSTITUTIONAL: Cooperative, no apparent distress, and appears well nourished / developed  NEUROLOGIC:  Awake and orientated to person, place and time. PSYCH: Calm affect. SKIN: Warm and dry. HEENT: Sclera non-icteric, normocephalic, neck supple, no elevation of JVP, normal carotid pulses with no bruits and thyroid normal size. LUNGS:  No increased work of breathing and clear to auscultation, no crackles or wheezing  CARDIOVASCULAR:  Regular rate 94 and rhythm with no murmurs, gallops, rubs, or abnormal heart sounds, normal PMI. The apical impulses not displaced  JVP less than 8 cm H2O  Heart tones are crisp and normal  Cervical veins are not engorged  The carotid upstroke is normal in amplitude and contour without delay or bruit  JVP is not elevated  ABDOMEN:  Normal bowel sounds, non-distended and non-tender to palpation  EXT: No edema, no calf tenderness. Pulses are present bilaterally. DATA:    Lab Results   Component Value Date    ALT 8 (L) 10/19/2022    AST 13 (L) 10/19/2022    ALKPHOS 48 10/19/2022    BILITOT 0.3 10/19/2022     Lab Results   Component Value Date    CREATININE 1.7 (H) 11/22/2022    BUN 21 (H) 11/22/2022     11/22/2022    K 3.7 11/22/2022     11/22/2022    CO2 27 11/22/2022       Lab Results   Component Value Date    WBC 9.1 10/31/2022    HGB 9.3 (L) 10/31/2022    HCT 29.5 (L) 10/31/2022    MCV 94.2 10/31/2022     10/31/2022     No components found for: CHLPL  Lab Results   Component Value Date    TRIG 231 (H) 10/20/2022    TRIG 264 (H) 03/30/2017    TRIG 244 (H) 09/02/2016     Lab Results   Component Value Date    HDL 25 (L) 10/20/2022    HDL 28 (L) 07/28/2022    HDL 30 (L) 03/01/2022     Lab Results   Component Value Date    LDLCALC 50 10/20/2022    LDLCALC 60 07/28/2022    [address] 62 03/01/2022     Lab Results   Component Value Date    LABVLDL 46 10/20/2022    LABVLDL 40 07/28/2022    LABVLDL 34 03/01/2022       Radiology Review:  Pertinent images / reports were reviewed as a part of this visit and reveals the following:    Echo:Nov '19  Summary   Normal left ventricle size, wall thickness, and systolic function with an   estimated ejection fraction of 60%. No regional wall motion abnormalities are seen. Normal diastolic function. Thickened mitral valve without evidence of stenosis. There is moderate   mitral regurgitation noted. There is mild tricuspid regurgitation with a PASP estimation of 45 mmHg. Mild pulmonary hypertension.      Last Stress Test: Sept '22       Impression    *Abnormal baseline EKG with inferolateral ST depression, consider ischemia    *Normal LV function with EF 61%    *Abnormal myocardial perfusion imaging with apical inferolateral mixed    ischemia and scar. Last Angiogram: Oct '22  LM 70% distal, 20% ostial   LAD 20% mid   Cx 70% mid, patent mid stent   RI N/A   % prox with R-R and L-R collaterals   LVEDP 6   LVG 55%         Carotid US: Oct '22       -The right internal carotid artery appears to have a <50% diameter reducing    stenosis based on velocity criteria. -The left internal carotid artery appears to have a <50% diameter reducing    stenosis based on velocity criteria. OR: 10/21/22  Coronary bypass grafting surgery x3 with single  reverse saphenous vein graft to the obtuse marginal branch of the circumflex, separate sequential reverse saphenous vein graft to the distal right coronary artery, pedicle left internal artery to the LAD, endoscopic vein harvest of the left greater saphenous vein, left atrial  appendage obliteration with 35-mm AtriCure left atrial clip         Assessment:      Diagnosis Orders   1. Coronary artery disease involving native coronary artery of native heart without angina pectoris   ~stable : denies angina. Participating in cardiac rehab II  ~s/p CABG Oct '22 with KELLY ligation  ~hx PTCA CX '05  ~DAPT / statin ; no BB d/t bradcardia  ~LVEF 55%       2. Primary hypertension   ~controlled on current regimen  ~hx partial nephrectomy       3. Mixed hyperlipidemia   ~trig and HDL not at goal   ~A1c 7.2% from Oct '22 : DM followed by PCP  ~fenofibrate / fish oil / rosuvastatin          I had the opportunity to review the clinical symptoms and presentation of Renae Denver. Plan:     Continue present management ; labs expected with PCP  F/U in 4 months with Dr. Chivo Ricardo    Overall the patient is stable from CV standpoint    I have addresed the patient's cardiac risk factors and adjusted pharmacologic treatment as needed. In addition, I have reinforced the need for patient directed risk factor modification.     Further evaluation will be based upon the patient's clinical course and testing results. All questions and concerns were addressed to the patient. Alternatives to my treatment were discussed. The patient is not currently smoking. The risks related to smoking were reviewed with the patient. Recommend maintaining a smoke-free lifestyle. Patient is not on a beta-blocker : bradycardia  Patient is not on an ace-i/ARB : CKD stage IIIa followed by Dr. Mignon Rubinstein ; hx partial nephrectomy '80  Patient is on a statin    Dual Antiplatelet therapy has been recommended / prescribed for this patient. Education conducted on adverse reactions including bleeding was discussed. The patient verbalizes understanding not to stop medications without discussing with us. Discussed exercise: 30-60 minutes 7 days/week  Discussed Low saturated fat/KANE diet. SMBG 58    Thank you for allowing to us to participate in the care of Kelly Monzon.     ANTHONY Degroot    Documentation of today's visit sent to PCP

## 2023-01-16 ENCOUNTER — HOSPITAL ENCOUNTER (OUTPATIENT)
Dept: CARDIAC REHAB | Age: 73
Setting detail: THERAPIES SERIES
Discharge: HOME | End: 2023-01-16
Payer: MEDICARE

## 2023-01-16 PROCEDURE — 93798 PHYS/QHP OP CAR RHAB W/ECG: CPT

## 2023-01-17 LAB
GLUCOSE BLD-MCNC: 207 MG/DL (ref 70–99)
GLUCOSE BLD-MCNC: 225 MG/DL (ref 70–99)
PERFORMED ON: ABNORMAL
PERFORMED ON: ABNORMAL

## 2023-01-18 ENCOUNTER — HOSPITAL ENCOUNTER (OUTPATIENT)
Dept: CARDIAC REHAB | Age: 73
Setting detail: THERAPIES SERIES
Discharge: HOME | End: 2023-01-18
Payer: MEDICARE

## 2023-01-18 PROCEDURE — 93797 PHYS/QHP OP CAR RHAB WO ECG: CPT

## 2023-01-18 PROCEDURE — 93798 PHYS/QHP OP CAR RHAB W/ECG: CPT

## 2023-01-20 ENCOUNTER — HOSPITAL ENCOUNTER (OUTPATIENT)
Dept: CARDIAC REHAB | Age: 73
Setting detail: THERAPIES SERIES
Discharge: HOME | End: 2023-01-20
Payer: MEDICARE

## 2023-01-20 PROCEDURE — 93798 PHYS/QHP OP CAR RHAB W/ECG: CPT

## 2023-01-23 ENCOUNTER — HOSPITAL ENCOUNTER (OUTPATIENT)
Dept: CARDIAC REHAB | Age: 73
Setting detail: THERAPIES SERIES
Discharge: HOME | End: 2023-01-23
Payer: MEDICARE

## 2023-01-23 PROCEDURE — 93798 PHYS/QHP OP CAR RHAB W/ECG: CPT

## 2023-01-23 RX ORDER — GLIMEPIRIDE 4 MG/1
TABLET ORAL
Qty: 180 TABLET | Refills: 1 | Status: SHIPPED | OUTPATIENT
Start: 2023-01-23

## 2023-01-23 NOTE — TELEPHONE ENCOUNTER
Patient is requesting a 90 day re-fill on Glimepiride 4 mg be sent to [address] on [address] [employer]. Last re-fill:  9/20/2022. Last appt:  11/22/22. Next appt:  2/24/2023.

## 2023-01-25 ENCOUNTER — APPOINTMENT (OUTPATIENT)
Dept: CARDIAC REHAB | Age: 73
End: 2023-01-25
Payer: MEDICARE

## 2023-01-27 ENCOUNTER — HOSPITAL ENCOUNTER (OUTPATIENT)
Dept: CARDIAC REHAB | Age: 73
Setting detail: THERAPIES SERIES
Discharge: HOME | End: 2023-01-27
Payer: MEDICARE

## 2023-01-27 PROCEDURE — 93798 PHYS/QHP OP CAR RHAB W/ECG: CPT

## 2023-01-30 ENCOUNTER — HOSPITAL ENCOUNTER (OUTPATIENT)
Dept: CARDIAC REHAB | Age: 73
Setting detail: THERAPIES SERIES
Discharge: HOME | End: 2023-01-30
Payer: MEDICARE

## 2023-01-30 PROCEDURE — 93798 PHYS/QHP OP CAR RHAB W/ECG: CPT

## 2023-02-01 ENCOUNTER — HOSPITAL ENCOUNTER (OUTPATIENT)
Dept: CARDIAC REHAB | Age: 73
Setting detail: THERAPIES SERIES
Discharge: HOME | End: 2023-02-01
Payer: MEDICARE

## 2023-02-01 PROCEDURE — 93798 PHYS/QHP OP CAR RHAB W/ECG: CPT

## 2023-02-03 ENCOUNTER — HOSPITAL ENCOUNTER (OUTPATIENT)
Dept: CARDIAC REHAB | Age: 73
Setting detail: THERAPIES SERIES
Discharge: HOME | End: 2023-02-03
Payer: MEDICARE

## 2023-02-03 PROCEDURE — 93798 PHYS/QHP OP CAR RHAB W/ECG: CPT

## 2023-02-06 ENCOUNTER — HOSPITAL ENCOUNTER (OUTPATIENT)
Dept: CARDIAC REHAB | Age: 73
Setting detail: THERAPIES SERIES
Discharge: HOME | End: 2023-02-06
Payer: MEDICARE

## 2023-02-06 PROCEDURE — 93798 PHYS/QHP OP CAR RHAB W/ECG: CPT

## 2023-02-08 ENCOUNTER — HOSPITAL ENCOUNTER (OUTPATIENT)
Dept: CARDIAC REHAB | Age: 73
Setting detail: THERAPIES SERIES
Discharge: HOME | End: 2023-02-08
Payer: MEDICARE

## 2023-02-08 PROCEDURE — 93798 PHYS/QHP OP CAR RHAB W/ECG: CPT

## 2023-02-10 ENCOUNTER — HOSPITAL ENCOUNTER (OUTPATIENT)
Dept: CARDIAC REHAB | Age: 73
Setting detail: THERAPIES SERIES
Discharge: HOME | End: 2023-02-10
Payer: MEDICARE

## 2023-02-10 PROCEDURE — 93798 PHYS/QHP OP CAR RHAB W/ECG: CPT

## 2023-02-13 ENCOUNTER — HOSPITAL ENCOUNTER (OUTPATIENT)
Dept: CARDIAC REHAB | Age: 73
Setting detail: THERAPIES SERIES
Discharge: HOME | End: 2023-02-13
Payer: MEDICARE

## 2023-02-13 PROCEDURE — 93798 PHYS/QHP OP CAR RHAB W/ECG: CPT

## 2023-02-15 ENCOUNTER — HOSPITAL ENCOUNTER (OUTPATIENT)
Dept: CARDIAC REHAB | Age: 73
Setting detail: THERAPIES SERIES
Discharge: HOME | End: 2023-02-15
Payer: MEDICARE

## 2023-02-15 PROCEDURE — 93798 PHYS/QHP OP CAR RHAB W/ECG: CPT

## 2023-02-17 ENCOUNTER — HOSPITAL ENCOUNTER (OUTPATIENT)
Dept: CARDIAC REHAB | Age: 73
Setting detail: THERAPIES SERIES
Discharge: HOME | End: 2023-02-17
Payer: MEDICARE

## 2023-02-17 PROCEDURE — 93798 PHYS/QHP OP CAR RHAB W/ECG: CPT

## 2023-02-17 RX ORDER — CLOPIDOGREL BISULFATE 75 MG/1
75 TABLET ORAL DAILY
Qty: 30 TABLET | Refills: 3 | OUTPATIENT
Start: 2023-02-17

## 2023-02-17 RX ORDER — FUROSEMIDE 20 MG/1
20 TABLET ORAL DAILY
Qty: 90 TABLET | Refills: 3 | OUTPATIENT
Start: 2023-02-17

## 2023-02-17 RX ORDER — CALCIUM CITRATE/VITAMIN D3 200MG-6.25
TABLET ORAL
Qty: 100 STRIP | Refills: 11 | Status: SHIPPED | OUTPATIENT
Start: 2023-02-17

## 2023-02-20 ENCOUNTER — HOSPITAL ENCOUNTER (OUTPATIENT)
Dept: CARDIAC REHAB | Age: 73
Setting detail: THERAPIES SERIES
Discharge: HOME | End: 2023-02-20
Payer: MEDICARE

## 2023-02-20 PROBLEM — D50.9 IRON DEFICIENCY ANEMIA, UNSPECIFIED: Status: ACTIVE | Noted: 2022-03-02

## 2023-02-20 PROBLEM — J96.01 ACUTE RESPIRATORY FAILURE WITH HYPOXIA (HCC): Status: RESOLVED | Noted: 2022-10-26 | Resolved: 2023-02-20

## 2023-02-20 PROCEDURE — 93798 PHYS/QHP OP CAR RHAB W/ECG: CPT

## 2023-02-20 NOTE — PROGRESS NOTES
Date of Visit:  2023    CC: Renae Denver (: 1950) is a 81 y.o. male, established patient, here for evaluation/re-evaluation of the following medical concerns:    ASSESSMENT/PLAN:  Type 2 diabetes mellitus without complication, with long-term current use of insulin ([facility] [address]  Assessment & Plan:  Home BS readings optimal in the am, but high in the evening due to dietary factors. Rather than trying to maintain his weight with simple carbs, recommended adding more lean proteins and healthy fats. If HgbA1c > 8.0, will need to discuss possible addition of SGLT2 inhibitor with nephrology or add mealtime insulin. Orders:  -     Hemoglobin A1C; Future  -     Microalbumin / Creatinine Urine Ratio  Essential hypertension  Assessment & Plan:  Well-controlled. Continue current antihypertensive regimen. Orders:  -     Comprehensive Metabolic Panel, Fasting; Future  -     TSH with Reflex; Future  Stage 3b chronic kidney disease ([facility] [address]  Assessment & Plan:  Stable per recent labs. He will continue to avoid NSAIDs and follow up with nephrology, as directed. Mixed hyperlipidemia  Assessment & Plan:  LDL has been at goal on 20 mg dose of Crestor, but triglycerides continue to be elevated despite maximal dose of fenofibrate. Continue current medications. Triglycerides should improve with lower carb diet. Orders:  -     Lipid, Fasting; Future  Major depressive disorder with single episode, in full remission [facility])  Assessment & Plan:  Asymptomatic on current medications except for persistent insomnia. He wishes to increase trazodone to 100 mg qpm and continue 22.5 mg of Remeron. We discussed potential dangers of increasing trazodone dose in combination with Remeron and Zoloft, so he decided to discontinue the Zoloft to allow for higher dose of trazodone. He will call if mood symptoms worsen with this change. Primary insomnia  Assessment & Plan:  See plan for depression.    Coronary artery disease involving native coronary artery of native heart without angina pectoris  Assessment & Plan:  Doing well s/p CABG 10/21/22 except for continued O2 requirement, which is likely related to COPD. Continue current medications, cardiac rehab and regular follow up with cardiology. Centrilobular emphysema (HCC)  Assessment & Plan:  Stable on 2 L O2 and current inhaler regimen- continue per pulmonary. Monitored closely at cardiac rehab. Iron deficiency anemia, unspecified iron deficiency anemia type  Assessment & Plan: May be contributing to hypoxemia. If still iron deficient 3 months post-op, will start supplement and consider GI evaluation. Orders:  -     CBC with Auto Differential; Future  -     Ferritin; Future  -     Iron and TIBC; Future  -     Soluble transferrin receptor; Future     Return in about 3 months (around 5/24/2023) for MEDICARE AW. Vitals:    02/24/23 1256   BP: 120/70   Pulse: 99   Resp: 16   SpO2: 94%  Comment: 2L   Weight: 174 lb (78.9 kg)      Estimated body mass index is 23.6 kg/m² as calculated from the following:    Height as of 1/13/23: 6' (1.829 m). Weight as of this encounter: 174 lb (78.9 kg). Wt Readings from Last 3 Encounters:   02/24/23 174 lb (78.9 kg)   01/13/23 174 lb 9.6 oz (79.2 kg)   12/21/22 165 lb 8 oz (75.1 kg)     BP Readings from Last 3 Encounters:   02/24/23 120/70   01/13/23 130/64   12/08/22 136/78     HPI  Diabetes Mellitus Type 2: Current symptoms/problems include none. Using Lantus daily- 2-12 units. Taking 8 mg Amaryl consistently. Home blood sugar records: fasting range: 70s-100s in the morning, high 100s-mid 200s before bedtime. Weight Has gained about 15 lbs back over the past 3 months  Any episodes of hypoglycemia? No  Daily Aspirin?  Yes    Lab Results   Component Value Date/Time    GLUF 131 07/28/2022 11:03 AM    GLUF 165 03/01/2022 09:27 AM    LABA1C 7.2 10/20/2022 06:25 AM    LABA1C 7.4 07/28/2022 11:03 AM         Hypertension/CKD:  Home blood pressure monitoring: No.  He is adherent to a low sodium diet. Use of agents associated with hypertension: none. GFR 43- 11/22/22. Hyperlipidemia:  No new myalgias or GI upset on Crestor and fenofibrate (Tricor, Trilipix). Lab Results   Component Value Date/Time    LDLCALC 50 10/20/2022 06:25 AM    LDLCALC 60 07/28/2022 11:03 AM    LDLDIRECT 116 11/08/2018 11:24 AM    LDLDIRECT 92 03/10/2016 09:02 AM    HDL 25 10/20/2022 06:25 AM    HDL 28 07/28/2022 11:03 AM    HDL 30 05/08/2012 01:59 PM    HDL 35 01/31/2012 01:50 AM    TRIGLYCFAST 200 07/28/2022 11:03 AM    TRIGLYCFAST 169 03/01/2022 09:27 AM    ALT 8 10/19/2022 09:15 AM    ALT 10 07/28/2022 11:03 AM    AST 13 10/19/2022 09:15 AM    AST 16 07/28/2022 11:03 AM         Mood Disorder:  Patient presents for follow-up of depression, insomnia. Current complaints include: insomnia- wants to increase trazodone. He denies anhedonia, depressed mood, feelings of hopelessness, decreased appetite and fatigue, suicidal thoughts or behavior. Symptoms/signs of marlo: none. External stressors: continued need for home O2. Current treatment includes: Zoloft- 100 mg qd and Remeron 22.5 mg qhs, trazodone 50 mg qhs. Medication side effects: none. Cardiac/pulmonary:  S/p CABG 10/21/22. Home O2- still requiring 2 L. Has been attending cardiac rehab since late December. Diet: High carb. ROS  As documented above    Physical Exam  Constitutional:       General: He is not in acute distress. Appearance: He is well-developed. He is not ill-appearing. Eyes:      Conjunctiva/sclera: Conjunctivae normal.   Neck:      Thyroid: No thyroid mass or thyromegaly. Cardiovascular:      Rate and Rhythm: Normal rate and regular rhythm. Heart sounds: Normal heart sounds. No murmur heard. No friction rub. No gallop. Pulmonary:      Effort: Pulmonary effort is normal. No respiratory distress. Breath sounds: Normal breath sounds. No wheezing, rhonchi or rales. Musculoskeletal:      Right lower leg: No edema. Left lower leg: No edema. Lymphadenopathy:      Cervical: No cervical adenopathy. Skin:     General: Skin is warm and dry. Findings: No erythema or rash. Neurological:      Mental Status: He is alert and oriented to person, place, and time. Psychiatric:         Speech: Speech normal.         Behavior: Behavior normal.         Thought Content: Thought content normal.         Judgment: Judgment normal.     Diabetic foot exam: dystrophic toenails  Left Foot:   Visual Exam: normal   Pulse DP: 1+ (weak)   Filament test: normal sensation     Right Foot:   Visual Exam: normal   Pulse DP: 1+ (weak)   Filament test: normal sensation         On this date 2/24/2023 I have spent 50 minutes reviewing previous notes, test results and face to face with the patient discussing the diagnosis and importance of compliance with the treatment plan as well as documenting on the day of the visit. --Suma Maldonado MD on 2/24/2023 at 7:00 PM    An electronic signature was used to authenticate this note.

## 2023-02-21 RX ORDER — CLOPIDOGREL BISULFATE 75 MG/1
75 TABLET ORAL DAILY
Qty: 30 TABLET | Refills: 3 | Status: SHIPPED | OUTPATIENT
Start: 2023-02-21

## 2023-02-21 RX ORDER — CLOPIDOGREL BISULFATE 75 MG/1
75 TABLET ORAL DAILY
Qty: 90 TABLET | OUTPATIENT
Start: 2023-02-21

## 2023-02-21 NOTE — TELEPHONE ENCOUNTER
Last OV: 01/13/2023   Tima Ervin  Last Labs: lipid 02/20/2023  Danielson  Next OV: 03/16/2023  Carol   Call list  Last Refill: 10/28/2022  Tima Leung

## 2023-02-22 ENCOUNTER — HOSPITAL ENCOUNTER (OUTPATIENT)
Dept: CARDIAC REHAB | Age: 73
Setting detail: THERAPIES SERIES
Discharge: HOME | End: 2023-02-22
Payer: MEDICARE

## 2023-02-22 PROCEDURE — 93798 PHYS/QHP OP CAR RHAB W/ECG: CPT

## 2023-02-23 RX ORDER — ASPIRIN 81 MG/1
TABLET, CHEWABLE ORAL
Qty: 30 TABLET | Refills: 3 | Status: SHIPPED | OUTPATIENT
Start: 2023-02-23

## 2023-02-24 ENCOUNTER — OFFICE VISIT (OUTPATIENT)
Dept: INTERNAL MEDICINE CLINIC | Age: 73
End: 2023-02-24

## 2023-02-24 VITALS
RESPIRATION RATE: 16 BRPM | SYSTOLIC BLOOD PRESSURE: 120 MMHG | HEART RATE: 99 BPM | WEIGHT: 174 LBS | BODY MASS INDEX: 23.6 KG/M2 | OXYGEN SATURATION: 94 % | DIASTOLIC BLOOD PRESSURE: 70 MMHG

## 2023-02-24 DIAGNOSIS — I10 ESSENTIAL HYPERTENSION: Chronic | ICD-10-CM

## 2023-02-24 DIAGNOSIS — E11.9 TYPE 2 DIABETES MELLITUS WITHOUT COMPLICATION, WITH LONG-TERM CURRENT USE OF INSULIN (HCC): Primary | ICD-10-CM

## 2023-02-24 DIAGNOSIS — J43.2 CENTRILOBULAR EMPHYSEMA (HCC): ICD-10-CM

## 2023-02-24 DIAGNOSIS — F51.01 PRIMARY INSOMNIA: ICD-10-CM

## 2023-02-24 DIAGNOSIS — I25.10 CORONARY ARTERY DISEASE INVOLVING NATIVE CORONARY ARTERY OF NATIVE HEART WITHOUT ANGINA PECTORIS: ICD-10-CM

## 2023-02-24 DIAGNOSIS — D50.9 IRON DEFICIENCY ANEMIA, UNSPECIFIED IRON DEFICIENCY ANEMIA TYPE: ICD-10-CM

## 2023-02-24 DIAGNOSIS — F32.5 MAJOR DEPRESSIVE DISORDER WITH SINGLE EPISODE, IN FULL REMISSION (HCC): ICD-10-CM

## 2023-02-24 DIAGNOSIS — N18.32 STAGE 3B CHRONIC KIDNEY DISEASE (HCC): ICD-10-CM

## 2023-02-24 DIAGNOSIS — E78.2 MIXED HYPERLIPIDEMIA: ICD-10-CM

## 2023-02-24 DIAGNOSIS — Z79.4 TYPE 2 DIABETES MELLITUS WITHOUT COMPLICATION, WITH LONG-TERM CURRENT USE OF INSULIN (HCC): Primary | ICD-10-CM

## 2023-02-24 PROBLEM — E44.0 MODERATE MALNUTRITION (HCC): Chronic | Status: RESOLVED | Noted: 2022-10-25 | Resolved: 2023-02-24

## 2023-02-24 RX ORDER — TRAZODONE HYDROCHLORIDE 100 MG/1
100 TABLET ORAL NIGHTLY
Qty: 90 TABLET | Refills: 1 | Status: SHIPPED | OUTPATIENT
Start: 2023-02-24

## 2023-02-24 SDOH — ECONOMIC STABILITY: INCOME INSECURITY: HOW HARD IS IT FOR YOU TO PAY FOR THE VERY BASICS LIKE FOOD, HOUSING, MEDICAL CARE, AND HEATING?: NOT HARD AT ALL

## 2023-02-24 SDOH — ECONOMIC STABILITY: FOOD INSECURITY: WITHIN THE PAST 12 MONTHS, YOU WORRIED THAT YOUR FOOD WOULD RUN OUT BEFORE YOU GOT MONEY TO BUY MORE.: NEVER TRUE

## 2023-02-24 SDOH — ECONOMIC STABILITY: FOOD INSECURITY: WITHIN THE PAST 12 MONTHS, THE FOOD YOU BOUGHT JUST DIDN'T LAST AND YOU DIDN'T HAVE MONEY TO GET MORE.: NEVER TRUE

## 2023-02-24 ASSESSMENT — PATIENT HEALTH QUESTIONNAIRE - PHQ9
SUM OF ALL RESPONSES TO PHQ QUESTIONS 1-9: 4
SUM OF ALL RESPONSES TO PHQ QUESTIONS 1-9: 4
1. LITTLE INTEREST OR PLEASURE IN DOING THINGS: 2
8. MOVING OR SPEAKING SO SLOWLY THAT OTHER PEOPLE COULD HAVE NOTICED. OR THE OPPOSITE, BEING SO FIGETY OR RESTLESS THAT YOU HAVE BEEN MOVING AROUND A LOT MORE THAN USUAL: 0
10. IF YOU CHECKED OFF ANY PROBLEMS, HOW DIFFICULT HAVE THESE PROBLEMS MADE IT FOR YOU TO DO YOUR WORK, TAKE CARE OF THINGS AT HOME, OR GET ALONG WITH OTHER PEOPLE: 0
3. TROUBLE FALLING OR STAYING ASLEEP: 0
7. TROUBLE CONCENTRATING ON THINGS, SUCH AS READING THE NEWSPAPER OR WATCHING TELEVISION: 0
5. POOR APPETITE OR OVEREATING: 0
SUM OF ALL RESPONSES TO PHQ QUESTIONS 1-9: 4
6. FEELING BAD ABOUT YOURSELF - OR THAT YOU ARE A FAILURE OR HAVE LET YOURSELF OR YOUR FAMILY DOWN: 0
9. THOUGHTS THAT YOU WOULD BE BETTER OFF DEAD, OR OF HURTING YOURSELF: 0
2. FEELING DOWN, DEPRESSED OR HOPELESS: 0
SUM OF ALL RESPONSES TO PHQ9 QUESTIONS 1 & 2: 2
4. FEELING TIRED OR HAVING LITTLE ENERGY: 2
SUM OF ALL RESPONSES TO PHQ QUESTIONS 1-9: 4

## 2023-02-24 NOTE — ASSESSMENT & PLAN NOTE
LDL has been at goal on 20 mg dose of Crestor, but triglycerides continue to be elevated despite maximal dose of fenofibrate. Continue current medications. Triglycerides should improve with lower carb diet.

## 2023-02-24 NOTE — ASSESSMENT & PLAN NOTE
Asymptomatic on current medications except for persistent insomnia. He wishes to increase trazodone to 100 mg qpm and continue 22.5 mg of Remeron. We discussed potential dangers of increasing trazodone dose in combination with Remeron and Zoloft, so he decided to discontinue the Zoloft to allow for higher dose of trazodone. He will call if mood symptoms worsen with this change.

## 2023-02-24 NOTE — ASSESSMENT & PLAN NOTE
Stable per recent labs. He will continue to avoid NSAIDs and follow up with nephrology, as directed.

## 2023-02-24 NOTE — ASSESSMENT & PLAN NOTE
Stable on 2 L O2 and current inhaler regimen- continue per pulmonary. Monitored closely at cardiac rehab.

## 2023-02-24 NOTE — ASSESSMENT & PLAN NOTE
Home BS readings optimal in the am, but high in the evening due to dietary factors. Rather than trying to maintain his weight with simple carbs, recommended adding more lean proteins and healthy fats. If HgbA1c > 8.0, will need to discuss possible addition of SGLT2 inhibitor with nephrology or add mealtime insulin.

## 2023-02-24 NOTE — ASSESSMENT & PLAN NOTE
Doing well s/p CABG 10/21/22 except for continued O2 requirement, which is likely related to COPD. Continue current medications, cardiac rehab and regular follow up with cardiology.

## 2023-02-25 NOTE — ASSESSMENT & PLAN NOTE
May be contributing to hypoxemia. If still iron deficient 3 months post-op, will start supplement and consider GI evaluation.

## 2023-02-27 ENCOUNTER — HOSPITAL ENCOUNTER (OUTPATIENT)
Dept: CARDIAC REHAB | Age: 73
Setting detail: THERAPIES SERIES
Discharge: HOME | End: 2023-02-27
Payer: MEDICARE

## 2023-02-27 DIAGNOSIS — N18.30 CKD (CHRONIC KIDNEY DISEASE), STAGE III (HCC): ICD-10-CM

## 2023-02-27 PROCEDURE — 93798 PHYS/QHP OP CAR RHAB W/ECG: CPT

## 2023-02-27 RX ORDER — DOXAZOSIN MESYLATE 4 MG/1
TABLET ORAL
Qty: 90 TABLET | Refills: 1 | Status: SHIPPED | OUTPATIENT
Start: 2023-02-27

## 2023-02-27 RX ORDER — SERTRALINE HYDROCHLORIDE 100 MG/1
100 TABLET, FILM COATED ORAL DAILY
Qty: 90 TABLET | Refills: 1 | OUTPATIENT
Start: 2023-02-27

## 2023-02-27 RX ORDER — FENOFIBRATE 160 MG/1
TABLET ORAL
Qty: 90 TABLET | Refills: 1 | Status: SHIPPED | OUTPATIENT
Start: 2023-02-27

## 2023-02-28 DIAGNOSIS — D50.9 IRON DEFICIENCY ANEMIA, UNSPECIFIED IRON DEFICIENCY ANEMIA TYPE: ICD-10-CM

## 2023-02-28 DIAGNOSIS — I10 ESSENTIAL HYPERTENSION: Chronic | ICD-10-CM

## 2023-02-28 DIAGNOSIS — E11.9 TYPE 2 DIABETES MELLITUS WITHOUT COMPLICATION, WITH LONG-TERM CURRENT USE OF INSULIN (HCC): ICD-10-CM

## 2023-02-28 DIAGNOSIS — Z79.4 TYPE 2 DIABETES MELLITUS WITHOUT COMPLICATION, WITH LONG-TERM CURRENT USE OF INSULIN (HCC): ICD-10-CM

## 2023-02-28 DIAGNOSIS — E78.2 MIXED HYPERLIPIDEMIA: ICD-10-CM

## 2023-03-01 ENCOUNTER — HOSPITAL ENCOUNTER (OUTPATIENT)
Dept: CARDIAC REHAB | Age: 73
Setting detail: THERAPIES SERIES
Discharge: HOME | End: 2023-03-01
Payer: MEDICARE

## 2023-03-01 PROCEDURE — 93798 PHYS/QHP OP CAR RHAB W/ECG: CPT

## 2023-03-03 ENCOUNTER — TELEPHONE (OUTPATIENT)
Dept: CARDIAC REHAB | Age: 73
End: 2023-03-03

## 2023-03-03 ENCOUNTER — HOSPITAL ENCOUNTER (OUTPATIENT)
Dept: CARDIAC REHAB | Age: 73
Setting detail: THERAPIES SERIES
Discharge: HOME | End: 2023-03-03
Payer: MEDICARE

## 2023-03-06 ENCOUNTER — HOSPITAL ENCOUNTER (EMERGENCY)
Age: 73
Discharge: HOME | End: 2023-03-06
Payer: MEDICARE

## 2023-03-06 ENCOUNTER — APPOINTMENT (OUTPATIENT)
Dept: GENERAL RADIOLOGY | Age: 73
End: 2023-03-06
Payer: MEDICARE

## 2023-03-06 ENCOUNTER — HOSPITAL ENCOUNTER (OUTPATIENT)
Dept: CARDIAC REHAB | Age: 73
Setting detail: THERAPIES SERIES
Discharge: HOME | End: 2023-03-06
Payer: MEDICARE

## 2023-03-06 VITALS
DIASTOLIC BLOOD PRESSURE: 74 MMHG | HEIGHT: 72 IN | RESPIRATION RATE: 28 BRPM | SYSTOLIC BLOOD PRESSURE: 111 MMHG | BODY MASS INDEX: 24.33 KG/M2 | TEMPERATURE: 97.7 F | HEART RATE: 73 BPM | OXYGEN SATURATION: 98 % | WEIGHT: 179.6 LBS

## 2023-03-06 DIAGNOSIS — R73.9 HYPERGLYCEMIA: ICD-10-CM

## 2023-03-06 DIAGNOSIS — I48.91 ATRIAL FIBRILLATION WITH RVR (HCC): Primary | ICD-10-CM

## 2023-03-06 LAB
A/G RATIO: 0.9 (ref 1.1–2.2)
ALBUMIN SERPL-MCNC: 3.5 G/DL (ref 3.4–5)
ALP BLD-CCNC: 54 U/L (ref 40–129)
ALT SERPL-CCNC: 6 U/L (ref 10–40)
ANION GAP SERPL CALCULATED.3IONS-SCNC: 6 MMOL/L (ref 3–16)
AST SERPL-CCNC: 12 U/L (ref 15–37)
BASOPHILS ABSOLUTE: 0 K/UL (ref 0–0.2)
BASOPHILS RELATIVE PERCENT: 0.6 %
BILIRUB SERPL-MCNC: 0.4 MG/DL (ref 0–1)
BUN BLDV-MCNC: 18 MG/DL (ref 7–20)
CALCIUM SERPL-MCNC: 10.3 MG/DL (ref 8.3–10.6)
CHLORIDE BLD-SCNC: 102 MMOL/L (ref 99–110)
CO2: 28 MMOL/L (ref 21–32)
CREAT SERPL-MCNC: 1.5 MG/DL (ref 0.8–1.3)
EOSINOPHILS ABSOLUTE: 0 K/UL (ref 0–0.6)
EOSINOPHILS RELATIVE PERCENT: 0.7 %
GFR SERPL CREATININE-BSD FRML MDRD: 50 ML/MIN/{1.73_M2}
GLUCOSE BLD-MCNC: 342 MG/DL (ref 70–99)
HCT VFR BLD CALC: 38.2 % (ref 40.5–52.5)
HEMOGLOBIN: 12.3 G/DL (ref 13.5–17.5)
LYMPHOCYTES ABSOLUTE: 0.9 K/UL (ref 1–5.1)
LYMPHOCYTES RELATIVE PERCENT: 12.5 %
MAGNESIUM: 2 MG/DL (ref 1.8–2.4)
MCH RBC QN AUTO: 31.3 PG (ref 26–34)
MCHC RBC AUTO-ENTMCNC: 32.1 G/DL (ref 31–36)
MCV RBC AUTO: 97.4 FL (ref 80–100)
MONOCYTES ABSOLUTE: 0.7 K/UL (ref 0–1.3)
MONOCYTES RELATIVE PERCENT: 10.2 %
NEUTROPHILS ABSOLUTE: 5.3 K/UL (ref 1.7–7.7)
NEUTROPHILS RELATIVE PERCENT: 76 %
PDW BLD-RTO: 17.6 % (ref 12.4–15.4)
PLATELET # BLD: 274 K/UL (ref 135–450)
PMV BLD AUTO: 7.8 FL (ref 5–10.5)
POTASSIUM REFLEX MAGNESIUM: 4.4 MMOL/L (ref 3.5–5.1)
PRO-BNP: 474 PG/ML (ref 0–124)
RBC # BLD: 3.92 M/UL (ref 4.2–5.9)
SODIUM BLD-SCNC: 136 MMOL/L (ref 136–145)
TOTAL PROTEIN: 7.6 G/DL (ref 6.4–8.2)
TROPONIN: <0.01 NG/ML
WBC # BLD: 7 K/UL (ref 4–11)

## 2023-03-06 PROCEDURE — 83880 ASSAY OF NATRIURETIC PEPTIDE: CPT

## 2023-03-06 PROCEDURE — 36415 COLL VENOUS BLD VENIPUNCTURE: CPT

## 2023-03-06 PROCEDURE — 93005 ELECTROCARDIOGRAM TRACING: CPT | Performed by: EMERGENCY MEDICINE

## 2023-03-06 PROCEDURE — 6370000000 HC RX 637 (ALT 250 FOR IP): Performed by: PHYSICIAN ASSISTANT

## 2023-03-06 PROCEDURE — 85025 COMPLETE CBC W/AUTO DIFF WBC: CPT

## 2023-03-06 PROCEDURE — 93798 PHYS/QHP OP CAR RHAB W/ECG: CPT

## 2023-03-06 PROCEDURE — 6370000000 HC RX 637 (ALT 250 FOR IP): Performed by: INTERNAL MEDICINE

## 2023-03-06 PROCEDURE — 2500000003 HC RX 250 WO HCPCS: Performed by: PHYSICIAN ASSISTANT

## 2023-03-06 PROCEDURE — 96374 THER/PROPH/DIAG INJ IV PUSH: CPT

## 2023-03-06 PROCEDURE — 99223 1ST HOSP IP/OBS HIGH 75: CPT | Performed by: INTERNAL MEDICINE

## 2023-03-06 PROCEDURE — 80053 COMPREHEN METABOLIC PANEL: CPT

## 2023-03-06 PROCEDURE — 96375 TX/PRO/DX INJ NEW DRUG ADDON: CPT

## 2023-03-06 PROCEDURE — 83735 ASSAY OF MAGNESIUM: CPT

## 2023-03-06 PROCEDURE — 84484 ASSAY OF TROPONIN QUANT: CPT

## 2023-03-06 PROCEDURE — 71045 X-RAY EXAM CHEST 1 VIEW: CPT

## 2023-03-06 PROCEDURE — 99285 EMERGENCY DEPT VISIT HI MDM: CPT

## 2023-03-06 RX ORDER — ACETAMINOPHEN 325 MG/1
650 TABLET ORAL EVERY 6 HOURS PRN
COMMUNITY

## 2023-03-06 RX ORDER — DILTIAZEM HYDROCHLORIDE 5 MG/ML
10 INJECTION INTRAVENOUS ONCE
Status: COMPLETED | OUTPATIENT
Start: 2023-03-06 | End: 2023-03-06

## 2023-03-06 RX ORDER — AMLODIPINE BESYLATE 10 MG/1
10 TABLET ORAL DAILY
COMMUNITY

## 2023-03-06 RX ADMIN — RIVAROXABAN 15 MG: 15 TABLET, FILM COATED ORAL at 17:12

## 2023-03-06 RX ADMIN — DILTIAZEM HYDROCHLORIDE 10 MG: 5 INJECTION INTRAVENOUS at 14:43

## 2023-03-06 RX ADMIN — INSULIN HUMAN 8 UNITS: 100 INJECTION, SOLUTION PARENTERAL at 15:20

## 2023-03-06 RX ADMIN — METOPROLOL TARTRATE 25 MG: 25 TABLET, FILM COATED ORAL at 15:23

## 2023-03-06 ASSESSMENT — LIFESTYLE VARIABLES
HOW MANY STANDARD DRINKS CONTAINING ALCOHOL DO YOU HAVE ON A TYPICAL DAY: PATIENT DOES NOT DRINK
HOW OFTEN DO YOU HAVE A DRINK CONTAINING ALCOHOL: NEVER

## 2023-03-06 ASSESSMENT — PAIN - FUNCTIONAL ASSESSMENT: PAIN_FUNCTIONAL_ASSESSMENT: NONE - DENIES PAIN

## 2023-03-06 NOTE — CONSULTS
[facility]   Electrophysiology Consultation   Date: 3/6/2023  Reason for Consultation: New onset atrial fibrillation  Consult Requesting Physician: No att. providers found     Chief Complaint   Patient presents with    Atrial Fibrillation     Pt went to cardiac rehab today, found to be in afib. Has a history of afib one time after surgery. Oxygen dependent at 2 liters     HPI: Brie Sykes is a 81 y.o. male with history of CAD, status post CABG and KELLY clip in October 2022, hypertension, diabetes, chronic respiratory failure on 2 L oxygen who was at cardiac rehab today and was found to be in atrial fibrillation with RVR. He came to the emergency room. He received Cardizem and metoprolol and his heart rate was controlled. He had atrial fibrillation postsurgery for 1 day but resolved spontaneously. He has had some fatigue and shortness of breath for the last 3 days.       Past Medical History:   Diagnosis Date    Chronic renal insufficiency     Due to chronic nephrolithiasis    Colloid cyst of third ventricle (HCC)     Coronary artery disease     DDD (degenerative disc disease), lumbar 2006    Disc bulge and facet arthropathy at L3-L4, L5-S1    Diabetes mellitus, type 2 ([address]     Diverticulitis of colon with perforation 11/07    Emergency colostomy    ED (erectile dysfunction)     Hyperlipidemia     Hypertension     Hypertensive retinopathy of both eyes 1/24/2013    Nephrolithiasis         Past Surgical History:   Procedure Laterality Date    BRAIN SURGERY  12/13/2007    Resection of colloid cyst of 3rd ventricle    CARDIAC CATHETERIZATION  5/02, 12/03,  3/08    COLOSTOMY  11/07    Emergent- due to diverticulitis with perforation    CORONARY ANGIOPLASTY  1995    RCA- unsuccessful    CORONARY ANGIOPLASTY WITH STENT PLACEMENT  10/05    Obtuse marginal branch of circumflex:  drug-eluting stent    CORONARY ARTERY BYPASS GRAFT N/A 10/21/2022    CABG X 3, LIMA  GRAFT TO LAD, VEIN GRAFT TO DISTAL RCA  AND OM, EVH LEFT SAPHENOUS VEIN. LIGATION KELLY WITH 35 MM ATRICLIP, TIMUR, TCPB, DOPPLER BYPASS GRAFT FLOW VERIFICATION, EPIAORTIC ECHOCARDIOGRAM, INSERTION OF VENTRICULAR AND ATRIAL PACING WIRES performed by Sarah Luciano MD at [facility]  2000, 2005    Right    LITHOTRIPSY  1998    Bilateral    LITHOTRIPSY  1994    Bilateral with right stent placement    PARTIAL NEPHRECTOMY  1980    Right    REVISION COLOSTOMY  3/08    Colostomy takedown with sigmoid colectomy and coloproctostomy anastomosis    TOTAL KNEE ARTHROPLASTY Left 3/45/35    UMBILICAL HERNIA REPAIR         Allergies   Allergen Reactions    Dilaudid [Hydromorphone Hcl] Other (See Comments)     Mental status changes    Nubain [Nalbuphine Hcl] Rash       Social History:  Reviewed. reports that he quit smoking about 31 years ago. His smoking use included cigarettes. He has a 20.00 pack-year smoking history. He has never used smokeless tobacco. He reports that he does not drink alcohol and does not use drugs. Family History:  Reviewed. Family Hx was Reviewed. No relevant family history is present. Review of System:  All other systems reviewed and are negative except for that noted above. Pertinent negatives are:     General: negative for fever, chills   Ophthalmic ROS: negative for - eye pain or loss of vision  ENT ROS: negative for - headaches, sore throat   Respiratory: negative for - cough, sputum  Cardiovascular: Reviewed in HPI  Gastrointestinal: negative for - abdominal pain, diarrhea, N/V  Hematology: negative for - bleeding, blood clots, bruising or jaundice  Genito-Urinary:  negative for - Dysuria or incontinence  Musculoskeletal: negative for - Joint swelling, muscle pain  Neurological: negative for - confusion, dizziness, headaches   Psychiatric: No anxiety, no depression.   Dermatological: negative for - rash    Physical Examination:  Vitals:    03/06/23 1600   BP: (!) 124/97   Pulse: 87   Resp: 21   Temp:    SpO2: 100% No intake/output data recorded. Wt Readings from Last 3 Encounters:   23 179 lb 9.6 oz (81.5 kg)   23 178 lb 6.4 oz (80.9 kg)   23 174 lb (78.9 kg)     Temp  Av.7 °F (36.5 °C)  Min: 97.7 °F (36.5 °C)  Max: 97.7 °F (36.5 °C)  Pulse  Av  Min: 78  Max: 109  BP  Min: 118/84  Max: 137/85  SpO2  Av.7 %  Min: 96 %  Max: 100 %  No intake or output data in the 24 hours ending 23 1624    Telemetry: Atrial fibrillation  Constitutional: Oriented. No distress. Head: Normocephalic and atraumatic. Mouth/Throat: Oropharynx is clear and moist.   Eyes: Conjunctivae normal. EOM are normal.   Neck: Neck supple. No rigidity. No JVD present. Cardiovascular: Normal rate, irregular rhythm, S1&S2. Pulmonary/Chest: Bilateral respiratory sounds. No wheezes, No rhonchi. On oxygen  Abdominal: Soft. Bowel sounds present. No distension, No tenderness. Musculoskeletal: No tenderness. No edema    Lymphadenopathy: Has no cervical adenopathy. Neurological: Alert and oriented. Cranial nerve appears intact, No Gross deficit   Skin: Skin is warm and dry. No rash noted. Psychiatric: Has a normal behavior     Labs, diagnostic and imaging results reviewed. Reviewed.    Recent Labs     23  1426      K 4.4      CO2 28   BUN 18   CREATININE 1.5*     Recent Labs     23  1426   WBC 7.0   HGB 12.3*   HCT 38.2*   MCV 97.4        Lab Results   Component Value Date/Time    TROPONINI <0.01 2023 02:26 PM     No results found for: BNP  Lab Results   Component Value Date/Time    PROTIME 16.2 10/22/2022 04:15 AM    PROTIME 18.7 10/21/2022 01:30 PM    PROTIME 17.3 10/19/2022 09:15 AM    INR 1.31 10/22/2022 04:15 AM    INR 1.57 10/21/2022 01:30 PM    INR 1.42 10/19/2022 09:15 AM     Lab Results   Component Value Date/Time    CHOL 121 10/20/2022 06:25 AM    HDL 25 10/20/2022 06:25 AM    HDL 30 2012 01:59 PM    TRIG 231 10/20/2022 06:25 AM       ECG:        Atrial fibrillation with rapid ventricular responseCannot rule out Inferior infarct         Echo:   Cath: 10/2022  Findings  Artery Findings/Result   LM 70% distal, 20% ostial   LAD 20% mid   Cx 70% mid, patent mid stent   RI N/A   % prox with R-R and L-R collaterals   LVEDP 6   LVG 55%      Intervention(s)  None  CXR  Impression   1. No acute cardiopulmonary findings. 2.  Stable mild cardiomegaly     Scheduled Meds:   rivaroxaban  15 mg Oral Daily    metoprolol tartrate  25 mg Oral BID     Continuous Infusions:  PRN Meds:.     Patient Active Problem List    Diagnosis Date Noted    Chronic hypoxemic respiratory failure ([address] 11/16/2022    S/P CABG x 3 10/26/2022    Coronary artery disease involving native coronary artery of native heart 10/19/2022    Iron deficiency anemia, unspecified 03/02/2022    Snoring 12/03/2019    COPD, moderate ([address] 07/23/2019    Primary insomnia 01/11/2019    Mixed hyperlipidemia 09/15/2017    Pulmonary nodule 04/24/2015    Hypertensive retinopathy of both eyes 01/24/2013    DM2 (diabetes mellitus, type 2) ([address]     ED (erectile dysfunction) 01/31/2012    Essential hypertension 10/26/2010    Chronic kidney disease, stage III (moderate) ([address] 10/26/2010    Major depression ([address] 10/26/2010    DDD (degenerative disc disease), lumbar 01/01/2006      There are no active hospital problems to display for this patient. Assessment:       Plan:    -New diagnosis of atrial fibrillation. He has had left atrial appendage clip. His rate is controlled now. I will start him on metoprolol 25 mg twice daily for further rate control. I will start him on Xarelto 15 mg daily until we do a TIMUR and confirmed that left atrial appendage is completely occluded. He will stop Plavix while on Xarelto and resume it afterwards. Afib risk factors including age, HTN, obesity, inactivity and DANTE were discussed with patient. Risk factor modification recommended. All questions were answered. Treatment options including cardioversion, rate control strategy, anticoagulation were discussed with patient. Risks, benefits and alternative of each treatment options were explained. All questions answered.      I will do a TIMUR and cardioversion as outpatient.  This will be to assess for left atrial appendage closure and maintaining sinus rhythm.  If he has recurrence in the future, we can consider ablation.    We will do a 30-day monitor at the time of cardioversion.      -CAD  Status post CABG  No chest pain  Continue aspirin and statin.    -Hypertension    BP is well controlled. Continue current meds.    -CKD  Avoid nephrotoxic drugs.  Adjusting the dose of Xarelto to renal dysfunction.      -COPD  Continue oxygen.  Continue inhalers.    -Hyperlipidemia  Continue Crestor.  Monitor for liver function.    I independently reviewed and interpreted ECG, cardiac labs, other relevant labs,  echo CXR  . Unless otherwise reflected in the note, my interpretation is in agreement with the report.  Patient has multiple chronic and new acute high risk cardiac condition and needs close monitoring as outpatient.    Thank you for allowing me to participate in the care of Lv Shin     NOTE: This report was transcribed using voice recognition software. Every effort was made to ensure accuracy, however, inadvertent computerized transcription errors may be present.

## 2023-03-06 NOTE — ED PROVIDER NOTES
[facility]        Pt Name: Ronny Retana  MRN: [id number]  [place] 1950  Date of evaluation: 3/6/2023  Provider: Giuliana Arce PA-C  PCP: Thai Whittington MD  Note Started: 2:40 PM EST 3/6/23      EVELYN. I have evaluated this patient. My supervising physician was available for consultation. CHIEF COMPLAINT       Chief Complaint   Patient presents with    Atrial Fibrillation     Pt went to cardiac rehab today, found to be in afib. Has a history of afib one time after surgery. Oxygen dependent at 2 liters       HISTORY OF PRESENT ILLNESS: 1 or more Elements     History From: patient    Ronny Retana is a 81 y.o. male with past medical history of hypertension, diabetes, CAD status post CABG October 2022, chronic hypoxic respiratory failure on 2 L since the surgery who presents complaining of atrial fibrillation. Patient was at cardiac rehab today, found to be in A-fib. He reports he was in A-fib for short time after surgery, converted and has not been on any anticoagulation since. He does admit to some increasing shortness of breath over the past 3 to 4 days. Denies fever, chest pain, lower extremity edema, increasing cough. Patient states he is compliant with his medication, denies any recent illnesses. Patient's cardiologist here is Dr. Cesario Nogueira. Nursing Notes were all reviewed and agreed with or any disagreements were addressed in the HPI. REVIEW OF SYSTEMS :      Review of Systems   All other systems reviewed and are negative. Positives and Pertinent negatives as per HPI.        PAST MEDICAL HISTORY    has a past medical history of Chronic renal insufficiency, Colloid cyst of third ventricle ([address] Coronary artery disease, DDD (degenerative disc disease), lumbar (2006), Diabetes mellitus, type 2 ([address] Diverticulitis of colon with perforation (11/07), ED (erectile dysfunction), Hyperlipidemia, Hypertension, Hypertensive retinopathy of both eyes (1/24/2013), and Nephrolithiasis. SURGICAL HISTORY     Past Surgical History:   Procedure Laterality Date    BRAIN SURGERY  12/13/2007    Resection of colloid cyst of 3rd ventricle    CARDIAC CATHETERIZATION  5/02, 12/03,  3/08    COLOSTOMY  11/07    Emergent- due to diverticulitis with perforation    CORONARY ANGIOPLASTY  1995    RCA- unsuccessful    CORONARY ANGIOPLASTY WITH STENT PLACEMENT  10/05    Obtuse marginal branch of circumflex:  drug-eluting stent    CORONARY ARTERY BYPASS GRAFT N/A 10/21/2022    CABG X 3, LIMA  GRAFT TO LAD, VEIN GRAFT TO DISTAL RCA  AND OM, EVH LEFT SAPHENOUS VEIN. LIGATION KELLY WITH 35 MM ATRICLIP, TIMUR, TCPB, DOPPLER BYPASS GRAFT FLOW VERIFICATION, EPIAORTIC ECHOCARDIOGRAM, INSERTION OF VENTRICULAR AND ATRIAL PACING WIRES performed [facility]  2000, 2005    Right    LITHOTRIPSY  1998    Bilateral    LITHOTRIPSY  1994    Bilateral with right stent placement    PARTIAL NEPHRECTOMY  1980    Right    REVISION COLOSTOMY  3/08    Colostomy takedown with sigmoid colectomy and coloproctostomy anastomosis    TOTAL KNEE ARTHROPLASTY Left 9/44/55    UMBILICAL HERNIA REPAIR         CURRENTMEDICATIONS       Previous Medications    ACETAMINOPHEN (TYLENOL) 325 MG TABLET    Take 650 mg by mouth every 6 hours as needed for Pain    ALBUTEROL SULFATE HFA (VENTOLIN HFA) 108 (90 BASE) MCG/ACT INHALER    2 Puff every 4-6 hours as needed for wheezing or shortness of breath    AMLODIPINE (NORVASC) 10 MG TABLET    Take 10 mg by mouth daily    ASPIRIN 81 MG CHEWABLE TABLET    CHEW AND SWALLOW 1 TABLET BY MOUTH DAILY    BLOOD GLUCOSE MONITORING SUPPL (ONE TOUCH ULTRA 2) W/DEVICE KIT    1 kit by Does not apply route daily as needed.     BLOOD GLUCOSE MONITORING SUPPL (PRECISION XTRA) W/DEVICE KIT    As directed    BLOOD GLUCOSE TEST STRIPS (TRUE METRIX BLOOD GLUCOSE TEST) STRIP    USE 1 STRIP TO TEST BLOOD SUGAR TWICE DAILY DOXAZOSIN (CARDURA) 4 MG TABLET    TAKE 1 TABLET BY MOUTH EVERY NIGHT    FENOFIBRATE (TRIGLIDE) 160 MG TABLET    TAKE 1 TABLET BY MOUTH DAILY    FISH OIL-OMEGA-3 FATTY ACIDS 1000 MG CAPSULE    Take 2 g by mouth daily. FUROSEMIDE (LASIX) 20 MG TABLET    Take 1 tablet by mouth daily    GLIMEPIRIDE (AMARYL) 4 MG TABLET    Take 2 tablets by mouth every morning    INSULIN GLARGINE (LANTUS SOLOSTAR) 100 UNIT/ML INJECTION PEN    Inject 15 Units into the skin nightly    INSULIN PEN NEEDLE (B-D UF III MINI PEN NEEDLES) 31G X 5 MM MISC    [facility] [id number]    Patient test twice daily    MIRTAZAPINE (REMERON) 15 MG TABLET    Take 1.5 tablets by mouth nightly    ROSUVASTATIN (CRESTOR) 20 MG TABLET    Take 2 tablets by mouth daily    TRAZODONE (DESYREL) 100 MG TABLET    Take 1 tablet by mouth nightly    TRELEGY ELLIPTA 100-62.5-25 MCG/INH AEPB    INHALE 1 PUFF INTO THE LUNGS DAILY       ALLERGIES     Dilaudid [hydromorphone hcl] and Nubain [nalbuphine hcl]    FAMILYHISTORY     History reviewed. No pertinent family history. SOCIAL HISTORY       Social History     Tobacco Use    Smoking status: Former     Packs/day: 1.00     Years: 20.00     Pack years: 20.00     Types: Cigarettes     Quit date: 3/3/1992     Years since quittin.0    Smokeless tobacco: Never   Vaping Use    Vaping Use: Never used   Substance Use Topics    Alcohol use: No    Drug use: No       SCREENINGS        Candida Coma Scale  Eye Opening: Spontaneous  Best Verbal Response: Oriented  Best Motor Response: Obeys commands  Candida Coma Scale Score: 15                CIWA Assessment  BP: 108/75  Heart Rate: 75           PHYSICAL EXAM  1 or more Elements     ED Triage Vitals [23 1351]   BP Temp Temp Source Heart Rate Resp SpO2 Height Weight   123/73 97.7 °F (36.5 °C) Oral (!) 109 18 96 % 6' (1.829 m) 179 lb 9.6 oz (81.5 kg)       Physical Exam  Vitals and nursing note reviewed.    Constitutional: General: He is not in acute distress. Appearance: He is not ill-appearing or toxic-appearing. HENT:      Head: Normocephalic and atraumatic. Right Ear: External ear normal.      Left Ear: External ear normal.      Nose: Nose normal.      Mouth/Throat:      Mouth: Mucous membranes are moist.      Pharynx: Oropharynx is clear. Eyes:      Conjunctiva/sclera: Conjunctivae normal.   Cardiovascular:      Rate and Rhythm: Tachycardia present. Rhythm irregular. Pulses: Normal pulses. Heart sounds: Normal heart sounds. Pulmonary:      Effort: Pulmonary effort is normal. No respiratory distress. Breath sounds: Normal breath sounds. Abdominal:      General: Abdomen is flat. Bowel sounds are normal. There is no distension. Palpations: Abdomen is soft. Tenderness: There is no abdominal tenderness. There is no guarding or rebound. Musculoskeletal:         General: Normal range of motion. Cervical back: Normal range of motion and neck supple. Skin:     General: Skin is warm and dry. Capillary Refill: Capillary refill takes less than 2 seconds. Neurological:      General: No focal deficit present. Mental Status: He is alert and oriented to person, place, and time. Cranial Nerves: No cranial nerve deficit. Sensory: No sensory deficit. Motor: No weakness.    Psychiatric:         Mood and Affect: Mood normal.         Behavior: Behavior normal.           DIAGNOSTIC RESULTS   LABS:    Labs Reviewed   CBC WITH AUTO DIFFERENTIAL - Abnormal; Notable for the following components:       Result Value    RBC 3.92 (*)     Hemoglobin 12.3 (*)     Hematocrit 38.2 (*)     RDW 17.6 (*)     Lymphocytes Absolute 0.9 (*)     All other components within normal limits   COMPREHENSIVE METABOLIC PANEL W/ REFLEX TO MG FOR LOW K - Abnormal; Notable for the following components:    Glucose 342 (*)     Creatinine 1.5 (*)     Est, Glom Filt Rate 50 (*)     Albumin/Globulin Ratio 0.9 (*)     ALT 6 (*)     AST 12 (*)     All other components within normal limits   BRAIN NATRIURETIC PEPTIDE - Abnormal; Notable for the following components:    Pro- (*)     All other components within normal limits   TROPONIN   MAGNESIUM       When ordered only abnormal lab results are displayed. All other labs were within normal range or not returned as of this dictation. EKG: When ordered, EKG's are interpreted by the Emergency Department Physician in the absence of a cardiologist.  Please see their note for interpretation of EKG. RADIOLOGY:   Non-plain film images such as CT, Ultrasound and MRI are read by the radiologist. Plain radiographic images are visualized and preliminarily interpreted by the ED Provider with the below findings:        Interpretation per the Radiologist below, if available at the time of this note:    XR CHEST PORTABLE   Final Result   1. No acute cardiopulmonary findings. 2.  Stable mild cardiomegaly           No results found. No results found.     PROCEDURES   Unless otherwise noted below, none     Procedures    CRITICAL CARE TIME (.cctime)         EMERGENCY DEPARTMENT COURSE and DIFFERENTIAL DIAGNOSIS/MDM:   Vitals:    Vitals:    03/06/23 1545 03/06/23 1600 03/06/23 1615 03/06/23 1630   BP: 125/89 (!) 124/97 138/83 108/75   Pulse: 78 87 74 75   Resp: 24 21 19 18   Temp:       TempSrc:       SpO2: 100% 100% 99% 100%   Weight:       Height:           Patient was given the following medications:  Medications   rivaroxaban (XARELTO) tablet 15 mg (has no administration in time range)   metoprolol tartrate (LOPRESSOR) tablet 25 mg (has no administration in time range)   dilTIAZem injection 10 mg (10 mg IntraVENous Given 3/6/23 1443)   metoprolol tartrate (LOPRESSOR) tablet 25 mg (25 mg Oral Given 3/6/23 1523)   insulin regular (HUMULIN R;NOVOLIN R) injection 8 Units (8 Units IntraVENous Given 3/6/23 1520)             Is this patient to be included in the SEP-1 Core Measure due to severe sepsis or septic shock? No   Exclusion criteria - the patient is NOT to be included for SEP-1 Core Measure due to: Infection is not suspected    Chronic Conditions affecting care:    has a past medical history of Chronic renal insufficiency, Colloid cyst of third ventricle [address] Coronary artery disease, DDD (degenerative disc disease), lumbar (2006), Diabetes mellitus, type 2 ([address] Diverticulitis of colon with perforation (11/07), ED (erectile dysfunction), Hyperlipidemia, Hypertension, Hypertensive retinopathy of both eyes (1/24/2013), and Nephrolithiasis. CONSULTS: (Who and What was discussed)  IP CONSULT TO CARDIOLOGY          Records Reviewed (Source):     CC/HPI Summary, DDx, ED Course, and Reassessment:   Abhishek Hughes is a 81 y.o. male with past medical history of hypertension, diabetes, CAD status post CABG October 2022, chronic hypoxic respiratory failure on 2 L since the surgery who presents complaining of atrial fibrillation. Patient was at cardiac rehab today, found to be in A-fib. He reports he was in A-fib for short time after surgery, converted and has not been on any anticoagulation since. He does admit to some increasing shortness of breath over the past 3 to 4 days. Denies fever, chest pain, lower extremity edema, increasing cough. Patient states he is compliant with his medication, denies any recent illnesses. Patient's cardiologist here is Dr. Love Jiménez. On exam, in A-fib, heart rate initially was in the low 100s. ,  No distress, lungs clear. 1550 - Shared medical decision making/recheck of patient, heart rate in 70s, remains in A-fib, blood pressure remains adequate. Discussed admission with patient, offered admission, patient is adamant that he would prefer discharge over admission. Patient is rate controlled, denies chest pain, current shortness of breath.   Will consult cardiology for recommendations, possibly  start patient on rate control, anticoagulation and have him follow-up with cardiology closely. 1615 -Case discussed with cardiology, Dr. Tania Rodriguez, in person in the ER, states he will see patient, speak with him and then will discuss recommendations. 1629 - Case discussed with cardiology again, recommends give Xarelto now, start 15 mg daily tomorrow. Rate 25 mg twice daily metoprolol, have patient stop Plavix since he will be on Xarelto and aspirin. Cardiology states he will arrange for outpatient follow-up, cardioversion. Patient aware of plan, agrees. Disposition Considerations (tests considered but not done, Admit vs D/C, Shared Decision Making, Pt Expectation of Test or Tx.):       I am the Primary Clinician of Record. FINAL IMPRESSION      1. Atrial fibrillation with RVR ([address]    2. Hyperglycemia          DISPOSITION/PLAN     DISPOSITION Decision To Discharge 03/06/2023 04:38:56 PM      PATIENT REFERRED TO:  Minerva Jeffries MD  [address] [place] [address]  [phone]    In 2 days  Follow-up with cardiology as he discussed. Return for any new or worsening symptoms. DISCHARGE MEDICATIONS:  New Prescriptions    METOPROLOL TARTRATE (LOPRESSOR) 25 MG TABLET    Take 1 tablet by mouth 2 times daily    RIVAROXABAN (XARELTO) 15 MG TABS TABLET    Take 1 tablet by mouth daily (with breakfast)       DISCONTINUED MEDICATIONS:  Discontinued Medications    CLOPIDOGREL (PLAVIX) 75 MG TABLET    TAKE 1 TABLET BY MOUTH DAILY. (Please note that portions of this note were completed with a voice recognition program.  Efforts were made to edit the dictations but occasionally words are mis-transcribed. )    Melissa Torres PA-C (electronically signed)            Melissa Torres PA-C  03/06/23 9225

## 2023-03-06 NOTE — PROGRESS NOTES
Pharmacy Home Medication Reconciliation Note    A medication reconciliation has been completed for Ale Braga 1950    Pharmacy: [address] [place] [address]  Information provided by: patient    The patient's home medication list is as follows: No current facility-administered medications on file prior to encounter. Current Outpatient Medications on File Prior to Encounter   Medication Sig Dispense Refill    acetaminophen (TYLENOL) 325 MG tablet Take 650 mg by mouth every 6 hours as needed for Pain      amLODIPine (NORVASC) 10 MG tablet Take 10 mg by mouth daily      doxazosin (CARDURA) 4 MG tablet TAKE 1 TABLET BY MOUTH EVERY NIGHT 90 tablet 1    fenofibrate (TRIGLIDE) 160 MG tablet TAKE 1 TABLET BY MOUTH DAILY 90 tablet 1    traZODone (DESYREL) 100 MG tablet Take 1 tablet by mouth nightly 90 tablet 1    aspirin 81 MG chewable tablet CHEW AND SWALLOW 1 TABLET BY MOUTH DAILY 30 tablet 3    clopidogrel (PLAVIX) 75 MG tablet TAKE 1 TABLET BY MOUTH DAILY.  30 tablet 3    blood glucose test strips (TRUE METRIX BLOOD GLUCOSE TEST) strip USE 1 STRIP TO TEST BLOOD SUGAR TWICE DAILY 100 strip 11    glimepiride (AMARYL) 4 MG tablet Take 2 tablets by mouth every morning 180 tablet 1    albuterol sulfate HFA (VENTOLIN HFA) 108 (90 Base) MCG/ACT inhaler 2 Puff every 4-6 hours as needed for wheezing or shortness of breath 1 each 3    furosemide (LASIX) 20 MG tablet Take 1 tablet by mouth daily 90 tablet 3    rosuvastatin (CRESTOR) 20 MG tablet Take 2 tablets by mouth daily (Patient taking differently: Take 20 mg by mouth daily) 90 tablet 1    mirtazapine (REMERON) 15 MG tablet Take 1.5 tablets by mouth nightly 135 tablet 1    insulin glargine (LANTUS SOLOSTAR) 100 UNIT/ML injection pen Inject 15 Units into the skin nightly (Patient taking differently: Inject 15 Units into the skin nightly 8-12 units) 5 pen 5    Insulin Pen Needle (B-D UF III MINI PEN NEEDLES) 31G X 5 MM MISC USE AS DIRECTED TO INJECT ONCE DAILY 100 each 11    TRELEGY ELLIPTA 100-62.5-25 MCG/INH AEPB INHALE 1 PUFF INTO THE LUNGS DAILY (Patient taking differently: Inhale 1 puff into the lungs daily Was told to take Trelegy 2 puffs  daily as [place] Apo was not accepted by insurance.) 180 each Thai Kang [address] 262 Patient test twice daily 150 each 5    Blood Glucose Monitoring Suppl (PRECISION XTRA) w/Device KIT As directed 1 kit 0    Blood Glucose Monitoring Suppl (ONE TOUCH ULTRA 2) W/DEVICE KIT 1 kit by Does not apply route daily as needed. 1 kit 0    fish oil-omega-3 fatty acids 1000 MG capsule Take 2 g by mouth daily. Timing of last doses updated. Thank you,  Vipul Rush Radha

## 2023-03-06 NOTE — PROGRESS NOTES
Full consult to follow. Please:  - administer one dose of xarelto 15 mg qd before discharge  - Xarelto 15 mg qd from tomorrow  - metoprolol 25 mg bid  - stop plavix from tomorrow  - will schedule TIMUR and cardioversion as outpatient.

## 2023-03-07 ENCOUNTER — CARE COORDINATION (OUTPATIENT)
Dept: CARE COORDINATION | Age: 73
End: 2023-03-07

## 2023-03-07 ENCOUNTER — HOSPITAL ENCOUNTER (OUTPATIENT)
Dept: ULTRASOUND IMAGING | Age: 73
Discharge: HOME | End: 2023-03-07
Payer: MEDICARE

## 2023-03-07 DIAGNOSIS — R10.9 LEFT FLANK PAIN: ICD-10-CM

## 2023-03-07 DIAGNOSIS — N18.31 STAGE 3A CHRONIC KIDNEY DISEASE (HCC): ICD-10-CM

## 2023-03-07 LAB
EKG ATRIAL RATE: 110 BPM
EKG DIAGNOSIS: NORMAL
EKG Q-T INTERVAL: 296 MS
EKG QRS DURATION: 96 MS
EKG QTC CALCULATION (BAZETT): 393 MS
EKG R AXIS: 68 DEGREES
EKG T AXIS: -70 DEGREES
EKG VENTRICULAR RATE: 106 BPM

## 2023-03-07 PROCEDURE — 93010 ELECTROCARDIOGRAM REPORT: CPT | Performed by: INTERNAL MEDICINE

## 2023-03-07 PROCEDURE — 76770 US EXAM ABDO BACK WALL COMP: CPT

## 2023-03-08 ENCOUNTER — TELEPHONE (OUTPATIENT)
Dept: CARDIOLOGY CLINIC | Age: 73
End: 2023-03-08

## 2023-03-08 ENCOUNTER — HOSPITAL ENCOUNTER (OUTPATIENT)
Dept: CARDIAC REHAB | Age: 73
Setting detail: THERAPIES SERIES
Discharge: HOME | End: 2023-03-08
Payer: MEDICARE

## 2023-03-08 ENCOUNTER — TELEPHONE (OUTPATIENT)
Dept: CARDIAC REHAB | Age: 73
End: 2023-03-08

## 2023-03-08 DIAGNOSIS — I25.10 CORONARY ARTERY DISEASE INVOLVING NATIVE CORONARY ARTERY OF NATIVE HEART WITHOUT ANGINA PECTORIS: Primary | ICD-10-CM

## 2023-03-08 NOTE — CARE COORDINATION
Care Transitions Initial Follow Up Call    Call within 2 business days of discharge: Yes     Patient: Solis Ceron Patient : 1950 MRN: [id number]    Last Discharge [address]       Date Complaint Diagnosis Description Type Department Provider    3/6/23 Atrial Fibrillation Atrial fibrillation with RVR ([facility] [address] . ..  ED (DISCHARGE) [facility] ED             RARS: Readmission Risk Score: 17.8       Spoke with: patient   Waiting to hear back from Dr Ronak Kolb to schedule TIMUR/ cardioversion    Discharge department/facility: [facility] ER    Non-face-to-face services provided:  Obtained and reviewed discharge summary and/or continuity of care documents    Follow Up  Future Appointments   Date Time Provider Padmini Almaguer   3/10/2023  1:00 PM [facility] CARD PULM EXERCISE [address]    3/13/2023  1:00 PM [facility] CARD PULM EXERCISE 1 [address] [place]    3/15/2023  1:00 PM [address] [place] [address]   3/16/2023  1:30 PM María Hager MD FF Cardio MMA   3/17/2023  1:00 PM [facility] CARD PULM EXERCISE 1 [address] [place]    2023 11:00 AM MD GWEN Hernandes  [place] - DYD   2023 12:45 PM Mariah Holloway MD [facility] FF AFL Nephrolo   2023 11:30 AM ANTHONY Webb - CNP FF Cardio MMA   10/16/2023 10:15 AM Anai Zendejas MD PULM & CC IRWIN Balderas RN

## 2023-03-08 NOTE — LETTER
[facility]  EP Procedure Sheet    3/8/23  Ranulfo Santoro  1950  EP Procedures  [] Pacemaker implant (single/dual) [] EP Study   [] ICD implant (single/dual) [] Atrial flutter ablation (TIMUR Y/N)   [] Biv implant ICD [] Tilt Table   [] Biv implant PPM [] Atrial fibrillation ablation (TIMUR Yes)   [] Generator Change (PPM/ICD/BiV) [] SVT ablation   [] Lead revision (RV/LA/RA) (<1 month) [] PVC ablation     [] Lead extraction +/- upgrade (BiV/PPM/ICD) [] VT Ischemic/ non-ischemic   [] Loop implant/ removal [] VT RVOT   [x] Cardioversion [] VT Left sided   [x] TIMUR [] AVN ablation   Equipment  [] Medtronic  [] TRACY Mapping System   [] [place] [] [address]   [] [employer] [] CryoAblation   [] Biotronik [] Laser Lead Extraction   EP Procedures Scheduling Request  # hours Requested  []1 []2 []2-4 [] 4-6 Scheduled  Date:   Specific Day  Completed    Anesthesia []yes []no F/u Date:   CT surgery backup []yes []no     Overnight stay      Performing MD []RMM [x]MXA   []MKW [] CMV First vs repeat   []1st [] 2nd [] 3rd   Pre-Procedure Labs / Imaging  [] PT/INR [] Type & cross   [] CBC [] Units PRBC   [] BMP/Mg [] Units FFP   [] Venogram [] Cardiac CTA for Pulmonary vein mapping     RN INITIALS: DW     Patient Instructions  Do not eat or drink after midnight the night prior to procedure  Dx:atrial fibrillation  ICD-10 code: i48.19

## 2023-03-08 NOTE — CARDIO/PULMONARY
Called to get a release from Dr. Kris Desir, patient to ED on 3/6/2023 from cardiac rehab, for atrial fib.

## 2023-03-08 NOTE — TELEPHONE ENCOUNTER
----- Message from Miles Kitchen MD sent at 3/6/2023  4:40 PM EST -----  Please send letter for TIMUR and cardioversion

## 2023-03-09 ENCOUNTER — TELEPHONE (OUTPATIENT)
Dept: CARDIOLOGY CLINIC | Age: 73
End: 2023-03-09

## 2023-03-09 LAB
CATARACTS: POSITIVE
DIABETIC RETINOPATHY: NEGATIVE
GLAUCOMA: NEGATIVE
INTRAOCULAR PRESSURE EYE: 10
INTRAOCULAR PRESSURE EYE: 10
VISUAL ACUITY DISTANCE LEFT EYE: NORMAL
VISUAL ACUITY DISTANCE RIGHT EYE: NORMAL

## 2023-03-09 RX ORDER — MIRTAZAPINE 15 MG/1
22.5 TABLET, FILM COATED ORAL NIGHTLY
Qty: 135 TABLET | Refills: 1 | Status: SHIPPED | OUTPATIENT
Start: 2023-03-09

## 2023-03-09 NOTE — TELEPHONE ENCOUNTER
Patient is calling to request a refill on the following medication:  mirtazapine (REMERON) 15 MG tablet  Last Appointment: 02/24/2023  Next Appointment: 05/30/2023  Last Refill: 07/19/2022    Please call in to:  Rosezena Prader [address] [place] [address] [phone] Rhonda Devries [phone]   [facility], [address] [place] [id number]   Phone:  [phone]  Fax:  [phone]    Please contact patient with any additional questions. He has been aware of the 24-48 hour processing time.

## 2023-03-10 ENCOUNTER — HOSPITAL ENCOUNTER (OUTPATIENT)
Dept: CARDIAC REHAB | Age: 73
Setting detail: THERAPIES SERIES
Discharge: HOME | End: 2023-03-10
Payer: MEDICARE

## 2023-03-10 PROCEDURE — 93798 PHYS/QHP OP CAR RHAB W/ECG: CPT

## 2023-03-10 RX ORDER — SODIUM CHLORIDE 0.9 % (FLUSH) 0.9 %
5-40 SYRINGE (ML) INJECTION PRN
OUTPATIENT
Start: 2023-03-10

## 2023-03-13 ENCOUNTER — HOSPITAL ENCOUNTER (OUTPATIENT)
Dept: CARDIAC REHAB | Age: 73
Setting detail: THERAPIES SERIES
Discharge: HOME | End: 2023-03-13
Payer: MEDICARE

## 2023-03-13 PROCEDURE — 93798 PHYS/QHP OP CAR RHAB W/ECG: CPT

## 2023-03-15 ENCOUNTER — HOSPITAL ENCOUNTER (OUTPATIENT)
Dept: CARDIAC REHAB | Age: 73
Setting detail: THERAPIES SERIES
Discharge: HOME | End: 2023-03-15
Payer: MEDICARE

## 2023-03-15 PROCEDURE — 93798 PHYS/QHP OP CAR RHAB W/ECG: CPT

## 2023-03-15 NOTE — PROGRESS NOTES
[address]  H+P  Consult  OP Visit  FU Visit   CC/HPI   CC New patient visit for s/p CABG, CAD. Intervention CABG, LAAE w/ CLIP   General Doing well. No new concerns. Cardiac Sx -CP, -SOB, -dizziness, -syncope, -edema, -orthopnea, -pnd   HISTORY/ALLERGY/ROS   MEDHx  has a past medical history of Chronic renal insufficiency, Colloid cyst of third ventricle (HCC), Coronary artery disease, DDD (degenerative disc disease), lumbar, Diabetes mellitus, type 2 ([facility] [address] Diverticulitis of colon with perforation, ED (erectile dysfunction), Hyperlipidemia, Hypertension, Hypertensive retinopathy of both eyes, and Nephrolithiasis. SURGHx  has a past surgical history that includes Cardiac catheterization (5/02, 12/03,  3/08); Revision Colostomy (3/08); Lithotripsy (1998); Lithotripsy (1994); partial nephrectomy (1980); Coronary angioplasty with stent (10/05); Coronary angioplasty (1995); Umbilical hernia repair; Knee arthroscopy (2000, 2005); colostomy (11/07); Total knee arthroplasty (Left, 9/29/14); brain surgery (12/13/2007); and Coronary artery bypass graft (N/A, 10/21/2022). SOCHx  reports that he quit smoking about 31 years ago. His smoking use included cigarettes. He has a 20.00 pack-year smoking history. He has never used smokeless tobacco. He reports that he does not drink alcohol and does not use drugs. FAMHx family history is not on file.    ALLERG Dilaudid [hydromorphone hcl] and Nubain [nalbuphine hcl]   ROS Full ROS obtained and negative except as mentioned in HPI   MEDICATIONS   Current Outpatient Medications   Medication Sig Dispense Refill    mirtazapine (REMERON) 15 MG tablet Take 1.5 tablets by mouth nightly 135 tablet 1    acetaminophen (TYLENOL) 325 MG tablet Take 650 mg by mouth every 6 hours as needed for Pain      amLODIPine (NORVASC) 10 MG tablet Take 10 mg by mouth daily      metoprolol tartrate (LOPRESSOR) 25 MG tablet Take 1 tablet by mouth 2 times daily 60 tablet 0    rivaroxaban (XARELTO) 15 MG TABS tablet Take 1 tablet by mouth daily (with breakfast) 30 tablet 0    doxazosin (CARDURA) 4 MG tablet TAKE 1 TABLET BY MOUTH EVERY NIGHT 90 tablet 1    fenofibrate (TRIGLIDE) 160 MG tablet TAKE 1 TABLET BY MOUTH DAILY 90 tablet 1    traZODone (DESYREL) 100 MG tablet Take 1 tablet by mouth nightly 90 tablet 1    aspirin 81 MG chewable tablet CHEW AND SWALLOW 1 TABLET BY MOUTH DAILY 30 tablet 3    blood glucose test strips (TRUE METRIX BLOOD GLUCOSE TEST) strip USE 1 STRIP TO TEST BLOOD SUGAR TWICE DAILY 100 strip 11    glimepiride (AMARYL) 4 MG tablet Take 2 tablets by mouth every morning 180 tablet 1    albuterol sulfate HFA (VENTOLIN HFA) 108 (90 Base) MCG/ACT inhaler 2 Puff every 4-6 hours as needed for wheezing or shortness of breath 1 each 3    furosemide (LASIX) 20 MG tablet Take 1 tablet by mouth daily 90 tablet 3    rosuvastatin (CRESTOR) 20 MG tablet Take 2 tablets by mouth daily (Patient taking differently: Take 20 mg by mouth daily) 90 tablet 1    insulin glargine (LANTUS SOLOSTAR) 100 UNIT/ML injection pen Inject 15 Units into the skin nightly (Patient taking differently: Inject 15 Units into the skin nightly 8-12 units) 5 pen 5    Insulin Pen Needle (B-D UF III MINI PEN NEEDLES) 31G X 5 MM MISC USE AS DIRECTED TO INJECT ONCE DAILY 100 each 11    TRELEGY ELLIPTA 100-62.5-25 MCG/INH AEPB INHALE 1 PUFF INTO THE LUNGS DAILY (Patient taking differently: Inhale 1 puff into the lungs daily Was told to take Trelegy 2 puffs  daily as [place] was not accepted by insurance.) 180 each Thai [address] Patient test twice daily 150 each 5    Blood Glucose Monitoring Suppl (PRECISION XTRA) w/Device KIT As directed 1 kit 0    Blood Glucose Monitoring Suppl (ONE TOUCH ULTRA 2) W/DEVICE KIT 1 kit by Does not apply route daily as needed. 1 kit 0    fish oil-omega-3 fatty acids 1000 MG capsule Take 2 g by mouth daily. No current facility-administered medications for this visit. PHYSICAL EXAM   Vitals /60 (Site: Left Upper Arm, Position: Sitting, Cuff Size: Medium Adult)   Pulse 65   Ht 6' (1.829 m)   Wt 179 lb (81.2 kg)   SpO2 96%   BMI 24.28 kg/m²    Gen Alert, coop, no distress Heart  RRR, no MRG   Head NC, AT, no abnorm Abd  Soft, NT, +BS, no mass, no OM   Eyes PER, conj/corn clear Ext  Ext nl, AT, no C/C/E   Nose Nares nl, no drain, NT Pulse 2+ and symmetric   Throat Lips, mucosa, tongue nl Skin Col/text/turg nl, no vis rash/les   Neck S/S, TM, NT, no bruit/JVD Psych Nl mood and affect   Lung CTA-B, unlabored, no DTP Lymph   No cervical or axillary LA   Ch wall NT, no deform Neuro  Nl gross M/S exam      COMPLIANCE   Discussed and counseled on diet, exercise, weight loss, smoking, alcohol, drugs. All questions answered. CODING   SCI (16898) - 30-39 mins spent reviewing hx/tests/consults, performing exam, counseling/educating, ordering meds/tests/procedures, referring/communicating w/PCP/consultants, documenting in EMR, interpreting results, communicating medical information and plan with family. SCRIBE ATTESTATION   Nurse Scribe Attestation  Ace Alvarez, am scribing for and in the presence of Michelle Higgins MD.   Signed, Barney Bonilla RN. 3/15/2023 5:13 PM    Doctor Barney Bonilla is working as a scribe for and in the presence of me Michelle Higgins MD). Working as a scribe, Barney Bonilla may have prepopulated components of this note with my historical  intellectual property under my direct supervision. Any additions to this intellectual property were performed in my presence and at my direction. Furthermore, the content and accuracy of this note have been reviewed by me Michelle Higgins MD).    ASSESSMENT AND PLAN   *CAD   Date EF Results   Sx   No concerning   Hx 10/22  CABGx3 SVG-OM, SVG-RCA, LIMA-LAD KELLY clip Doreen Addison)   LHC 10/22 55% No intervention, MVD=>CABG (Carol)   MPI 9/22  Inferolateral ST depression, apical inferolateral mixed I/S   TTE 11/19 60% Mod MR   Plan   Continue aggressive medical treatment at doses above   *AF  Status New onset in ER   Plan NSR today  30d monitor for burden  Continue AC  FU with EP after monitor   *HTN  Status Controlled   Plan Continue current antihypertensives at doses above   *CHOL  LDL 50, 10/22   Plan Counseled on diet/exercise/weight, continue HI/MT statin   *FOLLOWUP  6 months with IC

## 2023-03-16 ENCOUNTER — OFFICE VISIT (OUTPATIENT)
Dept: CARDIOLOGY CLINIC | Age: 73
End: 2023-03-16
Payer: MEDICARE

## 2023-03-16 VITALS
SYSTOLIC BLOOD PRESSURE: 126 MMHG | DIASTOLIC BLOOD PRESSURE: 60 MMHG | HEIGHT: 72 IN | OXYGEN SATURATION: 96 % | WEIGHT: 179 LBS | HEART RATE: 65 BPM | BODY MASS INDEX: 24.24 KG/M2

## 2023-03-16 DIAGNOSIS — I48.91 ATRIAL FIBRILLATION WITH RVR (HCC): Primary | ICD-10-CM

## 2023-03-16 DIAGNOSIS — I48.0 PAROXYSMAL A-FIB (HCC): ICD-10-CM

## 2023-03-16 PROCEDURE — 1036F TOBACCO NON-USER: CPT | Performed by: INTERNAL MEDICINE

## 2023-03-16 PROCEDURE — 1123F ACP DISCUSS/DSCN MKR DOCD: CPT | Performed by: INTERNAL MEDICINE

## 2023-03-16 PROCEDURE — G8427 DOCREV CUR MEDS BY ELIG CLIN: HCPCS | Performed by: INTERNAL MEDICINE

## 2023-03-16 PROCEDURE — G8484 FLU IMMUNIZE NO ADMIN: HCPCS | Performed by: INTERNAL MEDICINE

## 2023-03-16 PROCEDURE — 93000 ELECTROCARDIOGRAM COMPLETE: CPT | Performed by: INTERNAL MEDICINE

## 2023-03-16 PROCEDURE — 99214 OFFICE O/P EST MOD 30 MIN: CPT | Performed by: INTERNAL MEDICINE

## 2023-03-16 PROCEDURE — 3078F DIAST BP <80 MM HG: CPT | Performed by: INTERNAL MEDICINE

## 2023-03-16 PROCEDURE — 3017F COLORECTAL CA SCREEN DOC REV: CPT | Performed by: INTERNAL MEDICINE

## 2023-03-16 PROCEDURE — G8420 CALC BMI NORM PARAMETERS: HCPCS | Performed by: INTERNAL MEDICINE

## 2023-03-16 PROCEDURE — 3074F SYST BP LT 130 MM HG: CPT | Performed by: INTERNAL MEDICINE

## 2023-03-17 ENCOUNTER — HOSPITAL ENCOUNTER (OUTPATIENT)
Dept: CARDIAC REHAB | Age: 73
Setting detail: THERAPIES SERIES
Discharge: HOME | End: 2023-03-17
Payer: MEDICARE

## 2023-03-17 PROCEDURE — 93798 PHYS/QHP OP CAR RHAB W/ECG: CPT

## 2023-03-20 ENCOUNTER — HOSPITAL ENCOUNTER (OUTPATIENT)
Dept: CARDIAC REHAB | Age: 73
Setting detail: THERAPIES SERIES
Discharge: HOME | End: 2023-03-20
Payer: MEDICARE

## 2023-03-20 PROCEDURE — 93798 PHYS/QHP OP CAR RHAB W/ECG: CPT

## 2023-03-22 ENCOUNTER — HOSPITAL ENCOUNTER (OUTPATIENT)
Dept: CARDIAC REHAB | Age: 73
Setting detail: THERAPIES SERIES
Discharge: HOME | End: 2023-03-22
Payer: MEDICARE

## 2023-03-22 ENCOUNTER — HOSPITAL ENCOUNTER (OUTPATIENT)
Dept: CARDIAC CATH/INVASIVE PROCEDURES | Age: 73
Discharge: HOME | End: 2023-03-22

## 2023-03-22 PROCEDURE — 93798 PHYS/QHP OP CAR RHAB W/ECG: CPT

## 2023-03-24 ENCOUNTER — HOSPITAL ENCOUNTER (OUTPATIENT)
Dept: CARDIAC REHAB | Age: 73
Setting detail: THERAPIES SERIES
Discharge: HOME | End: 2023-03-24
Payer: MEDICARE

## 2023-03-24 PROCEDURE — 93798 PHYS/QHP OP CAR RHAB W/ECG: CPT

## 2023-03-24 RX ORDER — TRAZODONE HYDROCHLORIDE 50 MG/1
TABLET ORAL
Qty: 90 TABLET | Refills: 1 | OUTPATIENT
Start: 2023-03-24

## 2023-03-27 ENCOUNTER — HOSPITAL ENCOUNTER (OUTPATIENT)
Dept: CARDIAC REHAB | Age: 73
Setting detail: THERAPIES SERIES
Discharge: HOME | End: 2023-03-27
Payer: MEDICARE

## 2023-03-27 PROCEDURE — 93798 PHYS/QHP OP CAR RHAB W/ECG: CPT

## 2023-03-29 ENCOUNTER — TELEPHONE (OUTPATIENT)
Dept: INTERNAL MEDICINE CLINIC | Age: 73
End: 2023-03-29

## 2023-03-29 ENCOUNTER — HOSPITAL ENCOUNTER (OUTPATIENT)
Dept: CARDIAC REHAB | Age: 73
Setting detail: THERAPIES SERIES
Discharge: HOME | End: 2023-03-29
Payer: MEDICARE

## 2023-03-29 DIAGNOSIS — N18.30 CKD (CHRONIC KIDNEY DISEASE), STAGE III (HCC): ICD-10-CM

## 2023-03-29 PROCEDURE — 93798 PHYS/QHP OP CAR RHAB W/ECG: CPT

## 2023-03-29 NOTE — TELEPHONE ENCOUNTER
Patient is calling in for refill on:   doxazosin (CARDURA) 4 MG tablet  Last Appointment: 2/24/23  Next Appointment: 5/30/23  Last refill: 2/27/23      [id number] [address], [address] [facility] - [address] [phone]

## 2023-03-29 NOTE — TELEPHONE ENCOUNTER
Spoke with pharmacy, they have the script from 2/27 that had no been filled yet.     They will refill this today    Patient has been advised

## 2023-03-31 ENCOUNTER — HOSPITAL ENCOUNTER (OUTPATIENT)
Dept: CARDIAC REHAB | Age: 73
Setting detail: THERAPIES SERIES
Discharge: HOME | End: 2023-03-31
Payer: MEDICARE

## 2023-03-31 PROCEDURE — 93798 PHYS/QHP OP CAR RHAB W/ECG: CPT

## 2023-03-31 RX ORDER — RIVAROXABAN 15 MG/1
TABLET, FILM COATED ORAL
Qty: 30 TABLET | Refills: 3 | Status: SHIPPED | OUTPATIENT
Start: 2023-03-31

## 2023-03-31 NOTE — TELEPHONE ENCOUNTER
Last OV: 3/16/23 dce  Last Labs: 3/6/23 cbc  Last refill: 3/7/23  Next appt:  5/2/23 mxa, 9/19/23 npka

## 2023-04-03 ENCOUNTER — HOSPITAL ENCOUNTER (OUTPATIENT)
Dept: CARDIAC REHAB | Age: 73
Setting detail: THERAPIES SERIES
Discharge: HOME | End: 2023-04-03
Payer: MEDICARE

## 2023-04-03 PROCEDURE — 93798 PHYS/QHP OP CAR RHAB W/ECG: CPT

## 2023-04-03 NOTE — TELEPHONE ENCOUNTER
Medication Refill    Medication needing refilled:  metoprolol tartrate     Dosage of the medication:  25mg     How are you taking this medication (QD, BID, TID, QID, PRN):  Take 1 tablet by mouth 2 times daily    30 or 90 day supply called in: 180    When will you run out of your medication:    Which Pharmacy are we sending the medication to?:    [facility] [address], [address] [place] [address] Sindhu Tana [phone]         NOTE:  Per Pt Nasrin Lott [facility] is an ER Provider.   Please  advise

## 2023-04-14 ENCOUNTER — TELEPHONE (OUTPATIENT)
Dept: INTERNAL MEDICINE CLINIC | Age: 73
End: 2023-04-14

## 2023-04-14 PROBLEM — E03.8 SUBCLINICAL HYPOTHYROIDISM: Status: ACTIVE | Noted: 2023-04-14

## 2023-04-17 ENCOUNTER — TELEPHONE (OUTPATIENT)
Dept: INTERNAL MEDICINE CLINIC | Age: 73
End: 2023-04-17

## 2023-04-17 NOTE — TELEPHONE ENCOUNTER
According to the lab     0 Result Notes      Component 4/15/23 0203   Rejected Test 19,190    Reason for Rejection see below    Comment: Unable to perform testing; no specimen received. To perform testing the specimen will need to be recollected.   No spec

## 2023-04-17 NOTE — TELEPHONE ENCOUNTER
Shruti from [facility] is calling to let Dr. Panchito Barajas know that the 4/13 Soluble transferrin receptor lab was rejected due to the completion on 4/12. If this needs redone please advise.    Please call Shruti MOON# [phone]

## 2023-04-18 ENCOUNTER — TELEPHONE (OUTPATIENT)
Dept: INTERNAL MEDICINE CLINIC | Age: 73
End: 2023-04-18

## 2023-04-18 RX ORDER — PREDNISONE 10 MG/1
TABLET ORAL
Qty: 53 TABLET | Refills: 0 | Status: SHIPPED | OUTPATIENT
Start: 2023-04-18 | End: 2023-04-28

## 2023-04-18 NOTE — TELEPHONE ENCOUNTER
Patient is calling in to let Dr. Haley Dimas know that   doxycycline hyclate (VIBRAMYCIN) 100 MG capsule  Is not working for him he has not had any change. Patient stated that Dr. Haley Dimas had said that if it didn't work to call and she will prescribe a different medication with a steroid. Patient stated that he is also coughing hard now and having coughing fits. Patient is not taking any OTC cough medication only the antibiotic. Please advise and call patient to discuss medication options.

## 2023-04-18 NOTE — TELEPHONE ENCOUNTER
Spoke with patient he states the sputum is some what better, but the cough is getting worse and making him have increased SOB.  He was advised prednisone will be called in for him to the pharmacy on file and he had no further questions

## 2023-04-18 NOTE — TELEPHONE ENCOUNTER
He has only been taking this medication since Friday evening. How is it not working? No change in sputum? Cough worse? More SOB? Please call patient to clarify.

## 2023-04-24 ENCOUNTER — TELEPHONE (OUTPATIENT)
Dept: CARDIOLOGY CLINIC | Age: 73
End: 2023-04-24

## 2023-04-24 PROBLEM — I49.3 PVC (PREMATURE VENTRICULAR CONTRACTION): Status: ACTIVE | Noted: 2023-04-24

## 2023-04-24 NOTE — TELEPHONE ENCOUNTER
Pt is requesting a call back as he has some questions for the doctor.     Pt can be reached at [phone]

## 2023-04-24 NOTE — TELEPHONE ENCOUNTER
Last ov DCE 3/16/23 dx cad afib htn hld        New patient visit for s/p CABG, CAD. Intervention CABG, LAAE w/ CLIP   General Doing well. No new concerns.      Cardiac Sx -CP, -SOB, -dizziness, -syncope, -edema, -orthopnea, -pnd

## 2023-04-26 NOTE — TELEPHONE ENCOUNTER
Pt called in stating he missed  a call from Alexandra and would like for her to call him back he wall be waiting.     Pt can be reached at [phone]

## 2023-04-26 NOTE — TELEPHONE ENCOUNTER
Spoke with Annika Khan. He had CABG 10/22. He wants to know when he can return to riding his motorcycle and shooting hand guns at the range. Will discuss with DCE and return call.  Okay with bishpo to leave a message with answer if he does not answer callback

## 2023-04-28 RX ORDER — AMLODIPINE BESYLATE 10 MG/1
10 TABLET ORAL DAILY
Qty: 90 TABLET | Refills: 1 | Status: SHIPPED | OUTPATIENT
Start: 2023-04-28

## 2023-04-28 NOTE — TELEPHONE ENCOUNTER
Last appointment: 4/14/2023  Next appointment: 5/30/2023  Last refill: historical medication on current med list

## 2023-05-01 ENCOUNTER — TELEPHONE (OUTPATIENT)
Dept: INTERNAL MEDICINE CLINIC | Age: 73
End: 2023-05-01

## 2023-05-01 NOTE — PROGRESS NOTES
Kristin Theodore is a 81 y.o. male evaluated via telephone on 5/4/2023. Documentation:  I communicated with the patient and/or health care decision maker about the following issues:  Diabetes Mellitus Type 2: Current symptoms/problems- no polyuria, polydipsia, visual changes or paresthesias. Home blood sugar records: fasting range: 150-200, has been as high as 400s in the evenings, but  just completed 2 week prednisone taper 4/28 for COPD exacerbation. Any episodes of hypoglycemia? No  Diet: Not regulating carb intake  Exercise: Limited by pulmonary status, O2 requirement     Details of this discussion including any medical advice provided:  Type 2 diabetes mellitus with hyperglycemia, with long-term current use of insulin ([facility])  Assessment & Plan:  Recent exacerbation primarily due to prednisone, so should improve now that discontinued. However, dietary indiscretion is also undoubtedly playing a role, so he was encouraged to cut back on simple sugars and starches. He needs to establish with new nephrologist, so will ask about SGLT2 inhibitor at initial visit, since would also provide cardiac and possibly renal benefits. If not a candidate, will need to consider adding Januvia or mealtime insulin to his regimen. Did not tolerate Trulicity in the past.     Follow-up 5/30, as scheduled    Patient-Reported Vitals 5/4/2023   Patient-Reported Weight 186   Patient-Reported Height -   Patient-Reported Systolic -   Patient-Reported Diastolic -   Patient-Reported Pulse -        Wt Readings from Last 3 Encounters:   04/10/23 183 lb 6.4 oz (83.2 kg)   03/16/23 179 lb (81.2 kg)   03/06/23 179 lb 9.6 oz (81.5 kg)     BP Readings from Last 3 Encounters:   04/14/23 110/80   04/10/23 129/76   03/16/23 126/60     Estimated body mass index is 24.87 kg/m² as calculated from the following:    Height as of 3/16/23: 6' (1.829 m). Weight as of 4/10/23: 183 lb 6.4 oz (83.2 kg).     Total Time: minutes: 21-30 minutes    Kristin Theodore

## 2023-05-01 NOTE — TELEPHONE ENCOUNTER
Patient is requesting a call back from Dr Rahel Perera when possible regarding his medications. He states he needs to discuss all of his medications with Dr Rahel Perera when she is available. Please contact him at number provided.

## 2023-05-01 NOTE — TELEPHONE ENCOUNTER
Patient unavailable for 9:00 (30 min appt on this day). Patient has been scheduled for 11:45AM. If patient needs to be rescheduled for a different day please advise when.

## 2023-05-04 ENCOUNTER — SCHEDULED TELEPHONE ENCOUNTER (OUTPATIENT)
Dept: INTERNAL MEDICINE CLINIC | Age: 73
End: 2023-05-04

## 2023-05-04 DIAGNOSIS — E11.65 TYPE 2 DIABETES MELLITUS WITH HYPERGLYCEMIA, WITH LONG-TERM CURRENT USE OF INSULIN (HCC): Primary | ICD-10-CM

## 2023-05-04 DIAGNOSIS — Z79.4 TYPE 2 DIABETES MELLITUS WITH HYPERGLYCEMIA, WITH LONG-TERM CURRENT USE OF INSULIN (HCC): Primary | ICD-10-CM

## 2023-05-04 NOTE — ASSESSMENT & PLAN NOTE
Recent exacerbation primarily due to prednisone, so should improve now that discontinued. However, dietary indiscretion is also undoubtedly playing a role, so he was encouraged to cut back on simple sugars and starches. He needs to establish with new nephrologist, so will ask about SGLT2 inhibitor at initial visit, since would also provide cardiac and possibly renal benefits. If not a candidate, will need to consider adding Januvia or mealtime insulin to his regimen.  Did not tolerate Trulicity in the past.

## 2023-05-05 ENCOUNTER — TELEPHONE (OUTPATIENT)
Dept: PULMONOLOGY | Age: 73
End: 2023-05-05

## 2023-05-05 NOTE — TELEPHONE ENCOUNTER
Pt left voice mail asking for Artem Lewis to call him back, no other info given.     [facility] # [phone]

## 2023-05-05 NOTE — TELEPHONE ENCOUNTER
Spoke with patient. He is requesting an appt with Dr. Tobin Rehman to discuss d/c oxygen.   Appt scheduled 05/08/23

## 2023-05-08 ENCOUNTER — OFFICE VISIT (OUTPATIENT)
Dept: PULMONOLOGY | Age: 73
End: 2023-05-08
Payer: MEDICARE

## 2023-05-08 VITALS — HEART RATE: 81 BPM | OXYGEN SATURATION: 94 %

## 2023-05-08 DIAGNOSIS — J44.9 COPD, MODERATE (HCC): Primary | ICD-10-CM

## 2023-05-08 DIAGNOSIS — J43.2 CENTRILOBULAR EMPHYSEMA (HCC): ICD-10-CM

## 2023-05-08 DIAGNOSIS — J96.11 CHRONIC HYPOXEMIC RESPIRATORY FAILURE (HCC): ICD-10-CM

## 2023-05-08 PROCEDURE — 3023F SPIROM DOC REV: CPT | Performed by: INTERNAL MEDICINE

## 2023-05-08 PROCEDURE — 3017F COLORECTAL CA SCREEN DOC REV: CPT | Performed by: INTERNAL MEDICINE

## 2023-05-08 PROCEDURE — 1036F TOBACCO NON-USER: CPT | Performed by: INTERNAL MEDICINE

## 2023-05-08 PROCEDURE — 1123F ACP DISCUSS/DSCN MKR DOCD: CPT | Performed by: INTERNAL MEDICINE

## 2023-05-08 PROCEDURE — G8420 CALC BMI NORM PARAMETERS: HCPCS | Performed by: INTERNAL MEDICINE

## 2023-05-08 PROCEDURE — 99214 OFFICE O/P EST MOD 30 MIN: CPT | Performed by: INTERNAL MEDICINE

## 2023-05-08 PROCEDURE — G8427 DOCREV CUR MEDS BY ELIG CLIN: HCPCS | Performed by: INTERNAL MEDICINE

## 2023-05-08 NOTE — PROGRESS NOTES
probably okay on room air    FOLLOW UP: 6 month      No chief complaint on file. HPI  The patient presents with a chief complaint of moderate shortness of breath related to Moderate COPD of many years duration. He has mild associated cough. Exertion is a modifying factor. He is using supplemental oxygen. However, he really does not like it and wants to get rid of it. He had a heart attack at 1 point and was told he could not ride his motorcycle or she has guns but now he has been cleared for all that. He also wants to get rid of the oxygen. Review of Systems  As documented in HPI      Physical Exam:  Well developed, well nourished  Alert and oriented  Sclera is clear  No cervical adenopathy  No JVD. Chest examination is clear. Cardiac examination reveals regular rate and rhythm without murmur, gallop or rub. The abdomen is soft, nontender and nondistended. There is no clubbing, cyanosis or edema of the extremities. There is no obvious skin rash. No focal neuro deficicts  Normal mood and affect    Allergies   Allergen Reactions    Dilaudid [Hydromorphone Hcl] Other (See Comments)     Mental status changes    Nubain [Nalbuphine Hcl] Rash     Prior to Visit Medications    Medication Sig Taking?  Authorizing Provider   amLODIPine (NORVASC) 10 MG tablet TAKE 1 TABLET BY MOUTH DAILY  Randy Quick MD   metoprolol tartrate (LOPRESSOR) 25 MG tablet Take 1 tablet by mouth 2 times daily  Cinthia Baer MD   XARELTO 15 MG TABS tablet TAKE 1 TABLET BY MOUTH DAILY WITH BREAKFAST  Cinthia Baer MD   mirtazapine (REMERON) 15 MG tablet Take 1.5 tablets by mouth nightly  Randy Quick MD   acetaminophen (TYLENOL) 325 MG tablet Take 2 tablets by mouth every 6 hours as needed for Pain  Historical Provider, MD   doxazosin (CARDURA) 4 MG tablet TAKE 1 TABLET BY MOUTH EVERY NIGHT  Randy Quick MD   fenofibrate (TRIGLIDE) 160 MG tablet TAKE 1 TABLET BY MOUTH DAILY  Randy Quick MD   traZODone

## 2023-05-22 RX ORDER — FLURBIPROFEN SODIUM 0.3 MG/ML
SOLUTION/ DROPS OPHTHALMIC
Qty: 100 EACH | Refills: 11 | Status: SHIPPED | OUTPATIENT
Start: 2023-05-22

## 2023-05-28 PROBLEM — D50.9 IRON DEFICIENCY ANEMIA, UNSPECIFIED: Status: RESOLVED | Noted: 2022-03-02 | Resolved: 2023-05-28

## 2023-05-30 ENCOUNTER — OFFICE VISIT (OUTPATIENT)
Dept: INTERNAL MEDICINE CLINIC | Age: 73
End: 2023-05-30

## 2023-05-30 VITALS
WEIGHT: 185 LBS | HEART RATE: 74 BPM | BODY MASS INDEX: 24.52 KG/M2 | HEIGHT: 73 IN | DIASTOLIC BLOOD PRESSURE: 56 MMHG | SYSTOLIC BLOOD PRESSURE: 114 MMHG | OXYGEN SATURATION: 94 % | RESPIRATION RATE: 12 BRPM

## 2023-05-30 DIAGNOSIS — F32.4 MAJOR DEPRESSIVE DISORDER WITH SINGLE EPISODE, IN PARTIAL REMISSION (HCC): ICD-10-CM

## 2023-05-30 DIAGNOSIS — E11.65 TYPE 2 DIABETES MELLITUS WITH HYPERGLYCEMIA, WITH LONG-TERM CURRENT USE OF INSULIN (HCC): ICD-10-CM

## 2023-05-30 DIAGNOSIS — Z12.5 SCREENING FOR PROSTATE CANCER: ICD-10-CM

## 2023-05-30 DIAGNOSIS — E78.2 MIXED HYPERLIPIDEMIA: ICD-10-CM

## 2023-05-30 DIAGNOSIS — E03.8 SUBCLINICAL HYPOTHYROIDISM: ICD-10-CM

## 2023-05-30 DIAGNOSIS — Z00.00 INITIAL MEDICARE ANNUAL WELLNESS VISIT: Primary | ICD-10-CM

## 2023-05-30 DIAGNOSIS — N18.32 STAGE 3B CHRONIC KIDNEY DISEASE (HCC): ICD-10-CM

## 2023-05-30 DIAGNOSIS — F51.01 PRIMARY INSOMNIA: ICD-10-CM

## 2023-05-30 DIAGNOSIS — J44.9 COPD, MODERATE (HCC): ICD-10-CM

## 2023-05-30 DIAGNOSIS — Z79.4 TYPE 2 DIABETES MELLITUS WITH HYPERGLYCEMIA, WITH LONG-TERM CURRENT USE OF INSULIN (HCC): ICD-10-CM

## 2023-05-30 DIAGNOSIS — I10 BENIGN ESSENTIAL HTN: ICD-10-CM

## 2023-05-30 RX ORDER — SERTRALINE HYDROCHLORIDE 100 MG/1
100 TABLET, FILM COATED ORAL DAILY
Qty: 90 TABLET | Refills: 1 | Status: SHIPPED | OUTPATIENT
Start: 2023-05-30

## 2023-05-30 ASSESSMENT — PATIENT HEALTH QUESTIONNAIRE - PHQ9
4. FEELING TIRED OR HAVING LITTLE ENERGY: 2
5. POOR APPETITE OR OVEREATING: 0
6. FEELING BAD ABOUT YOURSELF - OR THAT YOU ARE A FAILURE OR HAVE LET YOURSELF OR YOUR FAMILY DOWN: 0
2. FEELING DOWN, DEPRESSED OR HOPELESS: 2
SUM OF ALL RESPONSES TO PHQ QUESTIONS 1-9: 6
SUM OF ALL RESPONSES TO PHQ QUESTIONS 1-9: 6
10. IF YOU CHECKED OFF ANY PROBLEMS, HOW DIFFICULT HAVE THESE PROBLEMS MADE IT FOR YOU TO DO YOUR WORK, TAKE CARE OF THINGS AT HOME, OR GET ALONG WITH OTHER PEOPLE: 1
9. THOUGHTS THAT YOU WOULD BE BETTER OFF DEAD, OR OF HURTING YOURSELF: 0
SUM OF ALL RESPONSES TO PHQ9 QUESTIONS 1 & 2: 4
1. LITTLE INTEREST OR PLEASURE IN DOING THINGS: 2
3. TROUBLE FALLING OR STAYING ASLEEP: 0
8. MOVING OR SPEAKING SO SLOWLY THAT OTHER PEOPLE COULD HAVE NOTICED. OR THE OPPOSITE, BEING SO FIGETY OR RESTLESS THAT YOU HAVE BEEN MOVING AROUND A LOT MORE THAN USUAL: 0
SUM OF ALL RESPONSES TO PHQ QUESTIONS 1-9: 6
SUM OF ALL RESPONSES TO PHQ QUESTIONS 1-9: 6
7. TROUBLE CONCENTRATING ON THINGS, SUCH AS READING THE NEWSPAPER OR WATCHING TELEVISION: 0

## 2023-05-30 NOTE — ASSESSMENT & PLAN NOTE
Medical Weight Loss Multi-Disciplinary Program    Patient's Name: Matt Batista Age: 39 y.o. YOB: 1977 Sex: female     Session #1. Pt attended in-person class. Weight obtained in office. Date: 1/24/2023    Visit Vitals  Ht 5' 4\" (1.626 m)   Wt 123.7 kg (272 lb 12.8 oz)   BMI 46.83 kg/m²       Pounds Lost since last month: N/A Pounds Gained since last month: N/A       Starting Weight (Initial Consult/Seminar, 1/17/23): 276 lbs Previous Months Weight: N/A   Overall Pounds Lost: 3.2 lbs  Overall Pounds Gained: 0.0 lbs     Do you smoke? no    Alcohol intake:  Number of drinks per week: 1-3    Class Guidelines    Guidelines are reviewed with patient at the start of every class. 1. Patient understands that weight loss trial classes must be consecutive. Patient understands if they miss a class, it is their responsibility to contact me to reschedule class. I will reach out to patient after their first no show. 2.  Patient understands the expectations that weight maintenance/weight loss is expected during the classes. Failure to demonstrate changes may result in extension of weight loss trial, followed by returning to see the surgeon. Patient understands that they CANNOT gain any weight during the weight loss trial.  Gaining weight will result in extension of weight loss trial.  3. Patient is also instructed to complete their labwork, psychological evaluation visit, and any other tests that the surgeon has ordered while they are working on their weight loss trial.  4.  Patient was instructed to bring their packet of nutrition education materials to every class and appointment. Eating Habits and Behaviors    Reviewed intake  Understanding low-carbohydrate/low-sugar/low-fat, higher protein meals  Instruction given for personal dietary changes  Discussed perceived compliance    Comments: RD Reviewed Diet History and Physical Activity/Exercise habits.   Recommended dietary changes See plan for depression. Continue current dose of trazodone. discussed for both before and after surgery. Today in class we reviewed the Key Diet Principles. Patient was encouraged to consume 3 meals each day, and meal timing was reviewed. Meal time behaviors that will help pt to be successful with their weight loss efforts were also discussed. We discussed the importance of drinking adequate amounts of fluids, recommending that patient consume a minimum of 64 oz of sugar-free, caffeine-free and carbonation-free fluids each day. Patient was encouraged to eliminate sugar-sweetened beverages such as sweet tea, fruit juice, fruit smoothies, flavored coffee drinks and regular sodas. During the weight loss trial, patient is encouraged to focus on eating protein-forward meals, with a daily goal of  grams protein. Patient was also advised to decrease carbohydrate intake to <100 g/d, choosing complex vs. simple carbohydrates in limited amounts. We also discussed limiting fat intake. Encouraged patient to follow the \"3-gram rule\" when choosing foods by looking for items containing <3 g of fat and sugar per serving. We reviewed meal planning guidelines and discussed appropriate meal and snack options. We also talked about appropriate protein drinks and patient was encouraged to start trying these, using them either for a meal replacement or a protein-rich snack pre-operatively. We reviewed the nutritional guidelines for selecting protein shakes. Pre-operative vitamin and mineral supplementation was reviewed. Patient was instructed to choose a chewable complete multivitamin with iron in NON-gummy form. Selection of probiotic was also reviewed. We also talked about dining out after bariatric surgery. Patient was instructed on:   Following the Key Diet Principles to stay within the bariatric dietary guidelines  Key words to look for in menu item descriptions in order to make healthier choices  Requesting specific substitutions to allow meals to fit in with bariatric dietary guidelines  Using the restaurant card to request smaller portions of menu items or ordering from children's/senior's menu    Patient's current diet habits include:  Patient is eating 1-2 meals and 0 snacks per day. Per dietary recall, pts diet has the following concerns:  Pt has found 1 protein supplement shakes for use post-op in meeting their protein needs. Pt is skipping meals. Pt is consuming 1-3 Prem Gell & ginger ale) alcoholic drinks per week. Pt is consuming 16 oz sugar-sweetened beverages/day. Pt is consuming foods high in carbohydrates &/or fats, such as: peanut butter, fruit, granola, flatbread, juice and starchy vegetables. Patient's plan for dietary and/or behavior changes in the upcoming month: none noted. Physical Activity/Exercise    Comments: We talked about exercise. Patient was given reasons of why exercise is so important and how that can help with their long-term success. I have encouraged patient to get a support system to help with the activity. We talked about recommended types of physical activity, including walking, swimming, cycling, or chair exercises. I also talked with patient about doing some strength training, which helps the metabolism, as well as strengthen muscles and bones. Patient's plan to incorporate more activity includes: [employer] and start going at least 3 times/week\". Behavior Modification     Comments:  During today's class, we discussed important behavior changes relating to dining away from home that will help the patient be successful in meeting their weight loss goals and improving their health including:   The need to limit the frequency of dining away from home, especially fast food and convenience food options, in order to limit intake of calories, fats, and carbohydrates  The importance of keeping a positive mindset by focusing on the occasion and enjoying the company rather than the foods they can't have and the need for reduced portion sizes after surgery  Avoiding starters such as appetizers, breads, bar snacks, soups, and salads  Researching to find restaurants that offer healthier food options and reviewing menus/nutrition facts online to have a plan of what to order before dining out    Class participants watched a video of a former patient's success story, where personal accountability and encouragement to make the appropriate lifestyle changes for improved of quality of life were discussed. Goals: Work to increase to 3-4 small meals per day, with 1-2 planned snacks as needed. Recommend following the plate method for meal planning - focusing on lean protein, non-starchy vegetables, and measured amounts of complex carbohydrates. - Goal of  g protein and <100 g carbohydrates per day. - Select at least 2 DIFFERENT protein supplements that can be used for protein supplementation to meet goals pre- and post-operatively. - Practice Carbohydrate Counting and label reading.   - Follow 3 g rule for fat and sugar. 2. Slow down meals  - Chew each bite 25-35 times. - Aim for 20-30 min/meal.  3. Increase non-caloric fluids to 64 oz per day. Eliminate caffeine, added sugar, carbonation, and straws.               - Continue to work to decrease sugar sweetened beverages - goal of calorie-free beverages only. - Must eliminate caffeine prior to surgery and avoid for ~6-8 weeks. - Practice 30:30 rule,  food and fluid intake. 4. Start activity regimen, work to increase ADLs. 5. Start Complete MVI and probiotic at least 30 days pre-op. Candidate for surgery (per RD): PENDING, Pt scheduled for nutrition education on 2/16/2023.

## 2023-05-30 NOTE — ASSESSMENT & PLAN NOTE
Improved. Off daytime O2 with sats in the low 90s- he will continue to monitor closely and use nighttime O2 per pulmonologist's recommendations.

## 2023-05-30 NOTE — ASSESSMENT & PLAN NOTE
BS volatile- [phone]- [phone]. Diet still somewhat erratic, but improved. Recently increased Lantus to 20 units of Lantus qpm and 5-10 in the am, with significant improvement. Taking 8 mg Amaryl qam. Once he establishes with new nephrologist, will ask about adding SGLT2 inhibitor, which may also have renal and CV benefits. He will continue to titrate Lantus to FBG of < 150.

## 2023-05-30 NOTE — ASSESSMENT & PLAN NOTE
Worse with discontinuation of Zoloft- primary symptoms have been irritability and anhedonia. No SI. He wants to restart the Zoloft at 100 mg, which was effective in the past, but would need to stop the Remeron or trazodone to help avoid serotonin syndrome. He feels that the trazodone is more effective for sleep and Remeron may be contributing to his fatigue, so will discontinue Remeron and restart Zoloft at prior dose. Patient will call for any significant medication side effects or worsening symptoms of depression.

## 2023-05-31 LAB
CREAT UR-MCNC: 33.7 MG/DL (ref 39–259)
MICROALBUMIN UR DL<=1MG/L-MCNC: 2.2 MG/DL
MICROALBUMIN/CREAT UR: 65.3 MG/G (ref 0–30)

## 2023-07-02 RX ORDER — ASPIRIN 81 MG/1
TABLET, CHEWABLE ORAL
Qty: 30 TABLET | Refills: 3 | Status: SHIPPED | OUTPATIENT
Start: 2023-07-02

## 2023-07-06 ENCOUNTER — TELEPHONE (OUTPATIENT)
Dept: INTERNAL MEDICINE CLINIC | Age: 73
End: 2023-07-06

## 2023-07-06 NOTE — TELEPHONE ENCOUNTER
Please have Lidia call patient to discuss paperwork for Glucose Monitor she was supposed to  be checking on. I set some paperwork that came  over fax on 7/7 on Lidia's desk in case this is the paperwork in question. Patient would like to discuss with Lidia.

## 2023-07-10 ENCOUNTER — TELEPHONE (OUTPATIENT)
Dept: CARDIOLOGY CLINIC | Age: 73
End: 2023-07-10

## 2023-07-10 NOTE — TELEPHONE ENCOUNTER
Pt is in the donut hole and asking if there is an alternative or other options for xarelto.  Please call to advise

## 2023-07-11 RX ORDER — CALCIUM CITRATE/VITAMIN D3 200MG-6.25
TABLET ORAL
Qty: 100 STRIP | Refills: 11 | Status: SHIPPED | OUTPATIENT
Start: 2023-07-11

## 2023-07-11 NOTE — TELEPHONE ENCOUNTER
Patient is requesting a refill of blood glucose test strips (TRUE METRIX BLOOD GLUCOSE TEST) [address] [phone]    Last appointment: 5/30/2023  Next appointment: 9/1/2023  Last refill: 2/17/23

## 2023-07-11 NOTE — TELEPHONE ENCOUNTER
Spoke to pt. Pt stated xarelto is way too expensive, he cant afford it. Pt stated he send an appeal for xarelto but his insurance denied it. Pt ask if there is any other medication, which is equivalent to xarelto and is cheap?   Please advise  Thank you

## 2023-07-12 NOTE — TELEPHONE ENCOUNTER
Chart reviewed. Patient had OHS with KELLY clip 10/22 no fu TIMUR to confirm KELLY clip yet. Per DCE schedule TIMUR to assess EKLLY clip. No need for patient to restart plavix.  Reviewed preop instructions, NPO status    Author Carmen, please schedule TIMUR with DCE

## 2023-07-13 DIAGNOSIS — Z98.890 S/P LEFT ATRIAL APPENDAGE LIGATION: ICD-10-CM

## 2023-07-13 DIAGNOSIS — I48.91 ATRIAL FIBRILLATION WITH RVR (HCC): Primary | ICD-10-CM

## 2023-07-13 NOTE — TELEPHONE ENCOUNTER
Sherrie Lopez, can you please place order for Lv's TIMUR. Once placed, I will call Delmi Kuhn to schedule. Thank you.

## 2023-07-13 NOTE — TELEPHONE ENCOUNTER
Date of Procedure: Wednesday 7/26/23 @ [address] with Dr. Deana Mazariegos    Time of arrival: 7:00 am     Procedure time: 8:00 am     Procedure: TIMUR    Called and spoke to Gurpreet Timmons and he is agreeable to date and time. Reviewed instructions and he verbalized understanding. Encouraged to call with any questions or concerns. Published on [employer] and e-mailed to cath lab.

## 2023-07-26 ENCOUNTER — HOSPITAL ENCOUNTER (OUTPATIENT)
Dept: CARDIAC CATH/INVASIVE PROCEDURES | Age: 73
Discharge: HOME | End: 2023-07-26
Attending: INTERNAL MEDICINE | Admitting: INTERNAL MEDICINE
Payer: MEDICARE

## 2023-07-26 VITALS
DIASTOLIC BLOOD PRESSURE: 82 MMHG | HEART RATE: 74 BPM | WEIGHT: 186 LBS | HEIGHT: 73 IN | OXYGEN SATURATION: 98 % | BODY MASS INDEX: 24.65 KG/M2 | RESPIRATION RATE: 16 BRPM | SYSTOLIC BLOOD PRESSURE: 162 MMHG

## 2023-07-26 DIAGNOSIS — Z98.890 S/P LEFT ATRIAL APPENDAGE LIGATION: ICD-10-CM

## 2023-07-26 DIAGNOSIS — I48.91 ATRIAL FIBRILLATION WITH RVR (HCC): ICD-10-CM

## 2023-07-26 PROBLEM — I34.0 NONRHEUMATIC MITRAL VALVE REGURGITATION: Status: ACTIVE | Noted: 2023-07-26

## 2023-07-26 PROCEDURE — 93312 ECHO TRANSESOPHAGEAL: CPT

## 2023-07-26 PROCEDURE — 7100000010 HC PHASE II RECOVERY - FIRST 15 MIN

## 2023-07-26 PROCEDURE — 93325 DOPPLER ECHO COLOR FLOW MAPG: CPT

## 2023-07-26 RX ORDER — SODIUM CHLORIDE 0.9 % (FLUSH) 0.9 %
5-40 SYRINGE (ML) INJECTION PRN
Status: DISCONTINUED | OUTPATIENT
Start: 2023-07-26 | End: 2023-07-26 | Stop reason: HOSPADM

## 2023-07-26 NOTE — H&P
[facility]  H+P  Consult  OP Visit  FU Visit   CC/HPI   CC New patient visit for s/p CABG, CAD. Intervention CABG, LAAE w/ CLIP   General Doing well. No new concerns. Cardiac Sx -CP, -SOB, -dizziness, -syncope, -edema, -orthopnea, -pnd   HISTORY/ALLERGY/ROS   MEDHx  has a past medical history of Chronic renal insufficiency, Colloid cyst of third ventricle (HCC), Coronary artery disease, DDD (degenerative disc disease), lumbar, Diabetes mellitus, type 2 ([address]), Diverticulitis of colon with perforation, ED (erectile dysfunction), Hyperlipidemia, Hypertension, Hypertensive retinopathy of both eyes, and Nephrolithiasis. SURGHx  has a past surgical history that includes Cardiac catheterization (5/02, 12/03,  3/08); Revision Colostomy (3/08); Lithotripsy (1998); Lithotripsy (1994); partial nephrectomy (1980); Coronary angioplasty with stent (10/05); Coronary angioplasty (1995); Umbilical hernia repair; Knee arthroscopy (2000, 2005); colostomy (11/07); Total knee arthroplasty (Left, 9/29/14); brain surgery (12/13/2007); and Coronary artery bypass graft (N/A, 10/21/2022). SOCHx  reports that he quit smoking about 31 years ago. His smoking use included cigarettes. He has a 20.00 pack-year smoking history. He has never used smokeless tobacco. He reports that he does not drink alcohol and does not use drugs. FAMHx family history is not on file. ALLERG Dilaudid [hydromorphone hcl] and Nubain [nalbuphine hcl]   ROS Full ROS obtained and negative except as mentioned in HPI   MEDICATIONS   No current facility-administered medications for this encounter. PHYSICAL EXAM   Vitals There were no vitals taken for this visit.    Gen Alert, coop, no distress Heart  RRR, no MRG   Head NC, AT, no abnorm Abd  Soft, NT, +BS, no mass, no OM   Eyes PER, conj/corn clear Ext  Ext nl, AT, no C/C/E   Nose Nares nl, no drain, NT Pulse 2+ and symmetric   Throat Lips, mucosa, tongue nl Skin Col/text/turg nl, no vis rash/les Neck S/S, TM, NT, no bruit/JVD Psych Nl mood and affect   Lung CTA-B, unlabored, no DTP Lymph   No cervical or axillary LA   Ch wall NT, no deform Neuro  Nl gross M/S exam      COMPLIANCE   Discussed and counseled on diet, exercise, weight loss, smoking, alcohol, drugs. All questions answered. CODING   SCI (09324) - 30-39 mins spent reviewing hx/tests/consults, performing exam, counseling/educating, ordering meds/tests/procedures, referring/communicating w/PCP/consultants, documenting in EMR, interpreting results, communicating medical information and plan with family. SCRIBE ATTESTATION   Nurse Scribe Attestation  Tanya Bravo, chanda scribing for and in the presence of Juanito Mcneill MD.   Signed, Shanon Dyer RN. 7/26/2023 7:03 AM    Doctor Shanon Dyer is working as a scribe for and in the presence of me Juanito Mcneill MD). Working as a scribe, Shanon Dyer may have prepopulated components of this note with my historical  intellectual property under my direct supervision. Any additions to this intellectual property were performed in my presence and at my direction. Furthermore, the content and accuracy of this note have been reviewed by me Juanito Mcneill MD).    ASSESSMENT AND PLAN   *CAD   Date EF Results   Sx   No concerning   Hx 10/22  CABGx3 SVG-OM, SVG-RCA, LIMA-LAD KELLY clip Rodolfo Plascencia)   [facility] 10/22 55% No intervention, MVD=>CABG (Carol)   MPI 9/22  Inferolateral ST depression, apical inferolateral mixed I/S   TTE 11/19 60% Mod MR   Plan   Continue aggressive medical treatment at doses above   *AF  Status New onset in ER   Plan NSR today  30d monitor for burden  Continue AC  TIMUR to evaluate LAAC integrity   *HTN  Status Controlled   Plan Continue current antihypertensives at doses above   *CHOL  LDL 50, 10/22   Plan Counseled on diet/exercise/weight, continue HI/MT statin   *FOLLOWUP  6 months with IC

## 2023-07-26 NOTE — DISCHARGE INSTRUCTIONS
TIMUR DISCHARGE INSTRUCTIONS    No driving for 24 hours. We strongly recommend that a responsible adult stay with you for the next 24 hours. Continue Medications.     Advance diet as tolerated    Contact physician office  for following symptoms:  Fever  Difficulty swallowing  Chest pain  Difficulty breathing  Bleeding

## 2023-07-28 RX ORDER — GLIMEPIRIDE 4 MG/1
TABLET ORAL
Qty: 180 TABLET | Refills: 0 | Status: SHIPPED | OUTPATIENT
Start: 2023-07-28

## 2023-08-06 RX ORDER — ROSUVASTATIN CALCIUM 20 MG/1
40 TABLET, COATED ORAL DAILY
Qty: 180 TABLET | Refills: 1 | Status: SHIPPED | OUTPATIENT
Start: 2023-08-06

## 2023-08-14 RX ORDER — INSULIN GLARGINE 100 [IU]/ML
INJECTION, SOLUTION SUBCUTANEOUS
Qty: 15 ML | Refills: 5 | Status: SHIPPED | OUTPATIENT
Start: 2023-08-14

## 2023-08-18 ENCOUNTER — TELEPHONE (OUTPATIENT)
Dept: INTERNAL MEDICINE CLINIC | Age: 73
End: 2023-08-18

## 2023-08-18 ENCOUNTER — OFFICE VISIT (OUTPATIENT)
Dept: INTERNAL MEDICINE CLINIC | Age: 73
End: 2023-08-18
Payer: MEDICARE

## 2023-08-18 VITALS
HEART RATE: 75 BPM | OXYGEN SATURATION: 92 % | SYSTOLIC BLOOD PRESSURE: 122 MMHG | DIASTOLIC BLOOD PRESSURE: 68 MMHG | WEIGHT: 186 LBS | BODY MASS INDEX: 24.54 KG/M2

## 2023-08-18 DIAGNOSIS — H60.392 OTHER INFECTIVE ACUTE OTITIS EXTERNA OF LEFT EAR: Primary | ICD-10-CM

## 2023-08-18 PROCEDURE — 3078F DIAST BP <80 MM HG: CPT | Performed by: INTERNAL MEDICINE

## 2023-08-18 PROCEDURE — 1123F ACP DISCUSS/DSCN MKR DOCD: CPT | Performed by: INTERNAL MEDICINE

## 2023-08-18 PROCEDURE — 3074F SYST BP LT 130 MM HG: CPT | Performed by: INTERNAL MEDICINE

## 2023-08-18 PROCEDURE — 4130F TOPICAL PREP RX AOE: CPT | Performed by: INTERNAL MEDICINE

## 2023-08-18 PROCEDURE — 3017F COLORECTAL CA SCREEN DOC REV: CPT | Performed by: INTERNAL MEDICINE

## 2023-08-18 PROCEDURE — G8420 CALC BMI NORM PARAMETERS: HCPCS | Performed by: INTERNAL MEDICINE

## 2023-08-18 PROCEDURE — G8427 DOCREV CUR MEDS BY ELIG CLIN: HCPCS | Performed by: INTERNAL MEDICINE

## 2023-08-18 PROCEDURE — 99213 OFFICE O/P EST LOW 20 MIN: CPT | Performed by: INTERNAL MEDICINE

## 2023-08-18 PROCEDURE — 1036F TOBACCO NON-USER: CPT | Performed by: INTERNAL MEDICINE

## 2023-08-18 RX ORDER — FENOFIBRATE 160 MG/1
TABLET ORAL
Qty: 90 TABLET | Refills: 1 | Status: SHIPPED | OUTPATIENT
Start: 2023-08-18

## 2023-08-18 RX ORDER — NEOMYCIN SULFATE, POLYMYXIN B SULFATE AND DEXAMETHASONE 3.5; 10000; 1 MG/ML; [USP'U]/ML; MG/ML
SUSPENSION/ DROPS OPHTHALMIC
Qty: 5 ML | Refills: 0 | Status: SHIPPED | OUTPATIENT
Start: 2023-08-18

## 2023-08-18 RX ORDER — CIPROFLOXACIN AND DEXAMETHASONE 3; 1 MG/ML; MG/ML
4 SUSPENSION/ DROPS AURICULAR (OTIC) 2 TIMES DAILY
Qty: 7.5 ML | Refills: 0 | Status: SHIPPED | OUTPATIENT
Start: 2023-08-18 | End: 2023-08-18

## 2023-08-18 NOTE — TELEPHONE ENCOUNTER
Patient is at the pharmacy now. He is calling regarding the prescription for ear drops that is not covered by his plan. He states the [facility] has advised the covered alternative medications are HC Ofloxacin and Neomycin/Polymixin ear drops.

## 2023-08-18 NOTE — PATIENT INSTRUCTIONS
[address] [address], and [address]  [address] [facility], [address] [place] [address]  Ph: [phone]

## 2023-08-18 NOTE — PROGRESS NOTES
Preauricular adenopathy present. Cervical: No cervical adenopathy. Neurological:      Mental Status: He is alert and oriented to person, place, and time. Psychiatric:         Attention and Perception: Attention normal.         Mood and Affect: Mood and affect normal.         Speech: Speech normal.         Behavior: Behavior normal.         Thought Content: Thought content normal.         Cognition and Memory: Cognition normal.         Judgment: Judgment normal.         --Mariia Griffith MD on 8/18/2023 at 4:44 PM    An electronic signature was used to authenticate this note.

## 2023-08-24 NOTE — TELEPHONE ENCOUNTER
No fevers, blood in stool, N/V. Was constipated and now having diarrhea. No SOB or exertional symptoms. Antipsychotics/SNRI/SSRI

## 2023-08-30 NOTE — PROGRESS NOTES
[facility]   Electrophysiology  Rodger Mike, APRN-CNP  Attending EP: Dr. Meera Hill    Date: 9/29/2023  I had the privilege of visiting Sharon Gonzalez in the office. Chief Complaint:   Chief Complaint   Patient presents with    Follow-up     No Cardiac Symptoms     History of Present Illness: History obtained from patient and medical record. Sharon Gonzalez is 72 y.o. male with a past medical history of PAF, DM, HTN, COPD on oxygen, and CAD s/p CABG with KELLY clip (10/22). In March of 2023, he was found to be in atrial fibrillation with RVR at cardiac rehab. He was sent to the ER and was given cardizem/metoprolol with spontaneous conversion.     -Interval history: Today, Sharon Gonzalez is being seen for routine follow up. He is doing well. He is in sinus rhythm. Pt denies any symptoms of bradycardia. He states he has been off Xarelto for several months as it was too expensive. He states he has not had symptoms while in atrial fibrillation. He states he only takes his lopressor once per day    Denies having chest pain, palpitations, shortness of breath, orthopnea/PND, cough, or dizziness at the time of this visit. With regard to medication therapy the patient has been compliant with prescribed regimen. They have tolerated therapy to date. Allergies: Allergies   Allergen Reactions    Dilaudid [Hydromorphone Hcl] Other (See Comments)     Mental status changes    Nubain [Nalbuphine Hcl] Rash     Home Medications:  Prior to Visit Medications    Medication Sig Taking?  Authorizing Provider   amLODIPine (NORVASC) 10 MG tablet TAKE 1 TABLET BY MOUTH DAILY Yes Amelia Moreland MD   sertraline (ZOLOFT) 100 MG tablet TAKE 1 TABLET BY MOUTH DAILY Yes Amelia Moreland MD   traZODone (DESYREL) 100 MG tablet Take 1 tablet by mouth nightly Yes Amelia Moreland MD   doxazosin (CARDURA) 4 MG tablet Take 1 tablet by mouth nightly Yes Amelia Moreland MD   empagliflozin (JARDIANCE) 10 MG tablet Take 1 tablet by mouth Kwadwo Vega(Attending)

## 2023-09-01 ENCOUNTER — OFFICE VISIT (OUTPATIENT)
Dept: INTERNAL MEDICINE CLINIC | Age: 73
End: 2023-09-01
Payer: MEDICARE

## 2023-09-01 VITALS
OXYGEN SATURATION: 92 % | HEART RATE: 60 BPM | BODY MASS INDEX: 24.57 KG/M2 | WEIGHT: 186.2 LBS | DIASTOLIC BLOOD PRESSURE: 78 MMHG | SYSTOLIC BLOOD PRESSURE: 130 MMHG

## 2023-09-01 DIAGNOSIS — E11.9 TYPE 2 DIABETES MELLITUS WITHOUT COMPLICATION, WITH LONG-TERM CURRENT USE OF INSULIN (HCC): Primary | ICD-10-CM

## 2023-09-01 DIAGNOSIS — E78.2 MIXED HYPERLIPIDEMIA: ICD-10-CM

## 2023-09-01 DIAGNOSIS — Z79.4 TYPE 2 DIABETES MELLITUS WITHOUT COMPLICATION, WITH LONG-TERM CURRENT USE OF INSULIN (HCC): Primary | ICD-10-CM

## 2023-09-01 DIAGNOSIS — I10 BENIGN ESSENTIAL HTN: ICD-10-CM

## 2023-09-01 DIAGNOSIS — F51.01 PRIMARY INSOMNIA: ICD-10-CM

## 2023-09-01 DIAGNOSIS — N18.32 STAGE 3B CHRONIC KIDNEY DISEASE (HCC): ICD-10-CM

## 2023-09-01 DIAGNOSIS — F32.5 MAJOR DEPRESSIVE DISORDER WITH SINGLE EPISODE, IN FULL REMISSION (HCC): ICD-10-CM

## 2023-09-01 PROCEDURE — 3078F DIAST BP <80 MM HG: CPT | Performed by: INTERNAL MEDICINE

## 2023-09-01 PROCEDURE — 99214 OFFICE O/P EST MOD 30 MIN: CPT | Performed by: INTERNAL MEDICINE

## 2023-09-01 PROCEDURE — 1123F ACP DISCUSS/DSCN MKR DOCD: CPT | Performed by: INTERNAL MEDICINE

## 2023-09-01 PROCEDURE — 1036F TOBACCO NON-USER: CPT | Performed by: INTERNAL MEDICINE

## 2023-09-01 PROCEDURE — 2022F DILAT RTA XM EVC RTNOPTHY: CPT | Performed by: INTERNAL MEDICINE

## 2023-09-01 PROCEDURE — G8420 CALC BMI NORM PARAMETERS: HCPCS | Performed by: INTERNAL MEDICINE

## 2023-09-01 PROCEDURE — 3075F SYST BP GE 130 - 139MM HG: CPT | Performed by: INTERNAL MEDICINE

## 2023-09-01 PROCEDURE — 3044F HG A1C LEVEL LT 7.0%: CPT | Performed by: INTERNAL MEDICINE

## 2023-09-01 PROCEDURE — 3017F COLORECTAL CA SCREEN DOC REV: CPT | Performed by: INTERNAL MEDICINE

## 2023-09-01 PROCEDURE — G8427 DOCREV CUR MEDS BY ELIG CLIN: HCPCS | Performed by: INTERNAL MEDICINE

## 2023-09-01 NOTE — ASSESSMENT & PLAN NOTE
BS remains volatile, likely due to erratic diet, which he will try to improve. No room to increase Lantus due to potential for am hypoglycemia. Continue Amaryl 8 mg qam. If HgbA1c > 7.5, will contact nephrologist to ask about adding SGLT2 inhibitor, which may also have renal and CV benefits.

## 2023-09-11 ENCOUNTER — TELEPHONE (OUTPATIENT)
Dept: INTERNAL MEDICINE CLINIC | Age: 73
End: 2023-09-11

## 2023-09-11 DIAGNOSIS — N18.30 CKD (CHRONIC KIDNEY DISEASE), STAGE III (HCC): ICD-10-CM

## 2023-09-11 DIAGNOSIS — M79.641 RIGHT HAND PAIN: ICD-10-CM

## 2023-09-11 RX ORDER — DOXAZOSIN MESYLATE 4 MG/1
4 TABLET ORAL NIGHTLY
Qty: 90 TABLET | Refills: 1 | Status: SHIPPED | OUTPATIENT
Start: 2023-09-11

## 2023-09-11 RX ORDER — TRAZODONE HYDROCHLORIDE 100 MG/1
100 TABLET ORAL NIGHTLY
Qty: 90 TABLET | Refills: 1 | Status: SHIPPED | OUTPATIENT
Start: 2023-09-11

## 2023-09-11 RX ORDER — MIRTAZAPINE 15 MG/1
22.5 TABLET, FILM COATED ORAL NIGHTLY
Qty: 135 TABLET | Refills: 1 | OUTPATIENT
Start: 2023-09-11

## 2023-09-11 NOTE — TELEPHONE ENCOUNTER
Jardiance script sent to pharmacy and referral placed to Dr. Waldemar Rodriguez at [facility]. Please notify patient.

## 2023-09-11 NOTE — TELEPHONE ENCOUNTER
Patient is requesting a referral for a hand specialist - Stacy bains.     He stated the pain from his thumb to his wrist is getting worse and limiting him to doing things

## 2023-09-11 NOTE — TELEPHONE ENCOUNTER
Which side is bothering him? Also, please let him know that I have been unable to reach his nephrologist to ask about the new diabetes medication that we discussed at his appointment that lowers blood sugar while helping to lower cardiovascular risk. I did some additional research and his kidney function is high enough that he should be able to take this medication safely. I will send in a script for Jardiance 10 mg qam if he agrees.

## 2023-09-11 NOTE — TELEPHONE ENCOUNTER
Right hand - so sorry spaced out     He agrees to take the jardiance 10mg - local pharmacy    He will let us know if this is to expensive

## 2023-09-11 NOTE — TELEPHONE ENCOUNTER
Trazodone and Cardura refilled, as requested, but the mirtazapine was discontinued 5/30/23, when the Zoloft was restarted. Please call patient to notify.

## 2023-09-11 NOTE — TELEPHONE ENCOUNTER
Patient is requesting re-fills on Trazadone 100 mg (last filled 2/24/2023), Doxazosin 4 mg (Last filled 2/23/2023) and Mirtazapine 15 mg be sent to [facility] #[id number]. He would like 90 day re-fills for each. Last visit:  9/1/2023. Next visit:  12/5/2023.

## 2023-09-13 DIAGNOSIS — N18.30 CKD (CHRONIC KIDNEY DISEASE), STAGE III (HCC): ICD-10-CM

## 2023-09-13 RX ORDER — AMLODIPINE BESYLATE 10 MG/1
10 TABLET ORAL DAILY
Qty: 90 TABLET | Refills: 1 | Status: SHIPPED | OUTPATIENT
Start: 2023-09-13

## 2023-09-13 RX ORDER — DOXAZOSIN MESYLATE 4 MG/1
4 TABLET ORAL
Qty: 90 TABLET | Refills: 1 | OUTPATIENT
Start: 2023-09-13

## 2023-09-13 RX ORDER — SERTRALINE HYDROCHLORIDE 100 MG/1
100 TABLET, FILM COATED ORAL DAILY
Qty: 90 TABLET | Refills: 1 | Status: SHIPPED | OUTPATIENT
Start: 2023-09-13

## 2023-09-13 RX ORDER — MIRTAZAPINE 15 MG/1
TABLET, FILM COATED ORAL
Qty: 135 TABLET | Refills: 1 | OUTPATIENT
Start: 2023-09-13

## 2023-09-13 NOTE — TELEPHONE ENCOUNTER
Last appointment: 9/1/2023  Next appointment: 12/5/2023  Last refill: 4/28/23 - amlodipine    5/30/23 - sertraline    Just needs the 2 rx's refilled.

## 2023-09-13 NOTE — TELEPHONE ENCOUNTER
Please have him ask his pharmacist if any other drugs in the same class, such as Lars Jaquelin, would be more affordable. If not, recommend that he work harder on his diet, since if there is no improvement in the HgbA1c, the alternative is to add mealtime insulin.

## 2023-09-13 NOTE — TELEPHONE ENCOUNTER
Patient is calling to let Dr. Rena Schultz know he has an appointment scheduled with the hand specialist.  However, the Jardiance 10 mg is too expensive for him to get filled. It cost $148.00 so he will  not be able to take this medication. If you  have any other options/suggestions please call  him to  discuss at [phone].

## 2023-09-13 NOTE — TELEPHONE ENCOUNTER
Patient calling back in regards to vmail left, gave patient verbal of 's note:     Please have him ask his pharmacist if any other drugs in the same class, such as Donzell Dye, would be more affordable. If not, recommend that he work harder on his diet, since if there is no improvement in the HgbA1c, the alternative is to add mealtime insulin. Patient stated that he will try one more time to ask pharmacy and if no luck he will not pursue any further with the pharmacy nor with us and will just take care of it himself. Patient did state he will not add mealtime insulin.

## 2023-09-19 RX ORDER — RIVAROXABAN 15 MG/1
TABLET, FILM COATED ORAL
Qty: 30 TABLET | Refills: 3 | Status: SHIPPED | OUTPATIENT
Start: 2023-09-19

## 2023-09-20 ENCOUNTER — OFFICE VISIT (OUTPATIENT)
Dept: ORTHOPEDIC SURGERY | Age: 73
End: 2023-09-20
Payer: MEDICARE

## 2023-09-20 VITALS — RESPIRATION RATE: 16 BRPM | BODY MASS INDEX: 24.65 KG/M2 | HEIGHT: 73 IN | WEIGHT: 186 LBS

## 2023-09-20 DIAGNOSIS — M18.11 ARTHRITIS OF CARPOMETACARPAL (CMC) JOINT OF RIGHT THUMB: Primary | ICD-10-CM

## 2023-09-20 PROCEDURE — G8420 CALC BMI NORM PARAMETERS: HCPCS | Performed by: ORTHOPAEDIC SURGERY

## 2023-09-20 PROCEDURE — G8427 DOCREV CUR MEDS BY ELIG CLIN: HCPCS | Performed by: ORTHOPAEDIC SURGERY

## 2023-09-20 PROCEDURE — 1123F ACP DISCUSS/DSCN MKR DOCD: CPT | Performed by: ORTHOPAEDIC SURGERY

## 2023-09-20 PROCEDURE — 99204 OFFICE O/P NEW MOD 45 MIN: CPT | Performed by: ORTHOPAEDIC SURGERY

## 2023-09-20 PROCEDURE — 3017F COLORECTAL CA SCREEN DOC REV: CPT | Performed by: ORTHOPAEDIC SURGERY

## 2023-09-20 PROCEDURE — 1036F TOBACCO NON-USER: CPT | Performed by: ORTHOPAEDIC SURGERY

## 2023-09-20 NOTE — PROGRESS NOTES
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 This has been present for 3 months. There is mild pain at rest.  Pain is aggrevated by movement, pinching and gripping. Symptoms have not changed recently. There is no mechanical snapping or popping on movement. There is no history of injury or chronic overuse. Arthritic symptoms are sometimes noticed in other joints(he has had a left knee replacement). There is a family history of arthritis. Proprietary pain/ antiinflammatory medications, in small doses, partially relieve symptoms. The pain assessment has been reviewed and is correct as stated. The patient's social history, past medical history, family history, medications, allergies and review of systems, entered 9/20/23,  have been reviewed, and dated and are recorded in the chart. On physical examination the patient is Height: 6' 1\" (185.4 cm), Weight - Scale: 186 lb (84.4 kg),  Respirations: 16 per minute. The patient is well nourished, is oriented to time and place, demonstrates appropriate mood and affect as well as normal gait and station. There is soft tissue swelling and bony thickening over the basilar joint areas of the thumb, without associated warmth or erythema. There is mild tenderness to palpation over the base of the thumb metacarpal.  The grind test is mildly positive. On testing range of motion, there is no limitation of  flexion, extension or retroposition. There is no thickening or tenderness over the flexor tendon sheath. There is no tenderness over the first dorsal compartment. The Polo test is negative. Skin is intact, as is distal circulation and sensation. Gross muscle strength is normal bilaterally. Hand and wrist joints are stable. There are no subcutaneous nodules or enlarged epitrochlear lymph nodes.     I have personally reviewed and

## 2023-09-22 ENCOUNTER — TELEPHONE (OUTPATIENT)
Dept: ADMINISTRATIVE | Age: 73
End: 2023-09-22

## 2023-09-22 NOTE — TELEPHONE ENCOUNTER
I don't know who stated this but I received a DENIAL for Jardiance 10mg tablets. LETTER ATTACHED. If this requires a response please respond to the pool. [facility]). Please advise patient thank you.

## 2023-09-22 NOTE — TELEPHONE ENCOUNTER
Appeal to insurance to lower copay for Tiana Dean was denied. Since he does not want to take mealtime insulin, recommend that he work on his diet to help bring down the HgbA1c. Please call patient to notify.

## 2023-09-26 ENCOUNTER — TELEPHONE (OUTPATIENT)
Dept: INTERNAL MEDICINE CLINIC | Age: 73
End: 2023-09-26

## 2023-09-26 NOTE — TELEPHONE ENCOUNTER
Patient is calling to let  that he is going to want the medication after all and he is okay with paying the copay for it as well. Patient never picked up last medication and is unaware if pharmacy still has it .  Per patient medication request for:    empagliflozin (JARDIANCE) 10 MG tablet  Last appointment: 9/1/2023  Next appointment: 12/5/2023  Last refill: 9/11/23      Please send medication request to :    [place] [id number] - P [phone] - F [phone]   [address], [address] [address]   Phone:  [phone]  Fax:  [phone]

## 2023-09-26 NOTE — TELEPHONE ENCOUNTER
Verified with pharmacy that medication is still on file and they will get it ready. Called to let patient know as well.

## 2023-09-28 ENCOUNTER — TELEPHONE (OUTPATIENT)
Dept: INTERNAL MEDICINE CLINIC | Age: 73
End: 2023-09-28

## 2023-09-28 NOTE — TELEPHONE ENCOUNTER
Patient is calling for medication refill from  for:     blood glucose test strips (TRUE METRIX BLOOD GLUCOSE TEST) strip  Last appointment: 9/1/2023  Next appointment: 12/5/2023  Last refill: 7/11/23      Per patient please send medication refill to :   Dony Montero [address] [place] [address] -  [phone] - F [phone]   [address], [address] [place] [address]   Phone:  [phone]  Fax:  [phone]

## 2023-09-28 NOTE — TELEPHONE ENCOUNTER
Last rx was sent in on 7/11/2023 with 11 refills.  Left message on voicemail advising patient to call the pharmacy

## 2023-09-29 ENCOUNTER — OFFICE VISIT (OUTPATIENT)
Dept: CARDIOLOGY CLINIC | Age: 73
End: 2023-09-29
Payer: MEDICARE

## 2023-09-29 VITALS
HEIGHT: 72 IN | SYSTOLIC BLOOD PRESSURE: 124 MMHG | WEIGHT: 183.8 LBS | OXYGEN SATURATION: 96 % | HEART RATE: 48 BPM | BODY MASS INDEX: 24.89 KG/M2 | DIASTOLIC BLOOD PRESSURE: 70 MMHG

## 2023-09-29 DIAGNOSIS — I49.3 PVC (PREMATURE VENTRICULAR CONTRACTION): ICD-10-CM

## 2023-09-29 DIAGNOSIS — I10 BENIGN ESSENTIAL HTN: ICD-10-CM

## 2023-09-29 DIAGNOSIS — J44.9 COPD, MODERATE (HCC): ICD-10-CM

## 2023-09-29 DIAGNOSIS — E78.2 MIXED HYPERLIPIDEMIA: ICD-10-CM

## 2023-09-29 DIAGNOSIS — I25.10 CORONARY ARTERY DISEASE INVOLVING NATIVE CORONARY ARTERY OF NATIVE HEART WITHOUT ANGINA PECTORIS: ICD-10-CM

## 2023-09-29 DIAGNOSIS — I48.91 ATRIAL FIBRILLATION WITH RVR (HCC): Primary | ICD-10-CM

## 2023-09-29 DIAGNOSIS — H35.033 HYPERTENSIVE RETINOPATHY OF BOTH EYES: ICD-10-CM

## 2023-09-29 PROCEDURE — 3078F DIAST BP <80 MM HG: CPT | Performed by: NURSE PRACTITIONER

## 2023-09-29 PROCEDURE — 3074F SYST BP LT 130 MM HG: CPT | Performed by: NURSE PRACTITIONER

## 2023-09-29 PROCEDURE — 3017F COLORECTAL CA SCREEN DOC REV: CPT | Performed by: NURSE PRACTITIONER

## 2023-09-29 PROCEDURE — 3023F SPIROM DOC REV: CPT | Performed by: NURSE PRACTITIONER

## 2023-09-29 PROCEDURE — 1123F ACP DISCUSS/DSCN MKR DOCD: CPT | Performed by: NURSE PRACTITIONER

## 2023-09-29 PROCEDURE — 1036F TOBACCO NON-USER: CPT | Performed by: NURSE PRACTITIONER

## 2023-09-29 PROCEDURE — G8420 CALC BMI NORM PARAMETERS: HCPCS | Performed by: NURSE PRACTITIONER

## 2023-09-29 PROCEDURE — G8427 DOCREV CUR MEDS BY ELIG CLIN: HCPCS | Performed by: NURSE PRACTITIONER

## 2023-09-29 PROCEDURE — 99214 OFFICE O/P EST MOD 30 MIN: CPT | Performed by: NURSE PRACTITIONER

## 2023-09-29 PROCEDURE — 93000 ELECTROCARDIOGRAM COMPLETE: CPT | Performed by: NURSE PRACTITIONER

## 2023-09-29 RX ORDER — METOPROLOL SUCCINATE 25 MG/1
12.5 TABLET, EXTENDED RELEASE ORAL DAILY
Qty: 30 TABLET | Refills: 5 | Status: SHIPPED | OUTPATIENT
Start: 2023-09-29

## 2023-09-29 RX ORDER — METOPROLOL SUCCINATE 25 MG/1
12.5 TABLET, EXTENDED RELEASE ORAL DAILY
Qty: 45 TABLET | OUTPATIENT
Start: 2023-09-29

## 2023-09-29 NOTE — TELEPHONE ENCOUNTER
Last Fill:   metoprolol succinate (TOPROL XL) 25 MG extended release tablet 30 tablet 5 9/29/2023     Sig - Route:  Take 0.5 tablets by mouth daily - Oral    Sent to pharmacy as: Metoprolol Succinate ER 25 MG Oral Tablet Extended Release 24 Hour (Toprol XL)    E-Prescribing Status: Receipt confirmed by pharmacy (9/29/2023  3:15 PM EDT)

## 2023-10-18 ENCOUNTER — TELEPHONE (OUTPATIENT)
Dept: INTERNAL MEDICINE CLINIC | Age: 73
End: 2023-10-18

## 2023-10-18 DIAGNOSIS — F51.01 PRIMARY INSOMNIA: Primary | ICD-10-CM

## 2023-10-18 NOTE — TELEPHONE ENCOUNTER
Patient is calling to let Alisha Logan know that he has been having sleep deprivation and has been getting the most 3 hrs of sleep. Patient states this has been an issue and has been previously spoken to  about and would niraj to know what Dr recommend or him to take or do? Diamond Hamman Offered to schedule patient a appointment and pt declined. Maria G advise.

## 2023-10-19 NOTE — TELEPHONE ENCOUNTER
Unfortunately, I cannot safely increase the trazodone or add back the mirtazapine due to risk of serotonin syndrome in combination with Zoloft. I sent in a script to his pharmacy for [address], which is okay to use with the other medications, but is a controlled substance and does have a risk of dependence. I hope that his insurance will cover this medication at a reasonable co-pay.

## 2023-10-27 RX ORDER — ASPIRIN 81 MG/1
TABLET, CHEWABLE ORAL
Qty: 90 TABLET | Refills: 3 | Status: SHIPPED | OUTPATIENT
Start: 2023-10-27

## 2023-10-31 ENCOUNTER — OFFICE VISIT (OUTPATIENT)
Dept: PULMONOLOGY | Age: 73
End: 2023-10-31
Payer: MEDICARE

## 2023-10-31 VITALS
WEIGHT: 186 LBS | OXYGEN SATURATION: 94 % | BODY MASS INDEX: 25.19 KG/M2 | DIASTOLIC BLOOD PRESSURE: 62 MMHG | HEART RATE: 83 BPM | SYSTOLIC BLOOD PRESSURE: 126 MMHG | HEIGHT: 72 IN

## 2023-10-31 DIAGNOSIS — J96.11 CHRONIC HYPOXEMIC RESPIRATORY FAILURE (HCC): ICD-10-CM

## 2023-10-31 DIAGNOSIS — J43.2 CENTRILOBULAR EMPHYSEMA (HCC): ICD-10-CM

## 2023-10-31 DIAGNOSIS — J44.9 COPD, MODERATE (HCC): Primary | ICD-10-CM

## 2023-10-31 DIAGNOSIS — R91.1 PULMONARY NODULE: ICD-10-CM

## 2023-10-31 PROCEDURE — 99214 OFFICE O/P EST MOD 30 MIN: CPT | Performed by: INTERNAL MEDICINE

## 2023-10-31 PROCEDURE — G8427 DOCREV CUR MEDS BY ELIG CLIN: HCPCS | Performed by: INTERNAL MEDICINE

## 2023-10-31 PROCEDURE — G8484 FLU IMMUNIZE NO ADMIN: HCPCS | Performed by: INTERNAL MEDICINE

## 2023-10-31 PROCEDURE — 3074F SYST BP LT 130 MM HG: CPT | Performed by: INTERNAL MEDICINE

## 2023-10-31 PROCEDURE — 3017F COLORECTAL CA SCREEN DOC REV: CPT | Performed by: INTERNAL MEDICINE

## 2023-10-31 PROCEDURE — G8419 CALC BMI OUT NRM PARAM NOF/U: HCPCS | Performed by: INTERNAL MEDICINE

## 2023-10-31 PROCEDURE — 1036F TOBACCO NON-USER: CPT | Performed by: INTERNAL MEDICINE

## 2023-10-31 PROCEDURE — 3023F SPIROM DOC REV: CPT | Performed by: INTERNAL MEDICINE

## 2023-10-31 PROCEDURE — 3078F DIAST BP <80 MM HG: CPT | Performed by: INTERNAL MEDICINE

## 2023-10-31 PROCEDURE — 1123F ACP DISCUSS/DSCN MKR DOCD: CPT | Performed by: INTERNAL MEDICINE

## 2023-10-31 RX ORDER — FLUTICASONE FUROATE, UMECLIDINIUM BROMIDE AND VILANTEROL TRIFENATATE 100; 62.5; 25 UG/1; UG/1; UG/1
1 POWDER RESPIRATORY (INHALATION) DAILY
Qty: 180 EACH | Refills: 3 | Status: SHIPPED | OUTPATIENT
Start: 2023-10-31

## 2023-11-06 RX ORDER — GLIMEPIRIDE 4 MG/1
TABLET ORAL
Qty: 180 TABLET | Refills: 1 | Status: SHIPPED | OUTPATIENT
Start: 2023-11-06

## 2023-11-16 ENCOUNTER — TELEPHONE (OUTPATIENT)
Dept: INTERNAL MEDICINE CLINIC | Age: 73
End: 2023-11-16

## 2023-11-16 NOTE — TELEPHONE ENCOUNTER
Patient is calling for  regarding medication refill for:    amLODIPine (NORVASC) 10 MG tablet  Last appointment: 9/1/2023  Next appointment: 12/5/2023  Last refill: 9/13/23      metoprolol succinate (TOPROL XL) 25 MG extended release tablet  Last appointment: 9/1/2023  Next appointment: 12/5/2023  Last refill: 9/29/23      Please send medication refill to :  [address] [place] [address] [phone] - F [phone]   [address], [address] [place] [address] Drive [address]   Phone:  [phone]  Fax:  [phone]

## 2023-12-04 ENCOUNTER — TELEPHONE (OUTPATIENT)
Dept: INTERNAL MEDICINE CLINIC | Age: 73
End: 2023-12-04

## 2023-12-04 NOTE — TELEPHONE ENCOUNTER
Patient is in [place] since [facility] 12/3/23. He is to be discharged tomorrow. He called to cancel his appointment scheduled for tomorrow and to reschedule for a HFU next week.   Patient scheduled for a HFU on 11/15/23 @ 11am

## 2023-12-08 ENCOUNTER — TELEPHONE (OUTPATIENT)
Dept: INTERNAL MEDICINE CLINIC | Age: 73
End: 2023-12-08

## 2023-12-08 NOTE — TELEPHONE ENCOUNTER
Patient is calling for  in regards to getting a cough medication. Per patient he just got out of the hospital with pneumonia and he is demanding  prescribe him something. Patient angrily demanded this request and stated he will not come in, will not do a vv, will not do evisit and stated he \"has put up with this and this office for 18 years and hes not doing any of this\" . Patient advised  needs some type of an appointment to be able to do so and started getting more agitated. Per patient is requesting a call from Dr. Alix Clancy with approval for his medication     Please advise.

## 2023-12-08 NOTE — TELEPHONE ENCOUNTER
For his safety, he needs evaluation before prescribing new medication. If he does not want to follow up with us, he can call [facility] and have hospitalist paged since he was just discharge a few days ago.

## 2023-12-14 PROBLEM — I65.23 BILATERAL CAROTID ARTERY STENOSIS: Status: ACTIVE | Noted: 2023-12-14

## 2023-12-14 PROBLEM — I21.4 NSTEMI (NON-ST ELEVATED MYOCARDIAL INFARCTION) (HCC): Status: ACTIVE | Noted: 2023-12-14

## 2023-12-14 PROBLEM — J18.9 PNEUMONIA OF RIGHT LOWER LOBE DUE TO INFECTIOUS ORGANISM: Status: ACTIVE | Noted: 2023-12-14

## 2023-12-14 PROBLEM — G44.019 EPISODIC CLUSTER HEADACHE, NOT INTRACTABLE: Status: ACTIVE | Noted: 2023-12-14

## 2023-12-15 ENCOUNTER — OFFICE VISIT (OUTPATIENT)
Dept: INTERNAL MEDICINE CLINIC | Age: 73
End: 2023-12-15
Payer: MEDICARE

## 2023-12-15 VITALS
HEART RATE: 60 BPM | DIASTOLIC BLOOD PRESSURE: 70 MMHG | OXYGEN SATURATION: 96 % | BODY MASS INDEX: 25.06 KG/M2 | WEIGHT: 184.8 LBS | SYSTOLIC BLOOD PRESSURE: 120 MMHG

## 2023-12-15 DIAGNOSIS — F51.01 PRIMARY INSOMNIA: ICD-10-CM

## 2023-12-15 DIAGNOSIS — N18.32 STAGE 3B CHRONIC KIDNEY DISEASE (HCC): ICD-10-CM

## 2023-12-15 DIAGNOSIS — G44.019 EPISODIC CLUSTER HEADACHE, NOT INTRACTABLE: ICD-10-CM

## 2023-12-15 DIAGNOSIS — F32.1 CURRENT MODERATE EPISODE OF MAJOR DEPRESSIVE DISORDER WITHOUT PRIOR EPISODE (HCC): ICD-10-CM

## 2023-12-15 DIAGNOSIS — E11.9 TYPE 2 DIABETES MELLITUS WITHOUT COMPLICATION, WITH LONG-TERM CURRENT USE OF INSULIN (HCC): ICD-10-CM

## 2023-12-15 DIAGNOSIS — I21.4 NSTEMI (NON-ST ELEVATED MYOCARDIAL INFARCTION) (HCC): ICD-10-CM

## 2023-12-15 DIAGNOSIS — Z79.4 TYPE 2 DIABETES MELLITUS WITHOUT COMPLICATION, WITH LONG-TERM CURRENT USE OF INSULIN (HCC): ICD-10-CM

## 2023-12-15 DIAGNOSIS — I65.23 BILATERAL CAROTID ARTERY STENOSIS: ICD-10-CM

## 2023-12-15 DIAGNOSIS — J18.9 PNEUMONIA OF RIGHT LOWER LOBE DUE TO INFECTIOUS ORGANISM: Primary | ICD-10-CM

## 2023-12-15 PROCEDURE — 3074F SYST BP LT 130 MM HG: CPT | Performed by: INTERNAL MEDICINE

## 2023-12-15 PROCEDURE — G8419 CALC BMI OUT NRM PARAM NOF/U: HCPCS | Performed by: INTERNAL MEDICINE

## 2023-12-15 PROCEDURE — 1036F TOBACCO NON-USER: CPT | Performed by: INTERNAL MEDICINE

## 2023-12-15 PROCEDURE — 3051F HG A1C>EQUAL 7.0%<8.0%: CPT | Performed by: INTERNAL MEDICINE

## 2023-12-15 PROCEDURE — 3017F COLORECTAL CA SCREEN DOC REV: CPT | Performed by: INTERNAL MEDICINE

## 2023-12-15 PROCEDURE — 3078F DIAST BP <80 MM HG: CPT | Performed by: INTERNAL MEDICINE

## 2023-12-15 PROCEDURE — G8427 DOCREV CUR MEDS BY ELIG CLIN: HCPCS | Performed by: INTERNAL MEDICINE

## 2023-12-15 PROCEDURE — 99417 PROLNG OP E/M EACH 15 MIN: CPT | Performed by: INTERNAL MEDICINE

## 2023-12-15 PROCEDURE — G8484 FLU IMMUNIZE NO ADMIN: HCPCS | Performed by: INTERNAL MEDICINE

## 2023-12-15 PROCEDURE — 1123F ACP DISCUSS/DSCN MKR DOCD: CPT | Performed by: INTERNAL MEDICINE

## 2023-12-15 PROCEDURE — 99215 OFFICE O/P EST HI 40 MIN: CPT | Performed by: INTERNAL MEDICINE

## 2023-12-15 PROCEDURE — 2022F DILAT RTA XM EVC RTNOPTHY: CPT | Performed by: INTERNAL MEDICINE

## 2023-12-15 RX ORDER — GUAIFENESIN 600 MG/1
600 TABLET, EXTENDED RELEASE ORAL 2 TIMES DAILY
COMMUNITY
Start: 2023-12-15

## 2023-12-15 RX ORDER — BUTALBITAL, ACETAMINOPHEN AND CAFFEINE 50; 325; 40 MG/1; MG/1; MG/1
1 TABLET ORAL EVERY 4 HOURS PRN
COMMUNITY
End: 2023-12-15 | Stop reason: SDUPTHER

## 2023-12-15 RX ORDER — BUTALBITAL, ACETAMINOPHEN AND CAFFEINE 50; 325; 40 MG/1; MG/1; MG/1
1 TABLET ORAL EVERY 4 HOURS PRN
Qty: 60 TABLET | Refills: 2 | Status: SHIPPED | OUTPATIENT
Start: 2023-12-15

## 2023-12-15 NOTE — ASSESSMENT & PLAN NOTE
HgbA1c 6.5 during hospitalization, but he was on Jardiance during the month of October- stopped due to cost. Continue current diabetic regimen.

## 2023-12-15 NOTE — ASSESSMENT & PLAN NOTE
Improved with high flow O2 and prn Fioricet- he will continue lowest effective dose prn. Will refer to neurology if symptoms worsen.

## 2023-12-15 NOTE — ASSESSMENT & PLAN NOTE
Suspect exacerbation related to recent health setbacks. Cannot safely increase dose of Zoloft due to risk of serotonin syndrome in combination with trazodone. Referred to Dr. Antonina Moreno for medication consult. Patient will call if symptoms change or worsen.

## 2023-12-15 NOTE — ASSESSMENT & PLAN NOTE
100 mg dose of trazodone is working well for him again, but will switch to Belsomra if condition worsens.

## 2023-12-15 NOTE — ASSESSMENT & PLAN NOTE
Presumed due to demand ischemia secondary to pneumonia. No evidence of wall motion abnormalities on recent echo. He will continue ASA, statin and beta blocker and follow up as directed with cardiology.

## 2023-12-15 NOTE — PROGRESS NOTES
extended release tablet           Medications marked \"taking\" at this time  Outpatient Medications Marked as Taking for the 12/15/23 encounter (Office Visit) with Briana Coronel MD   Medication Sig Dispense Refill    butalbital-acetaminophen-caffeine (FIORICET, ESGIC) -40 MG per tablet Take 1 tablet by mouth every 4 hours as needed for Headaches 60 tablet 2    guaiFENesin (MUCINEX) 600 MG extended release tablet Take 1 tablet by mouth 2 times daily      glimepiride (AMARYL) 4 MG tablet TAKE 2 TABLETS BY MOUTH EVERY MORNING 180 tablet 1    fluticasone-umeclidin-vilant (TRELEGY ELLIPTA) 100-62.5-25 MCG/ACT AEPB inhaler Inhale 1 puff into the lungs daily 180 each 3    aspirin 81 MG chewable tablet CHEW AND SWALLOW 1 TABLET BY MOUTH DAILY 90 tablet 3    metoprolol succinate (TOPROL XL) 25 MG extended release tablet Take 0.5 tablets by mouth daily 30 tablet 5    amLODIPine (NORVASC) 10 MG tablet TAKE 1 TABLET BY MOUTH DAILY 90 tablet 1    sertraline (ZOLOFT) 100 MG tablet TAKE 1 TABLET BY MOUTH DAILY 90 tablet 1    traZODone (DESYREL) 100 MG tablet Take 1 tablet by mouth nightly 90 tablet 1    doxazosin (CARDURA) 4 MG tablet Take 1 tablet by mouth nightly 90 tablet 1    fenofibrate (TRIGLIDE) 160 MG tablet TAKE 1 TABLET BY MOUTH DAILY 90 tablet 1    LANTUS SOLOSTAR 100 UNIT/ML injection pen ADMINISTER 15 UNITS UNDER THE SKIN EVERY NIGHT 15 mL 5    rosuvastatin (CRESTOR) 20 MG tablet Take 2 tablets by mouth daily 180 tablet 1    blood glucose test strips (TRUE METRIX BLOOD GLUCOSE TEST) strip USE 1 STRIP TO TEST BLOOD SUGAR TWICE DAILY 100 strip 11    Insulin Pen Needle (B-D UF III MINI PEN NEEDLES) 31G X 5 MM MISC USE AS DIRECTED TO INJECT ONCE DAILY AS DIRECTED 100 each 11    acetaminophen (TYLENOL) 325 MG tablet Take 2 tablets by mouth every 6 hours as needed for Pain      albuterol sulfate HFA (VENTOLIN HFA) 108 (90 Base) MCG/ACT inhaler 2 Puff every 4-6 hours as needed for wheezing or shortness of breath 1

## 2023-12-15 NOTE — ASSESSMENT & PLAN NOTE
Symptoms slowly improving after aggressive antibiotic therapy, which is now complete. He will try prn Mucinex for residual cough. He will call for recurrent fevers, purulent sputum or increased SOB.

## 2023-12-15 NOTE — ASSESSMENT & PLAN NOTE
High grade, but currently asymptomatic. He will see Dr. Mortimer Crumbly next week to discuss treatment options.

## 2023-12-27 RX ORDER — SERTRALINE HYDROCHLORIDE 100 MG/1
100 TABLET, FILM COATED ORAL DAILY
Qty: 90 TABLET | Refills: 1 | Status: SHIPPED | OUTPATIENT
Start: 2023-12-27

## 2024-01-10 NOTE — PROGRESS NOTES
PSYCHIATRY INITIAL EVALUATION    Lv Shin  1950  01/15/2024  Face to Face time: 60 minutes  PCP: Esme Danielson MD    CC: New Patient      ASSESSMENT:   Patient is a 73 y.o. male with a significant past medical history of CKD stage III (s/p heminephrectomy), hypertensive retinopathy of both eyes, CAD s/p CABG x 3, A-fib, hypertension, DM2, hyperlipidemia, COPD, mitral valve regurg, NSTEMI, and cluster headaches who presents to the outpatient psychiatric clinic today for evaluation and management of depression.    Patient's presentation today is indicative of a singular diagnosis of major depressive disorder that is recurrent.  The patient's current debilities range from mild to moderate, leading to that qualify her being added to the diagnosis.  There is a possibility given the patient's  history of an underlying PTSD, however the patient denies symptoms that are associated with the trauma at this time.  This may be worthy of further evaluation given the nature of his irritability concerns, however at present these do appear to be generally well-managed with his current medication regimen.  We will attempt to adjust things very slightly in order to provide him with further ongoing support.    Diagnosis:  MDD R mild to moderate    PLAN:   1.  Discussed with patient potential management options further conditions including medication management as well as nonpharmacologic strategies.  Patient on board with current plan of care.  2.  Increase trazodone to 150 mg nightly for treatment of insomnia concerns.  Patient was cautioned guarding adverse effects of this medication as have been described to him previously.  2.  Increase sertraline to 150 mg daily for treatment of depression and irritability concerns.  While the computer system does recognize an interaction between this medication as well as the patient's trazodone, the interaction at the current dosages is relatively minimal.  Should the

## 2024-01-15 ENCOUNTER — OFFICE VISIT (OUTPATIENT)
Dept: PSYCHIATRY | Age: 74
End: 2024-01-15
Payer: MEDICARE

## 2024-01-15 VITALS
SYSTOLIC BLOOD PRESSURE: 118 MMHG | WEIGHT: 184 LBS | BODY MASS INDEX: 24.92 KG/M2 | DIASTOLIC BLOOD PRESSURE: 72 MMHG | HEIGHT: 72 IN

## 2024-01-15 DIAGNOSIS — F33.0 MILD EPISODE OF RECURRENT MAJOR DEPRESSIVE DISORDER (HCC): Primary | ICD-10-CM

## 2024-01-15 PROCEDURE — 99204 OFFICE O/P NEW MOD 45 MIN: CPT | Performed by: STUDENT IN AN ORGANIZED HEALTH CARE EDUCATION/TRAINING PROGRAM

## 2024-01-15 PROCEDURE — G8420 CALC BMI NORM PARAMETERS: HCPCS | Performed by: STUDENT IN AN ORGANIZED HEALTH CARE EDUCATION/TRAINING PROGRAM

## 2024-01-15 PROCEDURE — 3078F DIAST BP <80 MM HG: CPT | Performed by: STUDENT IN AN ORGANIZED HEALTH CARE EDUCATION/TRAINING PROGRAM

## 2024-01-15 PROCEDURE — G8427 DOCREV CUR MEDS BY ELIG CLIN: HCPCS | Performed by: STUDENT IN AN ORGANIZED HEALTH CARE EDUCATION/TRAINING PROGRAM

## 2024-01-15 PROCEDURE — 3074F SYST BP LT 130 MM HG: CPT | Performed by: STUDENT IN AN ORGANIZED HEALTH CARE EDUCATION/TRAINING PROGRAM

## 2024-01-15 PROCEDURE — 1036F TOBACCO NON-USER: CPT | Performed by: STUDENT IN AN ORGANIZED HEALTH CARE EDUCATION/TRAINING PROGRAM

## 2024-01-15 PROCEDURE — G8484 FLU IMMUNIZE NO ADMIN: HCPCS | Performed by: STUDENT IN AN ORGANIZED HEALTH CARE EDUCATION/TRAINING PROGRAM

## 2024-01-15 PROCEDURE — 1123F ACP DISCUSS/DSCN MKR DOCD: CPT | Performed by: STUDENT IN AN ORGANIZED HEALTH CARE EDUCATION/TRAINING PROGRAM

## 2024-01-15 PROCEDURE — 3017F COLORECTAL CA SCREEN DOC REV: CPT | Performed by: STUDENT IN AN ORGANIZED HEALTH CARE EDUCATION/TRAINING PROGRAM

## 2024-01-15 ASSESSMENT — PATIENT HEALTH QUESTIONNAIRE - PHQ9
SUM OF ALL RESPONSES TO PHQ QUESTIONS 1-9: 7
2. FEELING DOWN, DEPRESSED OR HOPELESS: 2
6. FEELING BAD ABOUT YOURSELF - OR THAT YOU ARE A FAILURE OR HAVE LET YOURSELF OR YOUR FAMILY DOWN: 0
3. TROUBLE FALLING OR STAYING ASLEEP: 1
5. POOR APPETITE OR OVEREATING: 1
SUM OF ALL RESPONSES TO PHQ9 QUESTIONS 1 & 2: 4
SUM OF ALL RESPONSES TO PHQ QUESTIONS 1-9: 7
SUM OF ALL RESPONSES TO PHQ QUESTIONS 1-9: 7
9. THOUGHTS THAT YOU WOULD BE BETTER OFF DEAD, OR OF HURTING YOURSELF: 0
7. TROUBLE CONCENTRATING ON THINGS, SUCH AS READING THE NEWSPAPER OR WATCHING TELEVISION: 0
4. FEELING TIRED OR HAVING LITTLE ENERGY: 1
8. MOVING OR SPEAKING SO SLOWLY THAT OTHER PEOPLE COULD HAVE NOTICED. OR THE OPPOSITE, BEING SO FIGETY OR RESTLESS THAT YOU HAVE BEEN MOVING AROUND A LOT MORE THAN USUAL: 0
SUM OF ALL RESPONSES TO PHQ QUESTIONS 1-9: 7
10. IF YOU CHECKED OFF ANY PROBLEMS, HOW DIFFICULT HAVE THESE PROBLEMS MADE IT FOR YOU TO DO YOUR WORK, TAKE CARE OF THINGS AT HOME, OR GET ALONG WITH OTHER PEOPLE: 1
1. LITTLE INTEREST OR PLEASURE IN DOING THINGS: 2

## 2024-01-15 ASSESSMENT — ENCOUNTER SYMPTOMS
ALLERGIC/IMMUNOLOGIC NEGATIVE: 1
GASTROINTESTINAL NEGATIVE: 1
EYES NEGATIVE: 1
RESPIRATORY NEGATIVE: 1

## 2024-01-15 ASSESSMENT — ANXIETY QUESTIONNAIRES
7. FEELING AFRAID AS IF SOMETHING AWFUL MIGHT HAPPEN: 1
GAD7 TOTAL SCORE: 4
4. TROUBLE RELAXING: 1
IF YOU CHECKED OFF ANY PROBLEMS ON THIS QUESTIONNAIRE, HOW DIFFICULT HAVE THESE PROBLEMS MADE IT FOR YOU TO DO YOUR WORK, TAKE CARE OF THINGS AT HOME, OR GET ALONG WITH OTHER PEOPLE: SOMEWHAT DIFFICULT
2. NOT BEING ABLE TO STOP OR CONTROL WORRYING: 1
1. FEELING NERVOUS, ANXIOUS, OR ON EDGE: 0
6. BECOMING EASILY ANNOYED OR IRRITABLE: 1
3. WORRYING TOO MUCH ABOUT DIFFERENT THINGS: 0
5. BEING SO RESTLESS THAT IT IS HARD TO SIT STILL: 0

## 2024-01-28 NOTE — PROGRESS NOTES
Patient has been successfully weaned from Mechanical Ventilation. RSBI before extubation was 22 with Nif=-28 and SpO2 of 100 on 40% FiO2. Patient extubated and placed on 3 liters/min via nasal cannula. Post extubation SpO2 is 97% with HR  85 bpm and RR 20 breaths/min. Patient had mild cough that was non-productive. Extubation Well tolerated by patient. Lester Sr 46 y/o male w/ no sig pmh p/w constant L flank pain that occ radiated to abd x few hours.  No known trauma/ injury.  Denies hx similar.  Went to UC prior to arrival, was given dose toradol, and pain completely resolved.  Sent to ED for further eval.  Pt currently pain free, no complaints.  Denies f/c, cp, sob, cough, rhinorrhea, cp, n/v/d, dysuria, hematuria, saddle anesthesia, numbness/tingling/weakness to ext.  Denies smoking.  VSS.  Exam grossly unrevealing.    ?kidney stone w/ pain relief from toradol vs passed stone vs msk pain  Doubt PE, pna, lung etiology  No red flags to suggest acute cord/ cauda equina  Will check labs, ua to start, re-eval  If hematuria present or pain returns, consider imaging for stone 44 y/o male w/ no sig pmh p/w constant L flank pain that occ radiated to abd x few hours.  No known trauma/ injury.  Denies hx similar.  Went to  prior to arrival, was given dose toradol, and pain completely resolved.  Sent to ED for further eval.  Pt currently pain free, no complaints.  Denies f/c, cp, sob, cough, rhinorrhea, cp, n/v/d, dysuria, hematuria, saddle anesthesia, numbness/tingling/weakness to ext.  Denies smoking.  VSS.  Exam grossly unrevealing.    ?kidney stone w/ pain relief from toradol vs passed stone vs msk pain  Doubt PE, pna, lung etiology  No red flags to suggest acute cord/ cauda equina  Will check labs, ua to start, re-eval  If hematuria present or pain returns, consider imaging for stone  ---  labs with wbc 11.38.  nl bun/cr  ua with large blood, calcium oxalate crystals  on re-eval, pt continues to have minimal pain.  no cva ttp  d/w pt likely kidney stone.  discussed pros/cons of imaging.  overall feel utility quite low today, as pt unlikely to have obstructive or large stone based on minimal pain resolved with toradol and nl kidney fxn. pt feels comfortable deferring imaging.  will tx empirically for stone with pain meds, flomax, dc with uro f/u. strict return precautions provided

## 2024-01-29 ENCOUNTER — TELEPHONE (OUTPATIENT)
Dept: PSYCHIATRY | Age: 74
End: 2024-01-29

## 2024-01-29 DIAGNOSIS — F33.0 MILD EPISODE OF RECURRENT MAJOR DEPRESSIVE DISORDER (HCC): Primary | ICD-10-CM

## 2024-01-29 RX ORDER — TRAZODONE HYDROCHLORIDE 150 MG/1
150 TABLET ORAL NIGHTLY
Qty: 30 TABLET | Refills: 2 | Status: SHIPPED | OUTPATIENT
Start: 2024-01-29

## 2024-01-29 NOTE — TELEPHONE ENCOUNTER
Patient is calling in for  in regards to his medication:    traZODone (DESYREL) 150 MG tablet  Last appointment: 1/15/2024  Next appointment: 3/18/2024  Last refill: 9/11/23        Per patient  is set to increase trezadone from 100 mg to 150 mg.Patient also stated he is completely out and has no more for today.     Please send medication refill to :    [id number] [place], [place] - [address] - P [phone] - F [phone]   [address] [address]   Phone:  [phone]  Fax:  [phone]

## 2024-02-08 DIAGNOSIS — J44.9 COPD, MODERATE (HCC): Primary | ICD-10-CM

## 2024-02-08 RX ORDER — FLUTICASONE FUROATE, UMECLIDINIUM BROMIDE AND VILANTEROL TRIFENATATE 100; 62.5; 25 UG/1; UG/1; UG/1
1 POWDER RESPIRATORY (INHALATION) DAILY
Qty: 180 EACH | Refills: 0 | Status: SHIPPED | OUTPATIENT
Start: 2024-02-08

## 2024-02-08 NOTE — TELEPHONE ENCOUNTER
Last appointment:  10/31/2023    Next appointment:  4/29/2024    Last refill:       fluticasone-umeclidin-vilant (TRELEGY ELLIPTA) 100-62.5-25 MCG/ACT AEPB inhaler [[id number]]    Order Details  Dose: 1 puff Route: Inhalation Frequency: DAILY   Dispense Quantity: 180 each Refills: 3          Sig: Inhale 1 puff into the lungs daily         Start Date: 10/31/23

## 2024-02-13 RX ORDER — TRAZODONE HYDROCHLORIDE 100 MG/1
100 TABLET ORAL NIGHTLY
Qty: 90 TABLET | Refills: 1 | OUTPATIENT
Start: 2024-02-13

## 2024-03-04 RX ORDER — LANCETS
EACH MISCELLANEOUS
Qty: 150 EACH | Refills: 5 | Status: SHIPPED | OUTPATIENT
Start: 2024-03-04

## 2024-03-04 RX ORDER — BUTALBITAL, ACETAMINOPHEN AND CAFFEINE 50; 325; 40 MG/1; MG/1; MG/1
1 TABLET ORAL EVERY 4 HOURS PRN
Qty: 60 TABLET | Refills: 2 | Status: SHIPPED | OUTPATIENT
Start: 2024-03-04

## 2024-03-04 NOTE — TELEPHONE ENCOUNTER
Patient is calling in for  in regards to 90 day medication refill for:    butalbital-acetaminophen-caffeine (FIORICET, ESGIC) -40 MG per tablet   Last appointment: 12/15/2023  Next appointment: 3/12/2024  Last refill: 12/15/23        [facility]   Last appointment: 12/15/2023  Next appointment: 3/12/2024  Last refill: 6/19/19      Please send medication request to :    [id number] - [place], [place] - [address] - P [phone] - F [phone] [address] [facility] [facility] [address]  Phone: [phone]  Fax: [phone]

## 2024-03-06 PROBLEM — J18.9 PNEUMONIA OF RIGHT LOWER LOBE DUE TO INFECTIOUS ORGANISM: Status: RESOLVED | Noted: 2023-12-14 | Resolved: 2024-03-06

## 2024-03-06 PROBLEM — I25.2 HISTORY OF MI (MYOCARDIAL INFARCTION): Status: ACTIVE | Noted: 2023-12-14

## 2024-03-11 DIAGNOSIS — N18.30 CKD (CHRONIC KIDNEY DISEASE), STAGE III (HCC): ICD-10-CM

## 2024-03-11 RX ORDER — GLIMEPIRIDE 4 MG/1
TABLET ORAL
Qty: 180 TABLET | Refills: 1 | Status: SHIPPED | OUTPATIENT
Start: 2024-03-11

## 2024-03-11 RX ORDER — FENOFIBRATE 160 MG/1
TABLET ORAL
Qty: 90 TABLET | Refills: 1 | Status: SHIPPED | OUTPATIENT
Start: 2024-03-11

## 2024-03-11 RX ORDER — DOXAZOSIN MESYLATE 4 MG/1
4 TABLET ORAL NIGHTLY
Qty: 90 TABLET | Refills: 1 | Status: SHIPPED | OUTPATIENT
Start: 2024-03-11

## 2024-03-12 ENCOUNTER — TELEPHONE (OUTPATIENT)
Dept: INTERNAL MEDICINE CLINIC | Age: 74
End: 2024-03-12

## 2024-03-12 NOTE — TELEPHONE ENCOUNTER
Patient called with in with cold symptoms. Advised experiencing Headaches, coughing (also has COPD) with some mucus. No sore throat, no wheezing/SOB, no facial or ear pain, no temp and has not been eating since symptoms started Saturday. Patient did have an appointment today but cancelled and is now rescheduled for 3/26.

## 2024-03-12 NOTE — TELEPHONE ENCOUNTER
Does patient wasn't treatment for current symptoms? If so, will need to schedule virtual or telephone appt

## 2024-03-13 ENCOUNTER — OFFICE VISIT (OUTPATIENT)
Dept: FAMILY MEDICINE CLINIC | Age: 74
End: 2024-03-13
Payer: MEDICARE

## 2024-03-13 VITALS
OXYGEN SATURATION: 90 % | BODY MASS INDEX: 24.55 KG/M2 | TEMPERATURE: 97.1 F | WEIGHT: 181 LBS | SYSTOLIC BLOOD PRESSURE: 122 MMHG | HEART RATE: 94 BPM | DIASTOLIC BLOOD PRESSURE: 64 MMHG

## 2024-03-13 DIAGNOSIS — J96.11 CHRONIC HYPOXEMIC RESPIRATORY FAILURE (HCC): ICD-10-CM

## 2024-03-13 DIAGNOSIS — J44.9 COPD, MODERATE (HCC): Primary | ICD-10-CM

## 2024-03-13 PROCEDURE — 1123F ACP DISCUSS/DSCN MKR DOCD: CPT | Performed by: NURSE PRACTITIONER

## 2024-03-13 PROCEDURE — G8427 DOCREV CUR MEDS BY ELIG CLIN: HCPCS | Performed by: NURSE PRACTITIONER

## 2024-03-13 PROCEDURE — 99214 OFFICE O/P EST MOD 30 MIN: CPT | Performed by: NURSE PRACTITIONER

## 2024-03-13 PROCEDURE — 3074F SYST BP LT 130 MM HG: CPT | Performed by: NURSE PRACTITIONER

## 2024-03-13 PROCEDURE — G8484 FLU IMMUNIZE NO ADMIN: HCPCS | Performed by: NURSE PRACTITIONER

## 2024-03-13 PROCEDURE — 3017F COLORECTAL CA SCREEN DOC REV: CPT | Performed by: NURSE PRACTITIONER

## 2024-03-13 PROCEDURE — 3078F DIAST BP <80 MM HG: CPT | Performed by: NURSE PRACTITIONER

## 2024-03-13 PROCEDURE — 1036F TOBACCO NON-USER: CPT | Performed by: NURSE PRACTITIONER

## 2024-03-13 PROCEDURE — 3023F SPIROM DOC REV: CPT | Performed by: NURSE PRACTITIONER

## 2024-03-13 PROCEDURE — G8420 CALC BMI NORM PARAMETERS: HCPCS | Performed by: NURSE PRACTITIONER

## 2024-03-13 RX ORDER — PREDNISONE 20 MG/1
20 TABLET ORAL 2 TIMES DAILY
Qty: 10 TABLET | Refills: 0 | Status: SHIPPED | OUTPATIENT
Start: 2024-03-13 | End: 2024-03-15

## 2024-03-13 RX ORDER — DOXYCYCLINE HYCLATE 100 MG
100 TABLET ORAL 2 TIMES DAILY
Qty: 14 TABLET | Refills: 0 | Status: SHIPPED | OUTPATIENT
Start: 2024-03-13 | End: 2024-03-20

## 2024-03-13 SDOH — ECONOMIC STABILITY: FOOD INSECURITY: WITHIN THE PAST 12 MONTHS, YOU WORRIED THAT YOUR FOOD WOULD RUN OUT BEFORE YOU GOT MONEY TO BUY MORE.: NEVER TRUE

## 2024-03-13 SDOH — ECONOMIC STABILITY: INCOME INSECURITY: HOW HARD IS IT FOR YOU TO PAY FOR THE VERY BASICS LIKE FOOD, HOUSING, MEDICAL CARE, AND HEATING?: NOT HARD AT ALL

## 2024-03-13 SDOH — ECONOMIC STABILITY: FOOD INSECURITY: WITHIN THE PAST 12 MONTHS, THE FOOD YOU BOUGHT JUST DIDN'T LAST AND YOU DIDN'T HAVE MONEY TO GET MORE.: NEVER TRUE

## 2024-03-13 ASSESSMENT — ENCOUNTER SYMPTOMS
SORE THROAT: 0
SHORTNESS OF BREATH: 1
EYE REDNESS: 0
BLOOD IN STOOL: 0
CONSTIPATION: 0
RHINORRHEA: 0
DIARRHEA: 0
WHEEZING: 1
COLOR CHANGE: 0
BACK PAIN: 0
CHEST TIGHTNESS: 1
NAUSEA: 0
VOMITING: 0
EYE ITCHING: 0
ABDOMINAL PAIN: 0
COUGH: 1
SINUS PRESSURE: 0

## 2024-03-13 NOTE — ASSESSMENT & PLAN NOTE
Start doxy and prednisone taper, continue scheduled inhalers. Continue with symptomatic management. Recommend increased water intake as well as saline irrigation, mucolytics, and antihistamines as needed. Advised to call back if no improvement over the next 4-7 days.

## 2024-03-13 NOTE — PROGRESS NOTES
Sinus: No maxillary sinus tenderness or frontal sinus tenderness.      Mouth/Throat:      Tonsils: No tonsillar abscesses.   Eyes:      Extraocular Movements: Extraocular movements intact.      Pupils: Pupils are equal, round, and reactive to light.   Cardiovascular:      Rate and Rhythm: Normal rate and regular rhythm.      Pulses: Normal pulses.      Heart sounds: Normal heart sounds.   Pulmonary:      Effort: Pulmonary effort is normal.      Breath sounds: Examination of the right-upper field reveals decreased breath sounds and wheezing. Examination of the left-upper field reveals decreased breath sounds and wheezing. Examination of the right-middle field reveals decreased breath sounds and wheezing. Examination of the left-middle field reveals decreased breath sounds and wheezing. Examination of the left-lower field reveals decreased breath sounds and wheezing. Decreased breath sounds and wheezing present.   Lymphadenopathy:      Head:      Right side of head: No submental, submandibular, tonsillar, preauricular, posterior auricular or occipital adenopathy.      Left side of head: No submental, submandibular, tonsillar, preauricular, posterior auricular or occipital adenopathy.   Skin:     General: Skin is warm and dry.   Neurological:      Mental Status: He is alert.   Psychiatric:         Mood and Affect: Mood normal.         Behavior: Behavior normal.                 An electronic signature was used to authenticate this note.    --Monica Moseley, ANTHONY - CNP

## 2024-03-13 NOTE — PROGRESS NOTES
PSYCHIATRY PROGRESS NOTE    Lv Shin  1950 03/20/2024  Face to Face time: 30 minutes  PCP: Esme Danielson MD    CC:   Chief Complaint   Patient presents with    Follow-up       Patient is a 73 y.o. male with a significant past medical history of CKD stage III (s/p heminephrectomy), hypertensive retinopathy of both eyes, CAD s/p CABG x 3, A-fib, hypertension, DM2, hyperlipidemia, COPD, mitral valve regurg, NSTEMI, and cluster headaches who presents to the outpatient psychiatric clinic today for evaluation and management of depression.     A:  Patient's presentation today is indicative of continued difficulties with irritability that may stem from depression and continues to possibly stem from PTSD secondary to his  service.  Patient has not increased the medication that was suggested last time, appearing to have gotten confused from the instructions.  We will attempt to adjust his regimen to provide ongoing support.    Diagnosis:  MDD R mild to moderate    P:   1.  Formally increase sertraline again to 150 mg daily.  2.  Increase trazodone to 200 mg nightly      Medication Monitoring:    - PDMP reviewed: No current prescriptions     Follow-up: 2 months    Safety: Pt was counseled on the potential for increased suicidal ideations and advised on potential options for dealing with these including hotlines, calling the office, or going to the nearest emergency room.      __________________________________________________________________________    S:   Patient noted that he has had some difficulties recently with bronchitis that are leaving him winded and often coughing.  He stated that this has also limited his abilities to interact with other people.  When he is sick, the patient stated that he tends to be more irritable than baseline.  He has not experienced a difference in his anxiety/irritability since last time, citing how his medications did not change.  When asked about the proposed increase

## 2024-03-14 NOTE — PROGRESS NOTES
Date of Visit:  3/26/2024    CC: Lv Shin (: 1950) is a 73 y.o. male, established patient, here for evaluation/re-evaluation of the following medical concerns:    ASSESSMENT/PLAN:  Type 2 diabetes mellitus without complication, with long-term current use of insulin (HCC)  Assessment & Plan:  Recent blood sugar readings volatile due to prednisone taper, which was completed today, so expect improvement over the next week. If next HgbA1c > 8.0, will need to adjust insulin regimen. Continue current doses of Lantus and Amaryl for now.   Benign essential HTN  Assessment & Plan:  Above baseline today, but acceptable on second reading, likely due to recent steroids. Continue current antihypertensive medications.  Stage 3b chronic kidney disease (HCC)  Assessment & Plan:  Stable over the past year. He prefers not to follow up with nephrology, so will continue to monitor here. However, if GFR drops significantly, will refer back to specialist. He will continue to avoid NSAIDs.   Mixed hyperlipidemia  Assessment & Plan:  LDL has been at goal on 20 mg dose of Crestor, but triglycerides continue to be elevated despite maximal dose of fenofibrate and fish oil supplement. Continue current medications. Triglycerides should improve with lifestyle changes.  Major depressive disorder with single episode, in partial remission (HCC)  Assessment & Plan:  Improved with higher dose of Zoloft- continue per psychiatry.   Primary insomnia  Assessment & Plan:  Improved on higher dose of trazodone- continue per psychiatry. He has been educated on signs and symptoms of serotonin syndrome and will call immediately if any of these manifest.   COPD, moderate (HCC)  Assessment & Plan:  Improved with 7 day course of doxycyline and 12 day prednisone taper, completed today. Approaching baseline pulmonary status. Continue daily Trelegy and prn albuterol and home O2 at night and during the day to keep O2 sats > 90%.     Return in about 3

## 2024-03-15 ENCOUNTER — OFFICE VISIT (OUTPATIENT)
Dept: INTERNAL MEDICINE CLINIC | Age: 74
End: 2024-03-15
Payer: MEDICARE

## 2024-03-15 VITALS
SYSTOLIC BLOOD PRESSURE: 140 MMHG | DIASTOLIC BLOOD PRESSURE: 84 MMHG | BODY MASS INDEX: 24.52 KG/M2 | OXYGEN SATURATION: 91 % | WEIGHT: 181 LBS | HEIGHT: 72 IN | HEART RATE: 95 BPM

## 2024-03-15 DIAGNOSIS — E11.9 TYPE 2 DIABETES MELLITUS WITHOUT COMPLICATION, WITH LONG-TERM CURRENT USE OF INSULIN (HCC): ICD-10-CM

## 2024-03-15 DIAGNOSIS — I10 BENIGN ESSENTIAL HTN: ICD-10-CM

## 2024-03-15 DIAGNOSIS — J96.11 CHRONIC HYPOXEMIC RESPIRATORY FAILURE (HCC): ICD-10-CM

## 2024-03-15 DIAGNOSIS — Z79.4 TYPE 2 DIABETES MELLITUS WITHOUT COMPLICATION, WITH LONG-TERM CURRENT USE OF INSULIN (HCC): ICD-10-CM

## 2024-03-15 DIAGNOSIS — J06.9 VIRAL URI: Primary | ICD-10-CM

## 2024-03-15 PROCEDURE — 3046F HEMOGLOBIN A1C LEVEL >9.0%: CPT | Performed by: INTERNAL MEDICINE

## 2024-03-15 PROCEDURE — 3017F COLORECTAL CA SCREEN DOC REV: CPT | Performed by: INTERNAL MEDICINE

## 2024-03-15 PROCEDURE — G8427 DOCREV CUR MEDS BY ELIG CLIN: HCPCS | Performed by: INTERNAL MEDICINE

## 2024-03-15 PROCEDURE — 1123F ACP DISCUSS/DSCN MKR DOCD: CPT | Performed by: INTERNAL MEDICINE

## 2024-03-15 PROCEDURE — 3079F DIAST BP 80-89 MM HG: CPT | Performed by: INTERNAL MEDICINE

## 2024-03-15 PROCEDURE — 1036F TOBACCO NON-USER: CPT | Performed by: INTERNAL MEDICINE

## 2024-03-15 PROCEDURE — 2022F DILAT RTA XM EVC RTNOPTHY: CPT | Performed by: INTERNAL MEDICINE

## 2024-03-15 PROCEDURE — 3077F SYST BP >= 140 MM HG: CPT | Performed by: INTERNAL MEDICINE

## 2024-03-15 PROCEDURE — 99214 OFFICE O/P EST MOD 30 MIN: CPT | Performed by: INTERNAL MEDICINE

## 2024-03-15 PROCEDURE — G8420 CALC BMI NORM PARAMETERS: HCPCS | Performed by: INTERNAL MEDICINE

## 2024-03-15 PROCEDURE — G8484 FLU IMMUNIZE NO ADMIN: HCPCS | Performed by: INTERNAL MEDICINE

## 2024-03-15 RX ORDER — CALCIUM CITRATE/VITAMIN D3 200MG-6.25
TABLET ORAL
Qty: 100 STRIP | Refills: 11 | Status: SHIPPED | OUTPATIENT
Start: 2024-03-15

## 2024-03-15 RX ORDER — PREDNISONE 10 MG/1
10 TABLET ORAL DAILY
Qty: 42 TABLET | Refills: 0 | Status: SHIPPED | OUTPATIENT
Start: 2024-03-15 | End: 2024-04-26

## 2024-03-15 NOTE — PROGRESS NOTES
Date of Visit:  3/15/2024    CC: Lv Shin (: 1950) is a 73 y.o. male, established patient, here for evaluation/re-evaluation of the following medical concerns:    ASSESSMENT/PLAN:  Viral URI  Too late for antiviral treatment, so will defer testing for flu or COVID. Supportive care guidelines discussed.   Chronic hypoxemic respiratory failure (HCC)  Secondary to above. He will complete course of doxycyline prescribed by off-site NP, but needs higher dose of prednisone with taper. Continue Trelegy, albuterol MDI and home O2. Patient will call if symptoms change or worsen.   -     predniSONE (DELTASONE) 10 MG tablet; Take 1 tablet by mouth daily Take 6 tabs for 2 days, 5 for 2 days, 4 for 2 days, 3 for 2 days, 2 for 2 days, 1 for 2 days., Disp-42 tablet, R-0Normal  Benign essential HTN  Suspect elevated BP today related to acute illness and prednisone. Continue current antihypertensive medication. Will continue to monitor.  Type 2 diabetes mellitus without complication, with long-term current use of insulin (HCC)  Fasting BS readings higher than baseline since prednisone started- he will titrate Lantus accordingly until tapers off steroids. He will also try harder to watch carb intake while taking the prednisone.     Follow up 3/20/24, as scheduled    Vitals:    03/15/24 1109 03/15/24 1136   BP: (!) 162/84 (!) 140/84   Pulse: 95    SpO2: 91%    Weight: 82.1 kg (181 lb)    Height: 1.829 m (6')       Estimated body mass index is 24.55 kg/m² as calculated from the following:    Height as of this encounter: 1.829 m (6').    Weight as of this encounter: 82.1 kg (181 lb).     Wt Readings from Last 3 Encounters:   03/15/24 82.1 kg (181 lb)   24 82.1 kg (181 lb)   01/15/24 83.5 kg (184 lb)     BP Readings from Last 3 Encounters:   03/15/24 (!) 140/84   24 122/64   01/15/24 118/72     HPI  Respiratory Symptoms:  Patient complains of 6 day(s) history of fever: suspected but not measured, headache, nasal

## 2024-03-20 ENCOUNTER — OFFICE VISIT (OUTPATIENT)
Dept: PSYCHIATRY | Age: 74
End: 2024-03-20

## 2024-03-20 VITALS
WEIGHT: 180 LBS | BODY MASS INDEX: 24.38 KG/M2 | HEART RATE: 80 BPM | SYSTOLIC BLOOD PRESSURE: 170 MMHG | HEIGHT: 72 IN | DIASTOLIC BLOOD PRESSURE: 92 MMHG

## 2024-03-20 DIAGNOSIS — F33.0 MILD EPISODE OF RECURRENT MAJOR DEPRESSIVE DISORDER (HCC): ICD-10-CM

## 2024-03-20 RX ORDER — SERTRALINE HYDROCHLORIDE 100 MG/1
150 TABLET, FILM COATED ORAL DAILY
Qty: 135 TABLET | Refills: 1 | Status: SHIPPED | OUTPATIENT
Start: 2024-03-20

## 2024-03-20 RX ORDER — TRAZODONE HYDROCHLORIDE 100 MG/1
200 TABLET ORAL NIGHTLY
Qty: 180 TABLET | Refills: 1 | Status: SHIPPED | OUTPATIENT
Start: 2024-03-20

## 2024-03-20 ASSESSMENT — ENCOUNTER SYMPTOMS
GASTROINTESTINAL NEGATIVE: 1
ALLERGIC/IMMUNOLOGIC NEGATIVE: 1
SHORTNESS OF BREATH: 1
EYES NEGATIVE: 1
COUGH: 1

## 2024-03-20 ASSESSMENT — PATIENT HEALTH QUESTIONNAIRE - PHQ9
SUM OF ALL RESPONSES TO PHQ QUESTIONS 1-9: 8
10. IF YOU CHECKED OFF ANY PROBLEMS, HOW DIFFICULT HAVE THESE PROBLEMS MADE IT FOR YOU TO DO YOUR WORK, TAKE CARE OF THINGS AT HOME, OR GET ALONG WITH OTHER PEOPLE: SOMEWHAT DIFFICULT
SUM OF ALL RESPONSES TO PHQ QUESTIONS 1-9: 8
5. POOR APPETITE OR OVEREATING: SEVERAL DAYS
8. MOVING OR SPEAKING SO SLOWLY THAT OTHER PEOPLE COULD HAVE NOTICED. OR THE OPPOSITE, BEING SO FIGETY OR RESTLESS THAT YOU HAVE BEEN MOVING AROUND A LOT MORE THAN USUAL: NOT AT ALL
4. FEELING TIRED OR HAVING LITTLE ENERGY: SEVERAL DAYS
1. LITTLE INTEREST OR PLEASURE IN DOING THINGS: NEARLY EVERY DAY
2. FEELING DOWN, DEPRESSED OR HOPELESS: SEVERAL DAYS
SUM OF ALL RESPONSES TO PHQ QUESTIONS 1-9: 8
9. THOUGHTS THAT YOU WOULD BE BETTER OFF DEAD, OR OF HURTING YOURSELF: NOT AT ALL
SUM OF ALL RESPONSES TO PHQ9 QUESTIONS 1 & 2: 4
3. TROUBLE FALLING OR STAYING ASLEEP: MORE THAN HALF THE DAYS
SUM OF ALL RESPONSES TO PHQ QUESTIONS 1-9: 8
7. TROUBLE CONCENTRATING ON THINGS, SUCH AS READING THE NEWSPAPER OR WATCHING TELEVISION: NOT AT ALL

## 2024-03-20 ASSESSMENT — ANXIETY QUESTIONNAIRES
5. BEING SO RESTLESS THAT IT IS HARD TO SIT STILL: NOT AT ALL
6. BECOMING EASILY ANNOYED OR IRRITABLE: NEARLY EVERY DAY
7. FEELING AFRAID AS IF SOMETHING AWFUL MIGHT HAPPEN: NOT AT ALL
3. WORRYING TOO MUCH ABOUT DIFFERENT THINGS: NOT AT ALL
2. NOT BEING ABLE TO STOP OR CONTROL WORRYING: NOT AT ALL
4. TROUBLE RELAXING: NOT AT ALL
IF YOU CHECKED OFF ANY PROBLEMS ON THIS QUESTIONNAIRE, HOW DIFFICULT HAVE THESE PROBLEMS MADE IT FOR YOU TO DO YOUR WORK, TAKE CARE OF THINGS AT HOME, OR GET ALONG WITH OTHER PEOPLE: SOMEWHAT DIFFICULT
1. FEELING NERVOUS, ANXIOUS, OR ON EDGE: NOT AT ALL
GAD7 TOTAL SCORE: 3

## 2024-03-26 ENCOUNTER — OFFICE VISIT (OUTPATIENT)
Dept: INTERNAL MEDICINE CLINIC | Age: 74
End: 2024-03-26
Payer: MEDICARE

## 2024-03-26 VITALS
WEIGHT: 184.6 LBS | HEART RATE: 96 BPM | DIASTOLIC BLOOD PRESSURE: 70 MMHG | OXYGEN SATURATION: 95 % | SYSTOLIC BLOOD PRESSURE: 138 MMHG | BODY MASS INDEX: 25.04 KG/M2

## 2024-03-26 DIAGNOSIS — J44.9 COPD, MODERATE (HCC): ICD-10-CM

## 2024-03-26 DIAGNOSIS — Z79.4 TYPE 2 DIABETES MELLITUS WITHOUT COMPLICATION, WITH LONG-TERM CURRENT USE OF INSULIN (HCC): Primary | ICD-10-CM

## 2024-03-26 DIAGNOSIS — E78.2 MIXED HYPERLIPIDEMIA: ICD-10-CM

## 2024-03-26 DIAGNOSIS — F51.01 PRIMARY INSOMNIA: ICD-10-CM

## 2024-03-26 DIAGNOSIS — I10 BENIGN ESSENTIAL HTN: ICD-10-CM

## 2024-03-26 DIAGNOSIS — E11.9 TYPE 2 DIABETES MELLITUS WITHOUT COMPLICATION, WITH LONG-TERM CURRENT USE OF INSULIN (HCC): Primary | ICD-10-CM

## 2024-03-26 DIAGNOSIS — N18.32 STAGE 3B CHRONIC KIDNEY DISEASE (HCC): ICD-10-CM

## 2024-03-26 DIAGNOSIS — F32.4 MAJOR DEPRESSIVE DISORDER WITH SINGLE EPISODE, IN PARTIAL REMISSION (HCC): ICD-10-CM

## 2024-03-26 PROCEDURE — G8427 DOCREV CUR MEDS BY ELIG CLIN: HCPCS | Performed by: INTERNAL MEDICINE

## 2024-03-26 PROCEDURE — G8419 CALC BMI OUT NRM PARAM NOF/U: HCPCS | Performed by: INTERNAL MEDICINE

## 2024-03-26 PROCEDURE — 3075F SYST BP GE 130 - 139MM HG: CPT | Performed by: INTERNAL MEDICINE

## 2024-03-26 PROCEDURE — 1123F ACP DISCUSS/DSCN MKR DOCD: CPT | Performed by: INTERNAL MEDICINE

## 2024-03-26 PROCEDURE — 2022F DILAT RTA XM EVC RTNOPTHY: CPT | Performed by: INTERNAL MEDICINE

## 2024-03-26 PROCEDURE — 3078F DIAST BP <80 MM HG: CPT | Performed by: INTERNAL MEDICINE

## 2024-03-26 PROCEDURE — 3017F COLORECTAL CA SCREEN DOC REV: CPT | Performed by: INTERNAL MEDICINE

## 2024-03-26 PROCEDURE — 3046F HEMOGLOBIN A1C LEVEL >9.0%: CPT | Performed by: INTERNAL MEDICINE

## 2024-03-26 PROCEDURE — 1036F TOBACCO NON-USER: CPT | Performed by: INTERNAL MEDICINE

## 2024-03-26 PROCEDURE — 99214 OFFICE O/P EST MOD 30 MIN: CPT | Performed by: INTERNAL MEDICINE

## 2024-03-26 PROCEDURE — 3023F SPIROM DOC REV: CPT | Performed by: INTERNAL MEDICINE

## 2024-03-26 PROCEDURE — G8484 FLU IMMUNIZE NO ADMIN: HCPCS | Performed by: INTERNAL MEDICINE

## 2024-03-26 RX ORDER — ALBUTEROL SULFATE 90 UG/1
AEROSOL, METERED RESPIRATORY (INHALATION)
Qty: 1 EACH | Refills: 3 | Status: SHIPPED | OUTPATIENT
Start: 2024-03-26

## 2024-03-26 RX ORDER — FLURBIPROFEN SODIUM 0.3 MG/ML
SOLUTION/ DROPS OPHTHALMIC
Qty: 100 EACH | Refills: 3 | Status: SHIPPED | OUTPATIENT
Start: 2024-03-26

## 2024-03-26 NOTE — ASSESSMENT & PLAN NOTE
Stable over the past year. He prefers not to follow up with nephrology, so will continue to monitor here. However, if GFR drops significantly, will refer back to specialist. He will continue to avoid NSAIDs.

## 2024-03-26 NOTE — ASSESSMENT & PLAN NOTE
Improved with 7 day course of doxycyline and 12 day prednisone taper, completed today. Approaching baseline pulmonary status. Continue daily Trelegy and prn albuterol and home O2 at night and during the day to keep O2 sats > 90%.

## 2024-03-26 NOTE — ASSESSMENT & PLAN NOTE
LDL has been at goal on 20 mg dose of Crestor, but triglycerides continue to be elevated despite maximal dose of fenofibrate and fish oil supplement. Continue current medications. Triglycerides should improve with lifestyle changes.

## 2024-03-26 NOTE — ASSESSMENT & PLAN NOTE
Above baseline today, but acceptable on second reading, likely due to recent steroids. Continue current antihypertensive medications.

## 2024-03-26 NOTE — ASSESSMENT & PLAN NOTE
Improved on higher dose of trazodone- continue per psychiatry. He has been educated on signs and symptoms of serotonin syndrome and will call immediately if any of these manifest.

## 2024-03-26 NOTE — ASSESSMENT & PLAN NOTE
Recent blood sugar readings volatile due to prednisone taper, which was completed today, so expect improvement over the next week. If next HgbA1c > 8.0, will need to adjust insulin regimen. Continue current doses of Lantus and Amaryl for now.

## 2024-04-15 DIAGNOSIS — F33.0 MILD EPISODE OF RECURRENT MAJOR DEPRESSIVE DISORDER (HCC): ICD-10-CM

## 2024-04-15 RX ORDER — TRAZODONE HYDROCHLORIDE 150 MG/1
150 TABLET ORAL NIGHTLY
Qty: 30 TABLET | Refills: 2 | OUTPATIENT
Start: 2024-04-15

## 2024-04-29 ENCOUNTER — HOSPITAL ENCOUNTER (INPATIENT)
Age: 74
LOS: 4 days | Discharge: HOME | End: 2024-05-03
Attending: EMERGENCY MEDICINE | Admitting: INTERNAL MEDICINE
Payer: MEDICARE

## 2024-04-29 ENCOUNTER — APPOINTMENT (OUTPATIENT)
Dept: GENERAL RADIOLOGY | Age: 74
End: 2024-04-29
Payer: MEDICARE

## 2024-04-29 ENCOUNTER — APPOINTMENT (OUTPATIENT)
Dept: CT IMAGING | Age: 74
End: 2024-04-29
Payer: MEDICARE

## 2024-04-29 DIAGNOSIS — J44.1 COPD WITH ACUTE EXACERBATION (HCC): ICD-10-CM

## 2024-04-29 DIAGNOSIS — R79.89 ELEVATED TROPONIN: ICD-10-CM

## 2024-04-29 DIAGNOSIS — J96.01 ACUTE RESPIRATORY FAILURE WITH HYPOXIA (HCC): ICD-10-CM

## 2024-04-29 DIAGNOSIS — A41.9 SEPTICEMIA (HCC): Primary | ICD-10-CM

## 2024-04-29 DIAGNOSIS — N18.9 CHRONIC KIDNEY DISEASE, UNSPECIFIED CKD STAGE: ICD-10-CM

## 2024-04-29 DIAGNOSIS — J44.9 COPD, MODERATE (HCC): ICD-10-CM

## 2024-04-29 PROBLEM — J96.21 ACUTE ON CHRONIC RESPIRATORY FAILURE WITH HYPOXIA (HCC): Status: ACTIVE | Noted: 2024-04-29

## 2024-04-29 PROBLEM — J18.9 PNEUMONIA: Status: ACTIVE | Noted: 2024-04-29

## 2024-04-29 LAB
ALBUMIN SERPL-MCNC: 3.6 G/DL (ref 3.4–5)
ALBUMIN/GLOB SERPL: 1 {RATIO} (ref 1.1–2.2)
ALP SERPL-CCNC: 36 U/L (ref 40–129)
ALT SERPL-CCNC: 12 U/L (ref 10–40)
ANION GAP SERPL CALCULATED.3IONS-SCNC: 13 MMOL/L (ref 3–16)
APTT BLD: 43 SEC (ref 22.1–36.4)
AST SERPL-CCNC: 22 U/L (ref 15–37)
BACTERIA URNS QL MICRO: ABNORMAL /HPF
BASE EXCESS BLDV CALC-SCNC: -1.9 MMOL/L (ref -3–3)
BASOPHILS # BLD: 0 K/UL (ref 0–0.2)
BASOPHILS NFR BLD: 0 %
BILIRUB SERPL-MCNC: 1.2 MG/DL (ref 0–1)
BILIRUB UR QL STRIP.AUTO: ABNORMAL
BUN SERPL-MCNC: 20 MG/DL (ref 7–20)
CALCIUM SERPL-MCNC: 9.8 MG/DL (ref 8.3–10.6)
CHLORIDE SERPL-SCNC: 102 MMOL/L (ref 99–110)
CLARITY UR: ABNORMAL
CO2 BLDV-SCNC: 55 MMOL/L
CO2 SERPL-SCNC: 20 MMOL/L (ref 21–32)
COHGB MFR BLDV: 3.8 % (ref 0–1.5)
COLOR UR: ABNORMAL
CREAT SERPL-MCNC: 1.8 MG/DL (ref 0.8–1.3)
DEPRECATED RDW RBC AUTO: 18.2 % (ref 12.4–15.4)
DO-HGB MFR BLDV: 4 %
EKG ATRIAL RATE: 81 BPM
EKG DIAGNOSIS: NORMAL
EKG P AXIS: 62 DEGREES
EKG P-R INTERVAL: 166 MS
EKG Q-T INTERVAL: 408 MS
EKG QRS DURATION: 96 MS
EKG QTC CALCULATION (BAZETT): 473 MS
EKG R AXIS: 7 DEGREES
EKG T AXIS: 49 DEGREES
EKG VENTRICULAR RATE: 81 BPM
EOSINOPHIL # BLD: 0 K/UL (ref 0–0.6)
EOSINOPHIL NFR BLD: 0 %
EPI CELLS #/AREA URNS AUTO: 3 /HPF (ref 0–5)
FLUAV RNA RESP QL NAA+PROBE: NOT DETECTED
FLUBV RNA RESP QL NAA+PROBE: NOT DETECTED
GFR SERPLBLD CREATININE-BSD FMLA CKD-EPI: 40 ML/MIN/{1.73_M2}
GLUCOSE BLD-MCNC: 309 MG/DL (ref 70–99)
GLUCOSE BLD-MCNC: 346 MG/DL (ref 70–99)
GLUCOSE SERPL-MCNC: 237 MG/DL (ref 70–99)
GLUCOSE UR STRIP.AUTO-MCNC: NEGATIVE MG/DL
HCO3 BLDV-SCNC: 23.2 MMOL/L (ref 23–29)
HCT VFR BLD AUTO: 37.9 % (ref 40.5–52.5)
HGB BLD-MCNC: 12.2 G/DL (ref 13.5–17.5)
HGB UR QL STRIP.AUTO: NEGATIVE
HYALINE CASTS #/AREA URNS AUTO: 2 /LPF (ref 0–8)
INR PPP: 1.32 (ref 0.85–1.15)
KETONES UR STRIP.AUTO-MCNC: ABNORMAL MG/DL
LACTATE BLDV-SCNC: 1.4 MMOL/L (ref 0.4–1.9)
LEUKOCYTE ESTERASE UR QL STRIP.AUTO: ABNORMAL
LYMPHOCYTES # BLD: 1 K/UL (ref 1–5.1)
LYMPHOCYTES NFR BLD: 5 %
MACROCYTES BLD QL SMEAR: ABNORMAL
MCH RBC QN AUTO: 34.1 PG (ref 26–34)
MCHC RBC AUTO-ENTMCNC: 32.1 G/DL (ref 31–36)
MCV RBC AUTO: 106 FL (ref 80–100)
METHGB MFR BLDV: 0.2 %
MICROCYTES BLD QL SMEAR: ABNORMAL
MONOCYTES # BLD: 0.6 K/UL (ref 0–1.3)
MONOCYTES NFR BLD: 3 %
NEUTROPHILS # BLD: 18.6 K/UL (ref 1.7–7.7)
NEUTROPHILS NFR BLD: 92 %
NITRITE UR QL STRIP.AUTO: NEGATIVE
NT-PROBNP SERPL-MCNC: 1128 PG/ML (ref 0–124)
O2 CT VFR BLDV CALC: 16 VOL %
O2 THERAPY: ABNORMAL
PCO2 BLDV: 39.8 MMHG (ref 40–50)
PERFORMED ON: ABNORMAL
PERFORMED ON: ABNORMAL
PH BLDV: 7.37 [PH] (ref 7.35–7.45)
PH UR STRIP.AUTO: 5 [PH] (ref 5–8)
PLATELET # BLD AUTO: 196 K/UL (ref 135–450)
PLATELET BLD QL SMEAR: ADEQUATE
PMV BLD AUTO: 7.8 FL (ref 5–10.5)
PO2 BLDV: 76.2 MMHG (ref 25–40)
POLYCHROMASIA BLD QL SMEAR: ABNORMAL
POTASSIUM SERPL-SCNC: 4 MMOL/L (ref 3.5–5.1)
PROCALCITONIN SERPL IA-MCNC: 3.77 NG/ML (ref 0–0.15)
PROT SERPL-MCNC: 7.1 G/DL (ref 6.4–8.2)
PROT UR STRIP.AUTO-MCNC: ABNORMAL MG/DL
PROTHROMBIN TIME: 16.6 SEC (ref 11.9–14.9)
RBC # BLD AUTO: 3.58 M/UL (ref 4.2–5.9)
RBC CLUMPS #/AREA URNS AUTO: 7 /HPF (ref 0–4)
SAO2 % BLDV: 96 %
SARS-COV-2 RNA RESP QL NAA+PROBE: NOT DETECTED
SLIDE REVIEW: ABNORMAL
SODIUM SERPL-SCNC: 135 MMOL/L (ref 136–145)
SP GR UR STRIP.AUTO: 1.02 (ref 1–1.03)
SPHEROCYTES BLD QL SMEAR: ABNORMAL
TROPONIN, HIGH SENSITIVITY: 25 NG/L (ref 0–22)
TROPONIN, HIGH SENSITIVITY: 31 NG/L (ref 0–22)
TROPONIN, HIGH SENSITIVITY: 33 NG/L (ref 0–22)
UA COMPLETE W REFLEX CULTURE PNL UR: ABNORMAL
UA DIPSTICK W REFLEX MICRO PNL UR: YES
URN SPEC COLLECT METH UR: ABNORMAL
UROBILINOGEN UR STRIP-ACNC: 1 E.U./DL
WBC # BLD AUTO: 20.2 K/UL (ref 4–11)
WBC #/AREA URNS AUTO: 6 /HPF (ref 0–5)

## 2024-04-29 PROCEDURE — 71250 CT THORAX DX C-: CPT

## 2024-04-29 PROCEDURE — 87040 BLOOD CULTURE FOR BACTERIA: CPT

## 2024-04-29 PROCEDURE — 6370000000 HC RX 637 (ALT 250 FOR IP): Performed by: INTERNAL MEDICINE

## 2024-04-29 PROCEDURE — 6360000002 HC RX W HCPCS: Performed by: EMERGENCY MEDICINE

## 2024-04-29 PROCEDURE — 84484 ASSAY OF TROPONIN QUANT: CPT

## 2024-04-29 PROCEDURE — 96374 THER/PROPH/DIAG INJ IV PUSH: CPT

## 2024-04-29 PROCEDURE — 6360000002 HC RX W HCPCS: Performed by: PHYSICIAN ASSISTANT

## 2024-04-29 PROCEDURE — 99285 EMERGENCY DEPT VISIT HI MDM: CPT

## 2024-04-29 PROCEDURE — 71045 X-RAY EXAM CHEST 1 VIEW: CPT

## 2024-04-29 PROCEDURE — 83036 HEMOGLOBIN GLYCOSYLATED A1C: CPT

## 2024-04-29 PROCEDURE — 85025 COMPLETE CBC W/AUTO DIFF WBC: CPT

## 2024-04-29 PROCEDURE — 87636 SARSCOV2 & INF A&B AMP PRB: CPT

## 2024-04-29 PROCEDURE — 94761 N-INVAS EAR/PLS OXIMETRY MLT: CPT

## 2024-04-29 PROCEDURE — 6360000002 HC RX W HCPCS: Performed by: INTERNAL MEDICINE

## 2024-04-29 PROCEDURE — 85610 PROTHROMBIN TIME: CPT

## 2024-04-29 PROCEDURE — 6370000000 HC RX 637 (ALT 250 FOR IP): Performed by: PHYSICIAN ASSISTANT

## 2024-04-29 PROCEDURE — 2580000003 HC RX 258: Performed by: PHYSICIAN ASSISTANT

## 2024-04-29 PROCEDURE — 87449 NOS EACH ORGANISM AG IA: CPT

## 2024-04-29 PROCEDURE — 83880 ASSAY OF NATRIURETIC PEPTIDE: CPT

## 2024-04-29 PROCEDURE — 85730 THROMBOPLASTIN TIME PARTIAL: CPT

## 2024-04-29 PROCEDURE — 81001 URINALYSIS AUTO W/SCOPE: CPT

## 2024-04-29 PROCEDURE — 93005 ELECTROCARDIOGRAM TRACING: CPT | Performed by: EMERGENCY MEDICINE

## 2024-04-29 PROCEDURE — 6370000000 HC RX 637 (ALT 250 FOR IP): Performed by: EMERGENCY MEDICINE

## 2024-04-29 PROCEDURE — 36415 COLL VENOUS BLD VENIPUNCTURE: CPT

## 2024-04-29 PROCEDURE — 2580000003 HC RX 258: Performed by: INTERNAL MEDICINE

## 2024-04-29 PROCEDURE — 80053 COMPREHEN METABOLIC PANEL: CPT

## 2024-04-29 PROCEDURE — 83605 ASSAY OF LACTIC ACID: CPT

## 2024-04-29 PROCEDURE — 84145 PROCALCITONIN (PCT): CPT

## 2024-04-29 PROCEDURE — 93010 ELECTROCARDIOGRAM REPORT: CPT | Performed by: INTERNAL MEDICINE

## 2024-04-29 PROCEDURE — 94640 AIRWAY INHALATION TREATMENT: CPT

## 2024-04-29 PROCEDURE — 2700000000 HC OXYGEN THERAPY PER DAY

## 2024-04-29 PROCEDURE — 6370000000 HC RX 637 (ALT 250 FOR IP): Performed by: NURSE PRACTITIONER

## 2024-04-29 PROCEDURE — 96375 TX/PRO/DX INJ NEW DRUG ADDON: CPT

## 2024-04-29 PROCEDURE — 1200000000 HC SEMI PRIVATE

## 2024-04-29 PROCEDURE — 82803 BLOOD GASES ANY COMBINATION: CPT

## 2024-04-29 PROCEDURE — 2580000003 HC RX 258: Performed by: EMERGENCY MEDICINE

## 2024-04-29 RX ORDER — INSULIN LISPRO 100 [IU]/ML
0-4 INJECTION, SOLUTION INTRAVENOUS; SUBCUTANEOUS NIGHTLY
Status: DISCONTINUED | OUTPATIENT
Start: 2024-04-29 | End: 2024-04-30

## 2024-04-29 RX ORDER — POTASSIUM CHLORIDE 20 MEQ/1
40 TABLET, EXTENDED RELEASE ORAL PRN
Status: DISCONTINUED | OUTPATIENT
Start: 2024-04-29 | End: 2024-05-03 | Stop reason: HOSPADM

## 2024-04-29 RX ORDER — IPRATROPIUM BROMIDE AND ALBUTEROL SULFATE 2.5; .5 MG/3ML; MG/3ML
1 SOLUTION RESPIRATORY (INHALATION)
Status: DISCONTINUED | OUTPATIENT
Start: 2024-04-29 | End: 2024-04-29

## 2024-04-29 RX ORDER — DOXAZOSIN MESYLATE 4 MG/1
4 TABLET ORAL NIGHTLY
Status: DISCONTINUED | OUTPATIENT
Start: 2024-04-29 | End: 2024-05-03 | Stop reason: HOSPADM

## 2024-04-29 RX ORDER — INSULIN LISPRO 100 [IU]/ML
5 INJECTION, SOLUTION INTRAVENOUS; SUBCUTANEOUS
Status: DISCONTINUED | OUTPATIENT
Start: 2024-04-29 | End: 2024-04-30

## 2024-04-29 RX ORDER — POTASSIUM CHLORIDE 7.45 MG/ML
10 INJECTION INTRAVENOUS PRN
Status: DISCONTINUED | OUTPATIENT
Start: 2024-04-29 | End: 2024-05-03 | Stop reason: HOSPADM

## 2024-04-29 RX ORDER — ACETAMINOPHEN 325 MG/1
650 TABLET ORAL EVERY 6 HOURS PRN
Status: DISCONTINUED | OUTPATIENT
Start: 2024-04-29 | End: 2024-05-03 | Stop reason: HOSPADM

## 2024-04-29 RX ORDER — ONDANSETRON 2 MG/ML
4 INJECTION INTRAMUSCULAR; INTRAVENOUS EVERY 6 HOURS PRN
Status: DISCONTINUED | OUTPATIENT
Start: 2024-04-29 | End: 2024-05-03 | Stop reason: HOSPADM

## 2024-04-29 RX ORDER — SODIUM CHLORIDE 9 MG/ML
INJECTION, SOLUTION INTRAVENOUS PRN
Status: DISCONTINUED | OUTPATIENT
Start: 2024-04-29 | End: 2024-05-03 | Stop reason: HOSPADM

## 2024-04-29 RX ORDER — TRAZODONE HYDROCHLORIDE 50 MG/1
200 TABLET ORAL NIGHTLY
Status: DISCONTINUED | OUTPATIENT
Start: 2024-04-29 | End: 2024-05-03 | Stop reason: HOSPADM

## 2024-04-29 RX ORDER — POLYETHYLENE GLYCOL 3350 17 G/17G
17 POWDER, FOR SOLUTION ORAL DAILY PRN
Status: DISCONTINUED | OUTPATIENT
Start: 2024-04-29 | End: 2024-05-03 | Stop reason: HOSPADM

## 2024-04-29 RX ORDER — ROSUVASTATIN CALCIUM 40 MG/1
40 TABLET, COATED ORAL NIGHTLY
Status: DISCONTINUED | OUTPATIENT
Start: 2024-04-29 | End: 2024-05-03 | Stop reason: HOSPADM

## 2024-04-29 RX ORDER — BENZONATATE 100 MG/1
100 CAPSULE ORAL 3 TIMES DAILY PRN
Status: DISCONTINUED | OUTPATIENT
Start: 2024-04-29 | End: 2024-05-03

## 2024-04-29 RX ORDER — AMLODIPINE BESYLATE 5 MG/1
10 TABLET ORAL DAILY
Status: DISCONTINUED | OUTPATIENT
Start: 2024-04-29 | End: 2024-05-03 | Stop reason: HOSPADM

## 2024-04-29 RX ORDER — METOPROLOL SUCCINATE 25 MG/1
12.5 TABLET, EXTENDED RELEASE ORAL DAILY
Status: DISCONTINUED | OUTPATIENT
Start: 2024-04-29 | End: 2024-05-03 | Stop reason: HOSPADM

## 2024-04-29 RX ORDER — INSULIN LISPRO 100 [IU]/ML
0-8 INJECTION, SOLUTION INTRAVENOUS; SUBCUTANEOUS
Status: DISCONTINUED | OUTPATIENT
Start: 2024-04-29 | End: 2024-04-30

## 2024-04-29 RX ORDER — MAGNESIUM SULFATE IN WATER 40 MG/ML
2000 INJECTION, SOLUTION INTRAVENOUS PRN
Status: DISCONTINUED | OUTPATIENT
Start: 2024-04-29 | End: 2024-05-03 | Stop reason: HOSPADM

## 2024-04-29 RX ORDER — DEXTROSE MONOHYDRATE 100 MG/ML
INJECTION, SOLUTION INTRAVENOUS CONTINUOUS PRN
Status: DISCONTINUED | OUTPATIENT
Start: 2024-04-29 | End: 2024-05-03 | Stop reason: HOSPADM

## 2024-04-29 RX ORDER — IPRATROPIUM BROMIDE AND ALBUTEROL SULFATE 2.5; .5 MG/3ML; MG/3ML
1 SOLUTION RESPIRATORY (INHALATION) EVERY 4 HOURS PRN
Status: DISCONTINUED | OUTPATIENT
Start: 2024-04-29 | End: 2024-05-03 | Stop reason: HOSPADM

## 2024-04-29 RX ORDER — IPRATROPIUM BROMIDE AND ALBUTEROL SULFATE 2.5; .5 MG/3ML; MG/3ML
1 SOLUTION RESPIRATORY (INHALATION)
Status: DISCONTINUED | OUTPATIENT
Start: 2024-04-30 | End: 2024-04-30

## 2024-04-29 RX ORDER — GUAIFENESIN 600 MG/1
600 TABLET, EXTENDED RELEASE ORAL 2 TIMES DAILY
Status: DISCONTINUED | OUTPATIENT
Start: 2024-04-29 | End: 2024-05-03 | Stop reason: HOSPADM

## 2024-04-29 RX ORDER — GLUCAGON 1 MG/ML
1 KIT INJECTION PRN
Status: DISCONTINUED | OUTPATIENT
Start: 2024-04-29 | End: 2024-04-29 | Stop reason: RX

## 2024-04-29 RX ORDER — INSULIN GLARGINE 100 [IU]/ML
15 INJECTION, SOLUTION SUBCUTANEOUS NIGHTLY
Status: DISCONTINUED | OUTPATIENT
Start: 2024-04-29 | End: 2024-04-30

## 2024-04-29 RX ORDER — IPRATROPIUM BROMIDE AND ALBUTEROL SULFATE 2.5; .5 MG/3ML; MG/3ML
1 SOLUTION RESPIRATORY (INHALATION) ONCE
Status: COMPLETED | OUTPATIENT
Start: 2024-04-29 | End: 2024-04-29

## 2024-04-29 RX ORDER — 0.9 % SODIUM CHLORIDE 0.9 %
1000 INTRAVENOUS SOLUTION INTRAVENOUS ONCE
Status: COMPLETED | OUTPATIENT
Start: 2024-04-29 | End: 2024-04-29

## 2024-04-29 RX ORDER — ASPIRIN 81 MG/1
81 TABLET, CHEWABLE ORAL DAILY
Status: DISCONTINUED | OUTPATIENT
Start: 2024-04-29 | End: 2024-05-03 | Stop reason: HOSPADM

## 2024-04-29 RX ORDER — ENOXAPARIN SODIUM 100 MG/ML
40 INJECTION SUBCUTANEOUS DAILY
Status: DISCONTINUED | OUTPATIENT
Start: 2024-04-29 | End: 2024-05-03 | Stop reason: HOSPADM

## 2024-04-29 RX ORDER — BUTALBITAL, ACETAMINOPHEN AND CAFFEINE 50; 325; 40 MG/1; MG/1; MG/1
1 TABLET ORAL EVERY 4 HOURS PRN
Status: DISCONTINUED | OUTPATIENT
Start: 2024-04-29 | End: 2024-05-03 | Stop reason: HOSPADM

## 2024-04-29 RX ORDER — SODIUM CHLORIDE 9 MG/ML
INJECTION, SOLUTION INTRAVENOUS CONTINUOUS
Status: ACTIVE | OUTPATIENT
Start: 2024-04-29 | End: 2024-05-01

## 2024-04-29 RX ORDER — SODIUM CHLORIDE 0.9 % (FLUSH) 0.9 %
5-40 SYRINGE (ML) INJECTION PRN
Status: DISCONTINUED | OUTPATIENT
Start: 2024-04-29 | End: 2024-05-03 | Stop reason: HOSPADM

## 2024-04-29 RX ORDER — SODIUM CHLORIDE 0.9 % (FLUSH) 0.9 %
5-40 SYRINGE (ML) INJECTION EVERY 12 HOURS SCHEDULED
Status: DISCONTINUED | OUTPATIENT
Start: 2024-04-29 | End: 2024-05-03 | Stop reason: HOSPADM

## 2024-04-29 RX ORDER — ONDANSETRON 4 MG/1
4 TABLET, ORALLY DISINTEGRATING ORAL EVERY 8 HOURS PRN
Status: DISCONTINUED | OUTPATIENT
Start: 2024-04-29 | End: 2024-05-03 | Stop reason: HOSPADM

## 2024-04-29 RX ORDER — ACETAMINOPHEN 650 MG/1
650 SUPPOSITORY RECTAL EVERY 6 HOURS PRN
Status: DISCONTINUED | OUTPATIENT
Start: 2024-04-29 | End: 2024-05-03 | Stop reason: HOSPADM

## 2024-04-29 RX ADMIN — IPRATROPIUM BROMIDE AND ALBUTEROL SULFATE 1 DOSE: .5; 3 SOLUTION RESPIRATORY (INHALATION) at 14:34

## 2024-04-29 RX ADMIN — VANCOMYCIN HYDROCHLORIDE 1500 MG: 1.5 INJECTION, POWDER, LYOPHILIZED, FOR SOLUTION INTRAVENOUS at 12:07

## 2024-04-29 RX ADMIN — GUAIFENESIN 600 MG: 600 TABLET, EXTENDED RELEASE ORAL at 21:11

## 2024-04-29 RX ADMIN — CEFEPIME 2000 MG: 2 INJECTION, POWDER, FOR SOLUTION INTRAVENOUS at 11:05

## 2024-04-29 RX ADMIN — ROSUVASTATIN CALCIUM 40 MG: 40 TABLET, COATED ORAL at 21:02

## 2024-04-29 RX ADMIN — SODIUM CHLORIDE, PRESERVATIVE FREE 10 ML: 5 INJECTION INTRAVENOUS at 21:02

## 2024-04-29 RX ADMIN — AMLODIPINE BESYLATE 10 MG: 5 TABLET ORAL at 15:47

## 2024-04-29 RX ADMIN — Medication 2 PUFF: at 21:41

## 2024-04-29 RX ADMIN — METOPROLOL SUCCINATE 12.5 MG: 25 TABLET, EXTENDED RELEASE ORAL at 15:47

## 2024-04-29 RX ADMIN — SODIUM CHLORIDE 1000 ML: 9 INJECTION, SOLUTION INTRAVENOUS at 11:03

## 2024-04-29 RX ADMIN — SODIUM CHLORIDE: 9 INJECTION, SOLUTION INTRAVENOUS at 16:05

## 2024-04-29 RX ADMIN — IPRATROPIUM BROMIDE AND ALBUTEROL SULFATE 1 DOSE: .5; 3 SOLUTION RESPIRATORY (INHALATION) at 13:02

## 2024-04-29 RX ADMIN — IPRATROPIUM BROMIDE AND ALBUTEROL SULFATE 1 DOSE: .5; 3 SOLUTION RESPIRATORY (INHALATION) at 21:40

## 2024-04-29 RX ADMIN — INSULIN GLARGINE 15 UNITS: 100 INJECTION, SOLUTION SUBCUTANEOUS at 21:02

## 2024-04-29 RX ADMIN — DOXAZOSIN 4 MG: 4 TABLET ORAL at 21:00

## 2024-04-29 RX ADMIN — WATER 125 MG: 1 INJECTION INTRAMUSCULAR; INTRAVENOUS; SUBCUTANEOUS at 11:06

## 2024-04-29 RX ADMIN — WATER 40 MG: 1 INJECTION INTRAMUSCULAR; INTRAVENOUS; SUBCUTANEOUS at 15:48

## 2024-04-29 RX ADMIN — PIPERACILLIN AND TAZOBACTAM 3375 MG: 3; .375 INJECTION, POWDER, LYOPHILIZED, FOR SOLUTION INTRAVENOUS at 16:01

## 2024-04-29 RX ADMIN — INSULIN LISPRO 5 UNITS: 100 INJECTION, SOLUTION INTRAVENOUS; SUBCUTANEOUS at 17:48

## 2024-04-29 RX ADMIN — AZITHROMYCIN MONOHYDRATE 500 MG: 500 INJECTION, POWDER, LYOPHILIZED, FOR SOLUTION INTRAVENOUS at 16:06

## 2024-04-29 RX ADMIN — BENZONATATE 100 MG: 100 CAPSULE ORAL at 23:34

## 2024-04-29 RX ADMIN — SERTRALINE 150 MG: 50 TABLET, FILM COATED ORAL at 16:09

## 2024-04-29 RX ADMIN — TRAZODONE HYDROCHLORIDE 200 MG: 50 TABLET ORAL at 21:01

## 2024-04-29 RX ADMIN — WATER 40 MG: 1 INJECTION INTRAMUSCULAR; INTRAVENOUS; SUBCUTANEOUS at 23:34

## 2024-04-29 RX ADMIN — SODIUM CHLORIDE: 9 INJECTION, SOLUTION INTRAVENOUS at 15:51

## 2024-04-29 RX ADMIN — INSULIN LISPRO 6 UNITS: 100 INJECTION, SOLUTION INTRAVENOUS; SUBCUTANEOUS at 17:48

## 2024-04-29 RX ADMIN — INSULIN LISPRO 4 UNITS: 100 INJECTION, SOLUTION INTRAVENOUS; SUBCUTANEOUS at 21:02

## 2024-04-29 RX ADMIN — ASPIRIN 81 MG 81 MG: 81 TABLET ORAL at 15:47

## 2024-04-29 RX ADMIN — PIPERACILLIN AND TAZOBACTAM 3375 MG: 3; .375 INJECTION, POWDER, LYOPHILIZED, FOR SOLUTION INTRAVENOUS at 23:41

## 2024-04-29 RX ADMIN — IPRATROPIUM BROMIDE AND ALBUTEROL SULFATE 1 DOSE: .5; 3 SOLUTION RESPIRATORY (INHALATION) at 10:26

## 2024-04-29 ASSESSMENT — LIFESTYLE VARIABLES
HOW OFTEN DO YOU HAVE A DRINK CONTAINING ALCOHOL: NEVER
HOW MANY STANDARD DRINKS CONTAINING ALCOHOL DO YOU HAVE ON A TYPICAL DAY: PATIENT DOES NOT DRINK

## 2024-04-29 ASSESSMENT — PAIN - FUNCTIONAL ASSESSMENT: PAIN_FUNCTIONAL_ASSESSMENT: 0-10

## 2024-04-29 ASSESSMENT — PAIN DESCRIPTION - PAIN TYPE: TYPE: ACUTE PAIN

## 2024-04-29 ASSESSMENT — ENCOUNTER SYMPTOMS
VOMITING: 0
SORE THROAT: 0
CHEST TIGHTNESS: 1
ABDOMINAL PAIN: 0
SHORTNESS OF BREATH: 1
CONSTIPATION: 0
WHEEZING: 1
STRIDOR: 0
DIARRHEA: 0
COUGH: 1
BACK PAIN: 0
COLOR CHANGE: 0
VOICE CHANGE: 0
NAUSEA: 0
TROUBLE SWALLOWING: 0
EYES NEGATIVE: 1

## 2024-04-29 ASSESSMENT — PAIN DESCRIPTION - LOCATION: LOCATION: CHEST;HEAD

## 2024-04-29 ASSESSMENT — PAIN DESCRIPTION - DESCRIPTORS: DESCRIPTORS: ACHING

## 2024-04-29 ASSESSMENT — PAIN SCALES - GENERAL
PAINLEVEL_OUTOF10: 5
PAINLEVEL_OUTOF10: 0

## 2024-04-29 NOTE — ED NOTES
Patient transported to unit in stable condition by this RN on bedside tele and 4L NC with all patient belongings, wife at bedside.

## 2024-04-29 NOTE — ED PROVIDER NOTES
depressions  T waves: no abnormal inversions  Non-specific T wave changes: present  Prior EKG comparison: EKG dated 9/29/23 is not significantly different      RADIOLOGY  Any applicable radiology studies including x-ray, CT, MRI, and/or ultrasound, were reviewed independently by me in addition to the radiologist.  I reviewed all radiology images and reports as well from this evaluation.    XR CHEST PORTABLE    Result Date: 4/29/2024  No acute cardiopulmonary disease.  No change compared to the prior study.      ED COURSE/MDM  Patient seen and evaluated. Old records reviewed. Labs and imaging reviewed.      After initial evaluation, differential diagnostic considerations included but not limited to: acute coronary syndrome, pulmonary embolism, COPD/asthma, pneumonia, sepsis, pericardial tamponade, pneumothorax, CHF, thoracic aortic dissection, anxiety    The patient's ED workup was notable for concern for sepsis but no criteria for severe sepsis or septic shock.  Concern is for pneumonia as the primary cause, despite his clear chest x-ray he has an elevated procalcitonin although lactate is normal.  He does meet SIRS criteria.  He was given only 1 L of IV fluid given his valve disease history and BNP elevation and absence of hypotension or severe sepsis/shock.  Patient agreeable to a little bit of IV fluid resuscitation for now as opposed to full 30 cc/kg bolus.  Patient was started on broad-spectrum antibiotics though for potential HCAP.  The patient was given DuoNeb breathing treatments and IV Solu-Medrol with some improvement of symptoms as well as the antibiotics.  Patient was also hypoxic on arrival but stable on nasal cannula oxygen and does not require BiPAP or intubation which I did consider.    Is this patient to be included in the SEP-1 Core Measure?  No   Exclusion criteria - the patient is NOT to be included for SEP-1 Core Measure due to:  May have criteria for sepsis, but does not meet criteria for

## 2024-04-29 NOTE — PROGRESS NOTES
Patient has arrived to unit in stable condition. Vitals stable. Patient is awake, alert and oriented. Respirations are easy and unlabored. Patient does not appear to be in distress. Patient oriented to room and call light. Plan of care discussed with patient, patient agreeable. Call light within reach.  No further needs expressed.

## 2024-04-29 NOTE — ED NOTES
Report called to Rocio GAVIN RN, v/u and denies any questions at this time. RN states room is still being cleaned at this time despite showing ready bed in Epic, RN to call back when room is ready.

## 2024-04-29 NOTE — PROGRESS NOTES
4 Eyes Skin Assessment     NAME:  Lv Shin  YOB: 1950  MEDICAL RECORD NUMBER:  [id number]    The patient is being assessed for  Admission    I agree that at least one RN has performed a thorough Head to Toe Skin Assessment on the patient. ALL assessment sites listed below have been assessed.      Areas assessed by both nurses:    Head, Face, Ears, Shoulders, Back, Chest, Arms, Elbows, Hands, Sacrum. Buttock, Coccyx, Ischium, Legs. Feet and Heels, Under Medical Devices , and Other          Does the Patient have a Wound? No noted wound(s)       Carlo Prevention initiated by RN: No  Wound Care Orders initiated by RN: No    Pressure Injury (Stage 3,4, Unstageable, DTI, NWPT, and Complex wounds) if present, place Wound referral order by RN under : No    New Ostomies, if present place, Ostomy referral order under : No     Nurse 1 eSignature: Electronically signed by Adele Randhawa RN on 4/29/24 at 4:26 PM EDT    **SHARE this note so that the co-signing nurse can place an eSignature**    Nurse 2 eSignature: Electronically signed by Ankita Ellington RN on 4/29/24 at 5:22 PM EDT

## 2024-04-29 NOTE — ED NOTES
Patient states needs to use the bathroom, offered urinal or bedside commode due to needing oxygen. Patient refused, states he prefers to get up and walk to the bathroom.     Patient removed Oxygen and ambulated to bathroom without incident.

## 2024-04-29 NOTE — PROGRESS NOTES
Patient seen in ED, room 21.  Admission completed with the following exceptions:  4 Eyes Assessment, Immunizations, Vaccines, Rights and Responsibilities, Orientation to room, Plan of Care, Education/Learning Assessment and Education Plan, white board, height and weight, pain assessment and head to toe assessment.  Patient is alert and oriented X 4.  Patient lives at home w/ wife and is being admitted for COPD w/ acute exacerbation.  Home Medications as well as Outside Sources have been verbally reviewed with patient and updated if appropriate.  Medication reconciliation is now Completed.  All questions answered.   Patient answered yes to having diarrhea within the last two weeks.

## 2024-04-29 NOTE — H&P
HOSPITALISTS HISTORY AND PHYSICAL    4/29/2024 11:19 AM    Patient Information:  LV GMIENEZ is a 73 y.o. male [id number]  PCP:  Esme Danielson MD (Tel: [phone] )    Chief complaint:    Chief Complaint   Patient presents with    Cough    Shortness of Breath     Pt c/o of feeling ill, feels like he has pneumonia again, cough, hypoxic in triage spo2 81% on room air, 95% with 4liters of nc.  Been going on for 2 weeks.  Pt wears 2liters of oxygen at night.          History of Present Illness:  Lv Gimenez is a 73 y.o. male with history of COPD, chronic respiratory failure with hypoxia on 2 L NC at night, CAD s/p CABG X 3, DM 2, CKD 3 came to ER with complaints of SOB.   Patient 81% on RA in ED.  Has been feeling poorly for 2 weeks.  Has worsening chest tightness, SOB, cough and fatigue.  No fevers, chills or NS.  No HA, dizziness, LH or syncope.  No orthopnea, PND or LE edema.  Otherwise complete ROS is negative unless listed above.      REVIEW OF SYSTEMS:   Pertinent positives as noted in HPI.  All other systems were reviewed and are negative.      Past Medical History:   has a past medical history of Chronic renal insufficiency, Colloid cyst of third ventricle ([facility]), Coronary artery disease, DDD (degenerative disc disease), lumbar, Diabetes mellitus, type 2 (HCC), Diverticulitis of colon with perforation, ED (erectile dysfunction), Hyperlipidemia, Hypertension, Hypertensive retinopathy of both eyes, Nephrolithiasis, and Non-STEMI (non-ST elevated myocardial infarction) ([facility]).     Past Surgical History:   has a past surgical history that includes Cardiac catheterization (5/02, 12/03,  3/08); Revision Colostomy (3/08); Lithotripsy (1998); Lithotripsy (1994); partial nephrectomy (1980); Coronary angioplasty with stent (10/05); Coronary angioplasty (1995); Umbilical hernia repair; Knee arthroscopy (2000, 2005);  person. No speech or motor deficits  Psychiatry: Appropriate affect. Not agitated  Skin: Warm, dry, normal turgor, no rash  Brisk capillary refill, peripheral pulses palpable   Labs:  CBC:   Lab Results   Component Value Date/Time    WBC 20.2 04/29/2024 09:54 AM    RBC 3.58 04/29/2024 09:54 AM    HGB 12.2 04/29/2024 09:54 AM    HCT 37.9 04/29/2024 09:54 AM    .0 04/29/2024 09:54 AM    MCH 34.1 04/29/2024 09:54 AM    MCHC 32.1 04/29/2024 09:54 AM    RDW 18.2 04/29/2024 09:54 AM     04/29/2024 09:54 AM    MPV 7.8 04/29/2024 09:54 AM     BMP:    Lab Results   Component Value Date/Time     04/29/2024 09:54 AM    K 4.0 04/29/2024 09:54 AM    K 4.4 03/06/2023 02:26 PM     04/29/2024 09:54 AM    CO2 20 04/29/2024 09:54 AM    BUN 20 04/29/2024 09:54 AM    CREATININE 1.8 04/29/2024 09:54 AM    CALCIUM 9.8 04/29/2024 09:54 AM    GFRAA >60 07/28/2022 11:03 AM    GFRAA >60 05/10/2013 11:38 AM    LABGLOM 40 04/29/2024 09:54 AM    GLUCOSE 237 04/29/2024 09:54 AM     XR CHEST PORTABLE   Final Result   No acute cardiopulmonary disease.  No change compared to the prior study.         CT CHEST WO CONTRAST    (Results Pending)         Problem List  Principal Problem:    COPD with acute exacerbation (HCC)  Active Problems:    Coronary artery disease involving native coronary artery of native heart    S/P CABG x 3    Chronic hypoxemic respiratory failure (HCC)    Benign essential HTN    Chronic kidney disease, stage III (moderate) (HCC)    DM2 (diabetes mellitus, type 2) (HCC)    Acute on chronic respiratory failure with hypoxia (HCC)    Pneumonia  Resolved Problems:    * No resolved hospital problems. *        Assessment/Plan:   Acute COPD exacerbation  Admit to inpatient  Start Solumedrol 40 mg IV q6h  Start Duoneb  Start Dulera  Check viral panel  Check CT Chest w/o contrast  Droplet plus  Start Zithromax and Zosy  PNA  Start Zosyn and Zithromax  Check viral panel  Check CT Chest  Acute on chronic

## 2024-04-29 NOTE — ED NOTES
How does patient ambulate?   [x]Low Fall Risk (ambulates by themselves without support)  []Stand by assist   []Contact Guard   []Front wheel walker  []Wheelchair   []Steady  []Bed bound  []History of Lower Extremity Amputation  []Unknown, did not assess in the emergency department   How does patient take pills?  []Whole with Water  []Crushed in applesauce  []Crushed in pudding  []Other  [x]Unknown no oral medications were given in the ED  Is patient alert?   [x]Alert  []Drowsy but responds to voice  []Doesn't respond to voice but responds to painful stimuli  []Unresponsive  Is patient oriented?   [x]To person  [x]To place  [x]To time  [x]To situation  []Confused  []Agitated  [x]Follows commands  If patient is disoriented or from a Skill Nursing Facility has family been notified of admission?   []Yes   [x]No  Patient belongings?   [x]Cell phone  [x]Wallet   []Dentures  [x]Clothing  Any specific patient or family belongings/needs/dynamics?   Unknown  Miscellaneous comments/pending orders?  No     If there are any additional questions please reach out to the Emergency Department.

## 2024-04-29 NOTE — ACP (ADVANCE CARE PLANNING)
Advanced Care Planning Note.    Purpose of Encounter: Advanced care planning in light of acute COPD exacerbation  Parties In Attendance: Patient  Decisional Capacity: Yes  Subjective: Patient is SOB  Objective: Cr 1.8  Goals of Care Determination: Patient wants full support (CPR, vent, surgery, HD, trach, PEG)  Plan:  IV steroids,  IV Abx, IVF, Nebs, CT Chest  Code Status: Full code  Time spent on Advanced care Plannin minutes  Advanced Care Planning Documents: Completed advanced directives on chart, wife is the POA.    Saji Shore MD  2024 11:19 AM

## 2024-04-29 NOTE — ED PROVIDER NOTES
MONITORING SUPPL (ONE TOUCH ULTRA 2) W/DEVICE KIT    1 kit by Does not apply route daily as needed.    BLOOD GLUCOSE MONITORING SUPPL (PRECISION XTRA) W/DEVICE KIT    As directed    BLOOD GLUCOSE TEST STRIPS (TRUE METRIX BLOOD GLUCOSE TEST) STRIP    USE 1 STRIP TO TEST BLOOD SUGAR TWICE DAILY    BUTALBITAL-ACETAMINOPHEN-CAFFEINE (FIORICET, ESGIC) -40 MG PER TABLET    Take 1 tablet by mouth every 4 hours as needed for Headaches    DOXAZOSIN (CARDURA) 4 MG TABLET    TAKE 1 TABLET BY MOUTH EVERY NIGHT    FENOFIBRATE (TRIGLIDE) 160 MG TABLET    TAKE 1 TABLET BY MOUTH DAILY    FISH OIL-OMEGA-3 FATTY ACIDS 1000 MG CAPSULE    Take 2 capsules by mouth daily    FLUTICASONE-UMECLIDIN-VILANT (TRELEGY ELLIPTA) 100-62.5-25 MCG/ACT AEPB INHALER    Inhale 1 puff into the lungs daily    GLIMEPIRIDE (AMARYL) 4 MG TABLET    TAKE 2 TABLETS BY MOUTH EVERY MORNING    GUAIFENESIN (MUCINEX) 600 MG EXTENDED RELEASE TABLET    Take 1 tablet by mouth 2 times daily    INSULIN PEN NEEDLE (B-D UF III MINI PEN NEEDLES) 31G X 5 MM MISC    USE AS DIRECTED TO INJECT ONCE DAILY AS DIRECTED    [facility]    Patient test twice daily    LANTUS SOLOSTAR 100 UNIT/ML INJECTION PEN    ADMINISTER 15 UNITS UNDER THE SKIN EVERY NIGHT    METOPROLOL SUCCINATE (TOPROL XL) 25 MG EXTENDED RELEASE TABLET    Take 0.5 tablets by mouth daily    NEOMYCIN-POLYMYXIN-DEXAMETH (MAXITROL) 3.5-01966-1.1 OPHTHALMIC SUSPENSION    Use 2 drops in left ear 3x/day for 7 days    ROSUVASTATIN (CRESTOR) 20 MG TABLET    Take 2 tablets by mouth daily    SERTRALINE (ZOLOFT) 100 MG TABLET    Take 1.5 tablets by mouth daily    TRAZODONE (DESYREL) 100 MG TABLET    Take 2 tablets by mouth nightly    TRAZODONE (DESYREL) 150 MG TABLET    Take 1 tablet by mouth nightly       ALLERGIES     Dilaudid [hydromorphone hcl] and Nubain [nalbuphine hcl]    FAMILYHISTORY       Family History   Family history unknown: Yes        SOCIAL HISTORY       Social History     Tobacco Use     calf or thigh tenderness or palpable cord.  Negative Homans   Skin:     General: Skin is warm and dry.      Capillary Refill: Capillary refill takes less than 2 seconds.      Coloration: Skin is not jaundiced or pale.      Findings: No bruising, erythema, lesion or rash.   Neurological:      General: No focal deficit present.      Mental Status: He is alert and oriented to person, place, and time.   Psychiatric:         Behavior: Behavior normal.             DIAGNOSTIC RESULTS   LABS:    Labs Reviewed   PROCALCITONIN - Abnormal; Notable for the following components:       Result Value    Procalcitonin 3.77 (*)     All other components within normal limits   BLOOD GAS, VENOUS - Abnormal; Notable for the following components:    pCO2, Victor Manuel 39.8 (*)     pO2, Victor Manuel 76.2 (*)     Carboxyhemoglobin 3.8 (*)     All other components within normal limits   CBC WITH AUTO DIFFERENTIAL - Abnormal; Notable for the following components:    WBC 20.2 (*)     RBC 3.58 (*)     Hemoglobin 12.2 (*)     Hematocrit 37.9 (*)     .0 (*)     MCH 34.1 (*)     RDW 18.2 (*)     Neutrophils Absolute 18.6 (*)     Macrocytes 1+ (*)     Microcytes 1+ (*)     Polychromasia 1+ (*)     Spherocytes 1+ (*)     All other components within normal limits   COMPREHENSIVE METABOLIC PANEL - Abnormal; Notable for the following components:    Sodium 135 (*)     CO2 20 (*)     Glucose 237 (*)     Creatinine 1.8 (*)     Est, Glom Filt Rate 40 (*)     Albumin/Globulin Ratio 1.0 (*)     Total Bilirubin 1.2 (*)     Alkaline Phosphatase 36 (*)     All other components within normal limits   TROPONIN - Abnormal; Notable for the following components:    Troponin, High Sensitivity 33 (*)     All other components within normal limits   BRAIN NATRIURETIC PEPTIDE - Abnormal; Notable for the following components:    Pro-BNP 1,128 (*)     All other components within normal limits   PROTIME-INR - Abnormal; Notable for the following components:    Protime 16.6 (*)     INR

## 2024-04-30 LAB
ANION GAP SERPL CALCULATED.3IONS-SCNC: 11 MMOL/L (ref 3–16)
BASOPHILS # BLD: 0 K/UL (ref 0–0.2)
BASOPHILS NFR BLD: 0 %
BUN SERPL-MCNC: 26 MG/DL (ref 7–20)
CALCIUM SERPL-MCNC: 9.6 MG/DL (ref 8.3–10.6)
CHLORIDE SERPL-SCNC: 105 MMOL/L (ref 99–110)
CO2 SERPL-SCNC: 18 MMOL/L (ref 21–32)
CREAT SERPL-MCNC: 1.5 MG/DL (ref 0.8–1.3)
DEPRECATED RDW RBC AUTO: 18.2 % (ref 12.4–15.4)
EOSINOPHIL # BLD: 0 K/UL (ref 0–0.6)
EOSINOPHIL NFR BLD: 0 %
EST. AVERAGE GLUCOSE BLD GHB EST-MCNC: 128.4 MG/DL
GFR SERPLBLD CREATININE-BSD FMLA CKD-EPI: 49 ML/MIN/{1.73_M2}
GLUCOSE BLD-MCNC: 249 MG/DL (ref 70–99)
GLUCOSE BLD-MCNC: 303 MG/DL (ref 70–99)
GLUCOSE BLD-MCNC: 324 MG/DL (ref 70–99)
GLUCOSE BLD-MCNC: 389 MG/DL (ref 70–99)
GLUCOSE BLD-MCNC: 398 MG/DL (ref 70–99)
GLUCOSE SERPL-MCNC: 347 MG/DL (ref 70–99)
HBA1C MFR BLD: 6.1 %
HCT VFR BLD AUTO: 35.8 % (ref 40.5–52.5)
HGB BLD-MCNC: 11.5 G/DL (ref 13.5–17.5)
LEGIONELLA AG UR QL: NORMAL
LYMPHOCYTES # BLD: 0.7 K/UL (ref 1–5.1)
LYMPHOCYTES NFR BLD: 3 %
MACROCYTES BLD QL SMEAR: ABNORMAL
MCH RBC QN AUTO: 34.1 PG (ref 26–34)
MCHC RBC AUTO-ENTMCNC: 32.2 G/DL (ref 31–36)
MCV RBC AUTO: 105.8 FL (ref 80–100)
MICROCYTES BLD QL SMEAR: ABNORMAL
MONOCYTES # BLD: 0.2 K/UL (ref 0–1.3)
MONOCYTES NFR BLD: 1 %
NEUTROPHILS # BLD: 21.5 K/UL (ref 1.7–7.7)
NEUTROPHILS NFR BLD: 94 %
NEUTS BAND NFR BLD MANUAL: 2 % (ref 0–7)
NRBC BLD-RTO: 1 /100 WBC
ORGANISM: ABNORMAL
PERFORMED ON: ABNORMAL
PLATELET # BLD AUTO: 222 K/UL (ref 135–450)
PLATELET BLD QL SMEAR: ADEQUATE
PMV BLD AUTO: 7.7 FL (ref 5–10.5)
POLYCHROMASIA BLD QL SMEAR: ABNORMAL
POTASSIUM SERPL-SCNC: 4.9 MMOL/L (ref 3.5–5.1)
RBC # BLD AUTO: 3.38 M/UL (ref 4.2–5.9)
REPORT: NORMAL
RESP PATH DNA+RNA PNL NPH NAA+NON-PROBE: ABNORMAL
S PNEUM AG UR QL: NORMAL
SLIDE REVIEW: ABNORMAL
SODIUM SERPL-SCNC: 134 MMOL/L (ref 136–145)
SPHEROCYTES BLD QL SMEAR: ABNORMAL
WBC # BLD AUTO: 22.4 K/UL (ref 4–11)

## 2024-04-30 PROCEDURE — 6370000000 HC RX 637 (ALT 250 FOR IP): Performed by: STUDENT IN AN ORGANIZED HEALTH CARE EDUCATION/TRAINING PROGRAM

## 2024-04-30 PROCEDURE — 6370000000 HC RX 637 (ALT 250 FOR IP): Performed by: INTERNAL MEDICINE

## 2024-04-30 PROCEDURE — 2580000003 HC RX 258: Performed by: INTERNAL MEDICINE

## 2024-04-30 PROCEDURE — 85025 COMPLETE CBC W/AUTO DIFF WBC: CPT

## 2024-04-30 PROCEDURE — 80048 BASIC METABOLIC PNL TOTAL CA: CPT

## 2024-04-30 PROCEDURE — 94640 AIRWAY INHALATION TREATMENT: CPT

## 2024-04-30 PROCEDURE — 36415 COLL VENOUS BLD VENIPUNCTURE: CPT

## 2024-04-30 PROCEDURE — 94761 N-INVAS EAR/PLS OXIMETRY MLT: CPT

## 2024-04-30 PROCEDURE — 99223 1ST HOSP IP/OBS HIGH 75: CPT | Performed by: INTERNAL MEDICINE

## 2024-04-30 PROCEDURE — 1200000000 HC SEMI PRIVATE

## 2024-04-30 PROCEDURE — 94669 MECHANICAL CHEST WALL OSCILL: CPT

## 2024-04-30 PROCEDURE — 0202U NFCT DS 22 TRGT SARS-COV-2: CPT

## 2024-04-30 PROCEDURE — 6360000002 HC RX W HCPCS: Performed by: INTERNAL MEDICINE

## 2024-04-30 PROCEDURE — 2700000000 HC OXYGEN THERAPY PER DAY

## 2024-04-30 RX ORDER — IPRATROPIUM BROMIDE AND ALBUTEROL SULFATE 2.5; .5 MG/3ML; MG/3ML
1 SOLUTION RESPIRATORY (INHALATION)
Status: DISCONTINUED | OUTPATIENT
Start: 2024-05-01 | End: 2024-05-03 | Stop reason: HOSPADM

## 2024-04-30 RX ORDER — INSULIN LISPRO 100 [IU]/ML
0-16 INJECTION, SOLUTION INTRAVENOUS; SUBCUTANEOUS
Status: DISCONTINUED | OUTPATIENT
Start: 2024-04-30 | End: 2024-05-03 | Stop reason: HOSPADM

## 2024-04-30 RX ORDER — INSULIN GLARGINE 100 [IU]/ML
25 INJECTION, SOLUTION SUBCUTANEOUS NIGHTLY
Status: DISCONTINUED | OUTPATIENT
Start: 2024-04-30 | End: 2024-04-30

## 2024-04-30 RX ORDER — INSULIN GLARGINE 100 [IU]/ML
28 INJECTION, SOLUTION SUBCUTANEOUS NIGHTLY
Status: DISCONTINUED | OUTPATIENT
Start: 2024-04-30 | End: 2024-05-03

## 2024-04-30 RX ORDER — PROMETHAZINE HYDROCHLORIDE AND CODEINE PHOSPHATE 6.25; 1 MG/5ML; MG/5ML
5 SYRUP ORAL EVERY 4 HOURS PRN
Status: DISCONTINUED | OUTPATIENT
Start: 2024-04-30 | End: 2024-05-03 | Stop reason: HOSPADM

## 2024-04-30 RX ORDER — INSULIN LISPRO 100 [IU]/ML
0-4 INJECTION, SOLUTION INTRAVENOUS; SUBCUTANEOUS NIGHTLY
Status: DISCONTINUED | OUTPATIENT
Start: 2024-04-30 | End: 2024-05-03 | Stop reason: HOSPADM

## 2024-04-30 RX ORDER — INSULIN LISPRO 100 [IU]/ML
10 INJECTION, SOLUTION INTRAVENOUS; SUBCUTANEOUS
Status: DISCONTINUED | OUTPATIENT
Start: 2024-04-30 | End: 2024-05-03 | Stop reason: HOSPADM

## 2024-04-30 RX ADMIN — INSULIN GLARGINE 28 UNITS: 100 INJECTION, SOLUTION SUBCUTANEOUS at 21:04

## 2024-04-30 RX ADMIN — Medication 2 PUFF: at 09:41

## 2024-04-30 RX ADMIN — AMLODIPINE BESYLATE 10 MG: 5 TABLET ORAL at 08:34

## 2024-04-30 RX ADMIN — IPRATROPIUM BROMIDE AND ALBUTEROL SULFATE 1 DOSE: .5; 3 SOLUTION RESPIRATORY (INHALATION) at 20:25

## 2024-04-30 RX ADMIN — Medication 5 ML: at 11:03

## 2024-04-30 RX ADMIN — IPRATROPIUM BROMIDE AND ALBUTEROL SULFATE 1 DOSE: .5; 3 SOLUTION RESPIRATORY (INHALATION) at 13:04

## 2024-04-30 RX ADMIN — SERTRALINE 150 MG: 50 TABLET, FILM COATED ORAL at 08:33

## 2024-04-30 RX ADMIN — INSULIN LISPRO 4 UNITS: 100 INJECTION, SOLUTION INTRAVENOUS; SUBCUTANEOUS at 14:03

## 2024-04-30 RX ADMIN — SODIUM CHLORIDE, PRESERVATIVE FREE 10 ML: 5 INJECTION INTRAVENOUS at 21:06

## 2024-04-30 RX ADMIN — SODIUM CHLORIDE, PRESERVATIVE FREE 10 ML: 5 INJECTION INTRAVENOUS at 08:37

## 2024-04-30 RX ADMIN — TRAZODONE HYDROCHLORIDE 200 MG: 50 TABLET ORAL at 21:05

## 2024-04-30 RX ADMIN — DOXAZOSIN 4 MG: 4 TABLET ORAL at 21:05

## 2024-04-30 RX ADMIN — WATER 40 MG: 1 INJECTION INTRAMUSCULAR; INTRAVENOUS; SUBCUTANEOUS at 23:24

## 2024-04-30 RX ADMIN — METOPROLOL SUCCINATE 12.5 MG: 25 TABLET, EXTENDED RELEASE ORAL at 08:33

## 2024-04-30 RX ADMIN — WATER 40 MG: 1 INJECTION INTRAMUSCULAR; INTRAVENOUS; SUBCUTANEOUS at 04:08

## 2024-04-30 RX ADMIN — WATER 40 MG: 1 INJECTION INTRAMUSCULAR; INTRAVENOUS; SUBCUTANEOUS at 18:27

## 2024-04-30 RX ADMIN — AZITHROMYCIN MONOHYDRATE 500 MG: 500 INJECTION, POWDER, LYOPHILIZED, FOR SOLUTION INTRAVENOUS at 15:52

## 2024-04-30 RX ADMIN — BUTALBITAL, ACETAMINOPHEN, AND CAFFEINE 1 TABLET: 50; 325; 40 TABLET ORAL at 23:18

## 2024-04-30 RX ADMIN — INSULIN LISPRO 16 UNITS: 100 INJECTION, SOLUTION INTRAVENOUS; SUBCUTANEOUS at 18:26

## 2024-04-30 RX ADMIN — IPRATROPIUM BROMIDE AND ALBUTEROL SULFATE 1 DOSE: .5; 3 SOLUTION RESPIRATORY (INHALATION) at 16:26

## 2024-04-30 RX ADMIN — INSULIN LISPRO 12 UNITS: 100 INJECTION, SOLUTION INTRAVENOUS; SUBCUTANEOUS at 08:35

## 2024-04-30 RX ADMIN — GUAIFENESIN 600 MG: 600 TABLET, EXTENDED RELEASE ORAL at 21:05

## 2024-04-30 RX ADMIN — INSULIN LISPRO 5 UNITS: 100 INJECTION, SOLUTION INTRAVENOUS; SUBCUTANEOUS at 08:38

## 2024-04-30 RX ADMIN — INSULIN LISPRO 10 UNITS: 100 INJECTION, SOLUTION INTRAVENOUS; SUBCUTANEOUS at 18:27

## 2024-04-30 RX ADMIN — Medication 5 ML: at 16:08

## 2024-04-30 RX ADMIN — IPRATROPIUM BROMIDE AND ALBUTEROL SULFATE 1 DOSE: .5; 3 SOLUTION RESPIRATORY (INHALATION) at 23:40

## 2024-04-30 RX ADMIN — ROSUVASTATIN CALCIUM 40 MG: 40 TABLET, COATED ORAL at 21:06

## 2024-04-30 RX ADMIN — WATER 40 MG: 1 INJECTION INTRAMUSCULAR; INTRAVENOUS; SUBCUTANEOUS at 11:03

## 2024-04-30 RX ADMIN — INSULIN LISPRO 4 UNITS: 100 INJECTION, SOLUTION INTRAVENOUS; SUBCUTANEOUS at 21:04

## 2024-04-30 RX ADMIN — IPRATROPIUM BROMIDE AND ALBUTEROL SULFATE 1 DOSE: .5; 3 SOLUTION RESPIRATORY (INHALATION) at 00:53

## 2024-04-30 RX ADMIN — ASPIRIN 81 MG 81 MG: 81 TABLET ORAL at 08:34

## 2024-04-30 RX ADMIN — IPRATROPIUM BROMIDE AND ALBUTEROL SULFATE 1 DOSE: .5; 3 SOLUTION RESPIRATORY (INHALATION) at 09:41

## 2024-04-30 RX ADMIN — PIPERACILLIN AND TAZOBACTAM 3375 MG: 3; .375 INJECTION, POWDER, LYOPHILIZED, FOR SOLUTION INTRAVENOUS at 23:24

## 2024-04-30 RX ADMIN — ENOXAPARIN SODIUM 40 MG: 100 INJECTION SUBCUTANEOUS at 08:32

## 2024-04-30 RX ADMIN — Medication 5 ML: at 21:04

## 2024-04-30 RX ADMIN — SODIUM CHLORIDE: 9 INJECTION, SOLUTION INTRAVENOUS at 21:04

## 2024-04-30 RX ADMIN — Medication 2 PUFF: at 20:33

## 2024-04-30 RX ADMIN — GUAIFENESIN 600 MG: 600 TABLET, EXTENDED RELEASE ORAL at 08:34

## 2024-04-30 RX ADMIN — PIPERACILLIN AND TAZOBACTAM 3375 MG: 3; .375 INJECTION, POWDER, LYOPHILIZED, FOR SOLUTION INTRAVENOUS at 15:58

## 2024-04-30 RX ADMIN — PIPERACILLIN AND TAZOBACTAM 3375 MG: 3; .375 INJECTION, POWDER, LYOPHILIZED, FOR SOLUTION INTRAVENOUS at 08:46

## 2024-04-30 ASSESSMENT — PAIN SCALES - WONG BAKER: WONGBAKER_NUMERICALRESPONSE: NO HURT

## 2024-04-30 ASSESSMENT — PAIN DESCRIPTION - LOCATION: LOCATION: HEAD

## 2024-04-30 ASSESSMENT — PAIN SCALES - GENERAL
PAINLEVEL_OUTOF10: 0
PAINLEVEL_OUTOF10: 5
PAINLEVEL_OUTOF10: 0

## 2024-04-30 ASSESSMENT — PAIN DESCRIPTION - DESCRIPTORS: DESCRIPTORS: THROBBING

## 2024-04-30 NOTE — CARE COORDINATION
Case Management Assessment  Initial Evaluation    Date/Time of Evaluation: 4/30/2024 4:30 PM  Assessment Completed by: SARAH Yun, BURTW    If patient is discharged prior to next notation, then this note serves as note for discharge by case management.    Patient Name: Lv Shin                   YOB: 1950  Diagnosis: Septicemia (HCC) [A41.9]  Elevated troponin [R79.89]  COPD with acute exacerbation (HCC) [J44.1]  Acute respiratory failure with hypoxia (HCC) [J96.01]  Chronic kidney disease, unspecified CKD stage [N18.9]                   Date / Time: 4/29/2024  9:21 AM    Patient Admission Status: Inpatient   Readmission Risk (Low < 19, Mod (19-27), High > 27): Readmission Risk Score: 17.2    Current PCP: Esme Danielson MD  PCP verified by CM? Yes    Chart Reviewed: Yes      History Provided by: Patient  Patient Orientation: Alert and Oriented    Patient Cognition:      Hospitalization in the last 30 days (Readmission):  No    If yes, Readmission Assessment in  Navigator will be completed.    Advance Directives:      Code Status: Full Code   Patient's Primary Decision Maker is: Named in Scanned ACP Document    Primary Decision Maker: Ramses Shinna - Spouse - [phone]    Secondary Decision Maker: Adele Amador - Child - [phone]    Discharge Planning:    Patient lives with: Spouse/Significant Other Type of Home: House (1 level - 1 step)  Primary Care Giver: Self  Patient Support Systems include: Spouse/Significant Other   Current Financial resources: Medicare  Current community resources: None  Current services prior to admission: Durable Medical Equipment, Oxygen Therapy (active with [employer] home oxygen.  Patient only has concentrator at home for night time.)            Current DME: Other (Comment) (walker, [employer], [facility], [facility])            Type of Home Care services:  None    ADLS  Prior functional level: Independent in ADLs/IADLs  Current functional level: Independent in  ADLs/IADLs    PT AM-PAC:   /24  OT AM-PAC:   /24    Family can provide assistance at DC: Yes  Would you like Case Management to discuss the discharge plan with any other family members/significant others, and if so, who? Yes (w/spouse)  Plans to Return to Present Housing: Yes  Other Identified Issues/Barriers to RETURNING to current housing: None  Potential Assistance needed at discharge: Other (Comment) (possible need for continuous oxygen)            Potential DME:    Patient expects to discharge to: House  Plan for transportation at discharge: Family    Financial    Payor: [employer] MEDICARE / Plan: [employer] GOLD PLUS HMO / Product Type: *No Product type* /     Potential assistance Purchasing Medications: No  Meds-to-Beds request:        [id number] - [place], [place] - [address]  - P [phone] - F [phone] [address] [facility] [address]  Phone: [phone] Fax: [phone]      Notes:    Factors facilitating achievement of predicted outcomes: Family support and Cooperative    Barriers to discharge: None    Additional Case Management Notes:   Patient declined PT/OT evaluations and reported is independent with ADLs.  Confirmed with Dulce [phone] at [facility] that patient is active with their home oxygen services.  Currently he only has a home concentrator using 2 liters O2 at nighttime.  If patient needs continuous oxygen, he will need a new home O2 eval and orders faxed to [facility] at [phone].  [facility] Renan's office if needed.  KEITH will continue to follow.    The Plan for Transition of Care is related to the following treatment goals of Septicemia (HCC) [A41.9]  Elevated troponin [R79.89]  COPD with acute exacerbation (HCC) [J44.1]  Acute respiratory failure with hypoxia (HCC) [J96.01]  Chronic kidney disease, unspecified CKD stage [N18.9]    IF APPLICABLE: The Patient and/or patient representative Lv and his family were provided with a choice of provider and

## 2024-04-30 NOTE — PROGRESS NOTES
Hospitalist Progress Note      PCP: Esme Danielson MD    Date of Admission: 4/29/2024    Chief Complaint: SOB    Hospital Course:  Lv Shin is a 73 y.o. male with history of COPD, chronic respiratory failure with hypoxia on 2 L NC at night, CAD s/p CABG X 3, DM 2, CKD 3 came to ER with complaints of SOB.   Admitted as inpatient for acute COPD exacerbation.  Started on IV steroids, nebs and IV Zithromax and Zosyn.    CT Chest:   IMPRESSION:  Progressive advanced emphysema and pulmonary fibrosis.  No acute abnormality.    Pulmonary consulted on 4/30.    Parainfluenza positive.    Subjective:  Patient continues coughing and feeling SOB.  No CP, HA or abdominal pain.  No fevers.       Medications:  Reviewed    Infusion Medications    sodium chloride 25 mL/hr at 04/29/24 1605    dextrose      sodium chloride 75 mL/hr at 04/30/24 0653     Scheduled Medications    insulin lispro  0-16 Units SubCUTAneous TID WC    insulin lispro  0-4 Units SubCUTAneous Nightly    amLODIPine  10 mg Oral Daily    aspirin  81 mg Oral Daily    doxazosin  4 mg Oral Nightly    guaiFENesin  600 mg Oral BID    insulin glargine  15 Units SubCUTAneous Nightly    metoprolol succinate  12.5 mg Oral Daily    rosuvastatin  40 mg Oral Nightly    sertraline  150 mg Oral Daily    traZODone  200 mg Oral Nightly    sodium chloride flush  5-40 mL IntraVENous 2 times per day    enoxaparin  40 mg SubCUTAneous Daily    insulin lispro  5 Units SubCUTAneous TID WC    methylPREDNISolone  40 mg IntraVENous Q6H    mometasone-formoterol  2 puff Inhalation BID RT    azithromycin  500 mg IntraVENous Q24H    piperacillin-tazobactam  3,375 mg IntraVENous Q8H    ipratropium 0.5 mg-albuterol 2.5 mg  1 Dose Inhalation Q4H RT     PRN Meds: promethazine-codeine, butalbital-acetaminophen-caffeine, sodium chloride flush, sodium chloride, potassium chloride **OR** potassium alternative oral replacement **OR** potassium chloride, magnesium sulfate, ondansetron **OR**  SPECGRAV 1.025 04/29/2024 12:22 PM    GLUCOSEU Negative 04/29/2024 12:22 PM       Radiology:  CT CHEST WO CONTRAST   Final Result   Progressive advanced emphysema and pulmonary fibrosis.  No acute abnormality.         XR CHEST PORTABLE   Final Result   No acute cardiopulmonary disease.  No change compared to the prior study.             IP CONSULT TO HOSPITALIST  IP CONSULT TO PULMONOLOGY    Assessment/Plan:    Active Hospital Problems    Diagnosis     Chronic hypoxemic respiratory failure ([facility]) [J96.11]      Priority: Medium    S/P CABG x 3 [Z95.1]      Priority: Medium    Coronary artery disease involving native coronary artery of native heart [I25.10]      Priority: Medium    Acute on chronic respiratory failure with hypoxia ([facility]) [J96.21]     Pneumonia [J18.9]     COPD with acute exacerbation ([facility]) [J44.1]     DM2 (diabetes mellitus, type 2) ([facility]) [E11.9]     Chronic kidney disease, stage III (moderate) ([facility]) [N18.30]     Benign essential HTN [I10]      Acute COPD exacerbation  CT Chest reviewed  Will ask Pulm to evaluate  Add Phenergan with codeine cough syrup PRN  Continue Solumedrol 40 mg IV q6h  Continue Duoneb  Continue Dulera  Parainfluenza noted viral panel  DC droplet plus  Continue Zithromax and Zosyn  PNA  Continue Zosyn and Zithromax  Parainfluenza viral panel  Acute on chronic respiratory failure with hypoxia  On 2 L O2 via NC at night at home  Was on 4 L NC, now on 2 L NC  DM 2  Increase Lantus 15 to 25 U qhs  Increase Humalog 5 to 10 U with meals  SSI  Hypoglycemia orders  CAD   Continue ASA, Crestor and Toprol XL  CKD 3  Cr 1.2-2.0 at baseline          DVT Prophylaxis: Lovenox  Diet: ADULT DIET; Regular  ADULT ORAL NUTRITION SUPPLEMENT; Breakfast, Lunch, Dinner; Diabetic Oral Supplement  Code Status: Full Code  PT/OT Eval Status: Patient refused    Dispo - Home    Discussed with patient, nursing and CM.    Discussed with Dr Merchant (Pulm).  She has kindly agreed to evaluate patient's pulmonary

## 2024-04-30 NOTE — CONSULTS
PULMONARY AND CRITICAL CARE CONSULTATION NOTE    CONSULTING PHYSICIAN:      REASON FOR CONSULT:   Chief Complaint   Patient presents with    Cough    Shortness of Breath     Pt c/o of feeling ill, feels like he has pneumonia again, cough, hypoxic in triage spo2 81% on room air, 95% with 4liters of nc.  Been going on for 2 weeks.  Pt wears 2liters of oxygen at night.         DATE OF CONSULT: 4/30/2024    HISTORY OF PRESENT ILLNESS: 73 y.o. year old male with past medical history of COPD, FEV1 77%, chronic hypoxic respite failure with on home oxygen at night, CAD s/p CABG, diabetes mellitus type 2, CKD stage III who presented to hospital with complaints of increased shortness of breath along with cough productive of whitish phlegm and increased wheezing.  Symptoms: Have been going on for roughly 2 weeks and progressively getting worse.  He denies any fever or chest pain or hemoptysis.  Lab work showed leukocytosis with white cell count of 22,000 along with elevated procalcitonin of 3.7.  Blood cultures negative.  Streptococcal and Legionella urine antigen negative.  Patient had CT chest performed that showed centrilobular emphysema with bilateral peripheral interstitial septal thickening involving lower lobes predominantly with possible honeycombing.  There was some evidence of bilateral peripheral subtle interstitial septal thickening on 2021 CT chest.  Patient has been noticing that his dyspnea exertion has been progressively getting worse over the last few months to 1 year.    I personally reviewed and interpreted the images.    REVIEW OF SYSTEMS:   CONSTITUTIONAL SYMPTOMS: The patient denies fever, fatigue, night sweats, weight loss or weight gain.   HEENT: No vision changes. No tinnitus, Denies sinus pain. No hoarseness, or dysphagia.   NECK: Patient denies swelling in the neck.   CARDIOVASCULAR: Denies chest pain, palpitation, syncope.  RESPIRATORY: See above.   GASTROINTESTINAL: Denies nausea, abdominal  every 4 hours as needed for Headaches 60 tablet 2    [employer] Lancets Thin [facility] [place] Patient test twice daily 150 each 5    fluticasone-umeclidin-vilant (TRELEGY ELLIPTA) 100-62.5-25 MCG/ACT AEPB inhaler Inhale 1 puff into the lungs daily 180 each 0    guaiFENesin (MUCINEX) 600 MG extended release tablet Take 1 tablet by mouth 2 times daily      aspirin 81 MG chewable tablet CHEW AND SWALLOW 1 TABLET BY MOUTH DAILY 90 tablet 3    metoprolol succinate (TOPROL XL) 25 MG extended release tablet Take 0.5 tablets by mouth daily 30 tablet 5    amLODIPine (NORVASC) 10 MG tablet TAKE 1 TABLET BY MOUTH DAILY 90 tablet 1    neomycin-polymyxin-dexameth (MAXITROL) 3.5-38461-4.1 ophthalmic suspension Use 2 drops in left ear 3x/day for 7 days (Patient not taking: Reported on 4/29/2024) 5 mL 0    LANTUS SOLOSTAR 100 UNIT/ML injection pen ADMINISTER 15 UNITS UNDER THE SKIN EVERY NIGHT 15 mL 5    rosuvastatin (CRESTOR) 20 MG tablet Take 2 tablets by mouth daily 180 tablet 1    acetaminophen (TYLENOL) 325 MG tablet Take 2 tablets by mouth every 6 hours as needed for Pain      Blood Glucose Monitoring Suppl (PRECISION XTRA) w/Device KIT As directed 1 kit 0    Blood Glucose Monitoring Suppl (ONE TOUCH ULTRA 2) W/DEVICE KIT 1 kit by Does not apply route daily as needed. 1 kit 0    fish oil-omega-3 fatty acids 1000 MG capsule Take 2 capsules by mouth daily          insulin lispro  0-16 Units SubCUTAneous TID WC    insulin lispro  0-4 Units SubCUTAneous Nightly    insulin glargine  25 Units SubCUTAneous Nightly    insulin lispro  10 Units SubCUTAneous TID     amLODIPine  10 mg Oral Daily    aspirin  81 mg Oral Daily    doxazosin  4 mg Oral Nightly    guaiFENesin  600 mg Oral BID    metoprolol succinate  12.5 mg Oral Daily    rosuvastatin  40 mg Oral Nightly    sertraline  150 mg Oral Daily    traZODone  200 mg Oral Nightly    sodium chloride flush  5-40 mL IntraVENous 2 times per day    enoxaparin  40 mg SubCUTAneous Daily

## 2024-04-30 NOTE — PLAN OF CARE
Problem: Pain  Goal: Verbalizes/displays adequate comfort level or baseline comfort level  4/30/2024 0018 by Lorri Diana, RN  Outcome: Progressing  4/29/2024 1828 by Adele Garcia, RN  Outcome: Progressing     Problem: Safety - Adult  Goal: Free from fall injury  Outcome: Progressing

## 2024-05-01 LAB
ANION GAP SERPL CALCULATED.3IONS-SCNC: 9 MMOL/L (ref 3–16)
BASOPHILS # BLD: 0 K/UL (ref 0–0.2)
BASOPHILS NFR BLD: 0.1 %
BUN SERPL-MCNC: 22 MG/DL (ref 7–20)
CALCIUM SERPL-MCNC: 9.6 MG/DL (ref 8.3–10.6)
CHLORIDE SERPL-SCNC: 110 MMOL/L (ref 99–110)
CO2 SERPL-SCNC: 21 MMOL/L (ref 21–32)
CREAT SERPL-MCNC: 1.3 MG/DL (ref 0.8–1.3)
DEPRECATED RDW RBC AUTO: 18.5 % (ref 12.4–15.4)
EOSINOPHIL # BLD: 0 K/UL (ref 0–0.6)
EOSINOPHIL NFR BLD: 0.1 %
GFR SERPLBLD CREATININE-BSD FMLA CKD-EPI: 58 ML/MIN/{1.73_M2}
GLUCOSE BLD-MCNC: 167 MG/DL (ref 70–99)
GLUCOSE BLD-MCNC: 259 MG/DL (ref 70–99)
GLUCOSE BLD-MCNC: 286 MG/DL (ref 70–99)
GLUCOSE BLD-MCNC: 293 MG/DL (ref 70–99)
GLUCOSE SERPL-MCNC: 278 MG/DL (ref 70–99)
HCT VFR BLD AUTO: 34.5 % (ref 40.5–52.5)
HGB BLD-MCNC: 11.3 G/DL (ref 13.5–17.5)
LYMPHOCYTES # BLD: 0.6 K/UL (ref 1–5.1)
LYMPHOCYTES NFR BLD: 3.3 %
MCH RBC QN AUTO: 34.6 PG (ref 26–34)
MCHC RBC AUTO-ENTMCNC: 32.8 G/DL (ref 31–36)
MCV RBC AUTO: 105.5 FL (ref 80–100)
MONOCYTES # BLD: 0.2 K/UL (ref 0–1.3)
MONOCYTES NFR BLD: 1 %
NEUTROPHILS # BLD: 18.4 K/UL (ref 1.7–7.7)
NEUTROPHILS NFR BLD: 95.5 %
PERFORMED ON: ABNORMAL
PLATELET # BLD AUTO: 221 K/UL (ref 135–450)
PMV BLD AUTO: 8.2 FL (ref 5–10.5)
POTASSIUM SERPL-SCNC: 4.6 MMOL/L (ref 3.5–5.1)
RBC # BLD AUTO: 3.27 M/UL (ref 4.2–5.9)
SODIUM SERPL-SCNC: 140 MMOL/L (ref 136–145)
WBC # BLD AUTO: 19.2 K/UL (ref 4–11)

## 2024-05-01 PROCEDURE — 94669 MECHANICAL CHEST WALL OSCILL: CPT

## 2024-05-01 PROCEDURE — 6370000000 HC RX 637 (ALT 250 FOR IP): Performed by: INTERNAL MEDICINE

## 2024-05-01 PROCEDURE — 2700000000 HC OXYGEN THERAPY PER DAY

## 2024-05-01 PROCEDURE — 85025 COMPLETE CBC W/AUTO DIFF WBC: CPT

## 2024-05-01 PROCEDURE — 94761 N-INVAS EAR/PLS OXIMETRY MLT: CPT

## 2024-05-01 PROCEDURE — 36415 COLL VENOUS BLD VENIPUNCTURE: CPT

## 2024-05-01 PROCEDURE — 6370000000 HC RX 637 (ALT 250 FOR IP): Performed by: STUDENT IN AN ORGANIZED HEALTH CARE EDUCATION/TRAINING PROGRAM

## 2024-05-01 PROCEDURE — 80048 BASIC METABOLIC PNL TOTAL CA: CPT

## 2024-05-01 PROCEDURE — 2580000003 HC RX 258: Performed by: INTERNAL MEDICINE

## 2024-05-01 PROCEDURE — 6360000002 HC RX W HCPCS: Performed by: INTERNAL MEDICINE

## 2024-05-01 PROCEDURE — 99233 SBSQ HOSP IP/OBS HIGH 50: CPT | Performed by: INTERNAL MEDICINE

## 2024-05-01 PROCEDURE — 94640 AIRWAY INHALATION TREATMENT: CPT

## 2024-05-01 PROCEDURE — 1200000000 HC SEMI PRIVATE

## 2024-05-01 RX ADMIN — AZITHROMYCIN MONOHYDRATE 500 MG: 500 INJECTION, POWDER, LYOPHILIZED, FOR SOLUTION INTRAVENOUS at 16:09

## 2024-05-01 RX ADMIN — DOXAZOSIN 4 MG: 4 TABLET ORAL at 20:45

## 2024-05-01 RX ADMIN — ASPIRIN 81 MG 81 MG: 81 TABLET ORAL at 08:45

## 2024-05-01 RX ADMIN — INSULIN LISPRO 10 UNITS: 100 INJECTION, SOLUTION INTRAVENOUS; SUBCUTANEOUS at 13:37

## 2024-05-01 RX ADMIN — INSULIN LISPRO 10 UNITS: 100 INJECTION, SOLUTION INTRAVENOUS; SUBCUTANEOUS at 18:06

## 2024-05-01 RX ADMIN — ROSUVASTATIN CALCIUM 40 MG: 40 TABLET, COATED ORAL at 20:45

## 2024-05-01 RX ADMIN — Medication 5 ML: at 18:14

## 2024-05-01 RX ADMIN — PIPERACILLIN AND TAZOBACTAM 3375 MG: 3; .375 INJECTION, POWDER, LYOPHILIZED, FOR SOLUTION INTRAVENOUS at 08:44

## 2024-05-01 RX ADMIN — TRAZODONE HYDROCHLORIDE 200 MG: 50 TABLET ORAL at 22:13

## 2024-05-01 RX ADMIN — Medication 5 ML: at 09:00

## 2024-05-01 RX ADMIN — Medication 5 ML: at 00:40

## 2024-05-01 RX ADMIN — PIPERACILLIN AND TAZOBACTAM 3375 MG: 3; .375 INJECTION, POWDER, LYOPHILIZED, FOR SOLUTION INTRAVENOUS at 17:31

## 2024-05-01 RX ADMIN — IPRATROPIUM BROMIDE AND ALBUTEROL SULFATE 1 DOSE: 2.5; .5 SOLUTION RESPIRATORY (INHALATION) at 16:43

## 2024-05-01 RX ADMIN — SODIUM CHLORIDE, PRESERVATIVE FREE 10 ML: 5 INJECTION INTRAVENOUS at 08:46

## 2024-05-01 RX ADMIN — IPRATROPIUM BROMIDE AND ALBUTEROL SULFATE 1 DOSE: 2.5; .5 SOLUTION RESPIRATORY (INHALATION) at 09:09

## 2024-05-01 RX ADMIN — INSULIN GLARGINE 28 UNITS: 100 INJECTION, SOLUTION SUBCUTANEOUS at 22:04

## 2024-05-01 RX ADMIN — INSULIN LISPRO 8 UNITS: 100 INJECTION, SOLUTION INTRAVENOUS; SUBCUTANEOUS at 08:46

## 2024-05-01 RX ADMIN — SERTRALINE 150 MG: 50 TABLET, FILM COATED ORAL at 08:44

## 2024-05-01 RX ADMIN — Medication 5 ML: at 13:37

## 2024-05-01 RX ADMIN — SODIUM CHLORIDE: 9 INJECTION, SOLUTION INTRAVENOUS at 16:07

## 2024-05-01 RX ADMIN — WATER 40 MG: 1 INJECTION INTRAMUSCULAR; INTRAVENOUS; SUBCUTANEOUS at 20:46

## 2024-05-01 RX ADMIN — INSULIN LISPRO 10 UNITS: 100 INJECTION, SOLUTION INTRAVENOUS; SUBCUTANEOUS at 08:47

## 2024-05-01 RX ADMIN — Medication 5 ML: at 22:12

## 2024-05-01 RX ADMIN — IPRATROPIUM BROMIDE AND ALBUTEROL SULFATE 1 DOSE: 2.5; .5 SOLUTION RESPIRATORY (INHALATION) at 12:26

## 2024-05-01 RX ADMIN — INSULIN LISPRO 8 UNITS: 100 INJECTION, SOLUTION INTRAVENOUS; SUBCUTANEOUS at 13:37

## 2024-05-01 RX ADMIN — METOPROLOL SUCCINATE 12.5 MG: 25 TABLET, EXTENDED RELEASE ORAL at 08:45

## 2024-05-01 RX ADMIN — GUAIFENESIN 600 MG: 600 TABLET, EXTENDED RELEASE ORAL at 20:45

## 2024-05-01 RX ADMIN — Medication 2 PUFF: at 09:09

## 2024-05-01 RX ADMIN — IPRATROPIUM BROMIDE AND ALBUTEROL SULFATE 1 DOSE: 2.5; .5 SOLUTION RESPIRATORY (INHALATION) at 20:13

## 2024-05-01 RX ADMIN — GUAIFENESIN 600 MG: 600 TABLET, EXTENDED RELEASE ORAL at 08:45

## 2024-05-01 RX ADMIN — ACETAMINOPHEN 650 MG: 325 TABLET ORAL at 20:45

## 2024-05-01 RX ADMIN — AMLODIPINE BESYLATE 10 MG: 5 TABLET ORAL at 08:45

## 2024-05-01 RX ADMIN — WATER 40 MG: 1 INJECTION INTRAMUSCULAR; INTRAVENOUS; SUBCUTANEOUS at 14:45

## 2024-05-01 RX ADMIN — WATER 40 MG: 1 INJECTION INTRAMUSCULAR; INTRAVENOUS; SUBCUTANEOUS at 04:23

## 2024-05-01 RX ADMIN — Medication 2 PUFF: at 20:17

## 2024-05-01 RX ADMIN — SODIUM CHLORIDE, PRESERVATIVE FREE 10 ML: 5 INJECTION INTRAVENOUS at 14:45

## 2024-05-01 ASSESSMENT — PAIN SCALES - WONG BAKER
WONGBAKER_NUMERICALRESPONSE: NO HURT

## 2024-05-01 ASSESSMENT — PAIN SCALES - GENERAL: PAINLEVEL_OUTOF10: 8

## 2024-05-01 ASSESSMENT — PAIN DESCRIPTION - LOCATION: LOCATION: HEAD

## 2024-05-01 NOTE — RT PROTOCOL NOTE
RT Nebulizer Bronchodilator Protocol Note    There is a bronchodilator order in the chart from a provider indicating to follow the RT Bronchodilator Protocol and there is an “Initiate RT Bronchodilator Protocol” order as well (see protocol at bottom of note).    CXR Findings:  XR CHEST PORTABLE    Result Date: 4/29/2024  No acute cardiopulmonary disease.  No change compared to the prior study.       The findings from the last RT Protocol Assessment were as follows:  Smoking: Chronic pulmonary disease  Respiratory Pattern: Dyspnea on exertion or RR 21-25 bpm  Breath Sounds: Inspiratory and expiratory or bilateral wheezing and/or rhonchi  Cough: Strong, spontaneous, non-productive  Indication for Bronchodilator Therapy: Decreased or absent breath sounds  Bronchodilator Assessment Score: 10    Aerosolized bronchodilator medication orders have been revised according to the RT Nebulizer Bronchodilator Protocol below.    Respiratory Therapist to perform RT Therapy Protocol Assessment initially then follow the protocol.  Repeat RT Therapy Protocol Assessment PRN for score 0-3 or on second treatment, BID, and PRN for scores above 3.    No Indications - adjust the frequency to every 6 hours PRN wheezing or bronchospasm, if no treatments needed after 48 hours then discontinue using Per Protocol order mode.     If indication present, adjust the RT bronchodilator orders based on the Bronchodilator Assessment Score as indicated below.  If a patient is on this medication at home then do not decrease Frequency below that used at home.    0-3 - enter or revise RT bronchodilator order(s) to equivalent RT Bronchodilator order with Frequency of every 4 hours PRN for wheezing or increased work of breathing using Per Protocol order mode.       4-6 - enter or revise RT Bronchodilator order(s) to two equivalent RT bronchodilator orders with one order with BID Frequency and one order with Frequency of every 4 hours PRN wheezing or

## 2024-05-01 NOTE — PROGRESS NOTES
Pt complain of diarrhea multiple times.  Pt stool was collected and charge nurse said its not acceptable to send down.  Stool is thick and not formed.  Its not diarrhea.    Pt educated on the fact that its not diarrhea and is requesting something to stop it.

## 2024-05-01 NOTE — PROGRESS NOTES
04/30/24 2035   Treatment   Treatment Type Vibratory mucous clearing therapy or intervention

## 2024-05-01 NOTE — PLAN OF CARE
Problem: Pain  Goal: Verbalizes/displays adequate comfort level or baseline comfort level  4/30/2024 2216 by Lorri Diana RN  Outcome: Progressing  4/30/2024 1527 by Ana Jerez RN  Outcome: Progressing     Problem: Safety - Adult  Goal: Free from fall injury  4/30/2024 2216 by Lorri Diana RN  Outcome: Progressing  4/30/2024 1527 by Ana Jerez RN  Outcome: Progressing     Problem: Chronic Conditions and Co-morbidities  Goal: Patient's chronic conditions and co-morbidity symptoms are monitored and maintained or improved  4/30/2024 2216 by Lorri Diana RN  Outcome: Progressing  4/30/2024 1527 by Ana Jerez RN  Outcome: Progressing

## 2024-05-01 NOTE — PROGRESS NOTES
Hospitalist Progress Note      PCP: Esme Danielson MD    Date of Admission: 4/29/2024    Chief Complaint: SOB    Hospital Course:  Lv Shin is a 73 y.o. male with history of COPD, chronic respiratory failure with hypoxia on 2 L NC at night, CAD s/p CABG X 3, DM 2, CKD 3 came to ER with complaints of SOB.   Admitted as inpatient for acute COPD exacerbation.  Started on IV steroids, nebs and IV Zithromax and Zosyn.    CT Chest:   IMPRESSION:  Progressive advanced emphysema and pulmonary fibrosis.  No acute abnormality.    Pulmonary consulted on 4/30.    Parainfluenza positive.    Subjective:  Patient still coughing and feeling SOB with cough.  No CP, HA or abdominal pain.  No fevers.       Medications:  Reviewed    Infusion Medications    sodium chloride 10 mL/hr at 05/01/24 1607    dextrose       Scheduled Medications    methylPREDNISolone  40 mg IntraVENous Q8H    insulin lispro  0-16 Units SubCUTAneous TID WC    insulin lispro  0-4 Units SubCUTAneous Nightly    insulin lispro  10 Units SubCUTAneous TID WC    insulin glargine  28 Units SubCUTAneous Nightly    ipratropium 0.5 mg-albuterol 2.5 mg  1 Dose Inhalation 4x Daily RT    amLODIPine  10 mg Oral Daily    aspirin  81 mg Oral Daily    doxazosin  4 mg Oral Nightly    guaiFENesin  600 mg Oral BID    metoprolol succinate  12.5 mg Oral Daily    rosuvastatin  40 mg Oral Nightly    sertraline  150 mg Oral Daily    traZODone  200 mg Oral Nightly    sodium chloride flush  5-40 mL IntraVENous 2 times per day    enoxaparin  40 mg SubCUTAneous Daily    mometasone-formoterol  2 puff Inhalation BID RT    azithromycin  500 mg IntraVENous Q24H    piperacillin-tazobactam  3,375 mg IntraVENous Q8H     PRN Meds: promethazine-codeine, butalbital-acetaminophen-caffeine, sodium chloride flush, sodium chloride, potassium chloride **OR** potassium alternative oral replacement **OR** potassium chloride, magnesium sulfate, ondansetron **OR** ondansetron, polyethylene glycol,  acetaminophen **OR** acetaminophen, dextrose bolus **OR** dextrose bolus, dextrose, benzonatate, ipratropium 0.5 mg-albuterol 2.5 mg      Intake/Output Summary (Last 24 hours) at 5/1/2024 1802  Last data filed at 5/1/2024 1420  Gross per 24 hour   Intake 1659.95 ml   Output --   Net 1659.95 ml         Physical Exam Performed:    BP (!) 145/77   Pulse 80   Temp 98.1 °F (36.7 °C) (Oral)   Resp 18   Ht 1.829 m (6')   Wt 80.7 kg (178 lb)   SpO2 91%   BMI 24.14 kg/m²     General appearance:  Appears comfortable. Well nourished  Eyes: Sclera clear, pupils equal  ENT: Moist mucus membranes, no thrush. Trachea midline.  Cardiovascular: Regular rhythm, normal S1, S2. No murmur, gallop, rub. No edema in lower extremities  Respiratory: BL wheezing persists.  No rales or rhonchi.  Gastrointestinal: Abdomen soft, non-tender, not distended, normal bowel sounds  Musculoskeletal: No cyanosis in digits, neck supple  Neurology: Cranial nerves grossly intact. Alert and oriented in time, place and person. No speech or motor deficits  Psychiatry: Appropriate affect. Not agitated  Skin: Warm, dry, normal turgor, no rash  Brisk capillary refill, peripheral pulses palpable       Labs:   Recent Labs     04/29/24  0954 04/30/24  0541 05/01/24  0529   WBC 20.2* 22.4* 19.2*   HGB 12.2* 11.5* 11.3*   HCT 37.9* 35.8* 34.5*    222 221       Recent Labs     04/29/24  0954 04/30/24  0541 05/01/24  0529   * 134* 140   K 4.0 4.9 4.6    105 110   CO2 20* 18* 21   BUN 20 26* 22*   CREATININE 1.8* 1.5* 1.3   CALCIUM 9.8 9.6 9.6       Recent Labs     04/29/24  0954   AST 22   ALT 12   BILITOT 1.2*   ALKPHOS 36*       Recent Labs     04/29/24  0954   INR 1.32*       Recent Labs     04/29/24  0954 04/29/24  1108 04/29/24  1502   TROPHS 33* 31* 25*         Urinalysis:      Lab Results   Component Value Date/Time    NITRU Negative 04/29/2024 12:22 PM    WBCUA 6 04/29/2024 12:22 PM    BACTERIA None Seen 04/29/2024 12:22 PM    RBCUA 7

## 2024-05-01 NOTE — PLAN OF CARE
Problem: Pain  Goal: Verbalizes/displays adequate comfort level or baseline comfort level  5/1/2024 1012 by Margarita Barrow LPN  Outcome: Progressing  4/30/2024 2216 by Lorri Diana RN  Outcome: Progressing     Problem: Safety - Adult  Goal: Free from fall injury  5/1/2024 1012 by Margarita Barrow LPN  Outcome: Progressing  4/30/2024 2216 by Lorri Diana RN  Outcome: Progressing     Problem: Chronic Conditions and Co-morbidities  Goal: Patient's chronic conditions and co-morbidity symptoms are monitored and maintained or improved  5/1/2024 1012 by Margarita Barrow LPN  Outcome: Progressing  4/30/2024 2216 by Lorri Diana RN  Outcome: Progressing

## 2024-05-01 NOTE — PROGRESS NOTES
PULMONARY AND CRITICAL CARE MEDICINE PROGRESS NOTE      SUBJECTIVE: Patient shows only mild improvement in cough and shortness of breath.  Still having coughing fits.    REVIEW OF SYSTEMS:   CONSTITUTIONAL SYMPTOMS: The patient denies fever, fatigue, night sweats, weight loss or weight gain.   HEENT: No vision changes. No tinnitus, Denies sinus pain. No hoarseness, or dysphagia.   CARDIOVASCULAR: Denies chest pain, palpitation, syncope.  RESPIRATORY: See above.   GASTROINTESTINAL: Denies nausea, abdominal pain or change in bowel function.  SKIN: No rashes or itching.   MUSCULOSKELETAL: Denies weakness or bone pain.   NEUROLOGICAL: No headaches or seizures.     MEDICATIONS:      methylPREDNISolone  40 mg IntraVENous Q8H    insulin lispro  0-16 Units SubCUTAneous TID WC    insulin lispro  0-4 Units SubCUTAneous Nightly    insulin lispro  10 Units SubCUTAneous TID WC    insulin glargine  28 Units SubCUTAneous Nightly    ipratropium 0.5 mg-albuterol 2.5 mg  1 Dose Inhalation 4x Daily RT    amLODIPine  10 mg Oral Daily    aspirin  81 mg Oral Daily    doxazosin  4 mg Oral Nightly    guaiFENesin  600 mg Oral BID    metoprolol succinate  12.5 mg Oral Daily    rosuvastatin  40 mg Oral Nightly    sertraline  150 mg Oral Daily    traZODone  200 mg Oral Nightly    sodium chloride flush  5-40 mL IntraVENous 2 times per day    enoxaparin  40 mg SubCUTAneous Daily    mometasone-formoterol  2 puff Inhalation BID RT    azithromycin  500 mg IntraVENous Q24H    piperacillin-tazobactam  3,375 mg IntraVENous Q8H      sodium chloride 25 mL/hr at 04/29/24 1605    dextrose      sodium chloride 75 mL/hr at 05/01/24 0653     promethazine-codeine, butalbital-acetaminophen-caffeine, sodium chloride flush, sodium chloride, potassium chloride **OR** potassium alternative oral replacement **OR** potassium chloride, magnesium sulfate, ondansetron **OR** ondansetron, polyethylene glycol, acetaminophen **OR** acetaminophen, dextrose bolus **OR**

## 2024-05-01 NOTE — PROGRESS NOTES
04/30/24 2200   RT Protocol   History Pulmonary Disease 2   Respiratory pattern 2   Breath sounds 6   Cough 0   Indications for Bronchodilator Therapy Decreased or absent breath sounds   Bronchodilator Assessment Score 10

## 2024-05-02 LAB
ANION GAP SERPL CALCULATED.3IONS-SCNC: 9 MMOL/L (ref 3–16)
ANISOCYTOSIS BLD QL SMEAR: ABNORMAL
BASOPHILS # BLD: 0 K/UL (ref 0–0.2)
BASOPHILS NFR BLD: 0 %
BUN SERPL-MCNC: 19 MG/DL (ref 7–20)
CALCIUM SERPL-MCNC: 9.8 MG/DL (ref 8.3–10.6)
CHLORIDE SERPL-SCNC: 107 MMOL/L (ref 99–110)
CO2 SERPL-SCNC: 24 MMOL/L (ref 21–32)
CREAT SERPL-MCNC: 1.2 MG/DL (ref 0.8–1.3)
DEPRECATED RDW RBC AUTO: 18.2 % (ref 12.4–15.4)
EOSINOPHIL # BLD: 0 K/UL (ref 0–0.6)
EOSINOPHIL NFR BLD: 0 %
GFR SERPLBLD CREATININE-BSD FMLA CKD-EPI: 64 ML/MIN/{1.73_M2}
GLUCOSE BLD-MCNC: 135 MG/DL (ref 70–99)
GLUCOSE BLD-MCNC: 176 MG/DL (ref 70–99)
GLUCOSE BLD-MCNC: 214 MG/DL (ref 70–99)
GLUCOSE BLD-MCNC: 217 MG/DL (ref 70–99)
GLUCOSE SERPL-MCNC: 253 MG/DL (ref 70–99)
HCT VFR BLD AUTO: 35.5 % (ref 40.5–52.5)
HGB BLD-MCNC: 11.5 G/DL (ref 13.5–17.5)
LYMPHOCYTES # BLD: 1.1 K/UL (ref 1–5.1)
LYMPHOCYTES NFR BLD: 8 %
MACROCYTES BLD QL SMEAR: ABNORMAL
MCH RBC QN AUTO: 34 PG (ref 26–34)
MCHC RBC AUTO-ENTMCNC: 32.5 G/DL (ref 31–36)
MCV RBC AUTO: 104.6 FL (ref 80–100)
MONOCYTES # BLD: 0.1 K/UL (ref 0–1.3)
MONOCYTES NFR BLD: 1 %
NEUTROPHILS # BLD: 12.1 K/UL (ref 1.7–7.7)
NEUTROPHILS NFR BLD: 91 %
OVALOCYTES BLD QL SMEAR: ABNORMAL
PERFORMED ON: ABNORMAL
PLATELET # BLD AUTO: 235 K/UL (ref 135–450)
PLATELET BLD QL SMEAR: ADEQUATE
PMV BLD AUTO: 8.5 FL (ref 5–10.5)
POLYCHROMASIA BLD QL SMEAR: ABNORMAL
POTASSIUM SERPL-SCNC: 4.1 MMOL/L (ref 3.5–5.1)
RBC # BLD AUTO: 3.39 M/UL (ref 4.2–5.9)
SLIDE REVIEW: ABNORMAL
SODIUM SERPL-SCNC: 140 MMOL/L (ref 136–145)
SPHEROCYTES BLD QL SMEAR: ABNORMAL
WBC # BLD AUTO: 13.3 K/UL (ref 4–11)

## 2024-05-02 PROCEDURE — 2580000003 HC RX 258: Performed by: INTERNAL MEDICINE

## 2024-05-02 PROCEDURE — 6370000000 HC RX 637 (ALT 250 FOR IP): Performed by: INTERNAL MEDICINE

## 2024-05-02 PROCEDURE — 1200000000 HC SEMI PRIVATE

## 2024-05-02 PROCEDURE — 99233 SBSQ HOSP IP/OBS HIGH 50: CPT | Performed by: INTERNAL MEDICINE

## 2024-05-02 PROCEDURE — 6370000000 HC RX 637 (ALT 250 FOR IP): Performed by: STUDENT IN AN ORGANIZED HEALTH CARE EDUCATION/TRAINING PROGRAM

## 2024-05-02 PROCEDURE — 94669 MECHANICAL CHEST WALL OSCILL: CPT

## 2024-05-02 PROCEDURE — 94761 N-INVAS EAR/PLS OXIMETRY MLT: CPT

## 2024-05-02 PROCEDURE — 80048 BASIC METABOLIC PNL TOTAL CA: CPT

## 2024-05-02 PROCEDURE — 6360000002 HC RX W HCPCS: Performed by: INTERNAL MEDICINE

## 2024-05-02 PROCEDURE — 94640 AIRWAY INHALATION TREATMENT: CPT

## 2024-05-02 PROCEDURE — 85025 COMPLETE CBC W/AUTO DIFF WBC: CPT

## 2024-05-02 PROCEDURE — 36415 COLL VENOUS BLD VENIPUNCTURE: CPT

## 2024-05-02 PROCEDURE — 2700000000 HC OXYGEN THERAPY PER DAY

## 2024-05-02 RX ORDER — ARFORMOTEROL TARTRATE 15 UG/2ML
15 SOLUTION RESPIRATORY (INHALATION)
Status: DISCONTINUED | OUTPATIENT
Start: 2024-05-02 | End: 2024-05-03 | Stop reason: HOSPADM

## 2024-05-02 RX ORDER — HYDRALAZINE HYDROCHLORIDE 20 MG/ML
10 INJECTION INTRAMUSCULAR; INTRAVENOUS EVERY 4 HOURS PRN
Status: DISCONTINUED | OUTPATIENT
Start: 2024-05-02 | End: 2024-05-03 | Stop reason: HOSPADM

## 2024-05-02 RX ORDER — BUDESONIDE 0.5 MG/2ML
0.5 INHALANT ORAL
Status: DISCONTINUED | OUTPATIENT
Start: 2024-05-02 | End: 2024-05-03 | Stop reason: HOSPADM

## 2024-05-02 RX ADMIN — GUAIFENESIN 600 MG: 600 TABLET, EXTENDED RELEASE ORAL at 09:04

## 2024-05-02 RX ADMIN — INSULIN GLARGINE 28 UNITS: 100 INJECTION, SOLUTION SUBCUTANEOUS at 21:30

## 2024-05-02 RX ADMIN — ENOXAPARIN SODIUM 40 MG: 100 INJECTION SUBCUTANEOUS at 09:04

## 2024-05-02 RX ADMIN — WATER 40 MG: 1 INJECTION INTRAMUSCULAR; INTRAVENOUS; SUBCUTANEOUS at 05:13

## 2024-05-02 RX ADMIN — ARFORMOTEROL TARTRATE 15 MCG: 15 SOLUTION RESPIRATORY (INHALATION) at 20:15

## 2024-05-02 RX ADMIN — Medication 5 ML: at 13:25

## 2024-05-02 RX ADMIN — Medication 5 ML: at 23:56

## 2024-05-02 RX ADMIN — Medication 2 PUFF: at 08:20

## 2024-05-02 RX ADMIN — PIPERACILLIN AND TAZOBACTAM 3375 MG: 3; .375 INJECTION, POWDER, LYOPHILIZED, FOR SOLUTION INTRAVENOUS at 09:11

## 2024-05-02 RX ADMIN — BUDESONIDE 500 MCG: 0.5 INHALANT RESPIRATORY (INHALATION) at 20:15

## 2024-05-02 RX ADMIN — INSULIN LISPRO 10 UNITS: 100 INJECTION, SOLUTION INTRAVENOUS; SUBCUTANEOUS at 13:26

## 2024-05-02 RX ADMIN — IPRATROPIUM BROMIDE AND ALBUTEROL SULFATE 1 DOSE: 2.5; .5 SOLUTION RESPIRATORY (INHALATION) at 20:15

## 2024-05-02 RX ADMIN — IPRATROPIUM BROMIDE AND ALBUTEROL SULFATE 1 DOSE: 2.5; .5 SOLUTION RESPIRATORY (INHALATION) at 15:23

## 2024-05-02 RX ADMIN — SERTRALINE 150 MG: 50 TABLET, FILM COATED ORAL at 09:05

## 2024-05-02 RX ADMIN — METOPROLOL SUCCINATE 12.5 MG: 25 TABLET, EXTENDED RELEASE ORAL at 09:05

## 2024-05-02 RX ADMIN — INSULIN LISPRO 4 UNITS: 100 INJECTION, SOLUTION INTRAVENOUS; SUBCUTANEOUS at 09:04

## 2024-05-02 RX ADMIN — INSULIN LISPRO 10 UNITS: 100 INJECTION, SOLUTION INTRAVENOUS; SUBCUTANEOUS at 17:38

## 2024-05-02 RX ADMIN — PIPERACILLIN AND TAZOBACTAM 3375 MG: 3; .375 INJECTION, POWDER, LYOPHILIZED, FOR SOLUTION INTRAVENOUS at 23:57

## 2024-05-02 RX ADMIN — INSULIN LISPRO 10 UNITS: 100 INJECTION, SOLUTION INTRAVENOUS; SUBCUTANEOUS at 09:04

## 2024-05-02 RX ADMIN — WATER 40 MG: 1 INJECTION INTRAMUSCULAR; INTRAVENOUS; SUBCUTANEOUS at 17:39

## 2024-05-02 RX ADMIN — AZITHROMYCIN MONOHYDRATE 500 MG: 500 INJECTION, POWDER, LYOPHILIZED, FOR SOLUTION INTRAVENOUS at 16:13

## 2024-05-02 RX ADMIN — IPRATROPIUM BROMIDE AND ALBUTEROL SULFATE 1 DOSE: 2.5; .5 SOLUTION RESPIRATORY (INHALATION) at 08:20

## 2024-05-02 RX ADMIN — AMLODIPINE BESYLATE 10 MG: 5 TABLET ORAL at 09:05

## 2024-05-02 RX ADMIN — TRAZODONE HYDROCHLORIDE 200 MG: 50 TABLET ORAL at 21:57

## 2024-05-02 RX ADMIN — ROSUVASTATIN CALCIUM 40 MG: 40 TABLET, COATED ORAL at 19:52

## 2024-05-02 RX ADMIN — Medication 5 ML: at 09:52

## 2024-05-02 RX ADMIN — DOXAZOSIN 4 MG: 4 TABLET ORAL at 19:52

## 2024-05-02 RX ADMIN — IPRATROPIUM BROMIDE AND ALBUTEROL SULFATE 1 DOSE: 2.5; .5 SOLUTION RESPIRATORY (INHALATION) at 12:07

## 2024-05-02 RX ADMIN — INSULIN LISPRO 4 UNITS: 100 INJECTION, SOLUTION INTRAVENOUS; SUBCUTANEOUS at 13:25

## 2024-05-02 RX ADMIN — GUAIFENESIN 600 MG: 600 TABLET, EXTENDED RELEASE ORAL at 19:52

## 2024-05-02 RX ADMIN — SODIUM CHLORIDE, PRESERVATIVE FREE 10 ML: 5 INJECTION INTRAVENOUS at 09:05

## 2024-05-02 RX ADMIN — SODIUM CHLORIDE, PRESERVATIVE FREE 10 ML: 5 INJECTION INTRAVENOUS at 19:53

## 2024-05-02 RX ADMIN — ACETAMINOPHEN 650 MG: 325 TABLET ORAL at 15:57

## 2024-05-02 RX ADMIN — Medication 5 ML: at 19:53

## 2024-05-02 RX ADMIN — ASPIRIN 81 MG 81 MG: 81 TABLET ORAL at 09:14

## 2024-05-02 RX ADMIN — PIPERACILLIN AND TAZOBACTAM 3375 MG: 3; .375 INJECTION, POWDER, LYOPHILIZED, FOR SOLUTION INTRAVENOUS at 17:44

## 2024-05-02 RX ADMIN — PIPERACILLIN AND TAZOBACTAM 3375 MG: 3; .375 INJECTION, POWDER, LYOPHILIZED, FOR SOLUTION INTRAVENOUS at 00:38

## 2024-05-02 ASSESSMENT — PAIN SCALES - WONG BAKER
WONGBAKER_NUMERICALRESPONSE: NO HURT

## 2024-05-02 ASSESSMENT — PAIN SCALES - GENERAL
PAINLEVEL_OUTOF10: 0

## 2024-05-02 NOTE — PLAN OF CARE
Problem: Pain  Goal: Verbalizes/displays adequate comfort level or baseline comfort level  5/1/2024 2359 by Mary Ramírez, RN  Outcome: Progressing    Problem: Safety - Adult  Goal: Free from fall injury  5/1/2024 2359 by Mary Ramírez, RN  Outcome: Progressing    Problem: Chronic Conditions and Co-morbidities  Goal: Patient's chronic conditions and co-morbidity symptoms are monitored and maintained or improved  5/1/2024 2359 by Mary Ramírez, RN  Outcome: Progressing

## 2024-05-02 NOTE — PROGRESS NOTES
PULMONARY AND CRITICAL CARE MEDICINE PROGRESS NOTE      SUBJECTIVE: Patient states that he is feeling better although still having cough.  Mild expiratory wheezing.  On 2 L nasal cannula.  Desaturates on room air at rest.    REVIEW OF SYSTEMS:   CONSTITUTIONAL SYMPTOMS: The patient denies fever, fatigue, night sweats, weight loss or weight gain.   HEENT: No vision changes. No tinnitus, Denies sinus pain. No hoarseness, or dysphagia.   CARDIOVASCULAR: Denies chest pain, palpitation, syncope.  RESPIRATORY: See above.   GASTROINTESTINAL: Denies nausea, abdominal pain or change in bowel function.  SKIN: No rashes or itching.   MUSCULOSKELETAL: Denies weakness or bone pain.   NEUROLOGICAL: No headaches or seizures.     MEDICATIONS:      methylPREDNISolone  40 mg IntraVENous Q12H    insulin lispro  0-16 Units SubCUTAneous TID WC    insulin lispro  0-4 Units SubCUTAneous Nightly    insulin lispro  10 Units SubCUTAneous TID WC    insulin glargine  28 Units SubCUTAneous Nightly    ipratropium 0.5 mg-albuterol 2.5 mg  1 Dose Inhalation 4x Daily RT    amLODIPine  10 mg Oral Daily    aspirin  81 mg Oral Daily    doxazosin  4 mg Oral Nightly    guaiFENesin  600 mg Oral BID    metoprolol succinate  12.5 mg Oral Daily    rosuvastatin  40 mg Oral Nightly    sertraline  150 mg Oral Daily    traZODone  200 mg Oral Nightly    sodium chloride flush  5-40 mL IntraVENous 2 times per day    enoxaparin  40 mg SubCUTAneous Daily    mometasone-formoterol  2 puff Inhalation BID RT    azithromycin  500 mg IntraVENous Q24H    piperacillin-tazobactam  3,375 mg IntraVENous Q8H      sodium chloride 10 mL/hr at 05/01/24 1607    dextrose       promethazine-codeine, butalbital-acetaminophen-caffeine, sodium chloride flush, sodium chloride, potassium chloride **OR** potassium alternative oral replacement **OR** potassium chloride, magnesium sulfate, ondansetron **OR** ondansetron, polyethylene glycol, acetaminophen **OR** acetaminophen, dextrose

## 2024-05-02 NOTE — PLAN OF CARE
Problem: Pain  Goal: Verbalizes/displays adequate comfort level or baseline comfort level  5/2/2024 1151 by Yessica Flores RN  Outcome: Progressing  5/2/2024 1150 by Yessica Flores RN  Outcome: Progressing  5/1/2024 2359 by Mary Ramírez RN  Outcome: Progressing     Problem: Safety - Adult  Goal: Free from fall injury  5/2/2024 1151 by Yessica Flores RN  Outcome: Progressing  5/2/2024 1150 by Yessica Flores RN  Outcome: Progressing  5/1/2024 2359 by Mary Ramírez RN  Outcome: Progressing     Problem: Chronic Conditions and Co-morbidities  Goal: Patient's chronic conditions and co-morbidity symptoms are monitored and maintained or improved  5/2/2024 1151 by Yessica Flores RN  Outcome: Progressing  5/2/2024 1150 by Yessica Flores RN  Outcome: Progressing  5/1/2024 2359 by Mary Ramírez RN  Outcome: Progressing

## 2024-05-02 NOTE — PROGRESS NOTES
Assessment completed, see doc flowsheets. Pt is A&O X4. Lung sounds are wheezes/rhonchi. VSS. Tele on. Medication given per MAR. Patient has no needs at this time. Call light within in reach, will continue to monitor.

## 2024-05-02 NOTE — PLAN OF CARE
Problem: Pain  Goal: Verbalizes/displays adequate comfort level or baseline comfort level  5/2/2024 1150 by Yessica Flores RN  Outcome: Progressing  5/1/2024 2359 by Mary Ramírez RN  Outcome: Progressing     Problem: Safety - Adult  Goal: Free from fall injury  5/2/2024 1150 by Yessica Flores RN  Outcome: Progressing  5/1/2024 2359 by Mary Ramírez RN  Outcome: Progressing     Problem: Chronic Conditions and Co-morbidities  Goal: Patient's chronic conditions and co-morbidity symptoms are monitored and maintained or improved  5/2/2024 1150 by Yessica Flores RN  Outcome: Progressing  5/1/2024 2359 by Mary Ramírez RN  Outcome: Progressing

## 2024-05-02 NOTE — PROGRESS NOTES
Hospitalist Progress Note      PCP: Esme Danielson MD    Date of Admission: 4/29/2024    Chief Complaint: SOB    Hospital Course:  Lv Shin is a 73 y.o. male with history of COPD, chronic respiratory failure with hypoxia on 2 L NC at night, CAD s/p CABG X 3, DM 2, CKD 3 came to ER with complaints of SOB.   Admitted as inpatient for acute COPD exacerbation.  Started on IV steroids, nebs and IV Zithromax and Zosyn.    CT Chest:   IMPRESSION:  Progressive advanced emphysema and pulmonary fibrosis.  No acute abnormality.    Pulmonary consulted on 4/30.    Parainfluenza positive.    Subjective:  Patient continues coughing and remains SOB with cough.  No CP, HA or abdominal pain.  No fevers.       Medications:  Reviewed    Infusion Medications    sodium chloride 10 mL/hr at 05/01/24 1607    dextrose       Scheduled Medications    methylPREDNISolone  40 mg IntraVENous Q12H    budesonide  0.5 mg Nebulization BID RT    arformoterol tartrate  15 mcg Nebulization BID RT    insulin lispro  0-16 Units SubCUTAneous TID WC    insulin lispro  0-4 Units SubCUTAneous Nightly    insulin lispro  10 Units SubCUTAneous TID WC    insulin glargine  28 Units SubCUTAneous Nightly    ipratropium 0.5 mg-albuterol 2.5 mg  1 Dose Inhalation 4x Daily RT    amLODIPine  10 mg Oral Daily    aspirin  81 mg Oral Daily    doxazosin  4 mg Oral Nightly    guaiFENesin  600 mg Oral BID    metoprolol succinate  12.5 mg Oral Daily    rosuvastatin  40 mg Oral Nightly    sertraline  150 mg Oral Daily    traZODone  200 mg Oral Nightly    sodium chloride flush  5-40 mL IntraVENous 2 times per day    enoxaparin  40 mg SubCUTAneous Daily    azithromycin  500 mg IntraVENous Q24H    piperacillin-tazobactam  3,375 mg IntraVENous Q8H     PRN Meds: promethazine-codeine, butalbital-acetaminophen-caffeine, sodium chloride flush, sodium chloride, potassium chloride **OR** potassium alternative oral replacement **OR** potassium chloride, magnesium sulfate,  04/29/2024 12:22 PM    RBCUA 7 04/29/2024 12:22 PM    BLOODU Negative 04/29/2024 12:22 PM    SPECGRAV 1.015 02/26/2016 01:46 PM    GLUCOSEU Negative 04/29/2024 12:22 PM       Radiology:  CT CHEST WO CONTRAST   Final Result   Progressive advanced emphysema and pulmonary fibrosis.  No acute abnormality.         XR CHEST PORTABLE   Final Result   No acute cardiopulmonary disease.  No change compared to the prior study.             IP CONSULT TO HOSPITALIST  IP CONSULT TO PULMONOLOGY    Assessment/Plan:    Active Hospital Problems    Diagnosis     Chronic hypoxemic respiratory failure (HCC) [J96.11]      Priority: Medium    S/P CABG x 3 [Z95.1]      Priority: Medium    Coronary artery disease involving native coronary artery of native heart [I25.10]      Priority: Medium    Acute on chronic respiratory failure with hypoxia ([facility]) [J96.21]     Pneumonia [J18.9]     COPD with acute exacerbation ([facility]) [J44.1]     DM2 (diabetes mellitus, type 2) ([facility]) [E11.9]     Chronic kidney disease, stage III (moderate) ([facility]) [N18.30]     Benign essential HTN [I10]      Acute COPD exacerbation  CT Chest reviewed  Pulm input appreciated  Continue Phenergan with codeine cough syrup PRN  Wean Solumedrol 40 mg IV q8 to q12h  Continue Duoneb  Continue Dulera  Parainfluenza noted viral panel  Droplet precautions  Continue Zithromax and Zosyn  PNA  Continue Zosyn and Zithromax  Parainfluenza positive on viral panel  Acute on chronic respiratory failure with hypoxia  On 2 L O2 via NC at night at home  Was on 4 L NC, remains on 2 L NC  Home O2 eval upon DC  DM 2  Increase Lantus 35 U qhs  Continue Humalog 10 U with meals  SSI  Hypoglycemia orders  CAD   Continue ASA, Crestor and Toprol XL  CKD 3  Cr 1.2-2.0 at baseline          DVT Prophylaxis: Lovenox  Diet: ADULT DIET; Regular  ADULT ORAL NUTRITION SUPPLEMENT; Breakfast, Lunch, Dinner; Diabetic Oral Supplement  Code Status: Full Code  PT/OT Eval Status: Patient refused    Dispo -

## 2024-05-03 VITALS
TEMPERATURE: 97.9 F | HEIGHT: 72 IN | DIASTOLIC BLOOD PRESSURE: 84 MMHG | RESPIRATION RATE: 18 BRPM | SYSTOLIC BLOOD PRESSURE: 160 MMHG | BODY MASS INDEX: 24.11 KG/M2 | WEIGHT: 178 LBS | OXYGEN SATURATION: 97 % | HEART RATE: 68 BPM

## 2024-05-03 LAB
ANION GAP SERPL CALCULATED.3IONS-SCNC: 8 MMOL/L (ref 3–16)
ANISOCYTOSIS BLD QL SMEAR: ABNORMAL
BACTERIA BLD CULT ORG #2: NORMAL
BACTERIA BLD CULT: NORMAL
BASOPHILS # BLD: 0 K/UL (ref 0–0.2)
BASOPHILS NFR BLD: 0 %
BUN SERPL-MCNC: 22 MG/DL (ref 7–20)
CALCIUM SERPL-MCNC: 9.6 MG/DL (ref 8.3–10.6)
CHLORIDE SERPL-SCNC: 101 MMOL/L (ref 99–110)
CO2 SERPL-SCNC: 28 MMOL/L (ref 21–32)
CREAT SERPL-MCNC: 1.3 MG/DL (ref 0.8–1.3)
DEPRECATED RDW RBC AUTO: 17.8 % (ref 12.4–15.4)
EOSINOPHIL # BLD: 0 K/UL (ref 0–0.6)
EOSINOPHIL NFR BLD: 0 %
GFR SERPLBLD CREATININE-BSD FMLA CKD-EPI: 58 ML/MIN/{1.73_M2}
GLUCOSE BLD-MCNC: 169 MG/DL (ref 70–99)
GLUCOSE BLD-MCNC: 214 MG/DL (ref 70–99)
GLUCOSE BLD-MCNC: 296 MG/DL (ref 70–99)
GLUCOSE SERPL-MCNC: 242 MG/DL (ref 70–99)
HCT VFR BLD AUTO: 36 % (ref 40.5–52.5)
HGB BLD-MCNC: 11.9 G/DL (ref 13.5–17.5)
LYMPHOCYTES # BLD: 1.7 K/UL (ref 1–5.1)
LYMPHOCYTES NFR BLD: 16 %
MACROCYTES BLD QL SMEAR: ABNORMAL
MCH RBC QN AUTO: 34.5 PG (ref 26–34)
MCHC RBC AUTO-ENTMCNC: 33.1 G/DL (ref 31–36)
MCV RBC AUTO: 104.2 FL (ref 80–100)
MONOCYTES # BLD: 0.1 K/UL (ref 0–1.3)
MONOCYTES NFR BLD: 1 %
MYELOCYTES NFR BLD MANUAL: 4 %
NEUTROPHILS # BLD: 8.3 K/UL (ref 1.7–7.7)
NEUTROPHILS NFR BLD: 73 %
NEUTS BAND NFR BLD MANUAL: 5 % (ref 0–7)
NRBC BLD-RTO: 5 /100 WBC
OVALOCYTES BLD QL SMEAR: ABNORMAL
PERFORMED ON: ABNORMAL
PLATELET # BLD AUTO: 238 K/UL (ref 135–450)
PLATELET BLD QL SMEAR: ADEQUATE
PMV BLD AUTO: 7.5 FL (ref 5–10.5)
POTASSIUM SERPL-SCNC: 4.3 MMOL/L (ref 3.5–5.1)
RBC # BLD AUTO: 3.46 M/UL (ref 4.2–5.9)
SLIDE REVIEW: ABNORMAL
SODIUM SERPL-SCNC: 137 MMOL/L (ref 136–145)
VARIANT LYMPHS NFR BLD MANUAL: 1 % (ref 0–6)
WBC # BLD AUTO: 10.1 K/UL (ref 4–11)

## 2024-05-03 PROCEDURE — 94761 N-INVAS EAR/PLS OXIMETRY MLT: CPT

## 2024-05-03 PROCEDURE — 94669 MECHANICAL CHEST WALL OSCILL: CPT

## 2024-05-03 PROCEDURE — 2700000000 HC OXYGEN THERAPY PER DAY

## 2024-05-03 PROCEDURE — 6370000000 HC RX 637 (ALT 250 FOR IP): Performed by: INTERNAL MEDICINE

## 2024-05-03 PROCEDURE — 94640 AIRWAY INHALATION TREATMENT: CPT

## 2024-05-03 PROCEDURE — 85025 COMPLETE CBC W/AUTO DIFF WBC: CPT

## 2024-05-03 PROCEDURE — 2580000003 HC RX 258: Performed by: INTERNAL MEDICINE

## 2024-05-03 PROCEDURE — 6360000002 HC RX W HCPCS: Performed by: INTERNAL MEDICINE

## 2024-05-03 PROCEDURE — 99233 SBSQ HOSP IP/OBS HIGH 50: CPT | Performed by: INTERNAL MEDICINE

## 2024-05-03 PROCEDURE — 80048 BASIC METABOLIC PNL TOTAL CA: CPT

## 2024-05-03 PROCEDURE — 94680 O2 UPTK RST&XERS DIR SIMPLE: CPT

## 2024-05-03 PROCEDURE — 36415 COLL VENOUS BLD VENIPUNCTURE: CPT

## 2024-05-03 RX ORDER — IPRATROPIUM BROMIDE AND ALBUTEROL SULFATE 2.5; .5 MG/3ML; MG/3ML
3 SOLUTION RESPIRATORY (INHALATION) EVERY 4 HOURS PRN
Qty: 360 ML | Refills: 0 | Status: SHIPPED | OUTPATIENT
Start: 2024-05-03

## 2024-05-03 RX ORDER — BENZONATATE 100 MG/1
100 CAPSULE ORAL 3 TIMES DAILY
Status: DISCONTINUED | OUTPATIENT
Start: 2024-05-03 | End: 2024-05-03 | Stop reason: HOSPADM

## 2024-05-03 RX ORDER — PROMETHAZINE HYDROCHLORIDE AND CODEINE PHOSPHATE 6.25; 1 MG/5ML; MG/5ML
5 SYRUP ORAL EVERY 4 HOURS PRN
Qty: 180 ML | Refills: 0 | Status: SHIPPED | OUTPATIENT
Start: 2024-05-03 | End: 2024-05-17

## 2024-05-03 RX ORDER — BUDESONIDE 0.5 MG/2ML
0.5 INHALANT ORAL
Qty: 60 EACH | Refills: 0 | Status: SHIPPED | OUTPATIENT
Start: 2024-05-03

## 2024-05-03 RX ORDER — ARFORMOTEROL TARTRATE 15 UG/2ML
15 SOLUTION RESPIRATORY (INHALATION)
Qty: 120 ML | Refills: 0 | Status: SHIPPED | OUTPATIENT
Start: 2024-05-03

## 2024-05-03 RX ORDER — AMOXICILLIN AND CLAVULANATE POTASSIUM 500; 125 MG/1; MG/1
1 TABLET, FILM COATED ORAL 2 TIMES DAILY
Qty: 10 TABLET | Refills: 0 | Status: SHIPPED | OUTPATIENT
Start: 2024-05-03 | End: 2024-05-08

## 2024-05-03 RX ORDER — IPRATROPIUM BROMIDE AND ALBUTEROL SULFATE 2.5; .5 MG/3ML; MG/3ML
3 SOLUTION RESPIRATORY (INHALATION)
Qty: 360 ML | Refills: 0 | Status: SHIPPED | OUTPATIENT
Start: 2024-05-03

## 2024-05-03 RX ORDER — INSULIN GLARGINE 100 [IU]/ML
31 INJECTION, SOLUTION SUBCUTANEOUS NIGHTLY
Status: DISCONTINUED | OUTPATIENT
Start: 2024-05-03 | End: 2024-05-03 | Stop reason: HOSPADM

## 2024-05-03 RX ORDER — PREDNISONE 10 MG/1
TABLET ORAL
Qty: 37 TABLET | Refills: 0 | Status: SHIPPED | OUTPATIENT
Start: 2024-05-03

## 2024-05-03 RX ORDER — BENZONATATE 100 MG/1
100 CAPSULE ORAL 3 TIMES DAILY
Qty: 42 CAPSULE | Refills: 0 | Status: SHIPPED | OUTPATIENT
Start: 2024-05-03 | End: 2024-05-17

## 2024-05-03 RX ADMIN — INSULIN LISPRO 4 UNITS: 100 INJECTION, SOLUTION INTRAVENOUS; SUBCUTANEOUS at 08:50

## 2024-05-03 RX ADMIN — Medication 5 ML: at 14:24

## 2024-05-03 RX ADMIN — INSULIN LISPRO 10 UNITS: 100 INJECTION, SOLUTION INTRAVENOUS; SUBCUTANEOUS at 13:01

## 2024-05-03 RX ADMIN — IPRATROPIUM BROMIDE AND ALBUTEROL SULFATE 1 DOSE: 2.5; .5 SOLUTION RESPIRATORY (INHALATION) at 11:58

## 2024-05-03 RX ADMIN — SODIUM CHLORIDE, PRESERVATIVE FREE 10 ML: 5 INJECTION INTRAVENOUS at 08:43

## 2024-05-03 RX ADMIN — ACETAMINOPHEN 650 MG: 325 TABLET ORAL at 14:24

## 2024-05-03 RX ADMIN — WATER 40 MG: 1 INJECTION INTRAMUSCULAR; INTRAVENOUS; SUBCUTANEOUS at 05:27

## 2024-05-03 RX ADMIN — IPRATROPIUM BROMIDE AND ALBUTEROL SULFATE 1 DOSE: 2.5; .5 SOLUTION RESPIRATORY (INHALATION) at 07:48

## 2024-05-03 RX ADMIN — Medication 5 ML: at 05:27

## 2024-05-03 RX ADMIN — ARFORMOTEROL TARTRATE 15 MCG: 15 SOLUTION RESPIRATORY (INHALATION) at 07:48

## 2024-05-03 RX ADMIN — BENZONATATE 100 MG: 100 CAPSULE ORAL at 11:28

## 2024-05-03 RX ADMIN — INSULIN LISPRO 10 UNITS: 100 INJECTION, SOLUTION INTRAVENOUS; SUBCUTANEOUS at 08:50

## 2024-05-03 RX ADMIN — AMLODIPINE BESYLATE 10 MG: 5 TABLET ORAL at 08:40

## 2024-05-03 RX ADMIN — ACETAMINOPHEN 650 MG: 325 TABLET ORAL at 08:39

## 2024-05-03 RX ADMIN — SERTRALINE 150 MG: 50 TABLET, FILM COATED ORAL at 08:40

## 2024-05-03 RX ADMIN — IPRATROPIUM BROMIDE AND ALBUTEROL SULFATE 1 DOSE: 2.5; .5 SOLUTION RESPIRATORY (INHALATION) at 15:11

## 2024-05-03 RX ADMIN — BUDESONIDE 500 MCG: 0.5 INHALANT RESPIRATORY (INHALATION) at 07:48

## 2024-05-03 RX ADMIN — GUAIFENESIN 600 MG: 600 TABLET, EXTENDED RELEASE ORAL at 08:40

## 2024-05-03 RX ADMIN — ENOXAPARIN SODIUM 40 MG: 100 INJECTION SUBCUTANEOUS at 08:44

## 2024-05-03 RX ADMIN — ASPIRIN 81 MG 81 MG: 81 TABLET ORAL at 08:41

## 2024-05-03 RX ADMIN — PIPERACILLIN AND TAZOBACTAM 3375 MG: 3; .375 INJECTION, POWDER, LYOPHILIZED, FOR SOLUTION INTRAVENOUS at 08:43

## 2024-05-03 RX ADMIN — METOPROLOL SUCCINATE 12.5 MG: 25 TABLET, EXTENDED RELEASE ORAL at 08:41

## 2024-05-03 ASSESSMENT — PAIN DESCRIPTION - LOCATION
LOCATION: HEAD
LOCATION: HEAD

## 2024-05-03 ASSESSMENT — PAIN SCALES - GENERAL
PAINLEVEL_OUTOF10: 5
PAINLEVEL_OUTOF10: 5

## 2024-05-03 ASSESSMENT — PAIN SCALES - WONG BAKER
WONGBAKER_NUMERICALRESPONSE: NO HURT

## 2024-05-03 ASSESSMENT — PAIN DESCRIPTION - DESCRIPTORS: DESCRIPTORS: ACHING

## 2024-05-03 NOTE — PROGRESS NOTES
05/03/24 1203   Resting (Room Air)   SpO2 87   HR 70   Resting (On O2)   SpO2 93   HR 68   O2 Device Nasal cannula   O2 Flow Rate (l/min) 1 l/min   During Walk (On O2)   SpO2 89   HR 82   O2 Device Nasal cannula   O2 Flow Rate (l/min) 1 l/min   Need Additional O2 Flow Rate Rows Yes   O2 Flow Rate (l/min) 2 l/min   O2 Saturation 87% HR 83   O2 Flow Rate (l/min) 3 l/min   O2 Saturation 93% HR 87   Walk/Assistance Device Ambulation   Rate of Dyspnea 1   After Walk   SpO2 94   HR 63   O2 Device Nasal cannula   O2 Flow Rate (l/min) 3 l/min   Rate of Dyspnea 0   Does the Patient Qualify for Home O2 Yes   Liter Flow at Rest 1   Liter Flow on Exertion 3   Does the Patient Need Portable Oxygen Tanks Yes

## 2024-05-03 NOTE — CARE COORDINATION
Discharge Planning:     (VINICIUS) spoke with Dulce([phone]) from [facility]. The pt is able to bring a portable O2 tank home with him and [facility] will deliver the nebulizer today.     CM faxed the Nebulizer and O2 flow order to [phone].     CM delivered the O2 tank to the pt's room.    All CM needs have been met.      Electronically signed by SARAH Elizondo on 5/3/2024 at 1:33 PM

## 2024-05-03 NOTE — PLAN OF CARE
Problem: Pain  Goal: Verbalizes/displays adequate comfort level or baseline comfort level  Outcome: Completed     Problem: Safety - Adult  Goal: Free from fall injury  Outcome: Completed     Problem: Chronic Conditions and Co-morbidities  Goal: Patient's chronic conditions and co-morbidity symptoms are monitored and maintained or improved  Outcome: Completed

## 2024-05-03 NOTE — PLAN OF CARE
Problem: Pain  Goal: Verbalizes/displays adequate comfort level or baseline comfort level  5/2/2024 2001 by Saray Ramírez RN  Outcome: Progressing       Problem: Safety - Adult  Goal: Free from fall injury  5/2/2024 2001 by Saray Ramírez RN  Outcome: Progressing       Problem: Chronic Conditions and Co-morbidities  Goal: Patient's chronic conditions and co-morbidity symptoms are monitored and maintained or improved  5/2/2024 2001 by Saray Ramírez RN  Outcome: Progressing

## 2024-05-03 NOTE — DISCHARGE INSTR - COC
Continuity of Care Form    Patient Name: Lv Shin   :  1950  MRN:  [id number]    Admit date:  2024  Discharge date:  ***    Code Status Order: Full Code   Advance Directives:     Admitting Physician:  Saji Shore MD  PCP: Esme Danielson MD    Discharging Nurse: ***  Discharging Hospital Unit/Room#: 5TN-5583/5583-01  Discharging Unit Phone Number: ***    Emergency Contact:   Extended Emergency Contact Information  Primary Emergency Contact: Kimberlee Shin  Address: [address] [place] [address] [facility]  Home Phone: [phone]  Work Phone: [phone]  Relation: Spouse  Secondary Emergency Contact: Adele Amador           [facility]  Home Phone: [phone]  Relation: Child    Past Surgical History:  Past Surgical History:   Procedure Laterality Date    BRAIN SURGERY  2007    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 LIGATION KELLY WITH 35 MM ATRICLIP, TIMUR, TCPB, DOPPLER BYPASS GRAFT FLOW VERIFICATION, EPIAORTIC ECHOCARDIOGRAM, INSERTION OF VENTRICULAR AND ATRIAL PACING WIRES performed by Ammon Fraire MD at [facility]    KNEE ARTHROSCOPY  ,     Right    LITHOTRIPSY      Bilateral    LITHOTRIPSY      Bilateral with right stent placement    PARTIAL NEPHRECTOMY      Right    REVISION COLOSTOMY  3/08    Colostomy takedown with sigmoid colectomy and coloproctostomy anastomosis    TOTAL KNEE ARTHROPLASTY Left 14    UMBILICAL HERNIA REPAIR         Immunization History:   Immunization History  recorded.    Safety Concerns:     { ALEJANDRO Safety Concerns:920665896}    Impairments/Disabilities:      { ALEJANDRO Impairments/Disabilities:743885247}    Nutrition Therapy:  Current Nutrition Therapy:   { ALEJANDRO Diet List:189147538}    Routes of Feeding: {[facility] DME Other Feedings:251344774}  Liquids: {Slp liquid thickness:40170}  Daily Fluid Restriction: {[facility] DME Yes amt example:690659466}  Last Modified Barium Swallow with Video (Video Swallowing Test): {Done Not Done Date:}    Treatments at the Time of Hospital Discharge:   Respiratory Treatments: ***  Oxygen Therapy:  {Therapy; copd oxygen:39783}  Ventilator:    {[facility] Vent List:607054603}    Rehab Therapies: {THERAPEUTIC INTERVENTION:0170523741}  Weight Bearing Status/Restrictions: {[facility] Weight Bearin}  Other Medical Equipment (for information only, NOT a DME order):  {EQUIPMENT:078849691}  Other Treatments: ***    Patient's personal belongings (please select all that are sent with patient):  {[facility] DME Belongings:920498483}    RN SIGNATURE:  {Esignature:970220277}    CASE MANAGEMENT/SOCIAL WORK SECTION    Inpatient Status Date: 5/3/2024    Readmission Risk Assessment Score: 16%  Readmission Risk              Risk of Unplanned Readmission:  22           Discharging to Facility/ Agency   [facility]  [address] [place] [address]  Phone: [phone]  Fax: [phone]      / signature: Electronically signed by SARAH Elizondo on 5/3/24 at 1:24 PM EDT    PHYSICIAN SECTION    Prognosis: {Prognosis:4557391101}    Condition at Discharge: { Patient Condition:960912697}    Rehab Potential (if transferring to Rehab): {Prognosis:5664067770}    Recommended Labs or Other Treatments After Discharge: ***    Physician Certification: I certify the above information and transfer of Lv Shin  is necessary for the continuing treatment of the diagnosis listed and that he requires {Admit to Appropriate Level of

## 2024-05-03 NOTE — PROGRESS NOTES
Pt discharged home; he verbalizes understanding of d/c instructions including medication orders for home and possible side effects associated with them. Pt instructed to call the doctor listed if they should have any complications or worsening of symptoms. IV removed w/o complications.  Wheeled to front lobby with all personal belongings and medications dropped off by pharmacy. Daughter picked pt up from front lobby.

## 2024-05-03 NOTE — PROGRESS NOTES
Shift assessment completed. Routine vitals stable. Scheduled medications given. Patient is awake, alert and oriented. Respirations are easy and unlabored. Patient does not appear to be in distress. Patient ready to be discharged, Call light and bedside table within reach. No further needs at this time.

## 2024-05-03 NOTE — PROGRESS NOTES
CLINICAL PHARMACY NOTE: MEDS TO BEDS    Total # of Prescriptions Filled: 7   The following medications were delivered to the patient:  ARFORMOTEROL TARTRATE 15 NEBU   PROMETHAZINE - CODEINE 6.25 - 10SOLN  BENZONATATE 100MG  CAPS  BUDESONIDE 0.5MG/2ML SUSP (21FD=9494)  IPRATROPIUM - ALBUT 0.5 - 2.5 SOL (180ML  PREDNISONE 10MG TABS    Additional Documentation: Waldemar CRAIN approved to deliver medications to patient room=signed  [facility] Tech

## 2024-05-03 NOTE — PROGRESS NOTES
CLINICAL PHARMACY NOTE: MEDS TO BEDS    Total # of Prescriptions Filled: 7   The following medications were delivered to the patient:  Aformoterol nebulizer soln.  Promethazine w/ codeine soln.  Benzonatate 100mg  Budesonide 0.5mg/2ml  Duoneb soln.  Prednisone 10mg  Amox/Clav 500/125mg    Additional Documentation:     PARAS Medeiros approved medication delivery    Medications were delivered to patient's room and patient signed for    Ronna Rosenberg CPhT

## 2024-05-03 NOTE — PROGRESS NOTES
Patient was evaluated today for the diagnosis of COPD.  I entered a DME order for home oxygen at 2 lpm because the diagnosis and testing require the patient to have supplemental oxygen.  Condition will improve or be benefited by oxygen use.  The patient is  able to perform good mobility in a home setting and therefore does require the use of a portable oxygen system.  The need for this equipment was discussed with the patient and he understands and is in agreement.       Lv Shin was evaluated today and a DME order was entered for a nebulizer compressor in order to administer Albuterol, Ipratropium, and Budesonide due to the diagnosis of COPD.  The need for this equipment and treatment was discussed with the patient and he understands and is in agreement.

## 2024-05-03 NOTE — DISCHARGE SUMMARY
Hospital Medicine Discharge Summary    Patient: Lv Shin     Gender: male  : 1950   Age: 73 y.o.  MRN: [id number]    Admitting Physician: Saji Shore MD  Discharge Physician: Saji Shore MD     Code Status: Full Code     Admit Date: 2024   Discharge Date:   5/3/24    Disposition:  Home    Discharge Diagnoses:    Active Hospital Problems    Diagnosis Date Noted    Chronic hypoxemic respiratory failure (HCC) [J96.11] 2022     Priority: Medium    S/P CABG x 3 [Z95.1] 10/26/2022     Priority: Medium    Coronary artery disease involving native coronary artery of native heart [I25.10] 10/19/2022     Priority: Medium    Acute on chronic respiratory failure with hypoxia (HCC) [J96.21] 2024    Pneumonia [J18.9] 2024    COPD with acute exacerbation (HCC) [J44.1] 2024    DM2 (diabetes mellitus, type 2) (HCC) [E11.9]     Chronic kidney disease, stage III (moderate) (HCC) [N18.30] 10/26/2010    Benign essential HTN [I10] 10/26/2010       Follow-up appointments:  one week    Outpatient to do list: F/U with PCP and Pulm    Condition at Discharge:  Stable    Hospital Course:   Lv Shin is a 73 y.o. male with history of COPD, chronic respiratory failure with hypoxia on 2 L NC at night, CAD s/p CABG X 3, DM 2, CKD 3 came to ER with complaints of SOB.   Admitted as inpatient for acute COPD exacerbation.  Started on IV steroids, nebs and IV Zithromax and Zosyn.    CT Chest:   IMPRESSION:  Progressive advanced emphysema and pulmonary fibrosis.  No acute abnormality.     Pulmonary consulted on .     Parainfluenza positive.    Improved with aggressive IV steroids and nebs.      Will finish course of PO Augmentin at home.  Will taper Prednisone over 2 weeks.  Arranged for home nebulizer and will need home O2 continuously at 1 L and 3 L with activity.    Written for Phenergan with Codeine cough syrup and Tessalon perls.  F/U with PCP and Pulm as OP.    Discharge Medications:  intrarenal collecting system.. Soft Tissues/Bones: There is no acute fracture or aggressive osseous lesion.     Progressive advanced emphysema and pulmonary fibrosis.  No acute abnormality.     XR CHEST PORTABLE    Result Date: 4/29/2024  EXAMINATION: ONE XRAY VIEW OF THE CHEST 4/29/2024 9:44 am COMPARISON: 6 March 2023 HISTORY: ORDERING SYSTEM PROVIDED HISTORY: SOB TECHNOLOGIST PROVIDED HISTORY: Reason for exam:->SOB Reason for Exam: sob FINDINGS: The lungs are well inflated.  Heart is mildly enlarged with the right pericardial fat pad.  Median sternotomy wires.  Atrial appendage clip. Minimally increased interstitial markings throughout.  Mild degenerative changes the spine and shoulders.     No acute cardiopulmonary disease.  No change compared to the prior study.       The patient was seen and examined on day of discharge and this discharge summary is in conjunction with any daily progress note from day of discharge.Time Spent on discharge is 35 minutes in the examination, evaluation, counseling and review of medications and discharge plan.      Note that more than 30 minutes was spent in preparing discharge papers, discussing discharge with patient, medication review, etc.       Signed:    Saji Shore MD   5/3/2024      Thank you Esme Danielson MD for the opportunity to be involved in this patient's care. If you have any questions or concerns please feel free to contact me at [facility]

## 2024-05-03 NOTE — PROGRESS NOTES
05/03/24 0916   RT Protocol   History Pulmonary Disease 2   Respiratory pattern 2   Breath sounds 6   Cough 1   Indications for Bronchodilator Therapy Wheezing associated with pulm disorder   Bronchodilator Assessment Score 11

## 2024-05-03 NOTE — CARE COORDINATION
05/03/24 1327   IMM Letter   IMM Letter given to Patient/Family/Significant other/Guardian/POA/by:  Taylor explained the IMM letter to the patient over the phone. The patient was in agreement with discharge and gave verbal signature. The patient declined a copy of the IMM letter.   IMM Letter date given: 05/03/24   IMM Letter time given: 1325           Electronically signed by SARAH Elizondo on 5/3/2024 at 1:29 PM

## 2024-05-03 NOTE — PROGRESS NOTES
PULMONARY AND CRITICAL CARE MEDICINE PROGRESS NOTE      SUBJECTIVE: Patient is having slow clinical improvement.  Still has cough.  No shortness of breath and wheezing.  Able to move around in the room.    REVIEW OF SYSTEMS:   CONSTITUTIONAL SYMPTOMS: The patient denies fever, fatigue, night sweats, weight loss or weight gain.   HEENT: No vision changes. No tinnitus, Denies sinus pain. No hoarseness, or dysphagia.   CARDIOVASCULAR: Denies chest pain, palpitation, syncope.  RESPIRATORY: See above.   GASTROINTESTINAL: Denies nausea, abdominal pain or change in bowel function.  SKIN: No rashes or itching.   MUSCULOSKELETAL: Denies weakness or bone pain.   NEUROLOGICAL: No headaches or seizures.     MEDICATIONS:      benzonatate  100 mg Oral TID    insulin glargine  31 Units SubCUTAneous Nightly    methylPREDNISolone  40 mg IntraVENous Q12H    budesonide  0.5 mg Nebulization BID RT    arformoterol tartrate  15 mcg Nebulization BID RT    insulin lispro  0-16 Units SubCUTAneous TID WC    insulin lispro  0-4 Units SubCUTAneous Nightly    insulin lispro  10 Units SubCUTAneous TID WC    ipratropium 0.5 mg-albuterol 2.5 mg  1 Dose Inhalation 4x Daily RT    amLODIPine  10 mg Oral Daily    aspirin  81 mg Oral Daily    doxazosin  4 mg Oral Nightly    guaiFENesin  600 mg Oral BID    metoprolol succinate  12.5 mg Oral Daily    rosuvastatin  40 mg Oral Nightly    sertraline  150 mg Oral Daily    traZODone  200 mg Oral Nightly    sodium chloride flush  5-40 mL IntraVENous 2 times per day    enoxaparin  40 mg SubCUTAneous Daily    azithromycin  500 mg IntraVENous Q24H    piperacillin-tazobactam  3,375 mg IntraVENous Q8H      sodium chloride 10 mL/hr at 05/01/24 1607    dextrose       hydrALAZINE, promethazine-codeine, butalbital-acetaminophen-caffeine, sodium chloride flush, sodium chloride, potassium chloride **OR** potassium alternative oral replacement **OR** potassium chloride, magnesium sulfate, ondansetron **OR**

## 2024-05-03 NOTE — RT PROTOCOL NOTE
RT Nebulizer Bronchodilator Protocol Note    There is a bronchodilator order in the chart from a provider indicating to follow the RT Bronchodilator Protocol and there is an “Initiate RT Bronchodilator Protocol” order as well (see protocol at bottom of note).    CXR Findings:  No results found.    The findings from the last RT Protocol Assessment were as follows:  Smoking: Chronic pulmonary disease  Respiratory Pattern: Dyspnea on exertion or RR 21-25 bpm  Breath Sounds: Inspiratory and expiratory or bilateral wheezing and/or rhonchi  Cough: Strong, productive  Indication for Bronchodilator Therapy: Wheezing associated with pulm disorder  Bronchodilator Assessment Score: 11    Aerosolized bronchodilator medication orders have been revised according to the RT Nebulizer Bronchodilator Protocol below.    Respiratory Therapist to perform RT Therapy Protocol Assessment initially then follow the protocol.  Repeat RT Therapy Protocol Assessment PRN for score 0-3 or on second treatment, BID, and PRN for scores above 3.    No Indications - adjust the frequency to every 6 hours PRN wheezing or bronchospasm, if no treatments needed after 48 hours then discontinue using Per Protocol order mode.     If indication present, adjust the RT bronchodilator orders based on the Bronchodilator Assessment Score as indicated below.  If a patient is on this medication at home then do not decrease Frequency below that used at home.    0-3 - enter or revise RT bronchodilator order(s) to equivalent RT Bronchodilator order with Frequency of every 4 hours PRN for wheezing or increased work of breathing using Per Protocol order mode.       4-6 - enter or revise RT Bronchodilator order(s) to two equivalent RT bronchodilator orders with one order with BID Frequency and one order with Frequency of every 4 hours PRN wheezing or increased work of breathing using Per Protocol order mode.         7-10 - enter or revise RT Bronchodilator order(s) to two

## 2024-05-09 NOTE — PROGRESS NOTES
Physician Progress Note      PATIENT:               OMAYRA GIMENEZ  CSN #:                  [id number]  :                       1950  ADMIT DATE:       2024 9:21 AM  DISCH DATE:        5/3/2024 11:22 AM  RESPONDING  PROVIDER #:        Saji PISANO MD          QUERY TEXT:    Pt admitted with acute COPD exacerbation, Pneumonia, and Parainfluenza   positive.  Noted documentation of sepsis by ED provider.  If possible, please   document in progress notes and discharge summary:    The medical record reflects the following:  Risk Factors: 73 year old male with pneumonia and parainfluenza positive  Clinical Indicators: SIRs on admit: WBC 20.2, RR 22, 27. Procal 3.77. +   Parainfluenza  ED: \"concern for sepsis. Concern is for pneumonia as the primary cause,   despite his clear chest x-ray he has an elevated procalcitonin although   lactate is normal.  He does meet SIRS criteria. \"  H&P: \"PNA, Start Zosyn and Zithromax.\"  Pulmonology 5/3: \"In the clinical setting, we are likely dealing with acute   exacerbation of COPD with possible pneumonia along with parainfluenza 3   infection.\"  Treatment: 1L NS bolus, IVF NS 75ml/hr -. IV Zithromax, IV cefepime x1,   IV Zosyn, IV Vanc x1. CXR, CTchest, blood cultures, respiratory panel,   urinalysis.  Options provided:  -- Sepsis due to pneumonia confirmed present on admission  -- Sepsis ruled out  -- Other - I will add my own diagnosis  -- Disagree - Not applicable / Not valid  -- Disagree - Clinically unable to determine / Unknown  -- Refer to Clinical Documentation Reviewer    PROVIDER RESPONSE TEXT:    The diagnosis of sepsis due to pneumonia was confirmed as present on   admission.    Query created by: Neema Carmona on 5/3/2024 12:20 PM      Electronically signed by:  Saji PISANO MD 2024 5:40 PM

## 2024-05-24 ENCOUNTER — OFFICE VISIT (OUTPATIENT)
Dept: PULMONOLOGY | Age: 74
End: 2024-05-24

## 2024-05-24 VITALS
DIASTOLIC BLOOD PRESSURE: 80 MMHG | HEIGHT: 72 IN | OXYGEN SATURATION: 95 % | BODY MASS INDEX: 23.76 KG/M2 | SYSTOLIC BLOOD PRESSURE: 138 MMHG | HEART RATE: 82 BPM | WEIGHT: 175.4 LBS

## 2024-05-24 DIAGNOSIS — Z95.1 S/P CABG X 3: ICD-10-CM

## 2024-05-24 DIAGNOSIS — R91.1 PULMONARY NODULE: ICD-10-CM

## 2024-05-24 DIAGNOSIS — J44.9 COPD, MODERATE (HCC): Primary | ICD-10-CM

## 2024-05-24 DIAGNOSIS — J96.11 CHRONIC HYPOXEMIC RESPIRATORY FAILURE (HCC): ICD-10-CM

## 2024-05-24 DIAGNOSIS — J43.2 CENTRILOBULAR EMPHYSEMA (HCC): ICD-10-CM

## 2024-05-24 RX ORDER — FLUTICASONE FUROATE, UMECLIDINIUM BROMIDE AND VILANTEROL TRIFENATATE 200; 62.5; 25 UG/1; UG/1; UG/1
1 POWDER RESPIRATORY (INHALATION) DAILY
Qty: 3 EACH | Refills: 3 | Status: SHIPPED | OUTPATIENT
Start: 2024-05-24

## 2024-05-24 NOTE — PROGRESS NOTES
The visualized solid abdominal organs are unremarkable aside  from a few nonobstructing calculi within the right upper intrarenal  collecting system..     Soft Tissues/Bones: There is no acute fracture or aggressive osseous lesion.     IMPRESSION:  Progressive advanced emphysema and pulmonary fibrosis.  No acute abnormality.       4. Chronic Hypoxemic Respiratory Failure  He is more dependent on O2 at this point  He is taking his O2 off to work in the garage or go into the store.  Recommend that he wears O2 With exertion  Also have him turn his O2 up to 3 with sleep.    FOLLOW UP: 3 month      Chief Complaint   Patient presents with    COPD     States didn't have a very good hospital stay he said. Coughing minimal now but still has. At home uses 2 L o2. POC is on 3 LPM.       HPI  The patient presents with a chief complaint of moderate shortness of breath related to Moderate COPD of many years duration. He has mild associated cough. Exertion is a modifying factor.  He is using supplemental oxygen.      He was hospitalized last month with a COPD flare up and Parainfluenza infection. I reviewed medical records, labs and imaging from his hospitalization for this visit.    Review of Systems  As documented in HPI      Physical Exam:  Well developed, well nourished  Alert and oriented  Sclera is clear  No cervical adenopathy  No JVD.  Chest examination is clear.  Cardiac examination reveals regular rate and rhythm without murmur, gallop or rub.  The abdomen is soft, nontender and nondistended.   There is no clubbing, cyanosis or edema of the extremities.  There is no obvious skin rash.  No focal neuro deficicts  Normal mood and affect    Allergies   Allergen Reactions    Dilaudid [Hydromorphone Hcl] Other (See Comments)     Mental status changes    Nubain [Nalbuphine Hcl] Rash     Prior to Visit Medications    Medication Sig Taking? Authorizing Provider   arformoterol tartrate (BROVANA) 15 MCG/2ML NEBU Take 2 mLs by

## 2024-05-28 ENCOUNTER — TELEPHONE (OUTPATIENT)
Dept: PULMONOLOGY | Age: 74
End: 2024-05-28

## 2024-05-28 NOTE — TELEPHONE ENCOUNTER
Patient called and said that the POC is not working to good and would rather go back on the little tanks. Patient said [facility] needs a prescription for POC to be picked up and to deliver the small portable tanks     PH: [phone]

## 2024-06-19 LAB
ALBUMIN URINE, EXTERNAL: 11.3
CREATININE, URINE, EXTERNAL: 154.8
MICROALBUMIN/CREAT UR: 73 MG/G{CREAT}

## 2024-06-26 ENCOUNTER — TELEPHONE (OUTPATIENT)
Dept: PHARMACY | Facility: CLINIC | Age: 74
End: 2024-06-26

## 2024-06-26 NOTE — TELEPHONE ENCOUNTER
[facility]: STATIN THERAPY REVIEW  Identified statin use in persons with diabetes care gap per [place]. Records dated: 6/11/24.  Also identified as statin use in persons with cardiovascular disease care gap.    Last Office Visit: 3/26/24    ASSESSMENT  STATIN GAP IDENTIFIED    Per chart review, patient is prescribed rosuvastatin 40 mg (20 mg x 2) daily. No claims yet through [employer] insurance in 2024. Marked as taking LOV - possibly taking 1 tab (20 mg) vs 2 tabs as per Rx? See below, outside provider Rxed rosuvastatin 40 mg tab 1 daily and possibly filled in 2024 (claim not yet showing in portal at time of writing).    Per Reconciled Dispense Report:   ROSUVASTATIN 40MG TABLETS 06/20/2024 90 90 each Velma Veras PA [email or web address] #...   ROSUVASTATIN 20MG TABLETS 11/14/2023 90 180 each Danielson, Esme RANDOLPH MD [email or web address] #...   ROSUVASTATIN 20MG TABLETS 08/06/2023 90 180 each [facility]   ROSUVASTATIN 20MG TABLETS 02/17/2023 90 90 each Danielson, Esme RANDOLPH MD [facility]...   3RR per hyperlink - new Rxer; appears to be family medicine provider with [facility]    Lab Results   Component Value Date    CHOL 121 10/20/2022    TRIG 231 (H) 10/20/2022    HDL 24 (L) 09/01/2023    LDLDIRECT 49 09/01/2023     ALT   Date Value Ref Range Status   04/29/2024 12 10 - 40 U/L Final     AST   Date Value Ref Range Status   04/29/2024 22 15 - 37 U/L Final       The ASCVD Risk score ([place] DK, et al., 2019) failed to calculate for the following reasons:    The patient has a prior MI or stroke diagnosis     Lab Results   Component Value Date    LABA1C 6.1 04/29/2024    LABA1C 7.7 09/01/2023    LABA1C 6.9 04/12/2023     PLAN  The following are interventions that have been identified:   - Patient identified as having SUPD gap and SPC gap  Rosuvastatin on [facility] med list  Possibly filled 6/20/24 per reconcile dispense (Rx from [facility]

## 2024-06-28 NOTE — TELEPHONE ENCOUNTER
Pt canceled new patient appt.    Per payer portal confirmed statin claim - is no longer SUPD or SPC care gap on quality/stars report (compliance date 6/20/24).    For Pharmacy Admin Tracking Only    Program: [employer]  CPA in place:  No  Gap Closed?: Yes   Time Spent (min): 15

## 2024-08-26 ENCOUNTER — OFFICE VISIT (OUTPATIENT)
Dept: PULMONOLOGY | Age: 74
End: 2024-08-26
Payer: MEDICARE

## 2024-08-26 VITALS
HEIGHT: 72 IN | OXYGEN SATURATION: 94 % | DIASTOLIC BLOOD PRESSURE: 70 MMHG | HEART RATE: 71 BPM | SYSTOLIC BLOOD PRESSURE: 138 MMHG | BODY MASS INDEX: 24.24 KG/M2 | WEIGHT: 179 LBS

## 2024-08-26 DIAGNOSIS — J84.10 PULMONARY FIBROSIS (HCC): ICD-10-CM

## 2024-08-26 DIAGNOSIS — J44.9 COPD, MODERATE (HCC): Primary | ICD-10-CM

## 2024-08-26 DIAGNOSIS — J43.2 CENTRILOBULAR EMPHYSEMA (HCC): ICD-10-CM

## 2024-08-26 DIAGNOSIS — J96.11 CHRONIC HYPOXEMIC RESPIRATORY FAILURE (HCC): ICD-10-CM

## 2024-08-26 PROCEDURE — 3023F SPIROM DOC REV: CPT | Performed by: INTERNAL MEDICINE

## 2024-08-26 PROCEDURE — 3017F COLORECTAL CA SCREEN DOC REV: CPT | Performed by: INTERNAL MEDICINE

## 2024-08-26 PROCEDURE — G8427 DOCREV CUR MEDS BY ELIG CLIN: HCPCS | Performed by: INTERNAL MEDICINE

## 2024-08-26 PROCEDURE — G8420 CALC BMI NORM PARAMETERS: HCPCS | Performed by: INTERNAL MEDICINE

## 2024-08-26 PROCEDURE — 3078F DIAST BP <80 MM HG: CPT | Performed by: INTERNAL MEDICINE

## 2024-08-26 PROCEDURE — 99214 OFFICE O/P EST MOD 30 MIN: CPT | Performed by: INTERNAL MEDICINE

## 2024-08-26 PROCEDURE — 1123F ACP DISCUSS/DSCN MKR DOCD: CPT | Performed by: INTERNAL MEDICINE

## 2024-08-26 PROCEDURE — 1036F TOBACCO NON-USER: CPT | Performed by: INTERNAL MEDICINE

## 2024-08-26 PROCEDURE — 3075F SYST BP GE 130 - 139MM HG: CPT | Performed by: INTERNAL MEDICINE

## 2024-08-26 NOTE — PROGRESS NOTES
[facility]  Progress Note  MD Lv De La Cruz   YOB: 1950    Date of Visit:  8/26/2024    Assessment/Plan:  1. SOB  Stable  Has had a good summer       2. COPD, moderate (HCC)  Stable  PFT 8/19:  Spirometry:  Flow volume loops were normal. The FEV-1/FVC ratio was normal.   The FEV-1 was 2.79 liters (77% of predicted), which was   moderately decreased. The FVC was 3.72 liters (76% of predicted),   which was decreased. Response to inhaled bronchodilators   (albuterol) was not significant.    Lung volumes:  Lung volumes were tested by plethysmography. The total lung   capacity was 5.82 liters (83% of predicted), which was normal.   The residual volume was 1.86 liters (71% of predicted), which was   decreased. The ratio of residual volume to total lung capacity   (RV/TLC) was 32, which was normal.     Diffusion capacity was found to be mildly decreased.       Interpretation:  Overall normal PFT with only mild decrease in DLCO. Clinical   correlation is recommended.    Pulmonary function testing actually is improved.  Modified barium swallow was also pretty good.  Discussed techniques to protect against future aspiration    Medication Management:  Trelegy ==> Pulmicort and Brovana after his hisptialization ==> Trelegy 200 today  Also has Duoneb HHN now.  Albuterol    3. Centrilobular emphysema (HCC)/Pulmonary Fibrosis  Stable  CT chest 4/24:  FINDINGS:  Mediastinum: The central airways are patent.  There is no hilar or  mediastinal adenopathy.     Lungs/pleura: There has been worsening of moderate bilateral emphysematous  changes with scattered areas of peripheral pulmonary fibrosis and associated  traction bronchiectasis.  This is most predominant within the lung bases.  There is no ground-glass attenuation to reflect active alveolitis.  There is  no acute lobar consolidation, pneumothorax or effusion.     Upper Abdomen: The visualized solid    predniSONE (DELTASONE) 10 MG tablet Days 1-4:  4 tabs PO daily,  Days 5-8:  3 tabs PO daily,  Days 9-11:  2 tabs PO daily,  Days 12-14:  1 tab PO daily, Day 15:  OFF Yes Saji Shore MD   Insulin Pen Needle (B-D UF III MINI PEN NEEDLES) 31G X 5 MM MISC USE AS DIRECTED TO INJECT ONCE DAILY AS DIRECTED Yes Danielson, Esme RANDOLPH MD   traZODone (DESYREL) 100 MG tablet Take 2 tablets by mouth nightly Yes Cortes Patel MD   sertraline (ZOLOFT) 100 MG tablet Take 1.5 tablets by mouth daily Yes Cortes Patel MD   blood glucose test strips (TRUE METRIX BLOOD GLUCOSE TEST) strip USE 1 STRIP TO TEST BLOOD SUGAR TWICE DAILY Yes Danielson, Esme RANDOLPH MD   doxazosin (CARDURA) 4 MG tablet TAKE 1 TABLET BY MOUTH EVERY NIGHT Yes Danielson, Esme RANDOLPH MD   fenofibrate (TRIGLIDE) 160 MG tablet TAKE 1 TABLET BY MOUTH DAILY Yes Danielson, Esme RANDOLPH MD   glimepiride (AMARYL) 4 MG tablet TAKE 2 TABLETS BY MOUTH EVERY MORNING Yes Danielson, Esme RANDOLPH MD   butalbital-acetaminophen-caffeine (FIORICET, ESGIC) -40 MG per tablet Take 1 tablet by mouth every 4 hours as needed for Headaches Yes Danielson, Esme S, MD   [employer] Lancets Thin [facility] test twice daily Yes Danielson, Esme RANDOLPH MD   guaiFENesin (MUCINEX) 600 MG extended release tablet Take 1 tablet by mouth 2 times daily Yes Danielson, Esme RANDOLPH MD   aspirin 81 MG chewable tablet CHEW AND SWALLOW 1 TABLET BY MOUTH DAILY Yes Kurtis Medina MD   metoprolol succinate (TOPROL XL) 25 MG extended release tablet Take 0.5 tablets by mouth daily Yes Ishmael Castaneda APRN - CNP   amLODIPine (NORVASC) 10 MG tablet TAKE 1 TABLET BY MOUTH DAILY Yes Danielson, MD KIMBERLY Mahmood SOLOSTAR 100 UNIT/ML injection pen ADMINISTER 15 UNITS UNDER THE SKIN EVERY NIGHT Yes Danielson, Esme RANDOLPH MD   rosuvastatin (CRESTOR) 20 MG tablet Take 2 tablets by mouth daily Yes Danielson, Esme RANDOLPH MD   acetaminophen (TYLENOL) 325 MG tablet Take 2 tablets by mouth every 6 hours as needed for Pain Yes

## 2024-11-04 RX ORDER — ASPIRIN 81 MG/1
TABLET, CHEWABLE ORAL
Qty: 90 TABLET | Refills: 3 | Status: SHIPPED | OUTPATIENT
Start: 2024-11-04

## 2024-11-25 PROBLEM — J96.21 ACUTE ON CHRONIC RESPIRATORY FAILURE WITH HYPOXIA (HCC): Status: RESOLVED | Noted: 2024-04-29 | Resolved: 2024-11-25

## 2024-11-25 PROBLEM — J44.9 COPD, MODERATE (HCC): Status: RESOLVED | Noted: 2019-07-23 | Resolved: 2024-11-25

## 2024-11-25 PROBLEM — J44.1 COPD WITH ACUTE EXACERBATION (HCC): Status: RESOLVED | Noted: 2024-04-29 | Resolved: 2024-11-25

## 2024-11-25 PROBLEM — J96.11 CHRONIC HYPOXEMIC RESPIRATORY FAILURE: Status: RESOLVED | Noted: 2022-11-16 | Resolved: 2024-11-25

## 2024-11-25 PROBLEM — I48.91 ATRIAL FIBRILLATION WITH RVR (HCC): Status: RESOLVED | Noted: 2023-03-06 | Resolved: 2024-11-25

## 2024-11-25 PROBLEM — J43.2 CENTRILOBULAR EMPHYSEMA (HCC): Status: RESOLVED | Noted: 2023-10-31 | Resolved: 2024-11-25

## 2024-11-25 PROBLEM — J84.10 PULMONARY FIBROSIS (HCC): Status: RESOLVED | Noted: 2024-08-26 | Resolved: 2024-11-25

## 2024-12-19 PROBLEM — F32.1 CURRENT MODERATE EPISODE OF MAJOR DEPRESSIVE DISORDER WITHOUT PRIOR EPISODE (HCC): Status: ACTIVE | Noted: 2024-12-19

## 2024-12-19 PROBLEM — E11.65 TYPE 2 DIABETES MELLITUS WITH HYPERGLYCEMIA, WITH LONG-TERM CURRENT USE OF INSULIN (HCC): Status: ACTIVE | Noted: 2024-12-19

## 2024-12-19 PROBLEM — Z79.4 TYPE 2 DIABETES MELLITUS WITH HYPERGLYCEMIA, WITH LONG-TERM CURRENT USE OF INSULIN (HCC): Status: ACTIVE | Noted: 2024-12-19

## 2025-02-26 ENCOUNTER — OFFICE VISIT (OUTPATIENT)
Dept: PULMONOLOGY | Age: 75
End: 2025-02-26
Payer: MEDICARE

## 2025-02-26 VITALS
SYSTOLIC BLOOD PRESSURE: 118 MMHG | BODY MASS INDEX: 24.3 KG/M2 | OXYGEN SATURATION: 97 % | DIASTOLIC BLOOD PRESSURE: 64 MMHG | WEIGHT: 179.4 LBS | HEART RATE: 80 BPM | HEIGHT: 72 IN

## 2025-02-26 DIAGNOSIS — J43.2 CENTRILOBULAR EMPHYSEMA (HCC): ICD-10-CM

## 2025-02-26 DIAGNOSIS — J84.10 PULMONARY FIBROSIS (HCC): ICD-10-CM

## 2025-02-26 DIAGNOSIS — J44.9 COPD, MODERATE (HCC): ICD-10-CM

## 2025-02-26 DIAGNOSIS — J96.11 CHRONIC HYPOXEMIC RESPIRATORY FAILURE (HCC): Primary | ICD-10-CM

## 2025-02-26 PROCEDURE — 3074F SYST BP LT 130 MM HG: CPT | Performed by: INTERNAL MEDICINE

## 2025-02-26 PROCEDURE — 99214 OFFICE O/P EST MOD 30 MIN: CPT | Performed by: INTERNAL MEDICINE

## 2025-02-26 PROCEDURE — G8420 CALC BMI NORM PARAMETERS: HCPCS | Performed by: INTERNAL MEDICINE

## 2025-02-26 PROCEDURE — 3078F DIAST BP <80 MM HG: CPT | Performed by: INTERNAL MEDICINE

## 2025-02-26 PROCEDURE — 3017F COLORECTAL CA SCREEN DOC REV: CPT | Performed by: INTERNAL MEDICINE

## 2025-02-26 PROCEDURE — G8427 DOCREV CUR MEDS BY ELIG CLIN: HCPCS | Performed by: INTERNAL MEDICINE

## 2025-02-26 PROCEDURE — 3023F SPIROM DOC REV: CPT | Performed by: INTERNAL MEDICINE

## 2025-02-26 PROCEDURE — 1036F TOBACCO NON-USER: CPT | Performed by: INTERNAL MEDICINE

## 2025-02-26 PROCEDURE — 1159F MED LIST DOCD IN RCRD: CPT | Performed by: INTERNAL MEDICINE

## 2025-02-26 PROCEDURE — 1123F ACP DISCUSS/DSCN MKR DOCD: CPT | Performed by: INTERNAL MEDICINE

## 2025-02-26 PROCEDURE — 1160F RVW MEDS BY RX/DR IN RCRD: CPT | Performed by: INTERNAL MEDICINE

## 2025-02-26 NOTE — PROGRESS NOTES
Yes ProviderJed MD   Blood Glucose Monitoring Suppl (PRECISION XTRA) w/Device KIT As directed Yes Esme Danielson MD   Blood Glucose Monitoring Suppl (ONE TOUCH ULTRA 2) W/DEVICE KIT 1 kit by Does not apply route daily as needed. Yes Esme Danielson MD   fish oil-omega-3 fatty acids 1000 MG capsule Take 2 capsules by mouth daily Yes ProviderJed MD       Vitals:    25 1112   BP: 118/64   Site: Left Upper Arm   Position: Sitting   Cuff Size: Medium Adult   Pulse: 80   SpO2: 97%   Weight: 81.4 kg (179 lb 6.4 oz)   Height: 1.829 m (6')       Body mass index is 24.33 kg/m².     Wt Readings from Last 3 Encounters:   25 81.4 kg (179 lb 6.4 oz)   24 81.2 kg (179 lb)   24 79.6 kg (175 lb 6.4 oz)     BP Readings from Last 3 Encounters:   25 118/64   24 138/70   24 138/80        Social History     Tobacco Use   Smoking Status Former    Current packs/day: 0.00    Average packs/day: 1 pack/day for 20.0 years (20.0 ttl pk-yrs)    Types: Cigarettes    Start date: 3/3/1972    Quit date: 3/3/1992    Years since quittin.0    Passive exposure: Past   Smokeless Tobacco Never

## 2025-04-05 DIAGNOSIS — J44.9 COPD, MODERATE (HCC): Primary | ICD-10-CM

## 2025-04-07 RX ORDER — FLUTICASONE FUROATE, UMECLIDINIUM BROMIDE AND VILANTEROL TRIFENATATE 200; 62.5; 25 UG/1; UG/1; UG/1
1 POWDER RESPIRATORY (INHALATION) DAILY
Qty: 180 EACH | Refills: 3 | Status: SHIPPED | OUTPATIENT
Start: 2025-04-07

## 2025-05-15 ENCOUNTER — TELEPHONE (OUTPATIENT)
Dept: PULMONOLOGY | Age: 75
End: 2025-05-15

## 2025-05-15 NOTE — TELEPHONE ENCOUNTER
Faxed [facility] to a request to send CT chest 5/14/25 images through [employer] PACS.

## 2025-05-15 NOTE — TELEPHONE ENCOUNTER
Patient called stating that he had imaging done on yesterday at [facility] , and there was a suspicious area on the image. His daughter advised him to follow up with his pulmonologist on this matter. I made him aware that I would put message in for images to be reviewed, and will follow-up with him after ; letting him know next steps.        PH: [phone]

## 2025-05-23 ENCOUNTER — OFFICE VISIT (OUTPATIENT)
Dept: PULMONOLOGY | Age: 75
End: 2025-05-23

## 2025-05-23 VITALS
DIASTOLIC BLOOD PRESSURE: 80 MMHG | SYSTOLIC BLOOD PRESSURE: 130 MMHG | OXYGEN SATURATION: 98 % | BODY MASS INDEX: 21.75 KG/M2 | WEIGHT: 160.6 LBS | HEART RATE: 95 BPM | HEIGHT: 72 IN

## 2025-05-23 DIAGNOSIS — R91.8 PULMONARY INFILTRATE: Primary | ICD-10-CM

## 2025-05-23 DIAGNOSIS — R91.1 PULMONARY NODULE: ICD-10-CM

## 2025-05-23 DIAGNOSIS — J43.2 CENTRILOBULAR EMPHYSEMA (HCC): ICD-10-CM

## 2025-05-23 DIAGNOSIS — J84.10 PULMONARY FIBROSIS (HCC): ICD-10-CM

## 2025-05-23 DIAGNOSIS — J44.9 COPD, MODERATE (HCC): ICD-10-CM

## 2025-05-23 RX ORDER — PREDNISONE 10 MG/1
TABLET ORAL
Qty: 30 TABLET | Refills: 0 | Status: SHIPPED | OUTPATIENT
Start: 2025-05-23 | End: 2025-06-02

## 2025-05-23 NOTE — PROGRESS NOTES
[facility]  Progress Note  MD Lv De La Cruz   YOB: 1950    Date of Visit:  5/23/2025    Assessment/Plan:  1. Pulmonary infiltrate.  New      I reviewed imaging from [facility] and compared to previous imaging done here. He now has RUL nodularity/infiltrate that is new.     This has an inflammatory/infectious appearance though I cannot excluded malignancy.    Discussed options including empiric Abx vs immediate bronchoscopy/BAL/Bx    We decided to give Abx and repeat short order CT chest       2. COPD, moderate (HCC)  Stable  PFT 8/19:  Spirometry:  Flow volume loops were normal. The FEV-1/FVC ratio was normal.   The FEV-1 was 2.79 liters (77% of predicted), which was   moderately decreased. The FVC was 3.72 liters (76% of predicted),   which was decreased. Response to inhaled bronchodilators   (albuterol) was not significant.    Lung volumes:  Lung volumes were tested by plethysmography. The total lung   capacity was 5.82 liters (83% of predicted), which was normal.   The residual volume was 1.86 liters (71% of predicted), which was   decreased. The ratio of residual volume to total lung capacity   (RV/TLC) was 32, which was normal.     Diffusion capacity was found to be mildly decreased.       Interpretation:  Overall normal PFT with only mild decrease in DLCO. Clinical   correlation is recommended.    Pulmonary function testing actually is improved.  Modified barium swallow was also pretty good.  Discussed techniques to protect against future aspiration    Medication Management:  Trelegy ==> Pulmicort and Brovana after his hisptialization ==> Trelegy 200 today  Also has Duoneb HHN now.  Albuterol    Get him a handicap Placard    3. Centrilobular emphysema (HCC)/Pulmonary Fibrosis  Stable  CT chest 4/24:  FINDINGS:  Mediastinum: The central airways are patent.  There is no hilar or  mediastinal adenopathy.     Lungs/pleura: There

## 2025-05-29 NOTE — TELEPHONE ENCOUNTER
Patient is currently taking 1/2 of a 100mg. Patient wants to know if he could stop the zoloft. He says if you are concerned about the zoloft he will quit that. I told him not to do anything until I spoke to you. no

## 2025-06-26 ENCOUNTER — HOSPITAL ENCOUNTER (OUTPATIENT)
Dept: CT IMAGING | Age: 75
Discharge: HOME | End: 2025-06-26
Attending: INTERNAL MEDICINE
Payer: MEDICARE

## 2025-06-26 DIAGNOSIS — R91.1 PULMONARY NODULE: ICD-10-CM

## 2025-06-26 DIAGNOSIS — R91.8 PULMONARY INFILTRATE: ICD-10-CM

## 2025-06-26 PROCEDURE — 71250 CT THORAX DX C-: CPT

## 2025-07-01 ENCOUNTER — RESULTS FOLLOW-UP (OUTPATIENT)
Dept: PULMONOLOGY | Age: 75
End: 2025-07-01

## 2025-08-27 ENCOUNTER — OFFICE VISIT (OUTPATIENT)
Dept: PULMONOLOGY | Age: 75
End: 2025-08-27
Payer: MEDICARE

## 2025-08-27 VITALS
SYSTOLIC BLOOD PRESSURE: 128 MMHG | BODY MASS INDEX: 22.46 KG/M2 | HEART RATE: 72 BPM | HEIGHT: 72 IN | OXYGEN SATURATION: 97 % | DIASTOLIC BLOOD PRESSURE: 76 MMHG | WEIGHT: 165.8 LBS

## 2025-08-27 DIAGNOSIS — J96.11 CHRONIC HYPOXEMIC RESPIRATORY FAILURE (HCC): ICD-10-CM

## 2025-08-27 DIAGNOSIS — J84.10 PULMONARY FIBROSIS (HCC): ICD-10-CM

## 2025-08-27 DIAGNOSIS — J43.2 CENTRILOBULAR EMPHYSEMA (HCC): ICD-10-CM

## 2025-08-27 DIAGNOSIS — J44.9 COPD, MODERATE (HCC): Primary | ICD-10-CM

## 2025-08-27 PROCEDURE — 1159F MED LIST DOCD IN RCRD: CPT | Performed by: INTERNAL MEDICINE

## 2025-08-27 PROCEDURE — 3074F SYST BP LT 130 MM HG: CPT | Performed by: INTERNAL MEDICINE

## 2025-08-27 PROCEDURE — G8427 DOCREV CUR MEDS BY ELIG CLIN: HCPCS | Performed by: INTERNAL MEDICINE

## 2025-08-27 PROCEDURE — 1160F RVW MEDS BY RX/DR IN RCRD: CPT | Performed by: INTERNAL MEDICINE

## 2025-08-27 PROCEDURE — G2211 COMPLEX E/M VISIT ADD ON: HCPCS | Performed by: INTERNAL MEDICINE

## 2025-08-27 PROCEDURE — 3078F DIAST BP <80 MM HG: CPT | Performed by: INTERNAL MEDICINE

## 2025-08-27 PROCEDURE — 3017F COLORECTAL CA SCREEN DOC REV: CPT | Performed by: INTERNAL MEDICINE

## 2025-08-27 PROCEDURE — 1123F ACP DISCUSS/DSCN MKR DOCD: CPT | Performed by: INTERNAL MEDICINE

## 2025-08-27 PROCEDURE — 99214 OFFICE O/P EST MOD 30 MIN: CPT | Performed by: INTERNAL MEDICINE

## 2025-08-27 PROCEDURE — 1036F TOBACCO NON-USER: CPT | Performed by: INTERNAL MEDICINE

## 2025-08-27 PROCEDURE — G8420 CALC BMI NORM PARAMETERS: HCPCS | Performed by: INTERNAL MEDICINE

## 2025-08-27 PROCEDURE — 3023F SPIROM DOC REV: CPT | Performed by: INTERNAL MEDICINE

## (undated) DEVICE — RETRACTOR SURG INSRT SUT HLD OCTOBASE

## (undated) DEVICE — SUTURE S STL SZ 6 L18IN NONABSORBABLE SIL L48MM CCS 1/2 CIR M654G

## (undated) DEVICE — BASIC SINGLE BASIN 1-LF: Brand: MEDLINE INDUSTRIES, INC.

## (undated) DEVICE — SYRINGE, LUER LOCK, 10ML: Brand: MEDLINE

## (undated) DEVICE — ELECTRODE PT RET AD L9FT HI MOIST COND ADH HYDRGEL CORDED

## (undated) DEVICE — BLADE RETRCT 3 SH FNGR ASST

## (undated) DEVICE — TTL1LYR 16FR10ML 100%SIL TMPST TR: Brand: MEDLINE

## (undated) DEVICE — BLADE ES L4IN INSUL EDGE

## (undated) DEVICE — NEEDLE HYPO 20GA L1.5IN YEL POLYPR HUB S STL REG BVL STR

## (undated) DEVICE — SUTURE VCRL SZ 3-0 L27IN ABSRB UD L26MM SH 1/2 CIR J416H

## (undated) DEVICE — DRESSING GERM DIA1IN CNTR H DIA7MM BLU CHG ANTIMIC PROTCT

## (undated) DEVICE — CATH CTR VEN 3LUM INTRLNK INJ 9FRX11CM

## (undated) DEVICE — TOWEL 26X17IN 2 PER PACK COTTON DELINTED

## (undated) DEVICE — PLEDGET SURG W0.375XL0.062IN THK1.5MM WHT SFT PTFE RECT

## (undated) DEVICE — GOWN SIRUS NONREIN XL W/TWL: Brand: MEDLINE INDUSTRIES, INC.

## (undated) DEVICE — KIT ART LN 20GA L12CM FEP RADPQ 0.025X13.75IN SPR GWIRE

## (undated) DEVICE — AORTIC PNCH 4.8MM 8IN BX 10 DISP

## (undated) DEVICE — SUTURE VCRL SZ 0 L27IN ABSRB UD L36MM CT-1 1/2 CIR J260H

## (undated) DEVICE — SUTURE PERMAHAND SZ 3-0 L30IN NONABSORBABLE BLK SH L26MM K832H

## (undated) DEVICE — INTENDED FOR TISSUE SEPARATION, AND OTHER PROCEDURES THAT REQUIRE A SHARP SURGICAL BLADE TO PUNCTURE OR CUT.: Brand: BARD-PARKER ® STAINLESS STEEL BLADES

## (undated) DEVICE — SUTURE ETHBND EXCEL SZ 0 L18IN NONABSORBABLE GRN L36MM CT-1 CX21D

## (undated) DEVICE — SUTURE NONABSORBABLE MONOFILAMENT 5-0 C-1 1X24 IN PROLENE 8725H

## (undated) DEVICE — HYPODERMIC SAFETY NEEDLE: Brand: MAGELLAN

## (undated) DEVICE — FAIRFIELD CABG ACCESSARY PK

## (undated) DEVICE — SUTURE VCRL SZ 2-0 L36IN ABSRB UD L36MM CT-1 1/2 CIR J945H

## (undated) DEVICE — 3M™ BAIR HUGGER® CARDIAC ACCESS BLANKET, 5 PER CASE 63000: Brand: BAIR HUGGER™

## (undated) DEVICE — ADHESIVE SKIN CLOSURE TOP 36 CC HI VISC DERMBND MINI

## (undated) DEVICE — PRESSURE MONITORING SET: Brand: TRUWAVE, VAMP PLUS

## (undated) DEVICE — CATHETER THOR 32FR L23IN PVC 5 EYELET STR ATRAUM

## (undated) DEVICE — STERNUM BLADE, OFFSET (31.7 X 0.64 X 6.3MM)

## (undated) DEVICE — LABEL MED CUST CVR

## (undated) DEVICE — APPLICATOR PREP 26ML 0.7% IOD POVACRYLEX 74% ISO ALC ST

## (undated) DEVICE — PROVE COVER: Brand: UNBRANDED

## (undated) DEVICE — BANDAGE ADH W4XL13 3 4IN POSTOP DSG PRIMAPORE

## (undated) DEVICE — COVER,MAYO STAND,XL,STERILE: Brand: MEDLINE

## (undated) DEVICE — SUTURE PROL SZ 4-0 L36IN NONABSORBABLE BLU L26MM SH 1/2 CIR 8521H

## (undated) DEVICE — SUTURE PERMAHAND SZ 2-0 L30IN NONABSORBABLE BLK SILK W/O A305H

## (undated) DEVICE — ADHESIVE SKIN CLSR 0.7ML TOP DERMBND ADV

## (undated) DEVICE — [HIGH FLOW INSUFFLATOR,  DO NOT USE IF PACKAGE IS DAMAGED,  KEEP DRY,  KEEP AWAY FROM SUNLIGHT,  PROTECT FROM HEAT AND RADIOACTIVE SOURCES.]: Brand: PNEUMOSURE

## (undated) DEVICE — APPLICATOR MEDICATED 3 CC SOLUTION CLR STRL CHLORAPREP

## (undated) DEVICE — LEAD PACEMKR MYOCARDIAL UNIPOLAR TEMP

## (undated) DEVICE — SUTURE NONABSORBABLE MONOFILAMENT 6-0 C-1 1X30 IN PROLENE 8706H

## (undated) DEVICE — CATHETER THOR 32FR L23IN PVC 6 EYELET STR ATRAUM

## (undated) DEVICE — DRESSING TRNSPAR W FAB BORD SORBAVIEW ULT 475X425IN

## (undated) DEVICE — 12 FOOT DISPOSABLE EXTENSION CABLE WITH SAFE CONNECT / SCREW-DOWN

## (undated) DEVICE — GLOVE ORANGE PI 7 1/2   MSG9075

## (undated) DEVICE — TUBING, SUCTION, 1/4" X 10', STRAIGHT: Brand: MEDLINE

## (undated) DEVICE — WAX SURG 2.5GM HEMSTAT BNE BEESWAX PARAFFIN ISO PALMITATE

## (undated) DEVICE — BANDAGE COMPR W6INXL10YD ST M E WHITE/BEIGE

## (undated) DEVICE — GLOVE SURG SZ 75 L12IN FNGR THK94MIL STD WHT LTX FREE

## (undated) DEVICE — EVERGRIP INSERT SET 86MM: Brand: FOGARTY EVERGRIP

## (undated) DEVICE — SURGICAL PROCEDURE PACK PROC SMARTPREP 2

## (undated) DEVICE — BLADE OPHTH KNF D3MM 15DEG CATRCT BLU MICRO-SHARP

## (undated) DEVICE — SET ADMIN PRIMING 67ML L105IN NVENT 180UM FLTR 3 RLER CLMP

## (undated) DEVICE — MERCY FAIRFIELD TURNOVER KIT: Brand: MEDLINE INDUSTRIES, INC.

## (undated) DEVICE — CONTAINER STOR SURG SLUSH

## (undated) DEVICE — 11.5F X 20CM SOFT-LINE® DOUBLE LUMEN CATHETER TRAY: Brand: SOFT-LINE® DUO-FLOW®

## (undated) DEVICE — BANDAGE COMPR ELASTIC 5 YDX4 IN LT

## (undated) DEVICE — GAUZE,SPONGE,4"X4",8PLY,STRL,LF,10/TRAY: Brand: MEDLINE

## (undated) DEVICE — SYSTEM ENDOSCP VES HARV W/ TOOL CANN SEAL SHT PRT BLNT TIP

## (undated) DEVICE — DECANTER FLD 9IN ST BG FOR ASEP TRNSF OF FLD

## (undated) DEVICE — NEEDLE HYPO 27GA L0.5IN GRY POLYPR HUB S STL REG BVL STR

## (undated) DEVICE — DRESSING WND SM W2.5XL4IN BRTH W/ CATH SECUREMENT AND

## (undated) DEVICE — DRAPE THER FLUID WARMING 66X44 IN FLAT SLUSH DBL DISC ORS

## (undated) DEVICE — DRAIN SURG SGL COLL PT TB FOR ATS BG OASIS

## (undated) DEVICE — SUTURE PERMA-HAND SZ 4-0 L144IN NONABSORBABLE BLK LIGAPAK LA53G

## (undated) DEVICE — SUTURE PROL SZ 3-0 L36IN NONABSORBABLE BLU L26MM SH 1/2 CIR 8522H

## (undated) DEVICE — SUTURE SZ 7 L18IN NONABSORBABLE SIL CCS L48MM 1/2 CIR STRNM M655G

## (undated) DEVICE — SUTURE ETHBND EXCEL SZ 2-0 L36IN NONABSORBABLE GRN L26MM SH X523H

## (undated) DEVICE — BLADE OPHTH 180DEG CUT SURF BLU STR SHRP DBL BVL GRINDLESS

## (undated) DEVICE — SUTURE VCRL SZ 4-0 L18IN ABSRB UD L19MM PS-2 3/8 CIR PRIM J496H

## (undated) DEVICE — SCANLAN® VASCU-STATT® II SINGLE-USE BULLDOG CLAMP W/FIRMER CLAMPING PRESS - MIDI ANGLED 45° (YELLOW), CLAMPING PRESSURE 75-80 G (2/STERILE PKG): Brand: SCANLAN® VASCU-STATT® II SINGLE-USE BULLDOG CLAMP W/FIRMER CLAMPING PRESS

## (undated) DEVICE — SWAN-GANZ CCOMBO V THERMODILUTION CATHETER: Brand: SWAN-GANZ CCOMBO V

## (undated) DEVICE — LEAD PACE 2.6FR L50CM UPLR TEMP ATR NONSTEROID ELUT PIN

## (undated) DEVICE — SUTURE NONABSORBABLE MONOFILAMENT 7-0 BV-1 1X24 IN PROLENE 8702H

## (undated) DEVICE — SUTURE NONABSORBABLE MONOFILAMENT 4-0 RB-1 36 IN BLU PROLENE 8557H

## (undated) DEVICE — OPEN HRT BASIC PK

## (undated) DEVICE — 3M™ TEGADERM™ TRANSPARENT FILM DRESSING FRAME STYLE, 1626W, 4 IN X 4-3/4 IN (10 CM X 12 CM), 50/CT 4CT/CASE: Brand: 3M™ TEGADERM™